# Patient Record
Sex: FEMALE | Race: WHITE | NOT HISPANIC OR LATINO | Employment: FULL TIME | URBAN - METROPOLITAN AREA
[De-identification: names, ages, dates, MRNs, and addresses within clinical notes are randomized per-mention and may not be internally consistent; named-entity substitution may affect disease eponyms.]

---

## 2017-01-14 DIAGNOSIS — G62.9 PERIPHERAL POLYNEUROPATHY: ICD-10-CM

## 2017-01-15 RX ORDER — FUROSEMIDE 20 MG/1
TABLET ORAL
Qty: 30 TABLET | Refills: 4 | Status: SHIPPED | OUTPATIENT
Start: 2017-01-15 | End: 2017-05-20 | Stop reason: SDUPTHER

## 2017-01-16 ENCOUNTER — TELEPHONE (OUTPATIENT)
Dept: INTERNAL MEDICINE | Facility: CLINIC | Age: 52
End: 2017-01-16

## 2017-01-16 ENCOUNTER — TELEPHONE (OUTPATIENT)
Dept: ENDOCRINOLOGY | Facility: CLINIC | Age: 52
End: 2017-01-16

## 2017-01-16 RX ORDER — GABAPENTIN 800 MG/1
TABLET ORAL
Qty: 120 TABLET | Refills: 3 | Status: SHIPPED | OUTPATIENT
Start: 2017-01-16 | End: 2017-03-02 | Stop reason: SDUPTHER

## 2017-01-16 NOTE — TELEPHONE ENCOUNTER
----- Message from Blu Mallory sent at 1/16/2017 12:46 PM CST -----  Contact: same  Patient called in and requested a refill on her Gabapentin 800 mgs. Patient is completed out.      [id number] - [place] - [address] [facility] [facility] [address] [facility] [address] [place]  [address] [facility] [address]  Phone: [phone] Fax: [phone]    Patient call back number is [phone]

## 2017-01-18 DIAGNOSIS — G62.9 PERIPHERAL POLYNEUROPATHY: ICD-10-CM

## 2017-01-18 RX ORDER — GABAPENTIN 800 MG/1
TABLET ORAL
Qty: 120 TABLET | Refills: 3 | OUTPATIENT
Start: 2017-01-18

## 2017-01-23 RX ORDER — FLUOXETINE HYDROCHLORIDE 40 MG/1
CAPSULE ORAL
Qty: 30 CAPSULE | Refills: 4 | Status: SHIPPED | OUTPATIENT
Start: 2017-01-23 | End: 2017-03-02 | Stop reason: SDUPTHER

## 2017-02-13 ENCOUNTER — PATIENT MESSAGE (OUTPATIENT)
Dept: ENDOCRINOLOGY | Facility: CLINIC | Age: 52
End: 2017-02-13

## 2017-02-13 DIAGNOSIS — E11.9 TYPE 2 DIABETES MELLITUS WITHOUT COMPLICATION: ICD-10-CM

## 2017-02-14 DIAGNOSIS — E11.9 TYPE 2 DIABETES MELLITUS WITHOUT COMPLICATION, UNSPECIFIED LONG TERM INSULIN USE STATUS: ICD-10-CM

## 2017-02-14 RX ORDER — LANCETS
1 EACH MISCELLANEOUS 3 TIMES DAILY
Qty: 100 EACH | Refills: 3 | OUTPATIENT
Start: 2017-02-14

## 2017-02-14 NOTE — TELEPHONE ENCOUNTER
----- Message from Michela Chin sent at 2/14/2017  1:09 PM CST -----  Contact: Patient  Mary Ann, patient [phone]. Patient is calling about a refill on Rx Bydurion. Patient requested to have a refill and was told it is denied. Please advise as to why and what she can do to correct. Patient is scheduled for 3/2/17 appointment. Thanks.

## 2017-02-17 RX ORDER — NAPROXEN SODIUM 550 MG/1
TABLET ORAL
Qty: 60 TABLET | Refills: 5 | Status: SHIPPED | OUTPATIENT
Start: 2017-02-17 | End: 2017-08-16 | Stop reason: SDUPTHER

## 2017-02-20 RX ORDER — ALPRAZOLAM 0.5 MG/1
TABLET ORAL
Qty: 30 TABLET | Refills: 1 | Status: SHIPPED | OUTPATIENT
Start: 2017-02-20 | End: 2017-04-24 | Stop reason: SDUPTHER

## 2017-02-21 ENCOUNTER — TELEPHONE (OUTPATIENT)
Dept: ENDOCRINOLOGY | Facility: CLINIC | Age: 52
End: 2017-02-21

## 2017-02-21 DIAGNOSIS — E11.8 TYPE 2 DIABETES MELLITUS WITH COMPLICATION, UNSPECIFIED LONG TERM INSULIN USE STATUS: Primary | ICD-10-CM

## 2017-02-21 NOTE — TELEPHONE ENCOUNTER
Called pt, informed her of fasting labs scheduled for Thursday as requested.  Pt verbalized understanding.

## 2017-03-02 ENCOUNTER — TELEPHONE (OUTPATIENT)
Dept: ENDOCRINOLOGY | Facility: CLINIC | Age: 52
End: 2017-03-02

## 2017-03-02 ENCOUNTER — OFFICE VISIT (OUTPATIENT)
Dept: ENDOCRINOLOGY | Facility: CLINIC | Age: 52
End: 2017-03-02
Payer: COMMERCIAL

## 2017-03-02 VITALS
HEIGHT: 64 IN | SYSTOLIC BLOOD PRESSURE: 118 MMHG | HEART RATE: 101 BPM | BODY MASS INDEX: 48.67 KG/M2 | DIASTOLIC BLOOD PRESSURE: 78 MMHG | WEIGHT: 285.06 LBS

## 2017-03-02 DIAGNOSIS — G62.9 PERIPHERAL POLYNEUROPATHY: ICD-10-CM

## 2017-03-02 DIAGNOSIS — E78.5 DYSLIPIDEMIA: ICD-10-CM

## 2017-03-02 DIAGNOSIS — E11.9 TYPE 2 DIABETES MELLITUS WITHOUT COMPLICATION, WITH LONG-TERM CURRENT USE OF INSULIN: ICD-10-CM

## 2017-03-02 DIAGNOSIS — E66.01 MORBID OBESITY, UNSPECIFIED OBESITY TYPE: ICD-10-CM

## 2017-03-02 DIAGNOSIS — Z79.4 TYPE 2 DIABETES MELLITUS WITHOUT COMPLICATION, WITH LONG-TERM CURRENT USE OF INSULIN: ICD-10-CM

## 2017-03-02 DIAGNOSIS — E11.9 TYPE 2 DIABETES MELLITUS WITHOUT COMPLICATION, WITHOUT LONG-TERM CURRENT USE OF INSULIN: ICD-10-CM

## 2017-03-02 DIAGNOSIS — R25.1 OCCASIONAL TREMORS: ICD-10-CM

## 2017-03-02 DIAGNOSIS — R25.1 TREMORS OF NERVOUS SYSTEM: ICD-10-CM

## 2017-03-02 DIAGNOSIS — I10 HTN (HYPERTENSION), BENIGN: ICD-10-CM

## 2017-03-02 PROCEDURE — 99214 OFFICE O/P EST MOD 30 MIN: CPT | Mod: S$GLB,,, | Performed by: NURSE PRACTITIONER

## 2017-03-02 PROCEDURE — 4010F ACE/ARB THERAPY RXD/TAKEN: CPT | Mod: S$GLB,,, | Performed by: NURSE PRACTITIONER

## 2017-03-02 PROCEDURE — 3046F HEMOGLOBIN A1C LEVEL >9.0%: CPT | Mod: S$GLB,,, | Performed by: NURSE PRACTITIONER

## 2017-03-02 PROCEDURE — 2022F DILAT RTA XM EVC RTNOPTHY: CPT | Mod: S$GLB,,, | Performed by: NURSE PRACTITIONER

## 2017-03-02 PROCEDURE — 99999 PR PBB SHADOW E&M-EST. PATIENT-LVL IV: CPT | Mod: PBBFAC,,, | Performed by: NURSE PRACTITIONER

## 2017-03-02 PROCEDURE — 3078F DIAST BP <80 MM HG: CPT | Mod: S$GLB,,, | Performed by: NURSE PRACTITIONER

## 2017-03-02 PROCEDURE — 1160F RVW MEDS BY RX/DR IN RCRD: CPT | Mod: S$GLB,,, | Performed by: NURSE PRACTITIONER

## 2017-03-02 PROCEDURE — 3074F SYST BP LT 130 MM HG: CPT | Mod: S$GLB,,, | Performed by: NURSE PRACTITIONER

## 2017-03-02 RX ORDER — DEXTROSE 4 G
1 TABLET,CHEWABLE ORAL 2 TIMES DAILY
Qty: 1 EACH | Refills: 1 | Status: CANCELLED | OUTPATIENT
Start: 2017-03-02 | End: 2017-05-31

## 2017-03-02 RX ORDER — DEXTROSE 4 G
TABLET,CHEWABLE ORAL
Qty: 50 EACH | Refills: 12 | Status: SHIPPED | OUTPATIENT
Start: 2017-03-02 | End: 2018-09-07

## 2017-03-02 RX ORDER — GABAPENTIN 800 MG/1
TABLET ORAL
Qty: 120 TABLET | Refills: 12 | Status: SHIPPED | OUTPATIENT
Start: 2017-03-02 | End: 2017-04-24 | Stop reason: SDUPTHER

## 2017-03-02 RX ORDER — METFORMIN HYDROCHLORIDE 1000 MG/1
1000 TABLET ORAL 2 TIMES DAILY WITH MEALS
Qty: 60 TABLET | Refills: 12 | Status: SHIPPED | OUTPATIENT
Start: 2017-03-02 | End: 2018-02-05 | Stop reason: SDUPTHER

## 2017-03-02 RX ORDER — GLIMEPIRIDE 4 MG/1
TABLET ORAL
Qty: 30 TABLET | Refills: 12 | Status: SHIPPED | OUTPATIENT
Start: 2017-03-02 | End: 2017-07-28 | Stop reason: SDUPTHER

## 2017-03-02 NOTE — PROGRESS NOTES
Subjective:      Patient ID: Mary Ann Vidales is a 52 y.o. female.    Chief Complaint:  Diabetes f/u      History of Present Illness  Pt is a 50 y/o female with TYPE 2 DM since approx 2000, and chronic conditions pending review including HTN, neuropathy. Dyslipidemia, morbid obesity.      Interim Events:  Pt not seen in > 1 year. Back for refills.  Only sporadically checking glucoses and reports 140's.  Had recent labs for life insurance in Jan 2017. A1C 7%, Chem WNL.  Trigs 250's.  Otherwise grossly abnormal. Has been out of Bydureon for last 2 weeks.     Diabetes Flow Sheet:  Diabetes Medications:  Metformin 1000 BID,  glimipiride 4 mg,  Bydureon 2 mg q weekly.    Diabetes Complications:peripheral neuropathy   Aspirin:   Statin: none   ACE/ARB:losartan hctz 100/25 qd.   Last Urine Microalbumin:   Last Eye exam: 10/16Last Diabetic Education:   Glucometer--Accucheck Verio     Review of Systems   Constitutional: Positive for fatigue. Negative for activity change.   HENT: Negative for hearing loss and trouble swallowing.    Eyes: Negative for visual disturbance.        Reports retinopathy--last exam Jan 2014   Respiratory: Negative for cough and shortness of breath.    Cardiovascular: Positive for leg swelling. Negative for chest pain.   Gastrointestinal: Negative for constipation and diarrhea.   Genitourinary: Negative for difficulty urinating and frequency.   Musculoskeletal: Positive for arthralgias. Negative for myalgias.        C/o heel pain,  > in morning   Skin: Negative for rash and wound.   Neurological: Positive for numbness (feet and hands, hands > becoming more troublesome,  hand tremors which are worsening ). Negative for weakness and light-headedness.   Hematological: Does not bruise/bleed easily.   Psychiatric/Behavioral: Positive for sleep disturbance. Negative for agitation (per partners report) and decreased concentration. The patient is not nervous/anxious.        Objective:   Physical Exam    Constitutional: She is oriented to person, place, and time. She appears well-developed and well-nourished.   HENT:   Head: Normocephalic and atraumatic.   Right Ear: External ear normal.   Left Ear: External ear normal.   Nose: Nose normal.   Eyes: Conjunctivae and EOM are normal. Pupils are equal, round, and reactive to light.   Neck: Normal range of motion. Neck supple. No tracheal deviation present. No thyromegaly present.   Cardiovascular: Normal rate, regular rhythm, normal heart sounds and intact distal pulses.    Pulmonary/Chest: Effort normal and breath sounds normal. No respiratory distress.   Musculoskeletal: Normal range of motion. She exhibits no edema.   Feet: no open wounds or calluses.    Pedal pulses + 1 bilaterally  VIbratory sensation to feet scantly decreased bilaterally    Neurological: She is alert and oriented to person, place, and time. She has normal reflexes.   Skin: Skin is warm and dry.   Psychiatric: She has a normal mood and affect. Her behavior is normal. Judgment and thought content normal.       Lab Review:   Hemoglobin A1C   Date Value Ref Range Status   10/29/2015 7.7 (H) 4.5 - 6.2 % Final   08/05/2014 6.8 (H) 4.5 - 6.2 % Final   01/03/2014 6.3 (H) 4.5 - 6.2 % Final     Lab Results   Component Value Date    LDLCALC 115.4 10/29/2015     Lab Results   Component Value Date    TSH 2.098 10/29/2015       Chemistry        Component Value Date/Time     10/29/2015 0810    K 4.6 10/29/2015 0810     10/29/2015 0810    CO2 31 (H) 10/29/2015 0810    BUN 17 10/29/2015 0810    CREATININE 1.0 10/29/2015 0810     (H) 10/29/2015 0810        Component Value Date/Time    CALCIUM 10.1 10/29/2015 0810    ALKPHOS 83 10/29/2015 0810    AST 40 10/29/2015 0810    ALT 48 (H) 10/29/2015 0810    BILITOT 0.6 10/29/2015 0810            Assessment:     1. Diabetes mellitus with neurological manifestations, uncontrolled  blood sugar diagnostic Strp    Lipid panel    Hemoglobin A1c     Comprehensive metabolic panel    Microalbumin/creatinine urine ratio     DIABETES FOOT EXAM  Chronic-stable-needs to increase SBGM    2. Type 2 diabetes mellitus without complication, with long-term current use of insulin  glimepiride (AMARYL) 4 MG tablet    metformin (GLUCOPHAGE) 1000 MG tablet   3. Peripheral polyneuropathy  gabapentin (NEURONTIN) 800 MG tablet   4. Tremors of nervous system  --chronic-worseni g-refer to neuro -   5. HTN (hypertension), benign  -chronic-stable-cont arb-though may be causing cough   6. Dyslipidemia  -chronic-reevaluate-has had financial issues with multi drugs    7. Morbid obesity, unspecified obesity type  -chronic-enc diet and exercise.    8. Type 2 diabetes mellitus without complication, without long-term current use of insulin  exenatide microspheres (BYDUREON) 2 mg SSRR   9. Occasional tremors  Ambulatory referral to Neurology           Plan:       ORDERS  3/2/17    Cons neurology-hand tremors --> 18 mo and worsening.   6 mo  With fasting cmp,  Lipids, a1c, urine m/c prior.

## 2017-03-02 NOTE — MR AVS SNAPSHOT
[facility]  [address] [facility] [address] [place] [address]  Phone: [phone]  Fax: [phone]                  Mary Ann VOGT Sobeida   3/2/2017 7:30 AM   Office Visit    Description:  Female : 1965   Provider:  NICOLE Bejarano,ANTONINAC   Department:  [facility] - Endocrinology           Diagnoses this Visit        Comments    Diabetes mellitus with neurological manifestations, uncontrolled    -  Primary     Peripheral polyneuropathy         HTN (hypertension), benign         Dyslipidemia         Morbid obesity, unspecified obesity type         Tremors of nervous system         Type 2 diabetes mellitus without complication, with long-term current use of insulin         Type 2 diabetes mellitus without complication, without long-term current use of insulin         Occasional tremors                To Do List           Future Appointments        Provider Department Dept Phone    2017 8:00 AM LAB, [place] [facility] [phone]    2017 8:15 AM URINE [facility] [phone]    2017 9:00 AM NICOLE Bejarano,ANP-C [place] [phone]      Goals (5 Years of Data)     None       These Medications        Disp Refills Start End    glimepiride (AMARYL) 4 MG tablet 30 tablet 12 3/2/2017     TAKE 1 TABLET (4 MG TOTAL) BY MOUTH BEFORE BREAKFAST.    Pharmacy: [id number] [address] AT [facility] Ph #: [phone]       exenatide microspheres (BYDUREON) 2 mg SSRR 4 each 12 3/2/2017     Inject 2 mg into the skin once a week. - Subcutaneous    Pharmacy: [facility][id number] [address] AT [facility] Ph #: [phone]       gabapentin (NEURONTIN) 800 MG tablet 120 tablet 12 3/2/2017     TAKE 2 TABLETS (1,600 MG TOTAL) BY MOUTH 2 (TWO) TIMES DAILY.    Pharmacy: [facility] #[id number] [facility] [address] AT [facility]  "[facility] Ph #: [phone]       metformin (GLUCOPHAGE) 1000 MG tablet 60 tablet 12 3/2/2017     Take 1 tablet (1,000 mg total) by mouth 2 (two) times daily with meals. - Oral    Pharmacy: [facility]/pharmacy #[id number] [address] AT [facility] Ph #: [phone]       blood sugar diagnostic Strp 50 strip 12 3/2/2017     One touch verio test strips and lancets. Pt to check glucose 1-2 x a day.    Pharmacy: [facility]/pharmacy #[id number] [address]. AT [facility] Ph #: [phone]         [facility]     [facility] On Call Nurse Care Line - 24/7 Assistance  Registered nurses in the [facility] On Call Center provide clinical advisement, health education, appointment booking, and other advisory services.  Call for this free service at [phone].             Medications           Message regarding Medications     Verify the changes and/or additions to your medication regime listed below are the same as discussed with your clinician today.  If any of these changes or additions are incorrect, please notify your healthcare provider.        START taking these NEW medications        Refills    blood sugar diagnostic Strp 12    Sig: One touch verio test strips and lancets. Pt to check glucose 1-2 x a day.    Class: Normal      CHANGE how you are taking these medications     Start Taking Instead of    exenatide microspheres (BYDUREON) 2 mg SSRR BYDUREON 2 mg SSRR    Dosage:  Inject 2 mg into the skin once a week. Dosage:  INJECT 2 MG INTO THE SKIN ONCE A WEEK.    Reason for Change:  Reorder       STOP taking these medications     insulin needles, disposable, (BD ULTRA-FINE DEJON PEN NEEDLES) 32 x 5/32 " Ndle To use as directed with Humalog pen TID           Verify that the below list of medications is an accurate representation of the medications you are currently taking.  If none reported, the list may be blank. If incorrect, please contact your " healthcare provider. Carry this list with you in case of emergency.           Current Medications     albuterol-ipratropium  mcg (COMBIVENT)  mcg/actuation inhaler Inhale 1 puff into the lungs every 4 (four) hours as needed for Wheezing. 1 Aerosol Inhalation Three times a day    alprazolam (XANAX) 0.5 MG tablet TAKE 1 TABLET EVERY DAY AS NEEDED    clotrimazole-betamethasone (LOTRISONE) cream Apply 1 applicator topically Once daily as needed.     exenatide microspheres (BYDUREON) 2 mg SSRR Inject 2 mg into the skin once a week.    fluoxetine (PROZAC) 40 MG capsule TAKE ONE CAPSULE BY MOUTH EVERY DAY    fluticasone (FLONASE) 50 mcg/actuation nasal spray 2 sprays per nostril daily    furosemide (LASIX) 20 MG tablet TAKE 1 TABLET EVERY DAY    gabapentin (NEURONTIN) 800 MG tablet TAKE 2 TABLETS (1,600 MG TOTAL) BY MOUTH 2 (TWO) TIMES DAILY.    glimepiride (AMARYL) 4 MG tablet TAKE 1 TABLET (4 MG TOTAL) BY MOUTH BEFORE BREAKFAST.    hydrocodone-acetaminophen 7.5-325mg (NORCO) 7.5-325 mg per tablet Take 1 tablet by mouth 3 (three) times daily as needed.    insulin lispro (HUMALOG KWIKPEN) 100 unit/mL InPn pen Inject 10 Units into the skin three times daily with food.    lancets Misc 1 lancet by Misc.(Non-Drug; Combo Route) route 3 (three) times daily. Use with contour next meter.    metformin (GLUCOPHAGE) 1000 MG tablet Take 1 tablet (1,000 mg total) by mouth 2 (two) times daily with meals.    naproxen sodium (ANAPROX) 550 MG tablet TAKE 1 TABLET (550 MG TOTAL) BY MOUTH 2 (TWO) TIMES DAILY AS NEEDED.    nystatin-triamcinolone (MYCOLOG II) cream Apply topically 2 (two) times daily.    omeprazole (PRILOSEC) 40 MG capsule TAKE ONE CAPSULE BY MOUTH EVERY DAY    blood sugar diagnostic Strp One touch verio test strips and lancets. Pt to check glucose 1-2 x a day.    losartan-hydrochlorothiazide 100-25 mg (HYZAAR) 100-25 mg per tablet TAKE 1 TABLET BY MOUTH ONCE DAILY.           Clinical Reference Information            Your Vitals Were     BP                   118/78 (BP Location: Right arm, Patient Position: Sitting, BP Method: Manual)           Blood Pressure          Most Recent Value    BP  118/78      Allergies as of 3/2/2017     Captopril    Lantus [Insulin Glargine]    Levemir [Insulin Detemir]    Lisinopril    Other    Antiseptic Swabs      Immunizations Administered on Date of Encounter - 3/2/2017     None      Orders Placed During Today's Visit      Normal Orders This Visit    Ambulatory referral to Neurology      DIABETES FOOT EXAM     Future Labs/Procedures Expected by Expires    Comprehensive metabolic panel  3/2/2017 3/2/2018    Hemoglobin A1c  3/2/2017 3/2/2018    Lipid panel  3/2/2017 3/2/2018    Microalbumin/creatinine urine ratio  7/3/2017 5/1/2018      Language Assistance Services     ATTENTION: Language assistance services are available, free of charge. Please call [phone].      ATENCIÓN: Si habperico estrella, tiene a bang disposición servicios gratuitos de asistencia lingüística. Llame al [phone].     CHÚ Ý: N?u b?n nói Ti?ng Vi?t, có các d?ch v? h? tr? ngôn ng? mi?n phí dành cho b?n. G?i s? [phone].         [facility] complies with applicable Federal civil rights laws and does not discriminate on the basis of race, color, national origin, age, disability, or sex.

## 2017-03-02 NOTE — TELEPHONE ENCOUNTER
One touch verio not approved by insurance plan. Contour meter and test strips were preferred by her insurance company, and a prior authorization was completed for her.

## 2017-03-02 NOTE — TELEPHONE ENCOUNTER
Please schedule pt to see neuro.  Dx hand tremors.  Referral is in the system but I was unable to schedule the appt.  Thank you.

## 2017-03-03 RX ORDER — LANCETS
1 EACH MISCELLANEOUS 3 TIMES DAILY
Qty: 125 EACH | Refills: 3 | Status: SHIPPED | OUTPATIENT
Start: 2017-03-03 | End: 2018-09-07 | Stop reason: ALTCHOICE

## 2017-03-16 RX ORDER — HYDROCODONE BITARTRATE AND ACETAMINOPHEN 7.5; 325 MG/1; MG/1
1 TABLET ORAL 3 TIMES DAILY PRN
Qty: 90 TABLET | Refills: 0 | Status: SHIPPED | OUTPATIENT
Start: 2017-03-16 | End: 2017-06-27 | Stop reason: SDUPTHER

## 2017-03-16 NOTE — TELEPHONE ENCOUNTER
Last refilled in December.  Last visit was April. i sent her message she needs to book her annual to continue refills.    Refill ok?  Thanks hamlet

## 2017-03-27 RX ORDER — OMEPRAZOLE 40 MG/1
CAPSULE, DELAYED RELEASE ORAL
Qty: 30 CAPSULE | Refills: 6 | Status: SHIPPED | OUTPATIENT
Start: 2017-03-27 | End: 2017-10-17 | Stop reason: SDUPTHER

## 2017-04-06 ENCOUNTER — TELEPHONE (OUTPATIENT)
Dept: NEUROLOGY | Facility: CLINIC | Age: 52
End: 2017-04-06

## 2017-04-24 DIAGNOSIS — G62.9 PERIPHERAL POLYNEUROPATHY: ICD-10-CM

## 2017-04-24 RX ORDER — GABAPENTIN 800 MG/1
TABLET ORAL
Qty: 120 TABLET | Refills: 2 | Status: SHIPPED | OUTPATIENT
Start: 2017-04-24 | End: 2018-04-30 | Stop reason: SDUPTHER

## 2017-04-25 RX ORDER — ALPRAZOLAM 0.5 MG/1
TABLET ORAL
Qty: 30 TABLET | Refills: 1 | Status: SHIPPED | OUTPATIENT
Start: 2017-04-25 | End: 2017-08-14 | Stop reason: SDUPTHER

## 2017-05-20 RX ORDER — FUROSEMIDE 20 MG/1
TABLET ORAL
Qty: 30 TABLET | Refills: 3 | Status: SHIPPED | OUTPATIENT
Start: 2017-05-20 | End: 2017-07-24 | Stop reason: SDUPTHER

## 2017-05-20 RX ORDER — LOSARTAN POTASSIUM AND HYDROCHLOROTHIAZIDE 25; 100 MG/1; MG/1
TABLET ORAL
Qty: 30 TABLET | Refills: 1 | Status: SHIPPED | OUTPATIENT
Start: 2017-05-20 | End: 2017-08-12 | Stop reason: SDUPTHER

## 2017-05-23 ENCOUNTER — PATIENT MESSAGE (OUTPATIENT)
Dept: INTERNAL MEDICINE | Facility: CLINIC | Age: 52
End: 2017-05-23

## 2017-06-13 RX ORDER — FLUOXETINE HYDROCHLORIDE 40 MG/1
CAPSULE ORAL
Qty: 30 CAPSULE | Refills: 3 | Status: SHIPPED | OUTPATIENT
Start: 2017-06-13 | End: 2017-10-12 | Stop reason: SDUPTHER

## 2017-06-27 RX ORDER — HYDROCODONE BITARTRATE AND ACETAMINOPHEN 7.5; 325 MG/1; MG/1
1 TABLET ORAL 3 TIMES DAILY PRN
Qty: 90 TABLET | Refills: 0 | Status: SHIPPED | OUTPATIENT
Start: 2017-06-27 | End: 2017-07-28 | Stop reason: SDUPTHER

## 2017-06-27 NOTE — TELEPHONE ENCOUNTER
Yearly visit  was due in April and she never booked yet.  I booked in July and sent her message that she needs to keep appt in July and you likely will give this refill,    Ok?  Thanks hamlet

## 2017-07-12 ENCOUNTER — TELEPHONE (OUTPATIENT)
Dept: INTERNAL MEDICINE | Facility: CLINIC | Age: 52
End: 2017-07-12

## 2017-07-12 DIAGNOSIS — Z00.00 ANNUAL PHYSICAL EXAM: Primary | ICD-10-CM

## 2017-07-17 ENCOUNTER — PATIENT MESSAGE (OUTPATIENT)
Dept: INTERNAL MEDICINE | Facility: CLINIC | Age: 52
End: 2017-07-17

## 2017-07-17 NOTE — TELEPHONE ENCOUNTER
She was just apologizing for having to reschedule physical again.    Wife leonie had to go to er over weekend and has been very sick, might need angiogram.  She will try to keep appt for 7/28.

## 2017-07-24 ENCOUNTER — LAB VISIT (OUTPATIENT)
Dept: LAB | Facility: HOSPITAL | Age: 52
End: 2017-07-24
Attending: INTERNAL MEDICINE
Payer: COMMERCIAL

## 2017-07-24 DIAGNOSIS — Z00.00 ANNUAL PHYSICAL EXAM: ICD-10-CM

## 2017-07-24 LAB
ALBUMIN SERPL BCP-MCNC: 3.6 G/DL
ALP SERPL-CCNC: 82 U/L
ALT SERPL W/O P-5'-P-CCNC: 23 U/L
ANION GAP SERPL CALC-SCNC: 12 MMOL/L
AST SERPL-CCNC: 18 U/L
BASOPHILS # BLD AUTO: 0.03 K/UL
BASOPHILS NFR BLD: 0.4 %
BILIRUB SERPL-MCNC: 0.5 MG/DL
BUN SERPL-MCNC: 25 MG/DL
CALCIUM SERPL-MCNC: 10 MG/DL
CHLORIDE SERPL-SCNC: 103 MMOL/L
CHOLEST/HDLC SERPL: 5.2 {RATIO}
CO2 SERPL-SCNC: 27 MMOL/L
CREAT SERPL-MCNC: 1.5 MG/DL
DIFFERENTIAL METHOD: ABNORMAL
EOSINOPHIL # BLD AUTO: 0.2 K/UL
EOSINOPHIL NFR BLD: 2 %
ERYTHROCYTE [DISTWIDTH] IN BLOOD BY AUTOMATED COUNT: 14 %
EST. GFR  (AFRICAN AMERICAN): 46.5 ML/MIN/1.73 M^2
EST. GFR  (NON AFRICAN AMERICAN): 40.3 ML/MIN/1.73 M^2
ESTIMATED AVG GLUCOSE: 134 MG/DL
GLUCOSE SERPL-MCNC: 208 MG/DL
HBA1C MFR BLD HPLC: 6.3 %
HCT VFR BLD AUTO: 33.7 %
HDL/CHOLESTEROL RATIO: 19.1 %
HDLC SERPL-MCNC: 183 MG/DL
HDLC SERPL-MCNC: 35 MG/DL
HGB BLD-MCNC: 11.1 G/DL
LDLC SERPL CALC-MCNC: 107.6 MG/DL
LYMPHOCYTES # BLD AUTO: 2.3 K/UL
LYMPHOCYTES NFR BLD: 28.3 %
MCH RBC QN AUTO: 30.4 PG
MCHC RBC AUTO-ENTMCNC: 32.9 G/DL
MCV RBC AUTO: 92 FL
MONOCYTES # BLD AUTO: 0.6 K/UL
MONOCYTES NFR BLD: 6.9 %
NEUTROPHILS # BLD AUTO: 5 K/UL
NEUTROPHILS NFR BLD: 62 %
NONHDLC SERPL-MCNC: 148 MG/DL
PLATELET # BLD AUTO: 174 K/UL
PMV BLD AUTO: 10.2 FL
POTASSIUM SERPL-SCNC: 4.2 MMOL/L
PROT SERPL-MCNC: 6.9 G/DL
RBC # BLD AUTO: 3.65 M/UL
SODIUM SERPL-SCNC: 142 MMOL/L
T4 FREE SERPL-MCNC: 1.13 NG/DL
TRIGL SERPL-MCNC: 202 MG/DL
TSH SERPL DL<=0.005 MIU/L-ACNC: 1.64 UIU/ML
WBC # BLD AUTO: 8.09 K/UL

## 2017-07-24 PROCEDURE — 36415 COLL VENOUS BLD VENIPUNCTURE: CPT | Mod: PO

## 2017-07-24 PROCEDURE — 80053 COMPREHEN METABOLIC PANEL: CPT

## 2017-07-24 PROCEDURE — 84443 ASSAY THYROID STIM HORMONE: CPT

## 2017-07-24 PROCEDURE — 85025 COMPLETE CBC W/AUTO DIFF WBC: CPT

## 2017-07-24 PROCEDURE — 83036 HEMOGLOBIN GLYCOSYLATED A1C: CPT

## 2017-07-24 PROCEDURE — 84439 ASSAY OF FREE THYROXINE: CPT

## 2017-07-24 PROCEDURE — 80061 LIPID PANEL: CPT

## 2017-07-25 RX ORDER — FUROSEMIDE 20 MG/1
TABLET ORAL
Qty: 30 TABLET | Refills: 3 | Status: SHIPPED | OUTPATIENT
Start: 2017-07-25 | End: 2017-10-17 | Stop reason: SDUPTHER

## 2017-07-28 ENCOUNTER — OFFICE VISIT (OUTPATIENT)
Dept: INTERNAL MEDICINE | Facility: CLINIC | Age: 52
End: 2017-07-28
Payer: COMMERCIAL

## 2017-07-28 ENCOUNTER — TELEPHONE (OUTPATIENT)
Dept: INTERNAL MEDICINE | Facility: CLINIC | Age: 52
End: 2017-07-28

## 2017-07-28 VITALS
HEIGHT: 64 IN | RESPIRATION RATE: 16 BRPM | SYSTOLIC BLOOD PRESSURE: 110 MMHG | BODY MASS INDEX: 47.88 KG/M2 | TEMPERATURE: 98 F | DIASTOLIC BLOOD PRESSURE: 75 MMHG | HEART RATE: 93 BPM | WEIGHT: 280.44 LBS

## 2017-07-28 DIAGNOSIS — E11.9 TYPE 2 DIABETES MELLITUS WITHOUT COMPLICATION, WITH LONG-TERM CURRENT USE OF INSULIN: ICD-10-CM

## 2017-07-28 DIAGNOSIS — Z79.4 TYPE 2 DIABETES MELLITUS WITHOUT COMPLICATION, WITH LONG-TERM CURRENT USE OF INSULIN: ICD-10-CM

## 2017-07-28 DIAGNOSIS — Z23 NEED FOR 23-POLYVALENT PNEUMOCOCCAL POLYSACCHARIDE VACCINE: ICD-10-CM

## 2017-07-28 DIAGNOSIS — I10 HTN (HYPERTENSION), BENIGN: ICD-10-CM

## 2017-07-28 DIAGNOSIS — E10.40 DIABETIC NEUROPATHY, TYPE I DIABETES MELLITUS: ICD-10-CM

## 2017-07-28 DIAGNOSIS — F41.9 ANXIETY: ICD-10-CM

## 2017-07-28 DIAGNOSIS — Z00.00 ANNUAL PHYSICAL EXAM: Primary | ICD-10-CM

## 2017-07-28 DIAGNOSIS — E66.01 MORBID OBESITY, UNSPECIFIED OBESITY TYPE: ICD-10-CM

## 2017-07-28 DIAGNOSIS — R53.82 CHRONIC FATIGUE: ICD-10-CM

## 2017-07-28 DIAGNOSIS — G47.33 OSA (OBSTRUCTIVE SLEEP APNEA): ICD-10-CM

## 2017-07-28 DIAGNOSIS — D53.9 ANEMIA ASSOCIATED WITH NUTRITIONAL DEFICIENCY: ICD-10-CM

## 2017-07-28 PROCEDURE — 90732 PPSV23 VACC 2 YRS+ SUBQ/IM: CPT | Mod: S$GLB,,, | Performed by: INTERNAL MEDICINE

## 2017-07-28 PROCEDURE — 99396 PREV VISIT EST AGE 40-64: CPT | Mod: 25,S$GLB,, | Performed by: INTERNAL MEDICINE

## 2017-07-28 PROCEDURE — 90471 IMMUNIZATION ADMIN: CPT | Mod: S$GLB,,, | Performed by: INTERNAL MEDICINE

## 2017-07-28 PROCEDURE — 99999 PR PBB SHADOW E&M-EST. PATIENT-LVL III: CPT | Mod: PBBFAC,,, | Performed by: INTERNAL MEDICINE

## 2017-07-28 RX ORDER — GLIMEPIRIDE 4 MG/1
TABLET ORAL
Qty: 30 TABLET | Refills: 12
Start: 2017-07-28 | End: 2018-05-30 | Stop reason: SDUPTHER

## 2017-07-28 RX ORDER — HYDROCODONE BITARTRATE AND ACETAMINOPHEN 7.5; 325 MG/1; MG/1
1 TABLET ORAL 3 TIMES DAILY PRN
Qty: 90 TABLET | Refills: 0 | Status: SHIPPED | OUTPATIENT
Start: 2017-07-28 | End: 2017-10-26 | Stop reason: SDUPTHER

## 2017-07-28 NOTE — PROGRESS NOTES
Subjective:       Patient ID: Mary Ann Vidales is a 52 y.o. female.    Chief Complaint: Annual Exam  Dictation #1  MRN:[id number]  CSN:[id number]  Dict [id number]  HPI  Review of Systems   Constitutional: Positive for fatigue.   HENT: Negative for congestion, hearing loss, sinus pressure, sneezing, sore throat and voice change.    Eyes: Negative for visual disturbance.   Respiratory: Negative for cough, chest tightness and shortness of breath.    Cardiovascular: Negative.  Negative for chest pain, palpitations and leg swelling.   Gastrointestinal: Negative.    Genitourinary: Negative for difficulty urinating, dysuria, flank pain, frequency, hematuria, menstrual problem, pelvic pain, urgency, vaginal bleeding and vaginal discharge.   Musculoskeletal: Negative.  Negative for neck pain and neck stiffness.   Skin: Negative.    Neurological: Positive for numbness. Negative for dizziness, seizures, light-headedness and headaches.   Psychiatric/Behavioral: Positive for sleep disturbance. Negative for agitation, behavioral problems and confusion. The patient is not nervous/anxious.        Objective:      Physical Exam   Constitutional: She is oriented to person, place, and time. She appears well-developed and well-nourished.   Eyes: EOM are normal. Pupils are equal, round, and reactive to light.   Neck: Normal range of motion. Neck supple. No JVD present. No thyromegaly present.   Cardiovascular: Normal rate, regular rhythm, normal heart sounds and intact distal pulses.  Exam reveals no gallop.    No murmur heard.  Pulmonary/Chest: Breath sounds normal. She has no wheezes. She has no rales.   Abdominal: Soft. Bowel sounds are normal. She exhibits no mass. There is no tenderness.   Musculoskeletal: Normal range of motion.   Lymphadenopathy:     She has no cervical adenopathy.   Neurological: She is alert and oriented to person, place, and time. She has normal reflexes. No cranial nerve deficit.   Skin: No rash noted. No erythema.    Psychiatric: She has a normal mood and affect. Judgment normal.       Assessment:       1. Annual physical exam    2. Type 2 diabetes mellitus without complication, with long-term current use of insulin    3. Morbid obesity, unspecified obesity type    4. Diabetic neuropathy, type I diabetes mellitus    5. HTN (hypertension), benign    6. Anxiety    7. Chronic fatigue    8. Diabetes mellitus with neurological manifestations, uncontrolled    9. TINA (obstructive sleep apnea)        Plan:

## 2017-07-28 NOTE — TELEPHONE ENCOUNTER
Her glucose and lipids are high and should work on lo car,lo lipid intake and exercise to lose wt. Recheck again next viist

## 2017-07-30 NOTE — PROGRESS NOTES
HISTORY OF PRESENT ILLNESS:  A 52-year-old white female patient with diabetes   who has a history of sleep apnea, on CPAP, is sleeping well, but awakens tired   and naps through the day.  She is followed by Endocrine, Dr. Nuñez, blood   sugar at home is in the 130 range.  She has seen her eye doctor and was told her   eyes are normal.  She has had a total hysterectomy, so I checked her breasts.    She has a history of diabetic neuropathy to just below her knees that has been   stable.  She had lab work done prior to this visit showing normal urinalysis,   hemoglobin A1c is 6.3, glucose, however, was 208, but she said she had some   sweets the night before.  Creatinine also was now 1.5, previously it had been   normal.  Triglyceride 202.  CBC shows mild anemia, which she has had in the past   and thyroid function is normal.    PHYSICAL EXAMINATION:  VITAL SIGNS:  Normal.  SKIN:  Fair and healthy.  HEENT:  Clear.  NECK:  Shows no adenopathy, thyroid enlargement or bruit.  CHEST:  Clear.  HEART:  There is no murmur or gallop.  ABDOMEN:  Obese, nontender, no organomegaly.  EXTREMITIES:  Show normal muscles, joints, pulses.  NEUROLOGIC:  She has decreased sensation to proximal leg bilaterally about the   same level, which is a few inches below her knee.  She has normal appearing   skin.  She has no wounds, on her skin.  She has good pulses, good capillary   filling.  Her nails look good.  She does have some sensation in her feet.    IMPRESSION:  1.  Diabetes with diabetic neuropathy.  2.  Renal impairment, which is new.  3.  Mild anemia, which I am going to redo her evaluation.  4.  Obesity.  She has been losing weight over the last couple of years.  5.  Hyperlipidemia.    I have given her what refill she needs.  I did check her breasts, she has no   mass upon palpation, I ordered mammogram.  She is due a pneumococcal vaccine,   which she was to get this year.  I have ordered the additional lab on her and we   will see  her again in six months or before depending on her renal function.      JORGE LUIS/JOSE  dd: 07/29/2017 17:20:26 (CDT)  td: 07/29/2017 20:03:13 (CDT)  Doc ID   #[id number]  Job ID #[id number]    CC:

## 2017-08-11 ENCOUNTER — LAB VISIT (OUTPATIENT)
Dept: LAB | Facility: HOSPITAL | Age: 52
End: 2017-08-11
Attending: NURSE PRACTITIONER
Payer: COMMERCIAL

## 2017-08-11 DIAGNOSIS — Z79.4 TYPE 2 DIABETES MELLITUS WITHOUT COMPLICATION, WITH LONG-TERM CURRENT USE OF INSULIN: ICD-10-CM

## 2017-08-11 DIAGNOSIS — R53.82 CHRONIC FATIGUE: ICD-10-CM

## 2017-08-11 DIAGNOSIS — E11.9 TYPE 2 DIABETES MELLITUS WITHOUT COMPLICATION, WITH LONG-TERM CURRENT USE OF INSULIN: ICD-10-CM

## 2017-08-11 LAB
ALBUMIN SERPL BCP-MCNC: 3.5 G/DL
ALP SERPL-CCNC: 82 U/L
ALT SERPL W/O P-5'-P-CCNC: 27 U/L
ANION GAP SERPL CALC-SCNC: 10 MMOL/L
ANION GAP SERPL CALC-SCNC: 10 MMOL/L
AST SERPL-CCNC: 23 U/L
BASOPHILS # BLD AUTO: 0.02 K/UL
BASOPHILS NFR BLD: 0.3 %
BILIRUB SERPL-MCNC: 0.6 MG/DL
BUN SERPL-MCNC: 27 MG/DL
BUN SERPL-MCNC: 27 MG/DL
CALCIUM SERPL-MCNC: 9.8 MG/DL
CALCIUM SERPL-MCNC: 9.8 MG/DL
CHLORIDE SERPL-SCNC: 100 MMOL/L
CHLORIDE SERPL-SCNC: 100 MMOL/L
CHOLEST/HDLC SERPL: 5.4 {RATIO}
CO2 SERPL-SCNC: 28 MMOL/L
CO2 SERPL-SCNC: 28 MMOL/L
CREAT SERPL-MCNC: 1.5 MG/DL
CREAT SERPL-MCNC: 1.5 MG/DL
DIFFERENTIAL METHOD: ABNORMAL
EOSINOPHIL # BLD AUTO: 0.2 K/UL
EOSINOPHIL NFR BLD: 2.4 %
ERYTHROCYTE [DISTWIDTH] IN BLOOD BY AUTOMATED COUNT: 13.7 %
ERYTHROCYTE [SEDIMENTATION RATE] IN BLOOD BY WESTERGREN METHOD: 55 MM/HR
EST. GFR  (AFRICAN AMERICAN): 46.5 ML/MIN/1.73 M^2
EST. GFR  (AFRICAN AMERICAN): 46.5 ML/MIN/1.73 M^2
EST. GFR  (NON AFRICAN AMERICAN): 40.3 ML/MIN/1.73 M^2
EST. GFR  (NON AFRICAN AMERICAN): 40.3 ML/MIN/1.73 M^2
ESTIMATED AVG GLUCOSE: 160 MG/DL
GLUCOSE SERPL-MCNC: 225 MG/DL
GLUCOSE SERPL-MCNC: 225 MG/DL
HBA1C MFR BLD HPLC: 7.2 %
HCT VFR BLD AUTO: 33.9 %
HDL/CHOLESTEROL RATIO: 18.7 %
HDLC SERPL-MCNC: 198 MG/DL
HDLC SERPL-MCNC: 37 MG/DL
HGB BLD-MCNC: 11.4 G/DL
IRON SERPL-MCNC: 62 UG/DL
LDLC SERPL CALC-MCNC: 117 MG/DL
LYMPHOCYTES # BLD AUTO: 2.1 K/UL
LYMPHOCYTES NFR BLD: 25.8 %
MCH RBC QN AUTO: 30.7 PG
MCHC RBC AUTO-ENTMCNC: 33.6 G/DL
MCV RBC AUTO: 91 FL
MONOCYTES # BLD AUTO: 0.7 K/UL
MONOCYTES NFR BLD: 8.2 %
NEUTROPHILS # BLD AUTO: 5 K/UL
NEUTROPHILS NFR BLD: 62.9 %
NONHDLC SERPL-MCNC: 161 MG/DL
PLATELET # BLD AUTO: 169 K/UL
PMV BLD AUTO: 9.8 FL
POTASSIUM SERPL-SCNC: 4.9 MMOL/L
POTASSIUM SERPL-SCNC: 4.9 MMOL/L
PROT SERPL-MCNC: 6.7 G/DL
RBC # BLD AUTO: 3.71 M/UL
RETICS/RBC NFR AUTO: 2.8 %
SATURATED IRON: 15 %
SODIUM SERPL-SCNC: 138 MMOL/L
SODIUM SERPL-SCNC: 138 MMOL/L
TOTAL IRON BINDING CAPACITY: 422 UG/DL
TRANSFERRIN SERPL-MCNC: 285 MG/DL
TRIGL SERPL-MCNC: 220 MG/DL
VIT B12 SERPL-MCNC: 1825 PG/ML
WBC # BLD AUTO: 7.96 K/UL

## 2017-08-11 PROCEDURE — 80053 COMPREHEN METABOLIC PANEL: CPT

## 2017-08-11 PROCEDURE — 83540 ASSAY OF IRON: CPT

## 2017-08-11 PROCEDURE — 85045 AUTOMATED RETICULOCYTE COUNT: CPT

## 2017-08-11 PROCEDURE — 83036 HEMOGLOBIN GLYCOSYLATED A1C: CPT

## 2017-08-11 PROCEDURE — 36415 COLL VENOUS BLD VENIPUNCTURE: CPT | Mod: PO

## 2017-08-11 PROCEDURE — 80061 LIPID PANEL: CPT

## 2017-08-11 PROCEDURE — 85651 RBC SED RATE NONAUTOMATED: CPT

## 2017-08-11 PROCEDURE — 82607 VITAMIN B-12: CPT

## 2017-08-11 PROCEDURE — 85025 COMPLETE CBC W/AUTO DIFF WBC: CPT

## 2017-08-12 RX ORDER — LOSARTAN POTASSIUM AND HYDROCHLOROTHIAZIDE 25; 100 MG/1; MG/1
TABLET ORAL
Qty: 30 TABLET | Refills: 6 | Status: SHIPPED | OUTPATIENT
Start: 2017-08-12 | End: 2018-02-07 | Stop reason: SDUPTHER

## 2017-08-15 RX ORDER — ALPRAZOLAM 0.5 MG/1
TABLET ORAL
Qty: 30 TABLET | Refills: 2 | Status: SHIPPED | OUTPATIENT
Start: 2017-08-15 | End: 2018-01-09 | Stop reason: SDUPTHER

## 2017-08-15 NOTE — TELEPHONE ENCOUNTER
You did her hydrocodone recently. This was last filled 2 times in April.  I called to pharmacy. Please sign to chart.    Thanks hamlet

## 2017-08-16 RX ORDER — NAPROXEN SODIUM 550 MG/1
TABLET ORAL
Qty: 60 TABLET | Refills: 5 | Status: SHIPPED | OUTPATIENT
Start: 2017-08-16 | End: 2018-09-10 | Stop reason: ALTCHOICE

## 2017-08-28 ENCOUNTER — TELEPHONE (OUTPATIENT)
Dept: INTERNAL MEDICINE | Facility: CLINIC | Age: 52
End: 2017-08-28

## 2017-08-28 ENCOUNTER — PATIENT MESSAGE (OUTPATIENT)
Dept: INTERNAL MEDICINE | Facility: CLINIC | Age: 52
End: 2017-08-28

## 2017-08-28 NOTE — TELEPHONE ENCOUNTER
----- Message from Vishnu Batres MD sent at 8/28/2017 10:53 AM CDT -----  Her lab is good except for high glucose and elevated creatinine, so needs to drink more fluid and work on wt reduction and lo CHO diet

## 2017-10-02 DIAGNOSIS — M25.562 ACUTE PAIN OF LEFT KNEE: Primary | ICD-10-CM

## 2017-10-05 ENCOUNTER — OFFICE VISIT (OUTPATIENT)
Dept: ORTHOPEDICS | Facility: CLINIC | Age: 52
End: 2017-10-05
Payer: COMMERCIAL

## 2017-10-05 ENCOUNTER — HOSPITAL ENCOUNTER (OUTPATIENT)
Dept: RADIOLOGY | Facility: HOSPITAL | Age: 52
Discharge: HOME | End: 2017-10-05
Attending: ORTHOPAEDIC SURGERY
Payer: COMMERCIAL

## 2017-10-05 VITALS — BODY MASS INDEX: 47.8 KG/M2 | WEIGHT: 280 LBS | HEIGHT: 64 IN

## 2017-10-05 DIAGNOSIS — Z79.4 TYPE 2 DIABETES MELLITUS WITHOUT COMPLICATION, WITH LONG-TERM CURRENT USE OF INSULIN: ICD-10-CM

## 2017-10-05 DIAGNOSIS — M25.561 ACUTE PAIN OF RIGHT KNEE: ICD-10-CM

## 2017-10-05 DIAGNOSIS — S82.141A CLOSED FRACTURE OF RIGHT TIBIAL PLATEAU, INITIAL ENCOUNTER: Primary | ICD-10-CM

## 2017-10-05 DIAGNOSIS — M79.642 LEFT HAND PAIN: ICD-10-CM

## 2017-10-05 DIAGNOSIS — S62.647A CLOSED NONDISPLACED FRACTURE OF PROXIMAL PHALANX OF LEFT LITTLE FINGER, INITIAL ENCOUNTER: ICD-10-CM

## 2017-10-05 DIAGNOSIS — E11.9 TYPE 2 DIABETES MELLITUS WITHOUT COMPLICATION, WITH LONG-TERM CURRENT USE OF INSULIN: ICD-10-CM

## 2017-10-05 DIAGNOSIS — M25.562 ACUTE PAIN OF LEFT KNEE: ICD-10-CM

## 2017-10-05 PROCEDURE — 73562 X-RAY EXAM OF KNEE 3: CPT | Mod: 26,RT,, | Performed by: RADIOLOGY

## 2017-10-05 PROCEDURE — 99214 OFFICE O/P EST MOD 30 MIN: CPT | Mod: 57,S$GLB,, | Performed by: ORTHOPAEDIC SURGERY

## 2017-10-05 PROCEDURE — 27530 TREAT KNEE FRACTURE: CPT | Mod: RT,S$GLB,, | Performed by: ORTHOPAEDIC SURGERY

## 2017-10-05 PROCEDURE — 99999 PR PBB SHADOW E&M-EST. PATIENT-LVL II: CPT | Mod: PBBFAC,,, | Performed by: ORTHOPAEDIC SURGERY

## 2017-10-05 PROCEDURE — 26720 TREAT FINGER FRACTURE EACH: CPT | Mod: 51,LT,S$GLB, | Performed by: ORTHOPAEDIC SURGERY

## 2017-10-05 PROCEDURE — 73560 X-RAY EXAM OF KNEE 1 OR 2: CPT | Mod: TC,PO,LT

## 2017-10-05 PROCEDURE — 73560 X-RAY EXAM OF KNEE 1 OR 2: CPT | Mod: 26,59,LT, | Performed by: RADIOLOGY

## 2017-10-05 RX ORDER — HYDROCODONE BITARTRATE AND ACETAMINOPHEN 5; 325 MG/1; MG/1
TABLET ORAL
COMMUNITY
Start: 2017-09-25 | End: 2017-10-05 | Stop reason: SDUPTHER
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
## 2017-10-12 RX ORDER — FLUOXETINE HYDROCHLORIDE 40 MG/1
40 CAPSULE ORAL DAILY
Qty: 30 CAPSULE | Refills: 5 | Status: SHIPPED | OUTPATIENT
Start: 2017-10-12 | End: 2018-04-02 | Stop reason: SDUPTHER

## 2017-10-22 RX ORDER — FUROSEMIDE 20 MG/1
20 TABLET ORAL DAILY
Qty: 90 TABLET | Refills: 3 | Status: SHIPPED | OUTPATIENT
Start: 2017-10-22 | End: 2018-10-26 | Stop reason: SDUPTHER

## 2017-10-22 RX ORDER — OMEPRAZOLE 40 MG/1
40 CAPSULE, DELAYED RELEASE ORAL DAILY
Qty: 90 CAPSULE | Refills: 3 | Status: SHIPPED | OUTPATIENT
Start: 2017-10-22 | End: 2018-10-31 | Stop reason: SDUPTHER

## 2017-10-26 NOTE — TELEPHONE ENCOUNTER
Last seen and refilled 7/28/17.    Please approve.  I told her you'd be back Monday to sign rx.    Thanks hamlet

## 2017-10-27 DIAGNOSIS — Z09 FRACTURE FOLLOW-UP: Primary | ICD-10-CM

## 2017-10-28 ENCOUNTER — HOSPITAL ENCOUNTER (OUTPATIENT)
Dept: RADIOLOGY | Facility: HOSPITAL | Age: 52
Discharge: HOME | End: 2017-10-28
Attending: ORTHOPAEDIC SURGERY
Payer: COMMERCIAL

## 2017-10-28 ENCOUNTER — OFFICE VISIT (OUTPATIENT)
Dept: ORTHOPEDICS | Facility: CLINIC | Age: 52
End: 2017-10-28
Payer: COMMERCIAL

## 2017-10-28 VITALS — BODY MASS INDEX: 47.8 KG/M2 | WEIGHT: 280 LBS | HEIGHT: 64 IN

## 2017-10-28 DIAGNOSIS — E11.9 TYPE 2 DIABETES MELLITUS WITHOUT COMPLICATION, WITH LONG-TERM CURRENT USE OF INSULIN: ICD-10-CM

## 2017-10-28 DIAGNOSIS — M17.11 PRIMARY OSTEOARTHRITIS OF RIGHT KNEE: Primary | ICD-10-CM

## 2017-10-28 DIAGNOSIS — Z79.4 TYPE 2 DIABETES MELLITUS WITHOUT COMPLICATION, WITH LONG-TERM CURRENT USE OF INSULIN: ICD-10-CM

## 2017-10-28 DIAGNOSIS — Z09 FRACTURE FOLLOW-UP: ICD-10-CM

## 2017-10-28 PROCEDURE — 99999 PR PBB SHADOW E&M-EST. PATIENT-LVL II: CPT | Mod: PBBFAC,,, | Performed by: ORTHOPAEDIC SURGERY

## 2017-10-28 PROCEDURE — 73562 X-RAY EXAM OF KNEE 3: CPT | Mod: 26,RT,, | Performed by: RADIOLOGY

## 2017-10-28 PROCEDURE — 20610 DRAIN/INJ JOINT/BURSA W/O US: CPT | Mod: 58,RT,S$GLB, | Performed by: ORTHOPAEDIC SURGERY

## 2017-10-28 PROCEDURE — 73560 X-RAY EXAM OF KNEE 1 OR 2: CPT | Mod: 26,LT,, | Performed by: RADIOLOGY

## 2017-10-28 PROCEDURE — 99214 OFFICE O/P EST MOD 30 MIN: CPT | Mod: 24,25,S$GLB, | Performed by: ORTHOPAEDIC SURGERY

## 2017-10-28 PROCEDURE — 73130 X-RAY EXAM OF HAND: CPT | Mod: 26,LT,, | Performed by: RADIOLOGY

## 2017-10-28 PROCEDURE — 73130 X-RAY EXAM OF HAND: CPT | Mod: TC,PO,LT

## 2017-10-28 PROCEDURE — 73560 X-RAY EXAM OF KNEE 1 OR 2: CPT | Mod: TC,PO,LT

## 2017-10-28 RX ORDER — TRIAMCINOLONE ACETONIDE 40 MG/ML
40 INJECTION, SUSPENSION INTRA-ARTICULAR; INTRAMUSCULAR
Status: DISCONTINUED | OUTPATIENT
Start: 2017-10-28 | End: 2017-10-28 | Stop reason: HOSPADM

## 2017-10-28 RX ADMIN — TRIAMCINOLONE ACETONIDE 40 MG: 40 INJECTION, SUSPENSION INTRA-ARTICULAR; INTRAMUSCULAR at 09:10

## 2017-10-28 NOTE — PROCEDURES
Large Joint Aspiration/Injection  Date/Time: 10/28/2017 9:03 AM  Performed by: GURVINDER SIMS  Authorized by: GURVINDER SIMS.     Consent Done?:  Yes (Verbal)  Indications:  Pain  Timeout: Prior to procedure the correct patient, procedure, and site was verified      Location:  Knee  Site:  R knee  Prep: Patient was prepped and draped in usual sterile fashion    Ultrasonic Guidance for needle placement: No  Needle size:  21 G  Approach:  Anterolateral  Medications:  40 mg triamcinolone acetonide 40 mg/mL  Patient tolerance:  Patient tolerated the procedure well with no immediate complications
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
## 2017-10-30 RX ORDER — HYDROCODONE BITARTRATE AND ACETAMINOPHEN 7.5; 325 MG/1; MG/1
1 TABLET ORAL 3 TIMES DAILY PRN
Qty: 90 TABLET | Refills: 0 | Status: SHIPPED | OUTPATIENT
Start: 2017-10-30 | End: 2017-11-22 | Stop reason: ALTCHOICE

## 2017-11-29 ENCOUNTER — OFFICE VISIT (OUTPATIENT)
Dept: FAMILY MEDICINE | Facility: CLINIC | Age: 52
End: 2017-11-29
Payer: COMMERCIAL

## 2017-11-29 VITALS
BODY MASS INDEX: 47.39 KG/M2 | HEART RATE: 98 BPM | SYSTOLIC BLOOD PRESSURE: 100 MMHG | DIASTOLIC BLOOD PRESSURE: 80 MMHG | WEIGHT: 277.56 LBS | HEIGHT: 64 IN | RESPIRATION RATE: 16 BRPM

## 2017-11-29 DIAGNOSIS — S01.01XA LACERATION OF SCALP, INITIAL ENCOUNTER: ICD-10-CM

## 2017-11-29 PROCEDURE — 90471 IMMUNIZATION ADMIN: CPT | Mod: S$GLB,,, | Performed by: FAMILY MEDICINE

## 2017-11-29 PROCEDURE — 99999 PR PBB SHADOW E&M-EST. PATIENT-LVL IV: CPT | Mod: PBBFAC,,, | Performed by: PHYSICIAN ASSISTANT

## 2017-11-29 PROCEDURE — 90715 TDAP VACCINE 7 YRS/> IM: CPT | Mod: S$GLB,,, | Performed by: FAMILY MEDICINE

## 2017-11-29 PROCEDURE — 99214 OFFICE O/P EST MOD 30 MIN: CPT | Mod: 25,S$GLB,, | Performed by: PHYSICIAN ASSISTANT

## 2017-11-29 RX ORDER — HYDROCODONE BITARTRATE AND ACETAMINOPHEN 7.5; 325 MG/15ML; MG/15ML
SOLUTION ORAL
Refills: 0 | COMMUNITY
Start: 2017-11-15 | End: 2018-03-07

## 2017-11-29 RX ORDER — BLOOD SUGAR DIAGNOSTIC
STRIP MISCELLANEOUS
COMMUNITY
Start: 2017-11-15 | End: 2018-09-07

## 2017-11-29 RX ORDER — HYDROCODONE BITARTRATE AND ACETAMINOPHEN 7.5; 325 MG/15ML; MG/15ML
SOLUTION ORAL
COMMUNITY
Start: 2017-11-15 | End: 2017-11-29 | Stop reason: SDUPTHER
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
## 2017-12-20 DIAGNOSIS — M25.561 CHRONIC PAIN OF RIGHT KNEE: Primary | ICD-10-CM

## 2017-12-20 DIAGNOSIS — G89.29 CHRONIC PAIN OF RIGHT KNEE: Primary | ICD-10-CM

## 2017-12-20 DIAGNOSIS — M79.645 PAIN IN FINGER OF LEFT HAND: ICD-10-CM

## 2017-12-20 DIAGNOSIS — M79.644 PAIN OF FINGER OF RIGHT HAND: ICD-10-CM

## 2018-01-09 NOTE — TELEPHONE ENCOUNTER
She is due for 6 mos ck feb.  Last refilled 3 times 8/15.  I called in 3 more refills to recorder.    Please sign to chart.    I sent her a msg to set up her 6 mos visit soon.    Thanks hamlet

## 2018-01-10 RX ORDER — ALPRAZOLAM 0.5 MG/1
0.5 TABLET ORAL DAILY PRN
Qty: 30 TABLET | Refills: 2 | Status: SHIPPED | OUTPATIENT
Start: 2018-01-10 | End: 2018-04-25 | Stop reason: SDUPTHER

## 2018-02-05 DIAGNOSIS — E11.9 TYPE 2 DIABETES MELLITUS WITHOUT COMPLICATION, WITH LONG-TERM CURRENT USE OF INSULIN: ICD-10-CM

## 2018-02-05 DIAGNOSIS — Z79.4 TYPE 2 DIABETES MELLITUS WITHOUT COMPLICATION, WITH LONG-TERM CURRENT USE OF INSULIN: ICD-10-CM

## 2018-02-06 RX ORDER — METFORMIN HYDROCHLORIDE 1000 MG/1
1000 TABLET ORAL 2 TIMES DAILY WITH MEALS
Qty: 60 TABLET | Refills: 3 | Status: SHIPPED | OUTPATIENT
Start: 2018-02-06 | End: 2018-07-31 | Stop reason: ALTCHOICE

## 2018-02-08 RX ORDER — LOSARTAN POTASSIUM AND HYDROCHLOROTHIAZIDE 25; 100 MG/1; MG/1
TABLET ORAL
Qty: 30 TABLET | Refills: 3 | Status: SHIPPED | OUTPATIENT
Start: 2018-02-08 | End: 2018-06-16 | Stop reason: SDUPTHER

## 2018-03-06 ENCOUNTER — PATIENT MESSAGE (OUTPATIENT)
Dept: INTERNAL MEDICINE | Facility: CLINIC | Age: 53
End: 2018-03-06

## 2018-03-08 ENCOUNTER — PATIENT MESSAGE (OUTPATIENT)
Dept: INTERNAL MEDICINE | Facility: CLINIC | Age: 53
End: 2018-03-08

## 2018-03-08 RX ORDER — HYDROCODONE BITARTRATE AND ACETAMINOPHEN 7.5; 325 MG/1; MG/1
1 TABLET ORAL EVERY 6 HOURS PRN
Qty: 90 TABLET | Refills: 0 | Status: SHIPPED | OUTPATIENT
Start: 2018-03-08 | End: 2018-09-10 | Stop reason: SDUPTHER

## 2018-03-24 DIAGNOSIS — G62.9 PERIPHERAL POLYNEUROPATHY: ICD-10-CM

## 2018-03-24 RX ORDER — GABAPENTIN 800 MG/1
TABLET ORAL
Qty: 120 TABLET | Refills: 8
Start: 2018-03-24

## 2018-04-05 RX ORDER — FLUOXETINE HYDROCHLORIDE 40 MG/1
40 CAPSULE ORAL DAILY
Qty: 30 CAPSULE | Refills: 5 | Status: SHIPPED | OUTPATIENT
Start: 2018-04-05 | End: 2018-09-28 | Stop reason: SDUPTHER

## 2018-04-16 DIAGNOSIS — G62.9 PERIPHERAL POLYNEUROPATHY: ICD-10-CM

## 2018-04-17 RX ORDER — GABAPENTIN 800 MG/1
TABLET ORAL
Qty: 120 TABLET | Refills: 8 | Status: SHIPPED | OUTPATIENT
Start: 2018-04-17 | End: 2019-02-16 | Stop reason: SDUPTHER

## 2018-04-26 RX ORDER — ALPRAZOLAM 0.5 MG/1
TABLET ORAL
Qty: 30 TABLET | Refills: 2 | Status: SHIPPED | OUTPATIENT
Start: 2018-04-26 | End: 2018-07-30 | Stop reason: SDUPTHER

## 2018-04-26 NOTE — TELEPHONE ENCOUNTER
Called in alprazolam 30 x 2 refills to recorder at   [id number] - [place] - [address] AT [facility]   [address] [place] [address]   Phone: [phone]      As dr read approved.

## 2018-04-30 ENCOUNTER — HOSPITAL ENCOUNTER (OUTPATIENT)
Dept: RADIOLOGY | Facility: HOSPITAL | Age: 53
Discharge: HOME | End: 2018-04-30
Attending: INTERNAL MEDICINE
Payer: COMMERCIAL

## 2018-04-30 ENCOUNTER — OFFICE VISIT (OUTPATIENT)
Dept: INTERNAL MEDICINE | Facility: CLINIC | Age: 53
End: 2018-04-30
Payer: COMMERCIAL

## 2018-04-30 VITALS
RESPIRATION RATE: 18 BRPM | DIASTOLIC BLOOD PRESSURE: 94 MMHG | WEIGHT: 289.44 LBS | SYSTOLIC BLOOD PRESSURE: 116 MMHG | HEIGHT: 65 IN | TEMPERATURE: 98 F | BODY MASS INDEX: 48.22 KG/M2 | HEART RATE: 80 BPM

## 2018-04-30 DIAGNOSIS — E11.9 TYPE 2 DIABETES MELLITUS WITHOUT COMPLICATION, WITH LONG-TERM CURRENT USE OF INSULIN: ICD-10-CM

## 2018-04-30 DIAGNOSIS — S99.929A INJURY OF TOE, UNSPECIFIED LATERALITY, INITIAL ENCOUNTER: ICD-10-CM

## 2018-04-30 DIAGNOSIS — F41.9 ANXIETY: ICD-10-CM

## 2018-04-30 DIAGNOSIS — Z00.00 ANNUAL PHYSICAL EXAM: ICD-10-CM

## 2018-04-30 DIAGNOSIS — E66.01 MORBID OBESITY, UNSPECIFIED OBESITY TYPE: ICD-10-CM

## 2018-04-30 DIAGNOSIS — Z79.4 TYPE 2 DIABETES MELLITUS WITHOUT COMPLICATION, WITH LONG-TERM CURRENT USE OF INSULIN: ICD-10-CM

## 2018-04-30 DIAGNOSIS — E10.40 DIABETIC NEUROPATHY, TYPE I DIABETES MELLITUS: ICD-10-CM

## 2018-04-30 DIAGNOSIS — Z12.39 BREAST CANCER SCREENING: ICD-10-CM

## 2018-04-30 DIAGNOSIS — I10 HTN (HYPERTENSION), BENIGN: ICD-10-CM

## 2018-04-30 DIAGNOSIS — G47.33 OSA (OBSTRUCTIVE SLEEP APNEA): Primary | ICD-10-CM

## 2018-04-30 DIAGNOSIS — S91.209A AVULSION OF TOENAIL, INITIAL ENCOUNTER: ICD-10-CM

## 2018-04-30 DIAGNOSIS — R53.82 CHRONIC FATIGUE: ICD-10-CM

## 2018-04-30 DIAGNOSIS — F32.A DEPRESSION, UNSPECIFIED DEPRESSION TYPE: ICD-10-CM

## 2018-04-30 PROCEDURE — 71046 X-RAY EXAM CHEST 2 VIEWS: CPT | Mod: TC,PO

## 2018-04-30 PROCEDURE — 93010 ELECTROCARDIOGRAM REPORT: CPT | Mod: S$GLB,,, | Performed by: INTERNAL MEDICINE

## 2018-04-30 PROCEDURE — 93005 ELECTROCARDIOGRAM TRACING: CPT | Mod: S$GLB,,, | Performed by: INTERNAL MEDICINE

## 2018-04-30 PROCEDURE — 99999 PR PBB SHADOW E&M-EST. PATIENT-LVL V: CPT | Mod: PBBFAC,,, | Performed by: INTERNAL MEDICINE

## 2018-04-30 PROCEDURE — 3074F SYST BP LT 130 MM HG: CPT | Mod: CPTII,S$GLB,, | Performed by: INTERNAL MEDICINE

## 2018-04-30 PROCEDURE — 3080F DIAST BP >= 90 MM HG: CPT | Mod: CPTII,S$GLB,, | Performed by: INTERNAL MEDICINE

## 2018-04-30 PROCEDURE — 3045F PR MOST RECENT HEMOGLOBIN A1C LEVEL 7.0-9.0%: CPT | Mod: CPTII,S$GLB,, | Performed by: INTERNAL MEDICINE

## 2018-04-30 PROCEDURE — 71046 X-RAY EXAM CHEST 2 VIEWS: CPT | Mod: 26,,, | Performed by: RADIOLOGY

## 2018-04-30 PROCEDURE — 99214 OFFICE O/P EST MOD 30 MIN: CPT | Mod: S$GLB,,, | Performed by: INTERNAL MEDICINE

## 2018-05-02 ENCOUNTER — TELEPHONE (OUTPATIENT)
Dept: INTERNAL MEDICINE | Facility: CLINIC | Age: 53
End: 2018-05-02

## 2018-05-02 DIAGNOSIS — I10 BENIGN HYPERTENSION: Primary | ICD-10-CM

## 2018-05-02 DIAGNOSIS — Z91.89 AT RISK FOR CORONARY ARTERY DISEASE: ICD-10-CM

## 2018-05-02 NOTE — TELEPHONE ENCOUNTER
She wants to go ahead and schedule the calcium scoring , she was concerned since ekg done.  She will also need kidney function rechecked for the ct.    I entered both orders and will have renae call her to schedule. (referral msg sent)    She wanted your comments on cxr results.    Please advise.    Thanks hamlet    (we can send Blood cell Storage msg on cxr results)

## 2018-05-02 NOTE — TELEPHONE ENCOUNTER
----- Message from Margo Mares sent at 5/2/2018  9:05 AM CDT -----  Contact: Pt [phone]  Pt would like a call back from the nurse regarding getting a calcium scoring lab test ordered.

## 2018-05-03 ENCOUNTER — PATIENT MESSAGE (OUTPATIENT)
Dept: INTERNAL MEDICINE | Facility: CLINIC | Age: 53
End: 2018-05-03

## 2018-05-03 ENCOUNTER — OFFICE VISIT (OUTPATIENT)
Dept: PODIATRY | Facility: CLINIC | Age: 53
End: 2018-05-03
Payer: COMMERCIAL

## 2018-05-03 VITALS — BODY MASS INDEX: 48.22 KG/M2 | WEIGHT: 289.44 LBS | HEIGHT: 65 IN

## 2018-05-03 DIAGNOSIS — R60.0 BILATERAL LOWER EXTREMITY EDEMA: ICD-10-CM

## 2018-05-03 DIAGNOSIS — M20.42 HAMMER TOES OF BOTH FEET: ICD-10-CM

## 2018-05-03 DIAGNOSIS — E11.51 TYPE II DIABETES MELLITUS WITH PERIPHERAL CIRCULATORY DISORDER: Primary | ICD-10-CM

## 2018-05-03 DIAGNOSIS — B35.1 ONYCHOMYCOSIS DUE TO DERMATOPHYTE: ICD-10-CM

## 2018-05-03 DIAGNOSIS — E11.49 TYPE II DIABETES MELLITUS WITH NEUROLOGICAL MANIFESTATIONS: ICD-10-CM

## 2018-05-03 DIAGNOSIS — M20.41 HAMMER TOES OF BOTH FEET: ICD-10-CM

## 2018-05-03 PROCEDURE — 99203 OFFICE O/P NEW LOW 30 MIN: CPT | Mod: S$GLB,,, | Performed by: PODIATRIST

## 2018-05-03 PROCEDURE — 3008F BODY MASS INDEX DOCD: CPT | Mod: CPTII,S$GLB,, | Performed by: PODIATRIST

## 2018-05-03 PROCEDURE — 3045F PR MOST RECENT HEMOGLOBIN A1C LEVEL 7.0-9.0%: CPT | Mod: CPTII,S$GLB,, | Performed by: PODIATRIST

## 2018-05-03 PROCEDURE — 99999 PR PBB SHADOW E&M-EST. PATIENT-LVL III: CPT | Mod: PBBFAC,,, | Performed by: PODIATRIST

## 2018-05-03 NOTE — LETTER
May 8, 2018      Vishnu Batres MD  [address] [facility] [address]  [place] [address]           [facility]  [address] [facility] [address] [place] [address]  Phone: [phone]          Patient: Mary Ann Viadles   MR Number: [id number]   YOB: 1965   Date of Visit: 5/3/2018       Dear Dr. Vishnu Batres:    Thank you for referring Mary Ann Vidales to me for evaluation. Attached you will find relevant portions of my assessment and plan of care.    If you have questions, please do not hesitate to call me. I look forward to following Mary Ann Vidales along with you.    Sincerely,        Enclosure  CC:  No Recipients    If you would like to receive this communication electronically, please contact [email or web address] or [phone] to request more information on [employer] access.    For providers and/or their staff who would like to refer a patient to [facility], please contact us through our one-stop-shop provider referral line, Sole Nixon, at [phone].    If you feel you have received this communication in error or would no longer like to receive these types of communications, please e-mail [email or web address]

## 2018-05-13 NOTE — PROGRESS NOTES
Subjective:      Patient ID: Mary Ann Vidales is a 53 y.o. female.    Chief Complaint: Foot Problem (Toes bruised and loosing toenails, PCP--4/30/18, A1c-7.2-8/11/17)    Mary Ann is a 53 y.o. female who presents to the clinic for evaluation and treatment of high risk feet. Mary Ann has a past medical history of Chronic asthma; Diabetes mellitus type II; Fibroids; Hypertension; Incontinence; Neuropathy in diabetes; Obesity; TINA (obstructive sleep apnea); and Panic attacks. The patient's chief complaint is long, thick toenails and noting that the hallux nails are coming off every so often with bruising to the toes, no known trauma but she does admit that they may be rubbing on the shoes when she walks. This patient has documented high risk feet requiring routine maintenance secondary to diabetes mellitis and those secondary complications of diabetes, as mentioned.    PCP: Vishnu Batres MD    Date Last Seen by PCP: 4/30/18    Current shoe gear:   Casual shoes    Hemoglobin A1C   Date Value Ref Range Status   08/11/2017 7.2 (H) 4.0 - 5.6 % Final     Comment:     According to ADA guidelines, hemoglobin A1c <7.0% represents  optimal control in non-pregnant diabetic patients. Different  metrics may apply to specific patient populations.   Standards of Medical Care in Diabetes-2016.  For the purpose of screening for the presence of diabetes:  <5.7%     Consistent with the absence of diabetes  5.7-6.4%  Consistent with increasing risk for diabetes   (prediabetes)  >or=6.5%  Consistent with diabetes  Currently, no consensus exists for use of hemoglobin A1c  for diagnosis of diabetes for children.  This Hemoglobin A1c assay has significant interference with fetal   hemoglobin   (HbF). The results are invalid for patients with abnormal amounts of   HbF,   including those with known Hereditary Persistence   of Fetal Hemoglobin. Heterozygous hemoglobin variants (HbAS, HbAC,   HbAD, HbAE, HbA2) do not significantly  interfere with this assay;   however, presence of multiple variants in a sample may impact the %   interference.     07/24/2017 6.3 (H) 4.0 - 5.6 % Final     Comment:     According to ADA guidelines, hemoglobin A1c <7.0% represents  optimal control in non-pregnant diabetic patients. Different  metrics may apply to specific patient populations.   Standards of Medical Care in Diabetes-2016.  For the purpose of screening for the presence of diabetes:  <5.7%     Consistent with the absence of diabetes  5.7-6.4%  Consistent with increasing risk for diabetes   (prediabetes)  >or=6.5%  Consistent with diabetes  Currently, no consensus exists for use of hemoglobin A1c  for diagnosis of diabetes for children.  This Hemoglobin A1c assay has significant interference with fetal   hemoglobin   (HbF). The results are invalid for patients with abnormal amounts of   HbF,   including those with known Hereditary Persistence   of Fetal Hemoglobin. Heterozygous hemoglobin variants (HbAS, HbAC,   HbAD, HbAE, HbA2) do not significantly interfere with this assay;   however, presence of multiple variants in a sample may impact the %   interference.     10/29/2015 7.7 (H) 4.5 - 6.2 % Final       Review of Systems   Constitution: Negative for chills and fever.   Cardiovascular: Positive for leg swelling. Negative for claudication.   Respiratory: Negative for shortness of breath.    Skin: Positive for nail changes. Negative for itching and rash.   Musculoskeletal: Positive for arthritis. Negative for muscle cramps, muscle weakness and myalgias.   Gastrointestinal: Negative for nausea and vomiting.   Neurological: Positive for paresthesias. Negative for focal weakness, loss of balance and numbness.           Objective:      Physical Exam   Constitutional: She is oriented to person, place, and time. She appears well-developed and well-nourished. No distress.   Cardiovascular:   Pulses:       Dorsalis pedis pulses are 2+ on the right side, and  2+ on the left side.        Posterior tibial pulses are 2+ on the right side, and 2+ on the left side.   < 3 sec capillary refill time to toes 1-5 bilateral. Feet are warm to touch proximally with distal cooling b/l. There is diminished hair growth on the feet and toes b/l. There is mild edema b/l. No spider veins or varicosities present b/l.      Musculoskeletal:   Equinus noted b/l ankles with < 10 deg DF noted. MMT 5/5 in DF/PF/Inv/Ev resistance with no reproduction of pain in any direction. Passive range of motion of ankle and pedal joints is painless b/l.    Semi reducible contractures to bilateral toes 2-5 at the MTPJ and PIPJ.     Neurological: She is alert and oriented to person, place, and time. She has normal strength. She displays no atrophy and no tremor. A sensory deficit is present. She exhibits normal muscle tone.   Negative tinel sign bilateral. Diminished vibratory sensation bilateral   Skin: Skin is warm, dry and intact. No abrasion, no bruising, no burn, no ecchymosis, no laceration, no lesion, no petechiae and no rash noted. She is not diaphoretic. No cyanosis or erythema. No pallor. Nails show no clubbing.   Skin is thin and atrophic bilateral    Nails 2-5 bilateral are thick 3-4 mm, long 3-6 mm, and discolored with subungual debris    Right hallux nail is gone with underlying nail bed intact slight bruising noted. Left hallux nail lytic with surrounding bruising noted. No erythema or drainage.     Psychiatric: She has a normal mood and affect. Her behavior is normal.             Assessment:       Encounter Diagnoses   Name Primary?    Type II diabetes mellitus with peripheral circulatory disorder Yes    Type II diabetes mellitus with neurological manifestations     Onychomycosis due to dermatophyte     Hammer toes of both feet     Bilateral lower extremity edema          Plan:       Mary Ann was seen today for foot problem.    Diagnoses and all orders for this visit:    Type II diabetes  mellitus with peripheral circulatory disorder  -     DIABETIC SHOES FOR HOME USE    Type II diabetes mellitus with neurological manifestations  -     DIABETIC SHOES FOR HOME USE    Onychomycosis due to dermatophyte  -     DIABETIC SHOES FOR HOME USE    Hammer toes of both feet  -     DIABETIC SHOES FOR HOME USE    Bilateral lower extremity edema  -     DIABETIC SHOES FOR HOME USE      I counseled the patient on her conditions, their implications and medical management.    Shoe inspection. Diabetic Foot Education. Patient reminded of the importance of good nutrition and blood sugar control to help prevent podiatric complications of diabetes. Patient instructed on proper foot hygeine. We discussed wearing proper shoe gear, daily foot inspections, never walking without protective shoe gear, never putting sharp instruments to feet    Discussed that losing the nails can be both from the onychomycosis as well as from wearing ill fitting shoes. Prescription dispensed for diabetic shoes with deeper wider toe box.    Pres[facility]    Return in 6 weeks to follow up after she gets and has been wearing the diabetic shoes.    Lorne Burgess DPM

## 2018-05-13 NOTE — PROGRESS NOTES
Subjective:       Patient ID: Mary Ann Vidales is a 53 y.o. female.    Chief Complaint: Annual Exam; toe nails (bruising on both feet); Fatigue; Shortness of Breath; Cough (constant clearing of throat); and Chest Pain  HISTORY OF PRESENT ILLNESS:  A 53-year-old white female well known to me, coming   in for an annual review and she has some problems.  She is not really keeping   her diet.  Her blood sugars have been over 500.  She has quit Bydureon because   of the expense and wanted to get back on that again at the time of this visit,   which was arranged.  The patient is gaining weight and is already markedly   overweight.  She has had some decline in her vision.  She has not seen eye.  She   tends to fall asleep easily.  She has had chest tightness recently with a cough   productive of clear sputum, but minimally.  She has noticed problems with her   nails getting some more hematomas and has end up losing her first nail on the   right foot from this.  Her toes are rolling down.  She has no pain with walking,   however.    PHYSICAL EXAMINATION:  VITAL SIGNS:  Weight is 131.  Vital signs normal.  SKIN:  Fair, healthy.  HEENT:  Clear.  NECK:  Shows no adenopathy, thyroid enlargement or bruit.  CHEST:  Clear.  HEART:  There is no murmur or gallop.  ABDOMEN:  Obese, nontender, no organomegaly.  EXTREMITIES:  Show no edema.  NEUROLOGIC:  Normal.  Feet were examined in detail.  She has loss of sensation   in the distal toes and a little bit in the forefoot.  She has lost her first   nail on the right foot, but it is starting to epithelialize.  She has early claw   feet.  There is no break in the skin.  She has good pulse.  Neurologic   otherwise normal.    IMPRESSION:  1.  Diabetes, poor control.  2.  Morbid obesity.  I have gotten her on Bariatric Medicine for that.  3.  Problems with her toes, appear to be from trauma to her toes and I am having   her see Podiatry for that.  4.  Chest tightness, probably a mixture  of anxiety and her weight.  She has poor   R-wave progression, but she is very big.  5.  Change in vision for which she is going to see Optometry.    PLAN:  I told her we should put off her lab until she has been on the Bydureon   for a while since her measurements are all going to be off as a result of that.    I will see her again in three months.      JDC/HN  dd: 05/12/2018 23:55:27 (CDT)  td: 05/13/2018 14:46:12 (CDT)  Doc ID   #[id number]  Job ID #[id number]    CC:     Dict [id number]  HPI  Review of Systems   Constitutional: Positive for fatigue.   HENT: Negative for congestion, hearing loss, sinus pressure, sneezing, sore throat and voice change.    Eyes: Positive for visual disturbance.   Respiratory: Positive for cough and chest tightness. Negative for shortness of breath.    Cardiovascular: Negative.  Negative for chest pain, palpitations and leg swelling.   Gastrointestinal: Negative.    Genitourinary: Negative for difficulty urinating, dysuria, flank pain, frequency, hematuria, menstrual problem, pelvic pain, urgency, vaginal bleeding and vaginal discharge.   Musculoskeletal: Negative.  Negative for neck pain and neck stiffness.   Skin: Positive for color change and wound.   Neurological: Negative.  Negative for dizziness, seizures, light-headedness, numbness and headaches.   Psychiatric/Behavioral: Positive for sleep disturbance. Negative for agitation, behavioral problems and confusion. The patient is not nervous/anxious.        Objective:      Physical Exam   Constitutional: She is oriented to person, place, and time. She appears well-developed and well-nourished.   Eyes: EOM are normal. Pupils are equal, round, and reactive to light.   Neck: Normal range of motion. Neck supple. No JVD present. No thyromegaly present.   Cardiovascular: Normal rate, regular rhythm, normal heart sounds and intact distal pulses.  Exam reveals no gallop.    No murmur heard.  Pulmonary/Chest: Breath sounds normal. She has no wheezes.  She has no rales.   Abdominal: Soft. Bowel sounds are normal. She exhibits no mass. There is no tenderness.   Musculoskeletal: Normal range of motion.   Lymphadenopathy:     She has no cervical adenopathy.   Neurological: She is alert and oriented to person, place, and time. She has normal reflexes. No cranial nerve deficit.   Skin: No rash noted. No erythema.   Psychiatric: She has a normal mood and affect. Judgment normal.       Assessment:       1. TINA (obstructive sleep apnea)    2. Annual physical exam    3. Type 2 diabetes mellitus without complication, with long-term current use of insulin    4. Morbid obesity, unspecified obesity type    5. Diabetic neuropathy, type I diabetes mellitus    6. HTN (hypertension), benign    7. Anxiety    8. Depression, unspecified depression type    9. Chronic fatigue    10. Avulsion of toenail, initial encounter    11. Injury of toe, unspecified laterality, initial encounter    12. Breast cancer screening        Plan:

## 2018-05-17 ENCOUNTER — HOSPITAL ENCOUNTER (OUTPATIENT)
Dept: RADIOLOGY | Facility: HOSPITAL | Age: 53
Discharge: HOME | End: 2018-05-17
Attending: INTERNAL MEDICINE

## 2018-05-17 DIAGNOSIS — Z91.89 AT RISK FOR CORONARY ARTERY DISEASE: ICD-10-CM

## 2018-05-17 PROCEDURE — 75571 CT HRT W/O DYE W/CA TEST: CPT | Mod: 26,,, | Performed by: RADIOLOGY

## 2018-05-17 PROCEDURE — 75571 CT HRT W/O DYE W/CA TEST: CPT | Mod: TC

## 2018-05-21 ENCOUNTER — PATIENT MESSAGE (OUTPATIENT)
Dept: INTERNAL MEDICINE | Facility: CLINIC | Age: 53
End: 2018-05-21

## 2018-05-21 ENCOUNTER — TELEPHONE (OUTPATIENT)
Dept: INTERNAL MEDICINE | Facility: CLINIC | Age: 53
End: 2018-05-21

## 2018-05-21 DIAGNOSIS — E66.01 MORBID OBESITY, UNSPECIFIED OBESITY TYPE: ICD-10-CM

## 2018-05-21 DIAGNOSIS — E78.5 DYSLIPIDEMIA: ICD-10-CM

## 2018-05-21 DIAGNOSIS — I10 BENIGN HYPERTENSION: ICD-10-CM

## 2018-05-21 DIAGNOSIS — E11.9 TYPE 2 DIABETES MELLITUS WITHOUT COMPLICATION, WITH LONG-TERM CURRENT USE OF INSULIN: ICD-10-CM

## 2018-05-21 DIAGNOSIS — R93.1 ELEVATED CORONARY ARTERY CALCIUM SCORE: Primary | ICD-10-CM

## 2018-05-21 DIAGNOSIS — Z79.4 TYPE 2 DIABETES MELLITUS WITHOUT COMPLICATION, WITH LONG-TERM CURRENT USE OF INSULIN: ICD-10-CM

## 2018-05-21 RX ORDER — ATORVASTATIN CALCIUM 10 MG/1
10 TABLET, FILM COATED ORAL DAILY
Qty: 30 TABLET | Refills: 11 | Status: SHIPPED | OUTPATIENT
Start: 2018-05-21 | End: 2019-04-26 | Stop reason: SDUPTHER

## 2018-05-21 NOTE — TELEPHONE ENCOUNTER
She does have abnormal calcium score and should see cardiology.  She should be on lipitor, which was written.

## 2018-05-21 NOTE — TELEPHONE ENCOUNTER
RESULTS:  Agatston score 112.  Standard interpretation for a calcium score of 112 is as follows: Moderate plaque burden.  There is a high CVD risk.  Moderate nonobstructive CAD is highly likely..    When calculated for age and gender, patient is in the 90-99th percentile for cardiovascular risk.  Recommended clinical action: [place] risk factor modification and clinical follow-up.   Ensure strict adherence to NCEP cholesterol lowering guidelines.  Recommended appropriate exercise program.    No obvious intrathoracic abnormality

## 2018-05-21 NOTE — TELEPHONE ENCOUNTER
I gave her dr comments and recommendations.  She is agreeable to start the lipitor rx sent to pharmacy.     She wants to see Dr John Francis  , freddy at [facility]   Phone     Fax .  I faxed office note, dylon scoring, lab and ekg to them.    I have reminder to see if labs done in 3 months after started lipitor.

## 2018-05-25 DIAGNOSIS — Z79.4 TYPE 2 DIABETES MELLITUS WITHOUT COMPLICATION, WITH LONG-TERM CURRENT USE OF INSULIN: ICD-10-CM

## 2018-05-25 DIAGNOSIS — E11.9 TYPE 2 DIABETES MELLITUS WITHOUT COMPLICATION, WITH LONG-TERM CURRENT USE OF INSULIN: ICD-10-CM

## 2018-05-25 RX ORDER — GLIMEPIRIDE 4 MG/1
TABLET ORAL
Qty: 30 TABLET | Refills: 10 | OUTPATIENT
Start: 2018-05-25

## 2018-05-30 DIAGNOSIS — Z79.4 TYPE 2 DIABETES MELLITUS WITHOUT COMPLICATION, WITH LONG-TERM CURRENT USE OF INSULIN: ICD-10-CM

## 2018-05-30 DIAGNOSIS — E11.9 TYPE 2 DIABETES MELLITUS WITHOUT COMPLICATION, WITH LONG-TERM CURRENT USE OF INSULIN: ICD-10-CM

## 2018-05-30 NOTE — TELEPHONE ENCOUNTER
"----- Message from Melida Ray sent at 5/30/2018  3:42 PM CDT -----  Contact: patient- [phone]  RX request - refill or new RX.  Is this a refill or new RX:  refill  RX name and strength: glimepiride (AMARYL) 4 MG tablet   Directions:   Is this a 30 day or 90 day RX:    Local pharmacy or mail order pharmacy:  local  Pharmacy name and phone # (DON'T enter "on file" or "in chart"): [facility] [phone] (Phone)  [phone] (Fax)  Comments:  Patient going into meeting can leave message on voicemail if no answer        "

## 2018-05-30 NOTE — TELEPHONE ENCOUNTER
Looks like endocrine denied refill because appt is way past due. last seen 3/2/2017.  I told patient to book an appt with them and we ok'd 6 refills till they can see her.  I called refills to pharmacy recorder.    Please sign to chart if this is ok.    Thanks hamlet

## 2018-05-31 RX ORDER — GLIMEPIRIDE 4 MG/1
TABLET ORAL
Qty: 15 TABLET | Refills: 5 | Status: SHIPPED | OUTPATIENT
Start: 2018-05-31 | End: 2018-09-07

## 2018-06-18 RX ORDER — LOSARTAN POTASSIUM AND HYDROCHLOROTHIAZIDE 25; 100 MG/1; MG/1
TABLET ORAL
Qty: 30 TABLET | Refills: 3 | Status: SHIPPED | OUTPATIENT
Start: 2018-06-18 | End: 2018-10-24 | Stop reason: SDUPTHER

## 2018-07-30 ENCOUNTER — LAB VISIT (OUTPATIENT)
Dept: LAB | Facility: HOSPITAL | Age: 53
End: 2018-07-30
Attending: INTERNAL MEDICINE
Payer: COMMERCIAL

## 2018-07-30 DIAGNOSIS — E10.40 DIABETIC NEUROPATHY, TYPE I DIABETES MELLITUS: ICD-10-CM

## 2018-07-30 DIAGNOSIS — Z79.4 TYPE 2 DIABETES MELLITUS WITHOUT COMPLICATION, WITH LONG-TERM CURRENT USE OF INSULIN: ICD-10-CM

## 2018-07-30 DIAGNOSIS — E11.9 TYPE 2 DIABETES MELLITUS WITHOUT COMPLICATION, WITH LONG-TERM CURRENT USE OF INSULIN: ICD-10-CM

## 2018-07-30 DIAGNOSIS — E66.01 MORBID OBESITY, UNSPECIFIED OBESITY TYPE: ICD-10-CM

## 2018-07-30 DIAGNOSIS — I10 HTN (HYPERTENSION), BENIGN: ICD-10-CM

## 2018-07-30 DIAGNOSIS — Z00.00 ANNUAL PHYSICAL EXAM: ICD-10-CM

## 2018-07-30 LAB
ALBUMIN SERPL BCP-MCNC: 3.7 G/DL
ALP SERPL-CCNC: 77 U/L
ALT SERPL W/O P-5'-P-CCNC: 25 U/L
ANION GAP SERPL CALC-SCNC: 13 MMOL/L
AST SERPL-CCNC: 20 U/L
BASOPHILS # BLD AUTO: 0.05 K/UL
BASOPHILS NFR BLD: 0.5 %
BILIRUB SERPL-MCNC: 0.6 MG/DL
BUN SERPL-MCNC: 31 MG/DL
CALCIUM SERPL-MCNC: 10.2 MG/DL
CHLORIDE SERPL-SCNC: 101 MMOL/L
CHOLEST SERPL-MCNC: 142 MG/DL
CHOLEST/HDLC SERPL: 4.4 {RATIO}
CO2 SERPL-SCNC: 26 MMOL/L
CREAT SERPL-MCNC: 1.8 MG/DL
D DIMER PPP IA.FEU-MCNC: 0.39 MG/L FEU
DIFFERENTIAL METHOD: ABNORMAL
EOSINOPHIL # BLD AUTO: 0.2 K/UL
EOSINOPHIL NFR BLD: 1.9 %
ERYTHROCYTE [DISTWIDTH] IN BLOOD BY AUTOMATED COUNT: 14.7 %
EST. GFR  (AFRICAN AMERICAN): 37 ML/MIN/1.73 M^2
EST. GFR  (NON AFRICAN AMERICAN): 32.1 ML/MIN/1.73 M^2
ESTIMATED AVG GLUCOSE: 171 MG/DL
GLUCOSE SERPL-MCNC: 165 MG/DL
HBA1C MFR BLD HPLC: 7.6 %
HCT VFR BLD AUTO: 31.8 %
HDLC SERPL-MCNC: 32 MG/DL
HDLC SERPL: 22.5 %
HGB BLD-MCNC: 10.3 G/DL
IMM GRANULOCYTES # BLD AUTO: 0.03 K/UL
IMM GRANULOCYTES NFR BLD AUTO: 0.3 %
LDLC SERPL CALC-MCNC: 75 MG/DL
LYMPHOCYTES # BLD AUTO: 2.3 K/UL
LYMPHOCYTES NFR BLD: 25.1 %
MCH RBC QN AUTO: 29.9 PG
MCHC RBC AUTO-ENTMCNC: 32.4 G/DL
MCV RBC AUTO: 92 FL
MONOCYTES # BLD AUTO: 0.7 K/UL
MONOCYTES NFR BLD: 7.3 %
NEUTROPHILS # BLD AUTO: 5.9 K/UL
NEUTROPHILS NFR BLD: 64.9 %
NONHDLC SERPL-MCNC: 110 MG/DL
NRBC BLD-RTO: 0 /100 WBC
PLATELET # BLD AUTO: 175 K/UL
PMV BLD AUTO: 10.3 FL
POTASSIUM SERPL-SCNC: 4.1 MMOL/L
PROT SERPL-MCNC: 7.1 G/DL
RBC # BLD AUTO: 3.45 M/UL
SODIUM SERPL-SCNC: 140 MMOL/L
T4 FREE SERPL-MCNC: 1.25 NG/DL
TRIGL SERPL-MCNC: 175 MG/DL
TSH SERPL DL<=0.005 MIU/L-ACNC: 1.96 UIU/ML
WBC # BLD AUTO: 9.12 K/UL

## 2018-07-30 PROCEDURE — 83036 HEMOGLOBIN GLYCOSYLATED A1C: CPT

## 2018-07-30 PROCEDURE — 80053 COMPREHEN METABOLIC PANEL: CPT

## 2018-07-30 PROCEDURE — 85025 COMPLETE CBC W/AUTO DIFF WBC: CPT

## 2018-07-30 PROCEDURE — 84439 ASSAY OF FREE THYROXINE: CPT

## 2018-07-30 PROCEDURE — 84443 ASSAY THYROID STIM HORMONE: CPT

## 2018-07-30 PROCEDURE — 36415 COLL VENOUS BLD VENIPUNCTURE: CPT | Mod: PO

## 2018-07-30 PROCEDURE — 80061 LIPID PANEL: CPT

## 2018-07-30 PROCEDURE — 85379 FIBRIN DEGRADATION QUANT: CPT

## 2018-07-30 RX ORDER — ALPRAZOLAM 0.5 MG/1
TABLET ORAL
Qty: 30 TABLET | Refills: 2 | Status: SHIPPED | OUTPATIENT
Start: 2018-07-30 | End: 2018-10-26 | Stop reason: SDUPTHER

## 2018-07-30 NOTE — TELEPHONE ENCOUNTER
Xanax #30 x 2 to recorder at   [facility]/pharmacy #[id number] - [place] - [address] AT [facility]   [address] [place] [address]   Phone: [phone]     As dr read approved.

## 2018-07-31 ENCOUNTER — TELEPHONE (OUTPATIENT)
Dept: INTERNAL MEDICINE | Facility: CLINIC | Age: 53
End: 2018-07-31

## 2018-07-31 DIAGNOSIS — E11.9 TYPE 2 DIABETES MELLITUS WITHOUT COMPLICATION, WITH LONG-TERM CURRENT USE OF INSULIN: ICD-10-CM

## 2018-07-31 DIAGNOSIS — I10 ESSENTIAL HYPERTENSION: Primary | ICD-10-CM

## 2018-07-31 DIAGNOSIS — E10.29 TYPE 1 DIABETES MELLITUS WITH OTHER KIDNEY COMPLICATION: ICD-10-CM

## 2018-07-31 DIAGNOSIS — Z79.4 TYPE 2 DIABETES MELLITUS WITHOUT COMPLICATION, WITH LONG-TERM CURRENT USE OF INSULIN: ICD-10-CM

## 2018-07-31 DIAGNOSIS — E66.01 MORBID OBESITY, UNSPECIFIED OBESITY TYPE: ICD-10-CM

## 2018-07-31 NOTE — TELEPHONE ENCOUNTER
Her renal function is worse and hga1c is slightly higher, I would like her to see renal and get off metformin since the renal function is now worse.  In meantime she should push fluids and she is going to need to increase insulin and she should have this overseen by endo.

## 2018-08-01 ENCOUNTER — PATIENT MESSAGE (OUTPATIENT)
Dept: INTERNAL MEDICINE | Facility: CLINIC | Age: 53
End: 2018-08-01

## 2018-08-01 ENCOUNTER — TELEPHONE (OUTPATIENT)
Dept: INTERNAL MEDICINE | Facility: CLINIC | Age: 53
End: 2018-08-01

## 2018-08-01 DIAGNOSIS — N18.30 CKD (CHRONIC KIDNEY DISEASE), STAGE III: Primary | ICD-10-CM

## 2018-08-01 DIAGNOSIS — E10.29 TYPE 1 DIABETES MELLITUS WITH OTHER KIDNEY COMPLICATION: ICD-10-CM

## 2018-08-01 DIAGNOSIS — I10 ESSENTIAL HYPERTENSION: ICD-10-CM

## 2018-08-01 NOTE — TELEPHONE ENCOUNTER
She said she will see  Her endocrine on [facility] and stop metformin.  Angela is working on scheduling her with nephrology.  Will probably be a month before they can see her.    Do you need labs repeated  To recheck kidney function in meantime?

## 2018-08-01 NOTE — TELEPHONE ENCOUNTER
Left msg and [facility].  Send dr read separate msg to see if he needs labs repeated.

## 2018-08-13 ENCOUNTER — OFFICE VISIT (OUTPATIENT)
Dept: NEPHROLOGY | Facility: CLINIC | Age: 53
End: 2018-08-13
Payer: COMMERCIAL

## 2018-08-13 VITALS
BODY MASS INDEX: 46.88 KG/M2 | OXYGEN SATURATION: 99 % | DIASTOLIC BLOOD PRESSURE: 80 MMHG | WEIGHT: 281.38 LBS | HEIGHT: 65 IN | SYSTOLIC BLOOD PRESSURE: 130 MMHG | HEART RATE: 96 BPM

## 2018-08-13 DIAGNOSIS — N17.9 ACUTE RENAL FAILURE, UNSPECIFIED ACUTE RENAL FAILURE TYPE: Primary | ICD-10-CM

## 2018-08-13 PROCEDURE — 99999 PR PBB SHADOW E&M-EST. PATIENT-LVL III: CPT | Mod: PBBFAC,,, | Performed by: INTERNAL MEDICINE

## 2018-08-13 PROCEDURE — 99244 OFF/OP CNSLTJ NEW/EST MOD 40: CPT | Mod: S$GLB,,, | Performed by: INTERNAL MEDICINE

## 2018-08-13 NOTE — LETTER
August 13, 2018      Vishnu Batres MD  [address]  [place] [address]           [facility] [address] [facility] [address] [place] [address]  Phone: [phone]          Patient: Mary Ann Vidales   MR Number: [id number]   YOB: 1965   Date of Visit: 8/13/2018       Dear Dr. Vishnu Batres:    Thank you for referring Mary Ann Vidales to me for evaluation. Attached you will find relevant portions of my assessment and plan of care.    If you have questions, please do not hesitate to call me. I look forward to following Mary Ann Vidales along with you.    Sincerely,    Rosalio Peralta MD    Enclosure  CC:  No Recipients    If you would like to receive this communication electronically, please contact [email or web address] or [phone] to request more information on [facility] [employer] access.    For providers and/or their staff who would like to refer a patient to [facility], please contact us through our one-stop-shop provider referral line, S[facility] Nixon, at [phone].    If you feel you have received this communication in error or would no longer like to receive these types of communications, please e-mail [email or web address]

## 2018-08-13 NOTE — PROGRESS NOTES
Subjective:       Patient ID: Mary Ann Vidales is a 53 y.o. White female who presents for new patient evaluation for chronic renal failure.    Mary Ann Vidales is referred by Vishnu Batres MD to be evaluated for chronic renal failure.  She was found to have a creatinine of 1.5 in 2017 and then had a 1.8 this year.  She has no uremic or urinary symptoms and is in her usual state of health.  There have been no recent illnesses, hospitalizations or procedures.  She has chronic pain in her knees and takes naprosyn regularly for this.        Review of Systems   Constitutional: Negative for appetite change, chills and fever.   Eyes: Negative for visual disturbance.   Respiratory: Positive for shortness of breath. Negative for cough.    Cardiovascular: Positive for leg swelling. Negative for chest pain.   Gastrointestinal: Negative for diarrhea, nausea and vomiting.   Genitourinary: Negative for difficulty urinating, dysuria and hematuria.   Musculoskeletal: Positive for arthralgias (knees) and back pain. Negative for myalgias.   Skin: Negative for rash.   Neurological: Negative for headaches.   Psychiatric/Behavioral: Negative for sleep disturbance.       The past medical, family and social histories were reviewed for this encounter.     Past Medical History:   Diagnosis Date    Chronic asthma     Diabetes mellitus type II     Fibroids     Hypertension     Incontinence     Neuropathy in diabetes     Obesity     TINA (obstructive sleep apnea)     uses CPAP    Panic attacks      Past Surgical History:   Procedure Laterality Date    ECTOPIC PREGNANCY SURGERY      HERNIA REPAIR      HYSTERECTOMY      OOPHORECTOMY      TONSILLECTOMY       Social History     Socioeconomic History    Marital status:      Spouse name: Not on file    Number of children: Not on file    Years of education: Not on file    Highest education level: Not on file   Social Needs    Financial resource strain: Not on file     Food insecurity - worry: Not on file    Food insecurity - inability: Not on file    Transportation needs - medical: Not on file    Transportation needs - non-medical: Not on file   Occupational History    Not on file   Tobacco Use    Smoking status: Never Smoker    Smokeless tobacco: Never Used   Substance and Sexual Activity    Alcohol use: No     Alcohol/week: 0.0 oz    Drug use: No    Sexual activity: Yes   Other Topics Concern    Not on file   Social History Narrative     at car dealership. Lives with her partner, who smokes.     Current Outpatient Medications   Medication Sig    albuterol-ipratropium  mcg (COMBIVENT)  mcg/actuation inhaler Inhale 1 puff into the lungs every 4 (four) hours as needed for Wheezing. 1 Aerosol Inhalation Three times a day    ALPRAZolam (XANAX) 0.5 MG tablet TAKE 1 TABLET BY MOUTH ONCE A DAY AS NEEDED    atorvastatin (LIPITOR) 10 MG tablet Take 1 tablet (10 mg total) by mouth once daily.    blood-glucose meter (CONTOUR NEXT METER) Misc Check glucose 1-2 x a day    clotrimazole-betamethasone (LOTRISONE) cream Apply 1 applicator topically Once daily as needed.     CONTOUR TEST STRIPS Strp     exenatide microspheres (BYDUREON) 2 mg SSRR Inject 2 mg into the skin once a week.    FLUoxetine (PROZAC) 40 MG capsule TAKE 1 CAPSULE (40 MG TOTAL) BY MOUTH ONCE DAILY.    fluticasone (FLONASE) 50 mcg/actuation nasal spray 2 sprays per nostril daily    furosemide (LASIX) 20 MG tablet Take 1 tablet (20 mg total) by mouth once daily.    gabapentin (NEURONTIN) 800 MG tablet TAKE 2 TABLETS (1,600 MG TOTAL) BY MOUTH 2 (TWO) TIMES DAILY.    glimepiride (AMARYL) 4 MG tablet TAKE 1/2 TABLET (4 MG TOTAL) BY MOUTH BEFORE BREAKFAST.    hydrocodone-acetaminophen 7.5-325mg (NORCO) 7.5-325 mg per tablet Take 1 tablet by mouth every 6 (six) hours as needed for Pain.    insulin lispro (HUMALOG KWIKPEN) 100 unit/mL InPn pen Inject 10 Units into the skin three  times daily with food. (Patient taking differently: Inject 10 Units into the skin three times daily with food. With Sliding Scale.    Patient to Use Only When blood sugar over 120.)    lancets Misc 1 lancet by Misc.(Non-Drug; Combo Route) route 3 (three) times daily. Use with contour next meter.    losartan-hydrochlorothiazide 100-25 mg (HYZAAR) 100-25 mg per tablet TAKE 1 TABLET BY MOUTH ONCE DAILY.    naproxen sodium (ANAPROX) 550 MG tablet TAKE 1 TABLET (550 MG TOTAL) BY MOUTH 2 (TWO) TIMES DAILY AS NEEDED.    nystatin-triamcinolone (MYCOLOG II) cream Apply topically 2 (two) times daily.    omeprazole (PRILOSEC) 40 MG capsule Take 1 capsule (40 mg total) by mouth once daily.     No current facility-administered medications for this visit.        [facility] 12/20/2013     Objective:      Physical Exam   Constitutional: She is oriented to person, place, and time. She appears well-developed and well-nourished. No distress.   HENT:   Head: Normocephalic and atraumatic.   Eyes: Conjunctivae are normal.   Neck: Neck supple. No JVD present.   Cardiovascular: Normal rate, regular rhythm and normal heart sounds. Exam reveals no gallop and no friction rub.   No murmur heard.  Pulmonary/Chest: Effort normal and breath sounds normal. No respiratory distress. She has no wheezes. She has no rales.   Abdominal: Soft. Bowel sounds are normal. She exhibits no distension. There is no tenderness.   Musculoskeletal: She exhibits no edema.   Neurological: She is alert and oriented to person, place, and time.   Skin: Skin is warm and dry. No rash noted.   Psychiatric: She has a normal mood and affect.   Vitals reviewed.      Assessment:       1. Acute renal failure, unspecified acute renal failure type        Plan:   Return to clinic in 1 month.  Labs for next visit include rp, ua, urine for eosinophils, renal US in 2 weeks.  Baseline creatinine is 1.0 since 2005.  Please avoid or minimize all NSAIDS (ibuprofen, motrin, aleve, indocin,  naprosyn) to minimize the risk to your kidneys.  Aspirin in a dose of 325 mg or less a day is likely the safest with regards to kidney function.  If you are able to take aspirin and do not have any bleeding problems or ulcers, this may be your best therapy.  Alternatively, acetaminophen (Tylenol) is the safer than NSAIDs with regards to kidney function.  I would ask you take this as directed on the bottle.  It is best to stay under 2 grams a day (4-500 mg tablets a day maximum).  I suspect that her injury is from NSAIDs.  There appears to be no other precipitants although AIN is also a possibility.  Her urine sediment appears bland thus I think it is less likely that she has an acute GN.

## 2018-08-13 NOTE — PATIENT INSTRUCTIONS
Please avoid or minimize all NSAIDS (ibuprofen, motrin, aleve, indocin, naprosyn) to minimize the risk to your kidneys.  Aspirin in a dose of 325 mg or less a day is likely the safest with regards to kidney function.  If you are able to take aspirin and do not have any bleeding problems or ulcers, this may be your best therapy.  Alternatively, acetaminophen (Tylenol) is the safer than NSAIDs with regards to kidney function.  I would ask you take this as directed on the bottle.  It is best to stay under 2 grams a day (4-500 mg tablets a day maximum).

## 2018-08-20 ENCOUNTER — HOSPITAL ENCOUNTER (OUTPATIENT)
Dept: RADIOLOGY | Facility: HOSPITAL | Age: 53
Discharge: HOME | End: 2018-08-20
Attending: INTERNAL MEDICINE
Payer: COMMERCIAL

## 2018-08-20 DIAGNOSIS — N17.9 ACUTE RENAL FAILURE, UNSPECIFIED ACUTE RENAL FAILURE TYPE: ICD-10-CM

## 2018-08-20 PROCEDURE — 76770 US EXAM ABDO BACK WALL COMP: CPT | Mod: 26,,, | Performed by: RADIOLOGY

## 2018-08-20 PROCEDURE — 76770 US EXAM ABDO BACK WALL COMP: CPT | Mod: TC,PO

## 2018-08-22 ENCOUNTER — PATIENT MESSAGE (OUTPATIENT)
Dept: NEPHROLOGY | Facility: CLINIC | Age: 53
End: 2018-08-22

## 2018-08-22 ENCOUNTER — PATIENT MESSAGE (OUTPATIENT)
Dept: INTERNAL MEDICINE | Facility: CLINIC | Age: 53
End: 2018-08-22

## 2018-08-22 ENCOUNTER — TELEPHONE (OUTPATIENT)
Dept: INTERNAL MEDICINE | Facility: CLINIC | Age: 53
End: 2018-08-22

## 2018-08-22 NOTE — TELEPHONE ENCOUNTER
"Patient email states " We have established that it could be the anaprox causing my kidney issue Dr Peralta took me off and now my knees are killing me.  Is there anything else that dr Batres could prescribe to help as Tylenol is not working ? "    Please advise what we can recommend. otc heat rub? Other?  "

## 2018-08-24 ENCOUNTER — PATIENT MESSAGE (OUTPATIENT)
Dept: INTERNAL MEDICINE | Facility: CLINIC | Age: 53
End: 2018-08-24

## 2018-08-25 DIAGNOSIS — E11.9 TYPE 2 DIABETES MELLITUS WITHOUT COMPLICATION, WITH LONG-TERM CURRENT USE OF INSULIN: ICD-10-CM

## 2018-08-25 DIAGNOSIS — Z79.4 TYPE 2 DIABETES MELLITUS WITHOUT COMPLICATION, WITH LONG-TERM CURRENT USE OF INSULIN: ICD-10-CM

## 2018-08-26 ENCOUNTER — TELEPHONE (OUTPATIENT)
Dept: INTERNAL MEDICINE | Facility: CLINIC | Age: 53
End: 2018-08-26

## 2018-08-26 RX ORDER — METFORMIN HYDROCHLORIDE 1000 MG/1
1000 TABLET ORAL 2 TIMES DAILY WITH MEALS
Qty: 60 TABLET | Refills: 3 | OUTPATIENT
Start: 2018-08-26

## 2018-08-26 NOTE — TELEPHONE ENCOUNTER
If she needs something for joint pain we are then left with tramadol as the only solution.  If she wants to use that I will write for it

## 2018-08-28 RX ORDER — TRAMADOL HYDROCHLORIDE 50 MG/1
50 TABLET ORAL EVERY 12 HOURS PRN
Qty: 60 TABLET | Refills: 0 | Status: SHIPPED | OUTPATIENT
Start: 2018-08-28 | End: 2018-09-07

## 2018-09-07 ENCOUNTER — OFFICE VISIT (OUTPATIENT)
Dept: ENDOCRINOLOGY | Facility: CLINIC | Age: 53
End: 2018-09-07
Payer: COMMERCIAL

## 2018-09-07 VITALS
WEIGHT: 280.63 LBS | BODY MASS INDEX: 46.75 KG/M2 | HEART RATE: 82 BPM | SYSTOLIC BLOOD PRESSURE: 132 MMHG | HEIGHT: 65 IN | DIASTOLIC BLOOD PRESSURE: 80 MMHG

## 2018-09-07 DIAGNOSIS — I10 HTN (HYPERTENSION), BENIGN: ICD-10-CM

## 2018-09-07 DIAGNOSIS — E78.5 DYSLIPIDEMIA: ICD-10-CM

## 2018-09-07 DIAGNOSIS — Z79.4 TYPE 2 DIABETES MELLITUS WITHOUT COMPLICATION, WITH LONG-TERM CURRENT USE OF INSULIN: Primary | ICD-10-CM

## 2018-09-07 DIAGNOSIS — E11.9 TYPE 2 DIABETES MELLITUS WITHOUT COMPLICATION, WITH LONG-TERM CURRENT USE OF INSULIN: Primary | ICD-10-CM

## 2018-09-07 DIAGNOSIS — E66.01 MORBID OBESITY: ICD-10-CM

## 2018-09-07 PROCEDURE — 99999 PR PBB SHADOW E&M-EST. PATIENT-LVL IV: CPT | Mod: PBBFAC,,, | Performed by: NURSE PRACTITIONER

## 2018-09-07 PROCEDURE — 99214 OFFICE O/P EST MOD 30 MIN: CPT | Mod: S$GLB,,, | Performed by: NURSE PRACTITIONER

## 2018-09-07 RX ORDER — INSULIN PUMP SYRINGE, 3 ML
EACH MISCELLANEOUS
Qty: 1 EACH | Status: SHIPPED | OUTPATIENT
Start: 2018-09-07 | End: 2023-10-09

## 2018-09-07 RX ORDER — LANCETS
EACH MISCELLANEOUS
Qty: 100 EACH | Refills: 0 | Status: SHIPPED | OUTPATIENT
Start: 2018-09-07

## 2018-09-07 RX ORDER — GLIMEPIRIDE 4 MG/1
4 TABLET ORAL
Qty: 30 TABLET | Refills: 6 | Status: SHIPPED | OUTPATIENT
Start: 2018-09-07 | End: 2018-09-10 | Stop reason: SDUPTHER

## 2018-09-07 NOTE — PROGRESS NOTES
Subjective:      Patient ID: Mary Ann Vidales is a 53 y.o. female.    Chief Complaint:  Diabetes f/u      History of Present Illness  Pt is a 52y/o female with TYPE 2 DM since approx 2000, and chronic conditions pending review including HTN, neuropathy. Dyslipidemia, morbid obesity.      Interim Events:  Pt not seen since 3/17. . Back for refills.  Currently with sudden ARF--suspected r/t NSAID use r/t knee pain.  Needs TKR. Procrastinating.  Now pending angio.  Was Off metformin. But restarted the metformin when she stopped the Bydrureon which was causing a dry hacking cough--on 2 separate occassions when resuming (the bydureon). Continues on 2 mg glimpiride.   No recent glucose checks r/t no strips r/t costs.      Diabetes Flow Sheet:  Diabetes Medications:  Metformin 1000 BID,  glimipiride 4 mg,  Bydureon 2 mg q weekly.    Diabetes Complications:peripheral neuropathy   Aspirin:   Statin: none   ACE/ARB:losartan hctz 100/25 qd.   Last Urine Microalbumin:   Last Eye exam: 10/16Last Diabetic Education:   Glucometer--Accucheck Verio     Review of Systems   Constitutional: Negative for activity change and fatigue.   HENT: Negative for hearing loss and trouble swallowing.    Eyes: Negative for visual disturbance.   Respiratory: Negative for cough and shortness of breath.    Cardiovascular: Negative for chest pain and palpitations.   Gastrointestinal: Negative for constipation and diarrhea.   Genitourinary: Negative for difficulty urinating and frequency.   Musculoskeletal: Positive for arthralgias. Negative for myalgias.        Knees   Skin: Negative for rash and wound.   Neurological: Negative for weakness and light-headedness.   Hematological: Does not bruise/bleed easily.   Psychiatric/Behavioral: Negative for agitation and decreased concentration. The patient is not nervous/anxious.        Objective:   Physical Exam   Constitutional: She is oriented to person, place, and time. She appears well-developed and  well-nourished.   HENT:   Head: Normocephalic and atraumatic.   Right Ear: External ear normal.   Left Ear: External ear normal.   Nose: Nose normal.   Eyes: Conjunctivae and EOM are normal. Pupils are equal, round, and reactive to light.   Neck: Normal range of motion. Neck supple. No tracheal deviation present. No thyromegaly present.   Cardiovascular: Normal rate, regular rhythm, normal heart sounds and intact distal pulses.   Pulmonary/Chest: Effort normal and breath sounds normal. No respiratory distress.   Musculoskeletal: Normal range of motion. She exhibits no edema.   Feet:   Neurological: She is alert and oriented to person, place, and time. She has normal reflexes.   Skin: Skin is warm and dry.   Psychiatric: She has a normal mood and affect. Her behavior is normal. Judgment and thought content normal.       Lab Review:   Hemoglobin A1C   Date Value Ref Range Status   08/20/2018 8.3 (H) 4.0 - 5.6 % Final     Comment:     ADA Screening Guidelines:  5.7-6.4%  Consistent with prediabetes  >or=6.5%  Consistent with diabetes  High levels of fetal hemoglobin interfere with the HbA1C  assay. Heterozygous hemoglobin variants (HbS, HgC, etc)do  not significantly interfere with this assay.   However, presence of multiple variants may affect accuracy.     07/30/2018 7.6 (H) 4.0 - 5.6 % Final     Comment:     ADA Screening Guidelines:  5.7-6.4%  Consistent with prediabetes  >or=6.5%  Consistent with diabetes  High levels of fetal hemoglobin interfere with the HbA1C  assay. Heterozygous hemoglobin variants (HbS, HgC, etc)do  not significantly interfere with this assay.   However, presence of multiple variants may affect accuracy.     08/11/2017 7.2 (H) 4.0 - 5.6 % Final     Comment:     According to ADA guidelines, hemoglobin A1c <7.0% represents  optimal control in non-pregnant diabetic patients. Different  metrics may apply to specific patient populations.   Standards of Medical Care in Diabetes-2016.  For the  purpose of screening for the presence of diabetes:  <5.7%     Consistent with the absence of diabetes  5.7-6.4%  Consistent with increasing risk for diabetes   (prediabetes)  >or=6.5%  Consistent with diabetes  Currently, no consensus exists for use of hemoglobin A1c  for diagnosis of diabetes for children.  This Hemoglobin A1c assay has significant interference with fetal   hemoglobin   (HbF). The results are invalid for patients with abnormal amounts of   HbF,   including those with known Hereditary Persistence   of Fetal Hemoglobin. Heterozygous hemoglobin variants (HbAS, HbAC,   HbAD, HbAE, HbA2) do not significantly interfere with this assay;   however, presence of multiple variants in a sample may impact the %   interference.       Lab Results   Component Value Date    LDLCALC 75.0 07/30/2018     Lab Results   Component Value Date    TSH 1.957 07/30/2018       Chemistry        Component Value Date/Time     08/20/2018 1604     08/20/2018 1604     08/20/2018 1604    K 4.0 08/20/2018 1604    K 4.0 08/20/2018 1604    K 4.0 08/20/2018 1604    CL 97 08/20/2018 1604    CL 97 08/20/2018 1604    CL 97 08/20/2018 1604    CO2 33 (H) 08/20/2018 1604    CO2 33 (H) 08/20/2018 1604    CO2 33 (H) 08/20/2018 1604    BUN 25 (H) 08/20/2018 1604    BUN 25 (H) 08/20/2018 1604    BUN 25 (H) 08/20/2018 1604    CREATININE 1.8 (H) 08/20/2018 1604    CREATININE 1.8 (H) 08/20/2018 1604    CREATININE 1.8 (H) 08/20/2018 1604     (H) 08/20/2018 1604     (H) 08/20/2018 1604     (H) 08/20/2018 1604        Component Value Date/Time    CALCIUM 10.5 08/20/2018 1604    CALCIUM 10.5 08/20/2018 1604    CALCIUM 10.5 08/20/2018 1604    ALKPHOS 90 08/20/2018 1604    AST 23 08/20/2018 1604    ALT 27 08/20/2018 1604    BILITOT 0.7 08/20/2018 1604            Assessment:       1. Type 2 diabetes mellitus without complication, with long-term current use of insulin  semaglutide (OZEMPIC) 1 mg/0.75 mL (2 mg/1.5 mL)  PnIj  Chronic-uncontrolled-see plan     glimepiride (AMARYL) 4 MG tablet    blood-glucose meter kit    blood sugar diagnostic Strp    lancets (ONETOUCH ULTRASOFT LANCETS) Misc    Hemoglobin A1c   2. Morbid obesity  -chronic-limited for exercise    3. Dyslipidemia  -chronic -seeing cards-cont atorvastatin 10mg   4. HTN (hypertension), benign  -chronic-stable-cont losartan    5.      CKD stage 3----acutely worsened--on ACE, avoid NSAIDS.         Plan:   Pt has been taking nsaids r/t knee pain though advised not too--I reinforced not taking or she would likley be looking at HD in next couple of years.   She is procrastinating getting TKR--advised she needs to do it once medically cleared because she can't risk worsened renal issues r/t NSAIDs.   -advised starting with 2 mg glimipiride--if after several days most readings are > 140, can increase to whole tablet--4 mg.     Stop metformin   Continue glimipiride  4mg qd.   Add ozempic---unalble to tolerate Bydureon--hacking cough.  \      ORDERS 09/07/2018  3 mo with a1c prior

## 2018-09-10 DIAGNOSIS — E11.9 TYPE 2 DIABETES MELLITUS WITHOUT COMPLICATION, WITH LONG-TERM CURRENT USE OF INSULIN: ICD-10-CM

## 2018-09-10 DIAGNOSIS — Z79.4 TYPE 2 DIABETES MELLITUS WITHOUT COMPLICATION, WITH LONG-TERM CURRENT USE OF INSULIN: ICD-10-CM

## 2018-09-10 RX ORDER — GLIMEPIRIDE 4 MG/1
4 TABLET ORAL
Qty: 30 TABLET | Refills: 6 | Status: SHIPPED | OUTPATIENT
Start: 2018-09-10 | End: 2018-12-03 | Stop reason: SDUPTHER

## 2018-09-10 RX ORDER — HYDROCODONE BITARTRATE AND ACETAMINOPHEN 7.5; 325 MG/1; MG/1
1 TABLET ORAL EVERY 6 HOURS PRN
Qty: 90 TABLET | Refills: 0 | Status: SHIPPED | OUTPATIENT
Start: 2018-09-10 | End: 2018-12-12 | Stop reason: SDUPTHER

## 2018-09-10 NOTE — TELEPHONE ENCOUNTER
Last filled 3/8/18.  Says tramadol not working and dr galeas/kidney  ok  If she has to use this.    rx ok?    Thanks hamlet

## 2018-09-13 ENCOUNTER — OFFICE VISIT (OUTPATIENT)
Dept: NEPHROLOGY | Facility: CLINIC | Age: 53
End: 2018-09-13
Payer: COMMERCIAL

## 2018-09-13 ENCOUNTER — PATIENT MESSAGE (OUTPATIENT)
Dept: INTERNAL MEDICINE | Facility: CLINIC | Age: 53
End: 2018-09-13

## 2018-09-13 VITALS
SYSTOLIC BLOOD PRESSURE: 112 MMHG | WEIGHT: 275.38 LBS | DIASTOLIC BLOOD PRESSURE: 72 MMHG | HEART RATE: 80 BPM | HEIGHT: 65 IN | OXYGEN SATURATION: 98 % | BODY MASS INDEX: 45.88 KG/M2

## 2018-09-13 DIAGNOSIS — N17.9 ACUTE RENAL FAILURE, UNSPECIFIED ACUTE RENAL FAILURE TYPE: Primary | ICD-10-CM

## 2018-09-13 PROCEDURE — 3074F SYST BP LT 130 MM HG: CPT | Mod: CPTII,S$GLB,, | Performed by: INTERNAL MEDICINE

## 2018-09-13 PROCEDURE — 99999 PR PBB SHADOW E&M-EST. PATIENT-LVL III: CPT | Mod: PBBFAC,,, | Performed by: INTERNAL MEDICINE

## 2018-09-13 PROCEDURE — 3008F BODY MASS INDEX DOCD: CPT | Mod: CPTII,S$GLB,, | Performed by: INTERNAL MEDICINE

## 2018-09-13 PROCEDURE — 99214 OFFICE O/P EST MOD 30 MIN: CPT | Mod: S$GLB,,, | Performed by: INTERNAL MEDICINE

## 2018-09-13 PROCEDURE — 3078F DIAST BP <80 MM HG: CPT | Mod: CPTII,S$GLB,, | Performed by: INTERNAL MEDICINE

## 2018-09-13 NOTE — PROGRESS NOTES
"Subjective:       Patient ID: Mary Ann Vidales is a 53 y.o. White female who presents for return patient evaluation for chronic renal failure.    She has no uremic or urinary symptoms and is in her usual state of health.  There have been no recent illnesses, hospitalizations or procedures.  She has a cath planned for November.  She has been off of NSAIDs for three weeks.       Review of Systems   Constitutional: Negative for appetite change, chills and fever.   Eyes: Negative for visual disturbance.   Respiratory: Negative for cough and shortness of breath.    Cardiovascular: Negative for chest pain and leg swelling.   Gastrointestinal: Negative for diarrhea, nausea and vomiting.   Genitourinary: Negative for difficulty urinating, dysuria and hematuria.   Musculoskeletal: Positive for arthralgias (knees) and back pain. Negative for myalgias.   Skin: Negative for rash.   Neurological: Negative for headaches.   Psychiatric/Behavioral: Negative for sleep disturbance.       The past medical, family and social histories were reviewed for this encounter.     /72   Pulse 80   Ht 5' 4.5" (1.638 m)   Wt 124.9 kg (275 lb 5.7 oz)   LMP 12/20/2013   SpO2 98%   BMI 46.53 kg/m²     Objective:      Physical Exam   Constitutional: She is oriented to person, place, and time. She appears well-developed and well-nourished. No distress.   HENT:   Head: Normocephalic and atraumatic.   Eyes: Conjunctivae are normal.   Neck: Neck supple. No JVD present.   Cardiovascular: Normal rate, regular rhythm and normal heart sounds. Exam reveals no gallop and no friction rub.   No murmur heard.  Pulmonary/Chest: Effort normal and breath sounds normal. No respiratory distress. She has no wheezes. She has no rales.   Abdominal: Soft. Bowel sounds are normal. She exhibits no distension. There is no tenderness.   Musculoskeletal: She exhibits no edema.   Neurological: She is alert and oriented to person, place, and time.   Skin: Skin is " warm and dry. No rash noted.   Psychiatric: She has a normal mood and affect.   Vitals reviewed.      Assessment:       1. Acute renal failure, unspecified acute renal failure type        Plan:   Return to clinic in 1 month.  Labs for next visit include rp, ua in 3 weeks.  Baseline creatinine is 1.0 since 2005.  I suspect that her injury is from NSAIDs.  There appears to be no other precipitants although AIN is also a possibility.  Her urine sediment appears bland thus I think it is less likely that she has an acute GN.  I will give ner more time to settle down before I biopsy her per her wish.  She has a gap from 10/15-7/17 where she seemed to bump her baseline.  Renal US showed R 11.1 cm, L 10.6 cm.

## 2018-09-24 ENCOUNTER — PATIENT MESSAGE (OUTPATIENT)
Dept: INTERNAL MEDICINE | Facility: CLINIC | Age: 53
End: 2018-09-24

## 2018-09-24 ENCOUNTER — TELEPHONE (OUTPATIENT)
Dept: INTERNAL MEDICINE | Facility: CLINIC | Age: 53
End: 2018-09-24

## 2018-09-24 RX ORDER — AZITHROMYCIN 250 MG/1
TABLET, FILM COATED ORAL
Qty: 6 TABLET | Refills: 0 | Status: SHIPPED | OUTPATIENT
Start: 2018-09-24 | End: 2019-01-10 | Stop reason: ALTCHOICE

## 2018-09-24 NOTE — TELEPHONE ENCOUNTER
She had I think cardiac cath with [facility] and if so should see her soon to check her other illnesses out

## 2018-09-24 NOTE — TELEPHONE ENCOUNTER
Patient sent email requesting zpac.  I asked for symptoms and see below.    Chest congestion coughing up yuckies coughing stuffy I have feeling sick about a week now and getting progressively to this point         Last seen in April.    zpac ok with mucinex/saline spray?

## 2018-09-25 ENCOUNTER — PATIENT MESSAGE (OUTPATIENT)
Dept: INTERNAL MEDICINE | Facility: CLINIC | Age: 53
End: 2018-09-25

## 2018-09-26 ENCOUNTER — TELEPHONE (OUTPATIENT)
Dept: INTERNAL MEDICINE | Facility: CLINIC | Age: 53
End: 2018-09-26

## 2018-09-26 NOTE — TELEPHONE ENCOUNTER
Fax from [facility] at dr eder houser( [facility]) phone   Fax .    Stated patient was scheduled for coronary angiogram 8/17/18 but patient postponed to 11/9/18.    Dr read saw the fax and said keep info on her record.

## 2018-09-29 RX ORDER — FLUOXETINE HYDROCHLORIDE 40 MG/1
40 CAPSULE ORAL DAILY
Qty: 30 CAPSULE | Refills: 5 | Status: SHIPPED | OUTPATIENT
Start: 2018-09-29 | End: 2019-03-25 | Stop reason: SDUPTHER

## 2018-10-05 ENCOUNTER — LAB VISIT (OUTPATIENT)
Dept: LAB | Facility: HOSPITAL | Age: 53
End: 2018-10-05
Attending: INTERNAL MEDICINE
Payer: COMMERCIAL

## 2018-10-05 DIAGNOSIS — N17.9 ACUTE RENAL FAILURE, UNSPECIFIED ACUTE RENAL FAILURE TYPE: ICD-10-CM

## 2018-10-05 LAB
ALBUMIN SERPL BCP-MCNC: 3.5 G/DL
ANION GAP SERPL CALC-SCNC: 10 MMOL/L
BUN SERPL-MCNC: 26 MG/DL
CALCIUM SERPL-MCNC: 10 MG/DL
CHLORIDE SERPL-SCNC: 101 MMOL/L
CO2 SERPL-SCNC: 30 MMOL/L
CREAT SERPL-MCNC: 1.8 MG/DL
EST. GFR  (AFRICAN AMERICAN): 37 ML/MIN/1.73 M^2
EST. GFR  (NON AFRICAN AMERICAN): 32.1 ML/MIN/1.73 M^2
GLUCOSE SERPL-MCNC: 294 MG/DL
PHOSPHATE SERPL-MCNC: 2.8 MG/DL
POTASSIUM SERPL-SCNC: 4.3 MMOL/L
SODIUM SERPL-SCNC: 141 MMOL/L

## 2018-10-05 PROCEDURE — 36415 COLL VENOUS BLD VENIPUNCTURE: CPT | Mod: PO

## 2018-10-05 PROCEDURE — 80069 RENAL FUNCTION PANEL: CPT

## 2018-10-11 ENCOUNTER — OFFICE VISIT (OUTPATIENT)
Dept: NEPHROLOGY | Facility: CLINIC | Age: 53
End: 2018-10-11
Payer: COMMERCIAL

## 2018-10-11 VITALS
SYSTOLIC BLOOD PRESSURE: 118 MMHG | DIASTOLIC BLOOD PRESSURE: 78 MMHG | BODY MASS INDEX: 47.24 KG/M2 | HEIGHT: 64 IN | WEIGHT: 276.69 LBS | HEART RATE: 88 BPM | OXYGEN SATURATION: 98 %

## 2018-10-11 DIAGNOSIS — N17.9 ACUTE RENAL FAILURE, UNSPECIFIED ACUTE RENAL FAILURE TYPE: Primary | ICD-10-CM

## 2018-10-11 PROCEDURE — 3008F BODY MASS INDEX DOCD: CPT | Mod: CPTII,S$GLB,, | Performed by: INTERNAL MEDICINE

## 2018-10-11 PROCEDURE — 99214 OFFICE O/P EST MOD 30 MIN: CPT | Mod: S$GLB,,, | Performed by: INTERNAL MEDICINE

## 2018-10-11 PROCEDURE — 3074F SYST BP LT 130 MM HG: CPT | Mod: CPTII,S$GLB,, | Performed by: INTERNAL MEDICINE

## 2018-10-11 PROCEDURE — 3078F DIAST BP <80 MM HG: CPT | Mod: CPTII,S$GLB,, | Performed by: INTERNAL MEDICINE

## 2018-10-11 PROCEDURE — 99999 PR PBB SHADOW E&M-EST. PATIENT-LVL III: CPT | Mod: PBBFAC,,, | Performed by: INTERNAL MEDICINE

## 2018-10-11 RX ORDER — CYANOCOBALAMIN (VITAMIN B-12) 250 MCG
250 TABLET ORAL DAILY
COMMUNITY
End: 2020-05-21

## 2018-10-11 RX ORDER — ASCORBIC ACID 250 MG
250 TABLET ORAL DAILY
COMMUNITY
End: 2021-10-04

## 2018-10-11 NOTE — PROGRESS NOTES
"Subjective:       Patient ID: Mary Ann Vidales is a 53 y.o. White female who presents for return patient evaluation for chronic renal failure.    She has no uremic or urinary symptoms and is in her usual state of health.  There have been no recent illnesses, hospitalizations or procedures.  She has a cath planned for November.  She has been off of NSAIDs for three weeks.       Review of Systems   Constitutional: Negative for appetite change, chills and fever.   Eyes: Negative for visual disturbance.   Respiratory: Negative for cough and shortness of breath.    Cardiovascular: Negative for chest pain and leg swelling.   Gastrointestinal: Negative for diarrhea, nausea and vomiting.   Genitourinary: Negative for difficulty urinating, dysuria and hematuria.   Musculoskeletal: Positive for arthralgias (knees) and back pain. Negative for myalgias.   Skin: Negative for rash.   Neurological: Negative for headaches.   Psychiatric/Behavioral: Negative for sleep disturbance.       The past medical, family and social histories were reviewed for this encounter.     /78 (BP Location: Left arm, Patient Position: Sitting)   Pulse 88   Ht 5' 4" (1.626 m)   Wt 125.5 kg (276 lb 10.8 oz)   LMP 12/20/2013   SpO2 98%   BMI 47.49 kg/m²     Objective:      Physical Exam   Constitutional: She is oriented to person, place, and time. She appears well-developed and well-nourished. No distress.   HENT:   Head: Normocephalic and atraumatic.   Eyes: Conjunctivae are normal.   Neck: Neck supple. No JVD present.   Cardiovascular: Normal rate, regular rhythm and normal heart sounds. Exam reveals no gallop and no friction rub.   No murmur heard.  Pulmonary/Chest: Effort normal and breath sounds normal. No respiratory distress. She has no wheezes. She has no rales.   Abdominal: Soft. Bowel sounds are normal. She exhibits no distension. There is no tenderness.   Musculoskeletal: She exhibits no edema.   Neurological: She is alert and " oriented to person, place, and time.   Skin: Skin is warm and dry. No rash noted.   Psychiatric: She has a normal mood and affect.   Vitals reviewed.      Assessment:       1. Acute renal failure, unspecified acute renal failure type        Plan:   Return to clinic in 2 months.  Labs for next visit include rp, ua.  Baseline creatinine is 1.0 since 2005.  I do not know why she went from a creatinine of 1.0 in 2015 to 1.5 in 2017 and 1.8 in 2018 with bland urine sediment and a negative renal US.  There appears to be no other precipitants although AIN is also a possibility.  Her urine sediment appears bland thus I think it is less likely that she has an acute GN.  She does not wish to have a biopsy at this time and I am not puushing for one.  She has a gap from 10/15-7/17 where she seemed to bump her baseline.  Renal US showed R 11.1 cm, L 10.6 cm.

## 2018-10-12 ENCOUNTER — PATIENT MESSAGE (OUTPATIENT)
Dept: NEPHROLOGY | Facility: CLINIC | Age: 53
End: 2018-10-12

## 2018-10-16 ENCOUNTER — PATIENT MESSAGE (OUTPATIENT)
Dept: INTERNAL MEDICINE | Facility: CLINIC | Age: 53
End: 2018-10-16

## 2018-10-22 DIAGNOSIS — M25.569 KNEE PAIN, UNSPECIFIED CHRONICITY, UNSPECIFIED LATERALITY: Primary | ICD-10-CM

## 2018-10-25 ENCOUNTER — HOSPITAL ENCOUNTER (OUTPATIENT)
Dept: RADIOLOGY | Facility: HOSPITAL | Age: 53
Discharge: HOME | End: 2018-10-25
Attending: ORTHOPAEDIC SURGERY
Payer: COMMERCIAL

## 2018-10-25 ENCOUNTER — OFFICE VISIT (OUTPATIENT)
Dept: ORTHOPEDICS | Facility: CLINIC | Age: 53
End: 2018-10-25
Payer: COMMERCIAL

## 2018-10-25 VITALS — HEIGHT: 64 IN | WEIGHT: 276 LBS | BODY MASS INDEX: 47.12 KG/M2

## 2018-10-25 DIAGNOSIS — G89.29 CHRONIC PAIN OF RIGHT KNEE: Primary | ICD-10-CM

## 2018-10-25 DIAGNOSIS — M17.11 PRIMARY OSTEOARTHRITIS OF RIGHT KNEE: ICD-10-CM

## 2018-10-25 DIAGNOSIS — G89.29 CHRONIC PAIN OF LEFT KNEE: ICD-10-CM

## 2018-10-25 DIAGNOSIS — M25.561 CHRONIC PAIN OF RIGHT KNEE: Primary | ICD-10-CM

## 2018-10-25 DIAGNOSIS — M17.12 PRIMARY OSTEOARTHRITIS OF LEFT KNEE: ICD-10-CM

## 2018-10-25 DIAGNOSIS — M25.569 KNEE PAIN, UNSPECIFIED CHRONICITY, UNSPECIFIED LATERALITY: ICD-10-CM

## 2018-10-25 DIAGNOSIS — M25.562 CHRONIC PAIN OF LEFT KNEE: ICD-10-CM

## 2018-10-25 PROCEDURE — 20610 DRAIN/INJ JOINT/BURSA W/O US: CPT | Mod: 50,S$GLB,, | Performed by: ORTHOPAEDIC SURGERY

## 2018-10-25 PROCEDURE — 3008F BODY MASS INDEX DOCD: CPT | Mod: CPTII,S$GLB,, | Performed by: ORTHOPAEDIC SURGERY

## 2018-10-25 PROCEDURE — 99999 PR PBB SHADOW E&M-EST. PATIENT-LVL III: CPT | Mod: PBBFAC,,, | Performed by: ORTHOPAEDIC SURGERY

## 2018-10-25 PROCEDURE — 73562 X-RAY EXAM OF KNEE 3: CPT | Mod: TC,50,PO

## 2018-10-25 PROCEDURE — 99214 OFFICE O/P EST MOD 30 MIN: CPT | Mod: 25,S$GLB,, | Performed by: ORTHOPAEDIC SURGERY

## 2018-10-25 PROCEDURE — 73562 X-RAY EXAM OF KNEE 3: CPT | Mod: 26,59,LT, | Performed by: RADIOLOGY

## 2018-10-25 PROCEDURE — 73562 X-RAY EXAM OF KNEE 3: CPT | Mod: 26,RT,, | Performed by: RADIOLOGY

## 2018-10-25 RX ORDER — LOSARTAN POTASSIUM AND HYDROCHLOROTHIAZIDE 25; 100 MG/1; MG/1
TABLET ORAL
Qty: 30 TABLET | Refills: 3 | Status: SHIPPED | OUTPATIENT
Start: 2018-10-25 | End: 2018-11-20 | Stop reason: SDUPTHER

## 2018-10-25 RX ORDER — TRIAMCINOLONE ACETONIDE 40 MG/ML
80 INJECTION, SUSPENSION INTRA-ARTICULAR; INTRAMUSCULAR
Status: DISCONTINUED | OUTPATIENT
Start: 2018-10-25 | End: 2018-10-25 | Stop reason: HOSPADM

## 2018-10-25 RX ADMIN — TRIAMCINOLONE ACETONIDE 80 MG: 40 INJECTION, SUSPENSION INTRA-ARTICULAR; INTRAMUSCULAR at 03:10

## 2018-10-25 NOTE — PROCEDURES
Large Joint Aspiration/Injection: R knee, L knee  Date/Time: 10/25/2018 3:59 PM  Performed by: Caleb Felton MD  Authorized by: Caleb Felton MD     Consent Done?:  Yes (Verbal)  Indications:  Pain  Procedure site marked: Yes      Location:  Knee  Site:  R knee and L knee  Ultrasonic Guidance for needle placement: No  Needle size:  22 G  Approach:  Anterolateral  Medications:  80 mg triamcinolone acetonide 40 mg/mL  Patient tolerance:  Patient tolerated the procedure well with no immediate complications

## 2018-10-25 NOTE — PROGRESS NOTES
HISTORY OF PRESENT ILLNESS:  53 years old, complaining of bilateral knee pain.    We have given her injection into the right knee about a year ago, responded   well, requesting an injection today.  Pain is 10/10 on the pain scale, aching   pain.    Exam today shows tenderness at the joint line.  No signs of infection.  Skin is   intact.  Compartments are soft.    X-rays show arthritic changes with lateral plateau fracture old on the right   knee.    ASSESSMENT:  Bilateral knee arthrosis.    PLAN:  Bilateral Kenalog injection, strengthening over time.  Follow up as   needed.      PBB/HN  dd: 10/25/2018 16:32:11 (CDT)  td: 10/26/2018 11:09:18 (CDT)  Doc ID   #[id number]  Job ID #[id number]    CC:     Further History  Aching pain  Worse with activity  Relieved with rest  No other associated symptoms  No other radiation    Further Exam  Alert and oriented  Pleasant  Contralateral limb has appropriate range of motion for age and condition  Contralateral limb has appropriate strength for age and condition  Contralateral limb has appropriate stability  for age and condition  No adenopathy  Pulses are appropriate for current condition  Skin is intact        Chief Complaint    Chief Complaint   Patient presents with    Right Knee - Pain       HPI  Mary Ann Vidales is a 53 y.o.  female who presents with       Past Medical History  Past Medical History:   Diagnosis Date    Chronic asthma     Diabetes mellitus type II     Fibroids     Hypertension     Incontinence     Morbid obesity 11/3/2015    Neuropathy in diabetes     Obesity     TINA (obstructive sleep apnea)     uses CPAP    Panic attacks        Past Surgical History  Past Surgical History:   Procedure Laterality Date    ECTOPIC PREGNANCY SURGERY      HERNIA REPAIR      HYSTERECTOMY      OOPHORECTOMY      TONSILLECTOMY         Medications  Current Outpatient Medications   Medication Sig    albuterol-ipratropium  mcg (COMBIVENT)  mcg/actuation  inhaler Inhale 1 puff into the lungs every 4 (four) hours as needed for Wheezing. 1 Aerosol Inhalation Three times a day    ALPRAZolam (XANAX) 0.5 MG tablet TAKE 1 TABLET BY MOUTH ONCE A DAY AS NEEDED    ascorbic acid, vitamin C, (VITAMIN C) 250 MG tablet Take 250 mg by mouth once daily.    atorvastatin (LIPITOR) 10 MG tablet Take 1 tablet (10 mg total) by mouth once daily.    azithromycin (Z-CHRISTIAN) 250 MG tablet Take 2 tablets by mouth on day 1; Take 1 tablet by mouth on days 2-5    black cohosh 40 mg Tab Take by mouth.    blood sugar diagnostic Strp Pt to check glucoses TID    blood-glucose meter kit Pt to check glucoses Tid    clotrimazole-betamethasone (LOTRISONE) cream Apply 1 applicator topically Once daily as needed.     cyanocobalamin (VITAMIN B-12) 250 MCG tablet Take 250 mcg by mouth once daily.    FLUoxetine (PROZAC) 40 MG capsule TAKE 1 CAPSULE (40 MG TOTAL) BY MOUTH ONCE DAILY.    fluticasone (FLONASE) 50 mcg/actuation nasal spray 2 sprays per nostril daily    furosemide (LASIX) 20 MG tablet Take 1 tablet (20 mg total) by mouth once daily.    gabapentin (NEURONTIN) 800 MG tablet TAKE 2 TABLETS (1,600 MG TOTAL) BY MOUTH 2 (TWO) TIMES DAILY.    glimepiride (AMARYL) 4 MG tablet Take 1 tablet (4 mg total) by mouth daily with breakfast.    HYDROcodone-acetaminophen (NORCO) 7.5-325 mg per tablet Take 1 tablet by mouth every 6 (six) hours as needed for Pain.    lancets (ONETOUCH ULTRASOFT LANCETS) Misc Use to check glucoses up to TID.    losartan-hydrochlorothiazide 100-25 mg (HYZAAR) 100-25 mg per tablet TAKE 1 TABLET BY MOUTH ONCE DAILY.    multivit with calcium,iron,min (MULTIPLE VITAMIN, WOMENS ORAL) Take by mouth.    nystatin-triamcinolone (MYCOLOG II) cream Apply topically 2 (two) times daily.    omeprazole (PRILOSEC) 40 MG capsule Take 1 capsule (40 mg total) by mouth once daily.    semaglutide (OZEMPIC) 1 mg/0.75 mL (2 mg/1.5 mL) PnIj Inject 1 mg into the skin every 7 days.     No  current facility-administered medications for this visit.        Allergies  Review of patient's allergies indicates:   Allergen Reactions    Captopril      Other reaction(s): cough    Lantus [insulin glargine] Swelling and Other (See Comments)     Burning and redness in the legs    Levemir [insulin detemir] Swelling and Other (See Comments)     Burning and redness of lower extremities    Lisinopril Other (See Comments)     Other reaction(s): cough    Other      Tape causes welps & hives    Antiseptic swabs Rash     Antiseptic wipes given prior to surgery caused severe rash.        Family History  Family History   Problem Relation Age of Onset    Diabetes Mother     Heart disease Mother     COPD Mother     Heart failure Father     Breast cancer Neg Hx     Ovarian cancer Neg Hx        Social History  Social History     Socioeconomic History    Marital status:      Spouse name: Not on file    Number of children: Not on file    Years of education: Not on file    Highest education level: Not on file   Social Needs    Financial resource strain: Not on file    Food insecurity - worry: Not on file    Food insecurity - inability: Not on file    Transportation needs - medical: Not on file    Transportation needs - non-medical: Not on file   Occupational History    Not on file   Tobacco Use    Smoking status: Never Smoker    Smokeless tobacco: Never Used   Substance and Sexual Activity    Alcohol use: No     Alcohol/week: 0.0 oz    Drug use: No    Sexual activity: Yes   Other Topics Concern    Not on file   Social History Narrative     at car dealership. Lives with her partner, who smokes.               Review of Systems     Constitutional: Negative    HENT: Negative  Eyes: Negative  Respiratory: Negative  Cardiovascular: Negative  Musculoskeletal: HPI  Skin: Negative  Neurological: Negative  Hematological: Negative  Endocrine: Negative                 Physical Exam    There were  no vitals filed for this visit.  Body mass index is 47.38 kg/m².  Physical Examination:     General appearance -  well appearing, and in no distress  Mental status - awake  Neck - supple  Chest -  symmetric air entry  Heart - normal rate   Abdomen - soft      Assessment     1. Chronic pain of right knee    2. Primary osteoarthritis of right knee    3. Chronic pain of left knee    4. Primary osteoarthritis of left knee          Plan

## 2018-10-29 RX ORDER — ALPRAZOLAM 0.5 MG/1
TABLET ORAL
Qty: 30 TABLET | Refills: 2 | Status: SHIPPED | OUTPATIENT
Start: 2018-10-29 | End: 2019-01-21 | Stop reason: SDUPTHER

## 2018-10-29 RX ORDER — NAPROXEN SODIUM 550 MG/1
TABLET ORAL
Qty: 60 TABLET | Refills: 0 | Status: SHIPPED | OUTPATIENT
Start: 2018-10-29 | End: 2018-12-04 | Stop reason: SDUPTHER

## 2018-10-29 RX ORDER — FUROSEMIDE 20 MG/1
20 TABLET ORAL DAILY
Qty: 90 TABLET | Refills: 3 | Status: SHIPPED | OUTPATIENT
Start: 2018-10-29 | End: 2019-10-03 | Stop reason: SDUPTHER

## 2018-10-29 NOTE — TELEPHONE ENCOUNTER
Called in xanax #[phone] x 2 refills to recorder at     [facility]/pharmacy #[id number] - [place] - [address] AT [facility]   [address] [place] [id number]   Phone: [phone]      As dr read approved.

## 2018-11-01 RX ORDER — OMEPRAZOLE 40 MG/1
40 CAPSULE, DELAYED RELEASE ORAL DAILY
Qty: 90 CAPSULE | Refills: 3 | Status: SHIPPED | OUTPATIENT
Start: 2018-11-01 | End: 2019-09-12 | Stop reason: ALTCHOICE

## 2018-11-05 ENCOUNTER — PATIENT MESSAGE (OUTPATIENT)
Dept: ENDOCRINOLOGY | Facility: CLINIC | Age: 53
End: 2018-11-05

## 2018-11-05 DIAGNOSIS — Z79.4 TYPE 2 DIABETES MELLITUS WITHOUT COMPLICATION, WITH LONG-TERM CURRENT USE OF INSULIN: ICD-10-CM

## 2018-11-05 DIAGNOSIS — E11.9 TYPE 2 DIABETES MELLITUS WITHOUT COMPLICATION, WITH LONG-TERM CURRENT USE OF INSULIN: ICD-10-CM

## 2018-11-06 RX ORDER — METFORMIN HYDROCHLORIDE 1000 MG/1
1000 TABLET ORAL 2 TIMES DAILY WITH MEALS
Qty: 180 TABLET | Refills: 3 | Status: SHIPPED | OUTPATIENT
Start: 2018-11-06 | End: 2019-01-10 | Stop reason: SDUPTHER

## 2018-11-06 RX ORDER — METFORMIN HYDROCHLORIDE 1000 MG/1
1000 TABLET ORAL 2 TIMES DAILY WITH MEALS
Qty: 60 TABLET | Refills: 3 | Status: SHIPPED | OUTPATIENT
Start: 2018-11-06 | End: 2019-10-02 | Stop reason: SDUPTHER

## 2018-11-20 ENCOUNTER — TELEPHONE (OUTPATIENT)
Dept: INTERNAL MEDICINE | Facility: CLINIC | Age: 53
End: 2018-11-20

## 2018-11-20 RX ORDER — LOSARTAN POTASSIUM AND HYDROCHLOROTHIAZIDE 25; 100 MG/1; MG/1
1 TABLET ORAL DAILY
Qty: 30 TABLET | Refills: 5 | Status: SHIPPED | OUTPATIENT
Start: 2018-11-20 | End: 2019-05-09 | Stop reason: SDUPTHER

## 2018-11-20 RX ORDER — AMOXICILLIN AND CLAVULANATE POTASSIUM 875; 125 MG/1; MG/1
1 TABLET, FILM COATED ORAL 2 TIMES DAILY
Qty: 20 TABLET | Refills: 0 | Status: SHIPPED | OUTPATIENT
Start: 2018-11-20 | End: 2018-11-30

## 2018-11-20 NOTE — TELEPHONE ENCOUNTER
Last seen in April and past due returning.  I reminded her to book appt before end of the year (you wanted to see her by July)

## 2018-11-20 NOTE — TELEPHONE ENCOUNTER
She thinks she has an infected tooth and is hoping you can call in rx for her  Till she can get in with dentist.    Pharmacy verified.    Thanks hamlet

## 2018-11-26 ENCOUNTER — OFFICE VISIT (OUTPATIENT)
Dept: CARDIOLOGY | Facility: CLINIC | Age: 53
End: 2018-11-26
Payer: COMMERCIAL

## 2018-11-26 VITALS
WEIGHT: 271.63 LBS | BODY MASS INDEX: 46.37 KG/M2 | HEIGHT: 64 IN | SYSTOLIC BLOOD PRESSURE: 137 MMHG | HEART RATE: 74 BPM | DIASTOLIC BLOOD PRESSURE: 87 MMHG

## 2018-11-26 DIAGNOSIS — E78.5 DYSLIPIDEMIA: Primary | ICD-10-CM

## 2018-11-26 DIAGNOSIS — R07.9 CHEST PAIN, UNSPECIFIED TYPE: Primary | ICD-10-CM

## 2018-11-26 DIAGNOSIS — I10 HTN (HYPERTENSION), BENIGN: ICD-10-CM

## 2018-11-26 DIAGNOSIS — N18.30 CKD (CHRONIC KIDNEY DISEASE) STAGE 3, GFR 30-59 ML/MIN: ICD-10-CM

## 2018-11-26 DIAGNOSIS — R07.9 CHEST PAIN, UNSPECIFIED TYPE: ICD-10-CM

## 2018-11-26 DIAGNOSIS — R07.89 OTHER CHEST PAIN: ICD-10-CM

## 2018-11-26 PROCEDURE — 93000 ELECTROCARDIOGRAM COMPLETE: CPT | Mod: S$GLB,,, | Performed by: INTERNAL MEDICINE

## 2018-11-26 PROCEDURE — 3079F DIAST BP 80-89 MM HG: CPT | Mod: CPTII,S$GLB,, | Performed by: INTERNAL MEDICINE

## 2018-11-26 PROCEDURE — 3008F BODY MASS INDEX DOCD: CPT | Mod: CPTII,S$GLB,, | Performed by: INTERNAL MEDICINE

## 2018-11-26 PROCEDURE — 99204 OFFICE O/P NEW MOD 45 MIN: CPT | Mod: S$GLB,,, | Performed by: INTERNAL MEDICINE

## 2018-11-26 PROCEDURE — 3075F SYST BP GE 130 - 139MM HG: CPT | Mod: CPTII,S$GLB,, | Performed by: INTERNAL MEDICINE

## 2018-11-26 PROCEDURE — 99999 PR PBB SHADOW E&M-EST. PATIENT-LVL III: CPT | Mod: PBBFAC,,, | Performed by: INTERNAL MEDICINE

## 2018-11-26 NOTE — PROGRESS NOTES
Subjective:    Patient ID:  Mary Ann Vidales is a 53 y.o. female who presents for evaluation of Chest Pain (second opinion- angio vs stress test); Shortness of Breath; Nausea; Hypertension; and Hyperlipidemia      HPI53 yo WF with obesity, dyslipidemia, HTN, type II DM and family hx of CAD has been having SOB and chest tightness with over exertion for six months. Also having nausea, vomiting and HA's in the morning.Denies palpitations, weak spells, and syncope. Resting EKG shows LAD and NSSTT changes.    Review of Systems   Constitution: Negative for decreased appetite, fever, weakness, malaise/fatigue, weight gain and weight loss.   HENT: Negative for hearing loss and nosebleeds.    Eyes: Negative for visual disturbance.   Cardiovascular: Positive for chest pain and dyspnea on exertion. Negative for claudication, cyanosis, irregular heartbeat, leg swelling, near-syncope, orthopnea, palpitations, paroxysmal nocturnal dyspnea and syncope.   Respiratory: Positive for shortness of breath. Negative for cough, hemoptysis, sleep disturbances due to breathing, snoring and wheezing.    Endocrine: Negative for cold intolerance, heat intolerance, polydipsia and polyuria.   Hematologic/Lymphatic: Negative for adenopathy and bleeding problem. Does not bruise/bleed easily.   Skin: Negative for color change, itching, poor wound healing, rash and suspicious lesions.   Musculoskeletal: Negative for arthritis, back pain, falls, joint pain, joint swelling, muscle cramps, muscle weakness and myalgias.   Gastrointestinal: Positive for nausea and vomiting. Negative for bloating, abdominal pain, change in bowel habit, constipation, flatus, heartburn, hematemesis, hematochezia, hemorrhoids, jaundice and melena.   Genitourinary: Negative for bladder incontinence, decreased libido, frequency, hematuria, hesitancy and urgency.   Neurological: Negative for brief paralysis, difficulty with concentration, excessive daytime sleepiness,  "dizziness, focal weakness, headaches, light-headedness, loss of balance, numbness and vertigo.   Psychiatric/Behavioral: Negative for altered mental status, depression and memory loss. The patient does not have insomnia and is not nervous/anxious.    Allergic/Immunologic: Negative for environmental allergies, hives and persistent infections.        Objective:    Physical Exam   Constitutional: She is oriented to person, place, and time. She appears well-developed and well-nourished.   /87   Pulse 74   Ht 5' 4" (1.626 m)   Wt 123.2 kg (271 lb 9.7 oz)   LMP 12/20/2013   BMI 46.62 kg/m²      HENT:   Head: Normocephalic and atraumatic.   Right Ear: External ear normal.   Left Ear: External ear normal.   Nose: Nose normal.   Mouth/Throat: Oropharynx is clear and moist.   Eyes: Conjunctivae, EOM and lids are normal. Pupils are equal, round, and reactive to light. Right eye exhibits no discharge. Left eye exhibits no discharge. Right conjunctiva has no hemorrhage. No scleral icterus.   Neck: Normal range of motion. Neck supple. No JVD present. No tracheal deviation present. No thyromegaly present.   Cardiovascular: Normal rate, regular rhythm, normal heart sounds and intact distal pulses. Exam reveals no gallop and no friction rub.   No murmur heard.  Pulmonary/Chest: Effort normal and breath sounds normal. No respiratory distress. She has no wheezes. She has no rales. She exhibits no tenderness. Breasts are symmetrical.   Abdominal: Soft. Bowel sounds are normal. She exhibits no distension and no mass. There is no hepatosplenomegaly or hepatomegaly. There is no tenderness. There is no rebound and no guarding.   Musculoskeletal: Normal range of motion. She exhibits no edema or tenderness.   Lymphadenopathy:     She has no cervical adenopathy.   Neurological: She is alert and oriented to person, place, and time. She displays normal reflexes. No cranial nerve deficit. Coordination normal.   Skin: Skin is warm and " dry. No rash noted. No erythema. No pallor.   Psychiatric: She has a normal mood and affect. Her behavior is normal. Judgment and thought content normal.   Nursing note and vitals reviewed.        Assessment:       1. Dyslipidemia    2. HTN (hypertension), benign    3. CKD (chronic kidney disease) stage 3, GFR 30-59 ml/min    4. Other chest pain    5. Chest pain, unspecified type         Plan:     Discussed options and have elected to do PET stress. Cr 1.8 and would like to avoid iodine dye     Further recommendations after PET result  Orders Placed This Encounter   Procedures    PET Stress Test (Cupid Only)     Follow-up in about 3 months (around 2/26/2019).

## 2018-12-03 DIAGNOSIS — Z79.4 TYPE 2 DIABETES MELLITUS WITHOUT COMPLICATION, WITH LONG-TERM CURRENT USE OF INSULIN: ICD-10-CM

## 2018-12-03 DIAGNOSIS — E11.9 TYPE 2 DIABETES MELLITUS WITHOUT COMPLICATION, WITH LONG-TERM CURRENT USE OF INSULIN: ICD-10-CM

## 2018-12-03 RX ORDER — GLIMEPIRIDE 4 MG/1
TABLET ORAL
Qty: 15 TABLET | Refills: 5 | Status: SHIPPED | OUTPATIENT
Start: 2018-12-03 | End: 2019-05-21 | Stop reason: SDUPTHER

## 2018-12-05 RX ORDER — NAPROXEN SODIUM 550 MG/1
TABLET ORAL
Qty: 60 TABLET | Refills: 0 | Status: SHIPPED | OUTPATIENT
Start: 2018-12-05 | End: 2019-01-05 | Stop reason: SDUPTHER

## 2018-12-06 ENCOUNTER — CLINICAL SUPPORT (OUTPATIENT)
Dept: CARDIOLOGY | Facility: CLINIC | Age: 53
End: 2018-12-06
Attending: INTERNAL MEDICINE
Payer: COMMERCIAL

## 2018-12-06 DIAGNOSIS — N18.30 CKD (CHRONIC KIDNEY DISEASE) STAGE 3, GFR 30-59 ML/MIN: ICD-10-CM

## 2018-12-06 DIAGNOSIS — I10 HTN (HYPERTENSION), BENIGN: ICD-10-CM

## 2018-12-06 DIAGNOSIS — R07.89 OTHER CHEST PAIN: ICD-10-CM

## 2018-12-06 DIAGNOSIS — E78.5 DYSLIPIDEMIA: ICD-10-CM

## 2018-12-06 LAB
CV STRESS BASE HR: 101
DIASTOLIC BLOOD PRESSURE: 61
NUC REST DIASTOLIC VOLUME INDEX: 95
NUC REST EJECTION FRACTION: 65
NUC REST SYSTOLIC VOLUME INDEX: 34
NUC STRESS DIASTOLIC VOLUME INDEX: 93
NUC STRESS EJECTION FRACTION: 60 %
NUC STRESS SYSTOLIC VOLUME INDEX: 37
OHS CV CPX 85 PERCENT MAX PREDICTED HEART RATE MALE: 137
OHS CV CPX MAX PREDICTED HEART RATE: 161
OHS CV CPX PATIENT IS FEMALE: 1
OHS CV CPX PATIENT IS MALE: 0
OHS CV CPX PEAK DIASTOLIC BLOOD PRESSURE: 55 MMHG
OHS CV CPX PEAK HEAR RATE: 96
OHS CV CPX PEAK RATE PRESSURE PRODUCT: 8448
OHS CV CPX PEAK SYSTOLIC BLOOD PRESSURE: 88
OHS CV CPX PERCENT MAX PREDICTED HEART RATE ACHIEVED: 60
OHS CV CPX RATE PRESSURE PRODUCT PRESENTING: 9797
SYSTOLIC BLOOD PRESSURE: 97

## 2018-12-06 PROCEDURE — 78492 MYOCRD IMG PET MLT RST&STRS: CPT | Mod: S$GLB,,, | Performed by: INTERNAL MEDICINE

## 2018-12-06 PROCEDURE — A9555 RB82 RUBIDIUM: HCPCS | Mod: S$GLB,,, | Performed by: INTERNAL MEDICINE

## 2018-12-06 RX ORDER — DIPYRIDAMOLE 5 MG/ML
60 INJECTION INTRAVENOUS
Status: COMPLETED | OUTPATIENT
Start: 2018-12-06 | End: 2018-12-06

## 2018-12-06 RX ADMIN — DIPYRIDAMOLE 60 MG: 5 INJECTION INTRAVENOUS at 08:12

## 2018-12-07 ENCOUNTER — TELEPHONE (OUTPATIENT)
Dept: CARDIOLOGY | Facility: CLINIC | Age: 53
End: 2018-12-07

## 2018-12-07 DIAGNOSIS — R94.39 ABNORMAL STRESS TEST: Primary | ICD-10-CM

## 2018-12-07 DIAGNOSIS — R07.9 CHEST PAIN, UNSPECIFIED TYPE: ICD-10-CM

## 2018-12-07 NOTE — TELEPHONE ENCOUNTER
Discussed results of PET stress. Feel there is enough abnormalities to consider LHC. Discussed with patient and she is agreeable.

## 2018-12-07 NOTE — TELEPHONE ENCOUNTER
Pt has been given instruction and verbalized understanding and appointment has been moved to 1/11/18

## 2018-12-07 NOTE — TELEPHONE ENCOUNTER
----- Message from Michelle Reyna sent at 12/7/2018  3:23 PM CST -----  Contact: Patient  Type:  Patient Returning Call    Who Called:  Patient  Who Left Message for Patient:  Birgit   Does the patient know what this is regarding?: Angiogram  Best Call Back Number:  [phone] (home)   Additional Information:  Patient will need to reschedule from 12/19

## 2018-12-10 ENCOUNTER — PATIENT MESSAGE (OUTPATIENT)
Dept: CARDIOLOGY | Facility: CLINIC | Age: 53
End: 2018-12-10

## 2018-12-10 ENCOUNTER — TELEPHONE (OUTPATIENT)
Dept: INTERNAL MEDICINE | Facility: CLINIC | Age: 53
End: 2018-12-10

## 2018-12-10 DIAGNOSIS — E11.9 DIABETES MELLITUS WITHOUT COMPLICATION: Primary | ICD-10-CM

## 2018-12-10 NOTE — TELEPHONE ENCOUNTER
----- Message from Beatriz Gonzales sent at 12/10/2018  3:00 PM CST -----  Contact: Pt Mobile/Home [phone]   Doctor appointment and lab have been scheduled.  Please link lab orders to the lab appointment.  Date of doctor appointment:  12/18/2018  Physical or EP:  Physical   Date of lab appointment:  12/14/2018

## 2018-12-12 RX ORDER — HYDROCODONE BITARTRATE AND ACETAMINOPHEN 7.5; 325 MG/1; MG/1
1 TABLET ORAL EVERY 6 HOURS PRN
Qty: 90 TABLET | Refills: 0 | Status: SHIPPED | OUTPATIENT
Start: 2018-12-12 | End: 2019-03-14 | Stop reason: SDUPTHER

## 2018-12-14 ENCOUNTER — LAB VISIT (OUTPATIENT)
Dept: LAB | Facility: HOSPITAL | Age: 53
End: 2018-12-14
Attending: INTERNAL MEDICINE
Payer: COMMERCIAL

## 2018-12-14 DIAGNOSIS — E11.9 DIABETES MELLITUS WITHOUT COMPLICATION: ICD-10-CM

## 2018-12-14 DIAGNOSIS — N17.9 ACUTE RENAL FAILURE, UNSPECIFIED ACUTE RENAL FAILURE TYPE: ICD-10-CM

## 2018-12-14 LAB
ALBUMIN SERPL BCP-MCNC: 3.4 G/DL
ALBUMIN SERPL BCP-MCNC: 3.4 G/DL
ALP SERPL-CCNC: 78 U/L
ALT SERPL W/O P-5'-P-CCNC: 23 U/L
ANION GAP SERPL CALC-SCNC: 13 MMOL/L
ANION GAP SERPL CALC-SCNC: 13 MMOL/L
AST SERPL-CCNC: 18 U/L
BASOPHILS # BLD AUTO: 0.04 K/UL
BASOPHILS NFR BLD: 0.4 %
BILIRUB SERPL-MCNC: 0.5 MG/DL
BUN SERPL-MCNC: 30 MG/DL
BUN SERPL-MCNC: 30 MG/DL
CALCIUM SERPL-MCNC: 9.3 MG/DL
CALCIUM SERPL-MCNC: 9.3 MG/DL
CHLORIDE SERPL-SCNC: 102 MMOL/L
CHLORIDE SERPL-SCNC: 102 MMOL/L
CHOLEST SERPL-MCNC: 136 MG/DL
CHOLEST/HDLC SERPL: 3.3 {RATIO}
CO2 SERPL-SCNC: 28 MMOL/L
CO2 SERPL-SCNC: 28 MMOL/L
CREAT SERPL-MCNC: 1.8 MG/DL
CREAT SERPL-MCNC: 1.8 MG/DL
DIFFERENTIAL METHOD: ABNORMAL
EOSINOPHIL # BLD AUTO: 0.1 K/UL
EOSINOPHIL NFR BLD: 1.2 %
ERYTHROCYTE [DISTWIDTH] IN BLOOD BY AUTOMATED COUNT: 13.9 %
EST. GFR  (AFRICAN AMERICAN): 37 ML/MIN/1.73 M^2
EST. GFR  (AFRICAN AMERICAN): 37 ML/MIN/1.73 M^2
EST. GFR  (NON AFRICAN AMERICAN): 32.1 ML/MIN/1.73 M^2
EST. GFR  (NON AFRICAN AMERICAN): 32.1 ML/MIN/1.73 M^2
ESTIMATED AVG GLUCOSE: 146 MG/DL
GLUCOSE SERPL-MCNC: 159 MG/DL
GLUCOSE SERPL-MCNC: 159 MG/DL
HBA1C MFR BLD HPLC: 6.7 %
HCT VFR BLD AUTO: 32.3 %
HDLC SERPL-MCNC: 41 MG/DL
HDLC SERPL: 30.1 %
HGB BLD-MCNC: 10.4 G/DL
IMM GRANULOCYTES # BLD AUTO: 0.05 K/UL
IMM GRANULOCYTES NFR BLD AUTO: 0.5 %
LDLC SERPL CALC-MCNC: 70 MG/DL
LYMPHOCYTES # BLD AUTO: 2.2 K/UL
LYMPHOCYTES NFR BLD: 22.6 %
MCH RBC QN AUTO: 30.3 PG
MCHC RBC AUTO-ENTMCNC: 32.2 G/DL
MCV RBC AUTO: 94 FL
MONOCYTES # BLD AUTO: 0.6 K/UL
MONOCYTES NFR BLD: 6 %
NEUTROPHILS # BLD AUTO: 6.7 K/UL
NEUTROPHILS NFR BLD: 69.3 %
NONHDLC SERPL-MCNC: 95 MG/DL
NRBC BLD-RTO: 0 /100 WBC
PHOSPHATE SERPL-MCNC: 3.4 MG/DL
PLATELET # BLD AUTO: 173 K/UL
PMV BLD AUTO: 10 FL
POTASSIUM SERPL-SCNC: 4.2 MMOL/L
POTASSIUM SERPL-SCNC: 4.2 MMOL/L
PROT SERPL-MCNC: 6.8 G/DL
RBC # BLD AUTO: 3.43 M/UL
SODIUM SERPL-SCNC: 143 MMOL/L
SODIUM SERPL-SCNC: 143 MMOL/L
TRIGL SERPL-MCNC: 125 MG/DL
WBC # BLD AUTO: 9.69 K/UL

## 2018-12-14 PROCEDURE — 36415 COLL VENOUS BLD VENIPUNCTURE: CPT | Mod: PO

## 2018-12-14 PROCEDURE — 80053 COMPREHEN METABOLIC PANEL: CPT

## 2018-12-14 PROCEDURE — 83036 HEMOGLOBIN GLYCOSYLATED A1C: CPT

## 2018-12-14 PROCEDURE — 85025 COMPLETE CBC W/AUTO DIFF WBC: CPT

## 2018-12-14 PROCEDURE — 80069 RENAL FUNCTION PANEL: CPT

## 2018-12-14 PROCEDURE — 80061 LIPID PANEL: CPT

## 2019-01-07 RX ORDER — NAPROXEN SODIUM 550 MG/1
TABLET ORAL
Qty: 60 TABLET | Refills: 0 | Status: SHIPPED | OUTPATIENT
Start: 2019-01-07 | End: 2019-02-05 | Stop reason: SDUPTHER

## 2019-01-08 ENCOUNTER — PATIENT MESSAGE (OUTPATIENT)
Dept: ADMINISTRATIVE | Facility: OTHER | Age: 54
End: 2019-01-08

## 2019-01-08 ENCOUNTER — TELEPHONE (OUTPATIENT)
Dept: CARDIOLOGY | Facility: CLINIC | Age: 54
End: 2019-01-08

## 2019-01-08 ENCOUNTER — PATIENT MESSAGE (OUTPATIENT)
Dept: INTERNAL MEDICINE | Facility: CLINIC | Age: 54
End: 2019-01-08

## 2019-01-08 NOTE — TELEPHONE ENCOUNTER
----- Message from Brandi Denise sent at 1/8/2019  8:06 AM CST -----  Type: Needs Medical Advice    Who Called:  Patient   Symptoms (please be specific):  Coughing, congestion, sinus issues, throat sore  How long has patient had these symptoms: 1 day  Best Call Back Number: [phone]  Additional Information: patient is concerned about having her angiogram on Friday with these symptoms, please contact to advise.     Thank you

## 2019-01-09 ENCOUNTER — TELEPHONE (OUTPATIENT)
Dept: CARDIOLOGY | Facility: CLINIC | Age: 54
End: 2019-01-09

## 2019-01-09 NOTE — TELEPHONE ENCOUNTER
----- Message from Mariana Damian sent at 1/9/2019  2:25 PM CST -----  Contact: Mary Ann  Type: Needs Medical Advice    Who Called:  patient  Best Call Back Number: [phone]  Additional Information: patient has gotten sick & she needs to r/s the appt on 1/11--please call to r/s--thank you

## 2019-01-09 NOTE — TELEPHONE ENCOUNTER
Please advise pt. Wants to reschedule procedure due to being sick and wants to reschedule procedure to February 22nd 2019.  Is this ok

## 2019-01-10 ENCOUNTER — OFFICE VISIT (OUTPATIENT)
Dept: INTERNAL MEDICINE | Facility: CLINIC | Age: 54
End: 2019-01-10
Payer: COMMERCIAL

## 2019-01-10 VITALS
BODY MASS INDEX: 46.6 KG/M2 | HEART RATE: 90 BPM | SYSTOLIC BLOOD PRESSURE: 100 MMHG | TEMPERATURE: 98 F | HEIGHT: 64 IN | DIASTOLIC BLOOD PRESSURE: 60 MMHG | OXYGEN SATURATION: 96 % | RESPIRATION RATE: 18 BRPM | WEIGHT: 272.94 LBS

## 2019-01-10 DIAGNOSIS — J40 BRONCHITIS: ICD-10-CM

## 2019-01-10 DIAGNOSIS — B96.89 ACUTE BACTERIAL SINUSITIS: Primary | ICD-10-CM

## 2019-01-10 DIAGNOSIS — Z91.89 AT RISK FOR CORONARY ARTERY DISEASE: ICD-10-CM

## 2019-01-10 DIAGNOSIS — I10 ESSENTIAL HYPERTENSION: ICD-10-CM

## 2019-01-10 DIAGNOSIS — E66.01 MORBID OBESITY, UNSPECIFIED OBESITY TYPE: ICD-10-CM

## 2019-01-10 DIAGNOSIS — E11.9 DIABETES MELLITUS WITHOUT COMPLICATION: ICD-10-CM

## 2019-01-10 DIAGNOSIS — N18.30 CKD (CHRONIC KIDNEY DISEASE), STAGE III: ICD-10-CM

## 2019-01-10 DIAGNOSIS — J01.90 ACUTE BACTERIAL SINUSITIS: Primary | ICD-10-CM

## 2019-01-10 PROCEDURE — 3044F PR MOST RECENT HEMOGLOBIN A1C LEVEL <7.0%: ICD-10-PCS | Mod: CPTII,S$GLB,, | Performed by: INTERNAL MEDICINE

## 2019-01-10 PROCEDURE — 3044F HG A1C LEVEL LT 7.0%: CPT | Mod: CPTII,S$GLB,, | Performed by: INTERNAL MEDICINE

## 2019-01-10 PROCEDURE — 96372 PR INJECTION,THERAP/PROPH/DIAG2ST, IM OR SUBCUT: ICD-10-PCS | Mod: S$GLB,,, | Performed by: INTERNAL MEDICINE

## 2019-01-10 PROCEDURE — 99999 PR PBB SHADOW E&M-EST. PATIENT-LVL IV: ICD-10-PCS | Mod: PBBFAC,,, | Performed by: INTERNAL MEDICINE

## 2019-01-10 PROCEDURE — 96372 THER/PROPH/DIAG INJ SC/IM: CPT | Mod: S$GLB,,, | Performed by: INTERNAL MEDICINE

## 2019-01-10 PROCEDURE — 3008F BODY MASS INDEX DOCD: CPT | Mod: CPTII,S$GLB,, | Performed by: INTERNAL MEDICINE

## 2019-01-10 PROCEDURE — 3074F PR MOST RECENT SYSTOLIC BLOOD PRESSURE < 130 MM HG: ICD-10-PCS | Mod: CPTII,S$GLB,, | Performed by: INTERNAL MEDICINE

## 2019-01-10 PROCEDURE — 99214 PR OFFICE/OUTPT VISIT, EST, LEVL IV, 30-39 MIN: ICD-10-PCS | Mod: 25,S$GLB,, | Performed by: INTERNAL MEDICINE

## 2019-01-10 PROCEDURE — 3008F PR BODY MASS INDEX (BMI) DOCUMENTED: ICD-10-PCS | Mod: CPTII,S$GLB,, | Performed by: INTERNAL MEDICINE

## 2019-01-10 PROCEDURE — 99214 OFFICE O/P EST MOD 30 MIN: CPT | Mod: 25,S$GLB,, | Performed by: INTERNAL MEDICINE

## 2019-01-10 PROCEDURE — 3078F PR MOST RECENT DIASTOLIC BLOOD PRESSURE < 80 MM HG: ICD-10-PCS | Mod: CPTII,S$GLB,, | Performed by: INTERNAL MEDICINE

## 2019-01-10 PROCEDURE — 99999 PR PBB SHADOW E&M-EST. PATIENT-LVL IV: CPT | Mod: PBBFAC,,, | Performed by: INTERNAL MEDICINE

## 2019-01-10 PROCEDURE — 3074F SYST BP LT 130 MM HG: CPT | Mod: CPTII,S$GLB,, | Performed by: INTERNAL MEDICINE

## 2019-01-10 PROCEDURE — 3078F DIAST BP <80 MM HG: CPT | Mod: CPTII,S$GLB,, | Performed by: INTERNAL MEDICINE

## 2019-01-10 RX ORDER — TRIAMCINOLONE ACETONIDE 40 MG/ML
40 INJECTION, SUSPENSION INTRA-ARTICULAR; INTRAMUSCULAR ONCE
Status: COMPLETED | OUTPATIENT
Start: 2019-01-10 | End: 2019-01-10

## 2019-01-10 RX ORDER — AZITHROMYCIN 250 MG/1
TABLET, FILM COATED ORAL
Qty: 6 TABLET | Refills: 0 | Status: SHIPPED | OUTPATIENT
Start: 2019-01-10 | End: 2019-01-22 | Stop reason: ALTCHOICE

## 2019-01-10 RX ADMIN — TRIAMCINOLONE ACETONIDE 40 MG: 40 INJECTION, SUSPENSION INTRA-ARTICULAR; INTRAMUSCULAR at 03:01

## 2019-01-10 NOTE — TELEPHONE ENCOUNTER
Procedure moved to 02- as per pt. Advise pt. If she's having chest pains between then to go to the ER. PT. verbalized understanding.

## 2019-01-10 NOTE — PROGRESS NOTES
Subjective:       Patient ID: Mary Ann Vidales is a 53 y.o. female.    Chief Complaint: Nasal Congestion; Chest Congestion; Sore Throat; Cough; and Sinus Problem  HISTORY OF PRESENT ILLNESS:  A 53-year-old patient well known to me with a   three-day history of sinus congestion, drainage, cough, severe nasal blockage   and her sputum is slightly grayish.  The patient has had no fever, chills, and   she is getting ready to have an angiogram done.    PHYSICAL EXAMINATION:  VITAL SIGNS:  Normal.  Temperature normal.  O2 sat 96 on room air.  SKIN:  Fair and healthy.  HEENT:  Clear.  NECK:  Shows no stiffness or adenopathy.  CHEST:  Clear.    IMPRESSION:  Bacterial sinusitis.  The patient was treated with Z-CHRISTIAN, Kenalog   40 mg IM, told to flush her nose with saline, get Mucinex.  I will see her again   at regularly scheduled time.      JROXY/HN  dd: 01/31/2019 21:54:36 (CST)  td: 02/01/2019 19:36:31 (CST)  Doc ID   #[id number]  Job ID #[id number]    CC:     Dict [id number]  HPI  Review of Systems   Constitutional: Negative.    HENT: Positive for congestion, postnasal drip, sinus pressure, sinus pain and sneezing. Negative for hearing loss, sore throat and voice change.    Eyes: Negative for visual disturbance.   Respiratory: Positive for cough. Negative for chest tightness and shortness of breath.    Cardiovascular: Negative.  Negative for chest pain, palpitations and leg swelling.   Gastrointestinal: Negative.    Genitourinary: Negative for difficulty urinating, dysuria, flank pain, frequency, hematuria, menstrual problem, pelvic pain, urgency, vaginal bleeding and vaginal discharge.   Musculoskeletal: Negative.  Negative for neck pain and neck stiffness.   Skin: Negative.    Neurological: Negative.  Negative for dizziness, seizures, light-headedness, numbness and headaches.   Psychiatric/Behavioral: Negative for agitation, behavioral problems, confusion and sleep disturbance. The patient is not nervous/anxious.         Objective:      Physical Exam   Constitutional: She is oriented to person, place, and time. She appears well-developed and well-nourished.   Eyes: EOM are normal. Pupils are equal, round, and reactive to light.   Neck: Normal range of motion. Neck supple. No JVD present. No thyromegaly present.   Cardiovascular: Normal rate, regular rhythm, normal heart sounds and intact distal pulses. Exam reveals no gallop.   No murmur heard.  Pulmonary/Chest: Breath sounds normal. She has no wheezes. She has no rales.   Abdominal: Soft. Bowel sounds are normal. She exhibits no mass. There is no tenderness.   Musculoskeletal: Normal range of motion.   Lymphadenopathy:     She has no cervical adenopathy.   Neurological: She is alert and oriented to person, place, and time. She has normal reflexes. No cranial nerve deficit.   Skin: No rash noted. No erythema.   Psychiatric: She has a normal mood and affect. Judgment normal.       Assessment:       1. Acute bacterial sinusitis    2. Bronchitis    3. Diabetes mellitus without complication    4. CKD (chronic kidney disease), stage III    5. Essential hypertension    6. At risk for coronary artery disease    7. Morbid obesity, unspecified obesity type        Plan:

## 2019-01-22 ENCOUNTER — OFFICE VISIT (OUTPATIENT)
Dept: NEPHROLOGY | Facility: CLINIC | Age: 54
End: 2019-01-22
Payer: COMMERCIAL

## 2019-01-22 VITALS
HEIGHT: 64 IN | BODY MASS INDEX: 45.62 KG/M2 | SYSTOLIC BLOOD PRESSURE: 110 MMHG | DIASTOLIC BLOOD PRESSURE: 70 MMHG | OXYGEN SATURATION: 96 % | WEIGHT: 267.19 LBS | HEART RATE: 81 BPM

## 2019-01-22 DIAGNOSIS — N39.3 STRESS INCONTINENCE: ICD-10-CM

## 2019-01-22 DIAGNOSIS — R32 URINARY INCONTINENCE, UNSPECIFIED TYPE: ICD-10-CM

## 2019-01-22 DIAGNOSIS — N18.30 CHRONIC KIDNEY DISEASE, STAGE III (MODERATE): ICD-10-CM

## 2019-01-22 DIAGNOSIS — N17.9 ACUTE RENAL FAILURE, UNSPECIFIED ACUTE RENAL FAILURE TYPE: Primary | ICD-10-CM

## 2019-01-22 DIAGNOSIS — N28.1 ACQUIRED CYST OF KIDNEY: ICD-10-CM

## 2019-01-22 PROCEDURE — 99999 PR PBB SHADOW E&M-EST. PATIENT-LVL III: ICD-10-PCS | Mod: PBBFAC,,, | Performed by: INTERNAL MEDICINE

## 2019-01-22 PROCEDURE — 3008F BODY MASS INDEX DOCD: CPT | Mod: CPTII,S$GLB,, | Performed by: INTERNAL MEDICINE

## 2019-01-22 PROCEDURE — 3074F SYST BP LT 130 MM HG: CPT | Mod: CPTII,S$GLB,, | Performed by: INTERNAL MEDICINE

## 2019-01-22 PROCEDURE — 99999 PR PBB SHADOW E&M-EST. PATIENT-LVL III: CPT | Mod: PBBFAC,,, | Performed by: INTERNAL MEDICINE

## 2019-01-22 PROCEDURE — 99214 OFFICE O/P EST MOD 30 MIN: CPT | Mod: S$GLB,,, | Performed by: INTERNAL MEDICINE

## 2019-01-22 PROCEDURE — 3078F PR MOST RECENT DIASTOLIC BLOOD PRESSURE < 80 MM HG: ICD-10-PCS | Mod: CPTII,S$GLB,, | Performed by: INTERNAL MEDICINE

## 2019-01-22 PROCEDURE — 99214 PR OFFICE/OUTPT VISIT, EST, LEVL IV, 30-39 MIN: ICD-10-PCS | Mod: S$GLB,,, | Performed by: INTERNAL MEDICINE

## 2019-01-22 PROCEDURE — 3074F PR MOST RECENT SYSTOLIC BLOOD PRESSURE < 130 MM HG: ICD-10-PCS | Mod: CPTII,S$GLB,, | Performed by: INTERNAL MEDICINE

## 2019-01-22 PROCEDURE — 3008F PR BODY MASS INDEX (BMI) DOCUMENTED: ICD-10-PCS | Mod: CPTII,S$GLB,, | Performed by: INTERNAL MEDICINE

## 2019-01-22 PROCEDURE — 3078F DIAST BP <80 MM HG: CPT | Mod: CPTII,S$GLB,, | Performed by: INTERNAL MEDICINE

## 2019-01-22 RX ORDER — DICLOFENAC SODIUM 10 MG/G
2 GEL TOPICAL 2 TIMES DAILY PRN
Qty: 100 G | Refills: 3 | Status: SHIPPED | OUTPATIENT
Start: 2019-01-22 | End: 2020-11-05 | Stop reason: CLARIF

## 2019-01-22 NOTE — PROGRESS NOTES
"Subjective:       Patient ID: Mary Ann Vidales is a 53 y.o. White female who presents for return patient evaluation for chronic renal failure.    She has no uremic or urinary symptoms and is in her usual state of health.  There have been no recent illnesses, hospitalizations or procedures.  She has a cath planned for Jan 22nd.  She has chronic pain in her knees and does take naproxyn.  She also has chronic weak urinary stream with stress incontinence.      Review of Systems   Constitutional: Negative for appetite change, chills and fever.   Eyes: Negative for visual disturbance.   Respiratory: Negative for cough and shortness of breath.    Cardiovascular: Negative for chest pain and leg swelling.   Gastrointestinal: Negative for diarrhea, nausea and vomiting.   Genitourinary: Positive for decreased urine volume and difficulty urinating. Negative for dysuria and hematuria.   Musculoskeletal: Positive for arthralgias (knees) and back pain. Negative for myalgias.   Skin: Negative for rash.   Neurological: Negative for headaches.   Psychiatric/Behavioral: Negative for sleep disturbance.       The past medical, family and social histories were reviewed for this encounter.     /70   Pulse 81   Ht 5' 4" (1.626 m)   Wt 121.2 kg (267 lb 3.2 oz)   LMP 12/20/2013   SpO2 96%   BMI 45.86 kg/m²     Objective:      Physical Exam   Constitutional: She is oriented to person, place, and time. She appears well-developed and well-nourished. No distress.   HENT:   Head: Normocephalic and atraumatic.   Eyes: Conjunctivae are normal.   Neck: Neck supple. No JVD present.   Cardiovascular: Normal rate, regular rhythm and normal heart sounds. Exam reveals no gallop and no friction rub.   No murmur heard.  Pulmonary/Chest: Effort normal and breath sounds normal. No respiratory distress. She has no wheezes. She has no rales.   Abdominal: Soft. Bowel sounds are normal. She exhibits no distension. There is no tenderness. "   Musculoskeletal: She exhibits no edema.   Neurological: She is alert and oriented to person, place, and time.   Skin: Skin is warm and dry. No rash noted.   Psychiatric: She has a normal mood and affect.   Vitals reviewed.      Assessment:       1. Acute renal failure, unspecified acute renal failure type    2. Acquired cyst of kidney    3. Chronic kidney disease, stage III (moderate)        Plan:   Return to clinic in 5 months.  Labs for next visit include rp, ua, pth.  Baseline creatinine is 1.0 since 2005.  I do not know why she went from a creatinine of 1.0 in 2015 to 1.5 in 2017 and 1.8 in 2018 with bland urine sediment and a negative renal US.  There appears to be no other precipitants although AIN is also a possibility.  Her urine sediment appears bland thus I think it is less likely that she has an acute GN.  She does not wish to have a biopsy at this time and I am not puushing for one.  She has a gap from 10/15-7/17 where she seemed to bump her baseline.  Renal US showed R 11.1 cm, L 10.6 cm.  Refer to Lelia Sahni PT for incontinence evaluation.  Try voltaren gel to see if we can get her on less NSAIDs po.

## 2019-01-24 RX ORDER — ALPRAZOLAM 0.5 MG/1
TABLET ORAL
Qty: 30 TABLET | Refills: 2 | Status: SHIPPED | OUTPATIENT
Start: 2019-01-24 | End: 2019-06-28 | Stop reason: SDUPTHER

## 2019-01-24 NOTE — TELEPHONE ENCOUNTER
Called in xanax #30 x 2 refills to recorder at    [facility]/pharmacy #[id number] - [place] - [address] AT [facility]   [address] [place] [id number]   Phone: [phone]      As dr read approved.

## 2019-02-05 RX ORDER — NAPROXEN SODIUM 550 MG/1
TABLET ORAL
Qty: 60 TABLET | Refills: 0 | Status: SHIPPED | OUTPATIENT
Start: 2019-02-05 | End: 2019-03-06 | Stop reason: SDUPTHER

## 2019-02-08 ENCOUNTER — OFFICE VISIT (OUTPATIENT)
Dept: ENDOCRINOLOGY | Facility: CLINIC | Age: 54
End: 2019-02-08
Payer: COMMERCIAL

## 2019-02-08 VITALS
DIASTOLIC BLOOD PRESSURE: 70 MMHG | HEIGHT: 65 IN | BODY MASS INDEX: 44.22 KG/M2 | WEIGHT: 265.44 LBS | SYSTOLIC BLOOD PRESSURE: 118 MMHG | HEART RATE: 93 BPM

## 2019-02-08 DIAGNOSIS — N18.30 CKD (CHRONIC KIDNEY DISEASE) STAGE 3, GFR 30-59 ML/MIN: ICD-10-CM

## 2019-02-08 DIAGNOSIS — E78.5 DYSLIPIDEMIA: ICD-10-CM

## 2019-02-08 DIAGNOSIS — G62.9 PERIPHERAL POLYNEUROPATHY: ICD-10-CM

## 2019-02-08 DIAGNOSIS — Z79.4 TYPE 2 DIABETES MELLITUS WITHOUT COMPLICATION, WITH LONG-TERM CURRENT USE OF INSULIN: Primary | ICD-10-CM

## 2019-02-08 DIAGNOSIS — I10 HTN (HYPERTENSION), BENIGN: ICD-10-CM

## 2019-02-08 DIAGNOSIS — E11.9 TYPE 2 DIABETES MELLITUS WITHOUT COMPLICATION, WITH LONG-TERM CURRENT USE OF INSULIN: Primary | ICD-10-CM

## 2019-02-08 PROCEDURE — 99214 PR OFFICE/OUTPT VISIT, EST, LEVL IV, 30-39 MIN: ICD-10-PCS | Mod: S$GLB,,, | Performed by: NURSE PRACTITIONER

## 2019-02-08 PROCEDURE — 99214 OFFICE O/P EST MOD 30 MIN: CPT | Mod: S$GLB,,, | Performed by: NURSE PRACTITIONER

## 2019-02-08 PROCEDURE — 99999 PR PBB SHADOW E&M-EST. PATIENT-LVL IV: ICD-10-PCS | Mod: PBBFAC,,, | Performed by: NURSE PRACTITIONER

## 2019-02-08 PROCEDURE — 99999 PR PBB SHADOW E&M-EST. PATIENT-LVL IV: CPT | Mod: PBBFAC,,, | Performed by: NURSE PRACTITIONER

## 2019-02-16 DIAGNOSIS — G62.9 PERIPHERAL POLYNEUROPATHY: ICD-10-CM

## 2019-02-19 ENCOUNTER — TELEPHONE (OUTPATIENT)
Dept: CARDIOLOGY | Facility: CLINIC | Age: 54
End: 2019-02-19

## 2019-02-19 RX ORDER — GABAPENTIN 800 MG/1
TABLET ORAL
Qty: 120 TABLET | Refills: 8 | Status: SHIPPED | OUTPATIENT
Start: 2019-02-19 | End: 2019-10-02 | Stop reason: SDUPTHER

## 2019-02-19 NOTE — TELEPHONE ENCOUNTER
----- Message from Blu Stantoneunice sent at 2/19/2019  3:11 PM CST -----  Contact: same  IM sent also. I have Mary Ann Sobeida, MRN [id number] she wants to know if her procedure at [facility] on Friday 2/22/19 can be moved to 8am.  It is now scheduled at 2pm and patient stated she is a diabetic and cannot go that long with food and/or medication.      Patient call back number is [phone]

## 2019-02-20 ENCOUNTER — TELEPHONE (OUTPATIENT)
Dept: CARDIOLOGY | Facility: CLINIC | Age: 54
End: 2019-02-20

## 2019-02-20 NOTE — TELEPHONE ENCOUNTER
Spoke with the patient she stated she is going to cancel her appointment with Dr Beckman on 2/22. She is going to have the procedure with Dr Francis's office on 4/12. She called the cath lab personally. She spoke with Mala as well as myself

## 2019-02-20 NOTE — TELEPHONE ENCOUNTER
----- Message from Sharron Viramontes sent at 2/19/2019  4:39 PM CST -----  Contact: patient  Type: Needs Medical Advice    Who Called:  patient  Best Call Back Number: [phone]  Additional Information: calling to cancel procedure scheduled on 02/22/2019. Please advise

## 2019-02-28 ENCOUNTER — PATIENT MESSAGE (OUTPATIENT)
Dept: INTERNAL MEDICINE | Facility: CLINIC | Age: 54
End: 2019-02-28

## 2019-03-07 RX ORDER — NAPROXEN SODIUM 550 MG/1
TABLET ORAL
Qty: 60 TABLET | Refills: 0 | Status: SHIPPED | OUTPATIENT
Start: 2019-03-07 | End: 2019-04-15 | Stop reason: SDUPTHER

## 2019-03-18 RX ORDER — HYDROCODONE BITARTRATE AND ACETAMINOPHEN 7.5; 325 MG/1; MG/1
1 TABLET ORAL EVERY 6 HOURS PRN
Qty: 90 TABLET | Refills: 0 | Status: SHIPPED | OUTPATIENT
Start: 2019-03-18 | End: 2019-07-09 | Stop reason: SDUPTHER

## 2019-03-25 RX ORDER — FLUOXETINE HYDROCHLORIDE 40 MG/1
40 CAPSULE ORAL DAILY
Qty: 30 CAPSULE | Refills: 5 | Status: SHIPPED | OUTPATIENT
Start: 2019-03-25 | End: 2019-09-30 | Stop reason: SDUPTHER

## 2019-04-02 ENCOUNTER — PATIENT MESSAGE (OUTPATIENT)
Dept: INTERNAL MEDICINE | Facility: CLINIC | Age: 54
End: 2019-04-02

## 2019-04-02 RX ORDER — AMOXICILLIN AND CLAVULANATE POTASSIUM 875; 125 MG/1; MG/1
1 TABLET, FILM COATED ORAL 2 TIMES DAILY
Qty: 20 TABLET | Refills: 0 | OUTPATIENT
Start: 2019-04-02 | End: 2019-04-12

## 2019-04-02 RX ORDER — AMOXICILLIN AND CLAVULANATE POTASSIUM 875; 125 MG/1; MG/1
1 TABLET, FILM COATED ORAL 2 TIMES DAILY
Qty: 20 TABLET | Refills: 0 | Status: SHIPPED | OUTPATIENT
Start: 2019-04-02 | End: 2019-04-12

## 2019-04-02 NOTE — TELEPHONE ENCOUNTER
"So this is why she asked for amoxil.  You gave her in November and she was to make a dentist appt.  She is having tooth problem again and wants a refill.  Don't know if she ever went to dentist.  Says she " can't go to dentist right now, "  $ ?    Ok to refill or not?    Thanks hamlet"

## 2019-04-05 ENCOUNTER — TELEPHONE (OUTPATIENT)
Dept: CARDIOLOGY | Facility: CLINIC | Age: 54
End: 2019-04-05

## 2019-04-05 NOTE — TELEPHONE ENCOUNTER
Spoke to pt. Stated she was originally scheduled with Dr. Raymundo for a [facility] procedure and is not scheduled with another provider. she asking how can she get the prior authorization from Dr. Raymundo procedure sent to the provider who is not doing her procedure. Sent a message for advise to pre-service, pt. Responded back with insurance removed previous procedure request.  No further action needed.

## 2019-04-05 NOTE — TELEPHONE ENCOUNTER
----- Message from French Isaac sent at 4/5/2019 11:17 AM CDT -----  Contact: pt  Type: Needs Medical Advice    Who Called:  pt    Best Call Back Number: [phone]  Additional Information: pt called stating that she needs a release of authorization sent to her provider. Pt is having angiogram done on 4/12/19 and needs the release since she will not be doing this procedure with Dr. Raymundo.  Please call to advise.

## 2019-04-16 ENCOUNTER — OFFICE VISIT (OUTPATIENT)
Dept: INTERNAL MEDICINE | Facility: CLINIC | Age: 54
End: 2019-04-16
Payer: COMMERCIAL

## 2019-04-16 VITALS
DIASTOLIC BLOOD PRESSURE: 70 MMHG | RESPIRATION RATE: 16 BRPM | WEIGHT: 264 LBS | HEIGHT: 64 IN | SYSTOLIC BLOOD PRESSURE: 104 MMHG | BODY MASS INDEX: 45.07 KG/M2 | TEMPERATURE: 98 F | HEART RATE: 72 BPM

## 2019-04-16 DIAGNOSIS — M54.32 SCIATICA OF LEFT SIDE: Primary | ICD-10-CM

## 2019-04-16 DIAGNOSIS — J06.9 VIRAL UPPER RESPIRATORY TRACT INFECTION: ICD-10-CM

## 2019-04-16 PROCEDURE — 99999 PR PBB SHADOW E&M-EST. PATIENT-LVL III: ICD-10-PCS | Mod: PBBFAC,,, | Performed by: INTERNAL MEDICINE

## 2019-04-16 PROCEDURE — 99214 OFFICE O/P EST MOD 30 MIN: CPT | Mod: S$GLB,,, | Performed by: INTERNAL MEDICINE

## 2019-04-16 PROCEDURE — 3074F SYST BP LT 130 MM HG: CPT | Mod: CPTII,S$GLB,, | Performed by: INTERNAL MEDICINE

## 2019-04-16 PROCEDURE — 99214 PR OFFICE/OUTPT VISIT, EST, LEVL IV, 30-39 MIN: ICD-10-PCS | Mod: S$GLB,,, | Performed by: INTERNAL MEDICINE

## 2019-04-16 PROCEDURE — 3074F PR MOST RECENT SYSTOLIC BLOOD PRESSURE < 130 MM HG: ICD-10-PCS | Mod: CPTII,S$GLB,, | Performed by: INTERNAL MEDICINE

## 2019-04-16 PROCEDURE — 3008F BODY MASS INDEX DOCD: CPT | Mod: CPTII,S$GLB,, | Performed by: INTERNAL MEDICINE

## 2019-04-16 PROCEDURE — 3078F PR MOST RECENT DIASTOLIC BLOOD PRESSURE < 80 MM HG: ICD-10-PCS | Mod: CPTII,S$GLB,, | Performed by: INTERNAL MEDICINE

## 2019-04-16 PROCEDURE — 3008F PR BODY MASS INDEX (BMI) DOCUMENTED: ICD-10-PCS | Mod: CPTII,S$GLB,, | Performed by: INTERNAL MEDICINE

## 2019-04-16 PROCEDURE — 99999 PR PBB SHADOW E&M-EST. PATIENT-LVL III: CPT | Mod: PBBFAC,,, | Performed by: INTERNAL MEDICINE

## 2019-04-16 PROCEDURE — 3078F DIAST BP <80 MM HG: CPT | Mod: CPTII,S$GLB,, | Performed by: INTERNAL MEDICINE

## 2019-04-16 RX ORDER — NAPROXEN SODIUM 550 MG/1
550 TABLET ORAL 2 TIMES DAILY WITH MEALS
Qty: 60 TABLET | Refills: 3 | Status: SHIPPED | OUTPATIENT
Start: 2019-04-16 | End: 2019-07-15 | Stop reason: SDUPTHER

## 2019-04-16 RX ORDER — METHYLPREDNISOLONE 4 MG/1
TABLET ORAL
Qty: 1 PACKAGE | Refills: 0 | Status: SHIPPED | OUTPATIENT
Start: 2019-04-16 | End: 2019-05-07

## 2019-04-16 RX ORDER — NAPROXEN SODIUM 550 MG/1
TABLET ORAL
Qty: 60 TABLET | Refills: 0 | OUTPATIENT
Start: 2019-04-16

## 2019-04-17 NOTE — PROGRESS NOTES
Subjective:       Patient ID: Mary Ann Vidales is a 54 y.o. female.    Chief Complaint: Hip Pain (left side- pain at 2 ,  walking up to 10.  has to stop and sit. cries in sleep.) and Cough  HISTORY OF PRESENT ILLNESS:  A 54-year-old overweight white female comes in with   a three-week history of pain in her left lateral lower extremity from her hip   down to her foot with walking.  If she stops walking or rests for a minute, the   pain goes away or gets much better.  However, it occurred the last two nights   while she was sleeping when she was either lying on her left hip or on her back.    She had to get up and sit for it to go away.  Last three days, she has had a   severe cough productive of thick colored sputum as well.  The problem with her   pain occurred prior to her developing the respiratory problems.  The patient has   an angiogram scheduled for coronary arteries in early May.    PHYSICAL EXAMINATION:  VITAL SIGNS:  Normal including temperature.  SKIN:  Fair, healthy.  She complains of her face getting hot with this and that   occurred even prior to her respiratory infection that is episodic.  It does not   seem to, really with the pain itself.  The patient has no pain on neck flexion.  HEENT:  Clear.  NECK:  Shows no stiffness or adenopathy.  CHEST:  Clear.  MUSCULOSKELETAL:  The patient has no back pain, although it was difficult to   evaluate because of her size.  She has negative on straight leg raise.  She is   tender, however, in her sciatic nerve area and lateral thigh proximally.    IMPRESSION:  1.  Probable sciatica on the left.  2.  Probable viral respiratory infection.  She was given a Medrol Dosepak, which   I told her would probably help with both things.  She also was scheduled for   physical therapy.  She is a diabetic, so I told her glucoses might rise during   this, to flush her nose with saline, Mucinex if needed, drink plenty of fluids.    She was told to take naproxen if she was  not taking the steroids, but to stay   off of that while she was on the steroid.      JDC/HN  dd: 04/21/2019 19:39:36 (CDT)  td: 04/22/2019 00:41:28 (CDT)  Doc ID   #[id number]  Job ID #[id number]    CC:     [facility] [id number]  [facility]  Review of Systems   Constitutional: Negative.    HENT: Negative for congestion, hearing loss, sinus pressure, sneezing, sore throat and voice change.    Eyes: Negative for visual disturbance.   Respiratory: Positive for cough. Negative for chest tightness and shortness of breath.    Cardiovascular: Negative.  Negative for chest pain, palpitations and leg swelling.   Gastrointestinal: Negative.    Genitourinary: Negative for difficulty urinating, dysuria, flank pain, frequency, hematuria, menstrual problem, pelvic pain, urgency, vaginal bleeding and vaginal discharge.   Musculoskeletal: Positive for arthralgias and myalgias. Negative for neck pain and neck stiffness.   Skin: Negative.    Neurological: Negative.  Negative for dizziness, seizures, light-headedness, numbness and headaches.   Psychiatric/Behavioral: Negative for agitation, behavioral problems, confusion and sleep disturbance. The patient is not nervous/anxious.        Objective:      Physical Exam   Constitutional: She is oriented to person, place, and time. She appears well-developed and well-nourished.   Eyes: Pupils are equal, round, and reactive to light. EOM are normal.   Neck: Normal range of motion. Neck supple. No JVD present. No thyromegaly present.   Cardiovascular: Normal rate, regular rhythm, normal heart sounds and intact distal pulses. Exam reveals no gallop.   No murmur heard.  Pulmonary/Chest: Breath sounds normal. She has no wheezes. She has no rales.   Abdominal: Soft. Bowel sounds are normal. She exhibits no mass. There is no tenderness.   Musculoskeletal: Normal range of motion. She exhibits tenderness.   Lymphadenopathy:     She has no cervical adenopathy.   Neurological: She is alert and oriented to person, place, and  time. She has normal reflexes. No cranial nerve deficit.   Skin: No rash noted. No erythema.   Psychiatric: She has a normal mood and affect. Judgment normal.       Assessment:       1. Sciatica of left side    2. Viral upper respiratory tract infection        Plan:

## 2019-04-19 DIAGNOSIS — E11.9 TYPE 2 DIABETES MELLITUS WITHOUT COMPLICATION, WITH LONG-TERM CURRENT USE OF INSULIN: ICD-10-CM

## 2019-04-19 DIAGNOSIS — Z79.4 TYPE 2 DIABETES MELLITUS WITHOUT COMPLICATION, WITH LONG-TERM CURRENT USE OF INSULIN: ICD-10-CM

## 2019-04-22 DIAGNOSIS — Z79.4 TYPE 2 DIABETES MELLITUS WITHOUT COMPLICATION, WITH LONG-TERM CURRENT USE OF INSULIN: ICD-10-CM

## 2019-04-22 DIAGNOSIS — E11.9 TYPE 2 DIABETES MELLITUS WITHOUT COMPLICATION, WITH LONG-TERM CURRENT USE OF INSULIN: ICD-10-CM

## 2019-04-23 RX ORDER — SEMAGLUTIDE 1.34 MG/ML
INJECTION, SOLUTION SUBCUTANEOUS
Qty: 3 SYRINGE | Refills: 6 | Status: SHIPPED | OUTPATIENT
Start: 2019-04-23 | End: 2019-10-02 | Stop reason: SDUPTHER

## 2019-04-25 DIAGNOSIS — Z12.11 COLON CANCER SCREENING: ICD-10-CM

## 2019-04-26 DIAGNOSIS — E11.9 TYPE 2 DIABETES MELLITUS WITHOUT COMPLICATION, WITH LONG-TERM CURRENT USE OF INSULIN: ICD-10-CM

## 2019-04-26 DIAGNOSIS — E78.5 DYSLIPIDEMIA: ICD-10-CM

## 2019-04-26 DIAGNOSIS — E66.01 MORBID OBESITY, UNSPECIFIED OBESITY TYPE: ICD-10-CM

## 2019-04-26 DIAGNOSIS — I10 BENIGN HYPERTENSION: ICD-10-CM

## 2019-04-26 DIAGNOSIS — Z79.4 TYPE 2 DIABETES MELLITUS WITHOUT COMPLICATION, WITH LONG-TERM CURRENT USE OF INSULIN: ICD-10-CM

## 2019-04-26 DIAGNOSIS — R93.1 ELEVATED CORONARY ARTERY CALCIUM SCORE: ICD-10-CM

## 2019-04-27 RX ORDER — ATORVASTATIN CALCIUM 10 MG/1
TABLET, FILM COATED ORAL
Qty: 30 TABLET | Refills: 11 | Status: SHIPPED | OUTPATIENT
Start: 2019-04-27 | End: 2019-10-03 | Stop reason: SDUPTHER

## 2019-05-11 RX ORDER — LOSARTAN POTASSIUM AND HYDROCHLOROTHIAZIDE 25; 100 MG/1; MG/1
TABLET ORAL
Qty: 30 TABLET | Refills: 5 | Status: SHIPPED | OUTPATIENT
Start: 2019-05-11 | End: 2019-10-03 | Stop reason: SDUPTHER

## 2019-05-11 RX ORDER — LOSARTAN POTASSIUM AND HYDROCHLOROTHIAZIDE 25; 100 MG/1; MG/1
1 TABLET ORAL DAILY
Qty: 30 TABLET | Refills: 5 | Status: SHIPPED | OUTPATIENT
Start: 2019-05-11 | End: 2019-05-28 | Stop reason: SDUPTHER

## 2019-05-21 DIAGNOSIS — Z79.4 TYPE 2 DIABETES MELLITUS WITHOUT COMPLICATION, WITH LONG-TERM CURRENT USE OF INSULIN: ICD-10-CM

## 2019-05-21 DIAGNOSIS — E11.9 TYPE 2 DIABETES MELLITUS WITHOUT COMPLICATION, WITH LONG-TERM CURRENT USE OF INSULIN: ICD-10-CM

## 2019-05-22 ENCOUNTER — LAB VISIT (OUTPATIENT)
Dept: LAB | Facility: HOSPITAL | Age: 54
End: 2019-05-22
Attending: INTERNAL MEDICINE
Payer: COMMERCIAL

## 2019-05-22 DIAGNOSIS — N18.30 CHRONIC KIDNEY DISEASE, STAGE III (MODERATE): ICD-10-CM

## 2019-05-22 LAB
ALBUMIN SERPL BCP-MCNC: 3.6 G/DL (ref 3.5–5.2)
ANION GAP SERPL CALC-SCNC: 8 MMOL/L (ref 8–16)
BUN SERPL-MCNC: 31 MG/DL (ref 6–20)
CALCIUM SERPL-MCNC: 10.3 MG/DL (ref 8.7–10.5)
CHLORIDE SERPL-SCNC: 100 MMOL/L (ref 95–110)
CO2 SERPL-SCNC: 32 MMOL/L (ref 23–29)
CREAT SERPL-MCNC: 1.8 MG/DL (ref 0.5–1.4)
EST. GFR  (AFRICAN AMERICAN): 36.8 ML/MIN/1.73 M^2
EST. GFR  (NON AFRICAN AMERICAN): 31.9 ML/MIN/1.73 M^2
GLUCOSE SERPL-MCNC: 88 MG/DL (ref 70–110)
PHOSPHATE SERPL-MCNC: 2.6 MG/DL (ref 2.7–4.5)
POTASSIUM SERPL-SCNC: 4.3 MMOL/L (ref 3.5–5.1)
SODIUM SERPL-SCNC: 140 MMOL/L (ref 136–145)

## 2019-05-22 PROCEDURE — 80069 RENAL FUNCTION PANEL: CPT

## 2019-05-22 PROCEDURE — 36415 COLL VENOUS BLD VENIPUNCTURE: CPT | Mod: PO

## 2019-05-22 PROCEDURE — 83970 ASSAY OF PARATHORMONE: CPT

## 2019-05-22 RX ORDER — GLIMEPIRIDE 4 MG/1
TABLET ORAL
Qty: 15 TABLET | Refills: 5 | Status: SHIPPED | OUTPATIENT
Start: 2019-05-22 | End: 2019-06-07 | Stop reason: ALTCHOICE

## 2019-05-23 ENCOUNTER — PATIENT MESSAGE (OUTPATIENT)
Dept: NEPHROLOGY | Facility: CLINIC | Age: 54
End: 2019-05-23

## 2019-05-23 DIAGNOSIS — R82.90 ABNORMAL URINE FINDINGS: Primary | ICD-10-CM

## 2019-05-23 LAB — PTH-INTACT SERPL-MCNC: 76 PG/ML (ref 9–77)

## 2019-05-28 ENCOUNTER — PATIENT MESSAGE (OUTPATIENT)
Dept: NEPHROLOGY | Facility: CLINIC | Age: 54
End: 2019-05-28

## 2019-05-28 ENCOUNTER — OFFICE VISIT (OUTPATIENT)
Dept: NEPHROLOGY | Facility: CLINIC | Age: 54
End: 2019-05-28
Payer: COMMERCIAL

## 2019-05-28 VITALS
DIASTOLIC BLOOD PRESSURE: 50 MMHG | OXYGEN SATURATION: 98 % | SYSTOLIC BLOOD PRESSURE: 86 MMHG | HEART RATE: 110 BPM | WEIGHT: 264.56 LBS | BODY MASS INDEX: 45.17 KG/M2 | HEIGHT: 64 IN

## 2019-05-28 DIAGNOSIS — N18.30 CHRONIC KIDNEY DISEASE, STAGE III (MODERATE): Primary | ICD-10-CM

## 2019-05-28 DIAGNOSIS — N28.1 ACQUIRED CYST OF KIDNEY: ICD-10-CM

## 2019-05-28 PROCEDURE — 3078F PR MOST RECENT DIASTOLIC BLOOD PRESSURE < 80 MM HG: ICD-10-PCS | Mod: CPTII,S$GLB,, | Performed by: INTERNAL MEDICINE

## 2019-05-28 PROCEDURE — 3074F PR MOST RECENT SYSTOLIC BLOOD PRESSURE < 130 MM HG: ICD-10-PCS | Mod: CPTII,S$GLB,, | Performed by: INTERNAL MEDICINE

## 2019-05-28 PROCEDURE — 99214 PR OFFICE/OUTPT VISIT, EST, LEVL IV, 30-39 MIN: ICD-10-PCS | Mod: S$GLB,,, | Performed by: INTERNAL MEDICINE

## 2019-05-28 PROCEDURE — 99214 OFFICE O/P EST MOD 30 MIN: CPT | Mod: S$GLB,,, | Performed by: INTERNAL MEDICINE

## 2019-05-28 PROCEDURE — 3078F DIAST BP <80 MM HG: CPT | Mod: CPTII,S$GLB,, | Performed by: INTERNAL MEDICINE

## 2019-05-28 PROCEDURE — 99999 PR PBB SHADOW E&M-EST. PATIENT-LVL III: ICD-10-PCS | Mod: PBBFAC,,, | Performed by: INTERNAL MEDICINE

## 2019-05-28 PROCEDURE — 3008F BODY MASS INDEX DOCD: CPT | Mod: CPTII,S$GLB,, | Performed by: INTERNAL MEDICINE

## 2019-05-28 PROCEDURE — 3074F SYST BP LT 130 MM HG: CPT | Mod: CPTII,S$GLB,, | Performed by: INTERNAL MEDICINE

## 2019-05-28 PROCEDURE — 3008F PR BODY MASS INDEX (BMI) DOCUMENTED: ICD-10-PCS | Mod: CPTII,S$GLB,, | Performed by: INTERNAL MEDICINE

## 2019-05-28 PROCEDURE — 99999 PR PBB SHADOW E&M-EST. PATIENT-LVL III: CPT | Mod: PBBFAC,,, | Performed by: INTERNAL MEDICINE

## 2019-05-28 RX ORDER — CEPHALEXIN 250 MG/1
250 CAPSULE ORAL EVERY 8 HOURS
Qty: 21 CAPSULE | Refills: 0 | Status: SHIPPED | OUTPATIENT
Start: 2019-05-28 | End: 2020-05-21

## 2019-05-28 NOTE — PROGRESS NOTES
"Subjective:       Patient ID: Mary Ann Vidales is a 54 y.o. White female who presents for return patient evaluation for chronic renal failure.    She has no uremic symptoms and is in her usual state of health.  There have been no recent illnesses, hospitalizations or procedures.  She did have some dysuria lately.  She has an angiogram planned for July 5th.      Review of Systems   Constitutional: Negative for appetite change, chills and fever.   Eyes: Negative for visual disturbance.   Respiratory: Negative for cough and shortness of breath.    Cardiovascular: Negative for chest pain and leg swelling.   Gastrointestinal: Negative for diarrhea, nausea and vomiting.   Genitourinary: Positive for decreased urine volume and difficulty urinating. Negative for dysuria and hematuria.   Musculoskeletal: Positive for arthralgias (knees) and back pain. Negative for myalgias.   Skin: Negative for rash.   Neurological: Negative for headaches.   Psychiatric/Behavioral: Negative for sleep disturbance.       The past medical, family and social histories were reviewed for this encounter.     BP (!) 86/50 (BP Location: Right arm, Patient Position: Sitting)   Pulse 110   Ht 5' 4" (1.626 m)   Wt 120 kg (264 lb 8.8 oz)   LMP 12/20/2013   SpO2 98%   BMI 45.41 kg/m²     Objective:      Physical Exam   Constitutional: She is oriented to person, place, and time. She appears well-developed and well-nourished. No distress.   HENT:   Head: Normocephalic and atraumatic.   Eyes: Conjunctivae are normal.   Neck: Neck supple. No JVD present.   Cardiovascular: Normal rate, regular rhythm and normal heart sounds. Exam reveals no gallop and no friction rub.   No murmur heard.  Pulmonary/Chest: Effort normal and breath sounds normal. No respiratory distress. She has no wheezes. She has no rales.   Abdominal: Soft. Bowel sounds are normal. She exhibits no distension. There is no tenderness.   Musculoskeletal: She exhibits no edema. "   Neurological: She is alert and oriented to person, place, and time.   Skin: Skin is warm and dry. No rash noted.   Psychiatric: She has a normal mood and affect.   Vitals reviewed.      Assessment:       1. Chronic kidney disease, stage III (moderate)    2. Acquired cyst of kidney        Plan:   Return to clinic in 3 months.  Labs for next visit include rp, ua, pth.  Baseline creatinine is 1.0 since 2005.  She has then been 1.6-1.8 since 2015.  PTH is 76 with a calcium of 10.3.  I do not know why she went from a creatinine of 1.0 in 2015 to 1.5 in 2017 and 1.8 in 2018 with bland urine sediment and a negative renal US.  There appears to be no other precipitants although AIN is also a possibility.  Her urine sediment appears bland thus I think it is less likely that she has an acute GN.  She does not wish to have a biopsy at this time and I am not pushing for one.  She has a gap from 10/15-7/17 where she seemed to bump her baseline.  Renal US showed R 11.1 cm, L 10.6 cm.  Drop losartan to 1/2 pill a day.  I encouraged her to start a plant-based whole food diet.

## 2019-06-06 ENCOUNTER — TELEPHONE (OUTPATIENT)
Dept: ENDOCRINOLOGY | Facility: CLINIC | Age: 54
End: 2019-06-06

## 2019-06-06 NOTE — TELEPHONE ENCOUNTER
Pt asking pharmacy to fill glimepiride 2 mg bid  Cannot tell if that is what you want per chart notes    Let me know and I can send you a refill

## 2019-06-07 RX ORDER — GLIMEPIRIDE 2 MG/1
2 TABLET ORAL
Qty: 90 TABLET | Refills: 3 | Status: SHIPPED | OUTPATIENT
Start: 2019-06-07 | End: 2019-06-10 | Stop reason: SDUPTHER

## 2019-06-10 RX ORDER — GLIMEPIRIDE 2 MG/1
4 TABLET ORAL
Qty: 180 TABLET | Refills: 3 | Status: SHIPPED | OUTPATIENT
Start: 2019-06-10 | End: 2019-10-02 | Stop reason: SDUPTHER

## 2019-06-21 ENCOUNTER — PATIENT OUTREACH (OUTPATIENT)
Dept: ADMINISTRATIVE | Facility: HOSPITAL | Age: 54
End: 2019-06-21

## 2019-06-30 ENCOUNTER — LAB VISIT (OUTPATIENT)
Dept: LAB | Facility: HOSPITAL | Age: 54
End: 2019-06-30
Attending: INTERNAL MEDICINE
Payer: COMMERCIAL

## 2019-06-30 DIAGNOSIS — Z12.11 COLON CANCER SCREENING: ICD-10-CM

## 2019-06-30 PROCEDURE — 82274 ASSAY TEST FOR BLOOD FECAL: CPT

## 2019-06-30 RX ORDER — ALPRAZOLAM 0.5 MG/1
TABLET ORAL
Qty: 30 TABLET | Refills: 3 | Status: SHIPPED | OUTPATIENT
Start: 2019-06-30 | End: 2019-11-12 | Stop reason: SDUPTHER

## 2019-07-01 NOTE — TELEPHONE ENCOUNTER
Alprazolam 30 x 3 called to recorder as dr read approved.    [facility]/pharmacy #[id number] - [place] - [address] AT [place] [id number] [place] [address]   Phone: [phone]

## 2019-07-03 LAB — HEMOCCULT STL QL IA: NEGATIVE

## 2019-07-05 ENCOUNTER — TELEPHONE (OUTPATIENT)
Dept: INTERNAL MEDICINE | Facility: CLINIC | Age: 54
End: 2019-07-05

## 2019-07-09 NOTE — TELEPHONE ENCOUNTER
Last saw dr read 4/16/19.  Last refilled 3/18/19.  She gets this occasionally.    Please approve since dr read is out till 7/15/19.    Thanks hamlet

## 2019-07-11 RX ORDER — HYDROCODONE BITARTRATE AND ACETAMINOPHEN 7.5; 325 MG/1; MG/1
1 TABLET ORAL EVERY 6 HOURS PRN
Qty: 90 TABLET | Refills: 0 | Status: SHIPPED | OUTPATIENT
Start: 2019-07-11 | End: 2019-10-07 | Stop reason: SDUPTHER

## 2019-07-11 RX ORDER — HYDROCODONE BITARTRATE AND ACETAMINOPHEN 7.5; 325 MG/1; MG/1
1 TABLET ORAL EVERY 6 HOURS PRN
Qty: 90 TABLET | Refills: 0 | Status: SHIPPED | OUTPATIENT
Start: 2019-07-11 | End: 2019-07-11 | Stop reason: SDUPTHER

## 2019-07-16 RX ORDER — NAPROXEN SODIUM 550 MG/1
550 TABLET ORAL 2 TIMES DAILY WITH MEALS
Qty: 60 TABLET | Refills: 3 | Status: SHIPPED | OUTPATIENT
Start: 2019-07-16 | End: 2019-10-03 | Stop reason: SDUPTHER

## 2019-08-09 ENCOUNTER — LAB VISIT (OUTPATIENT)
Dept: LAB | Facility: HOSPITAL | Age: 54
End: 2019-08-09
Attending: NURSE PRACTITIONER
Payer: COMMERCIAL

## 2019-08-09 DIAGNOSIS — Z79.4 TYPE 2 DIABETES MELLITUS WITHOUT COMPLICATION, WITH LONG-TERM CURRENT USE OF INSULIN: ICD-10-CM

## 2019-08-09 DIAGNOSIS — E11.9 TYPE 2 DIABETES MELLITUS WITHOUT COMPLICATION, WITH LONG-TERM CURRENT USE OF INSULIN: ICD-10-CM

## 2019-08-09 LAB
ESTIMATED AVG GLUCOSE: 131 MG/DL (ref 68–131)
HBA1C MFR BLD HPLC: 6.2 % (ref 4–5.6)

## 2019-08-09 PROCEDURE — 36415 COLL VENOUS BLD VENIPUNCTURE: CPT | Mod: PO

## 2019-08-09 PROCEDURE — 83036 HEMOGLOBIN GLYCOSYLATED A1C: CPT

## 2019-09-11 ENCOUNTER — TELEPHONE (OUTPATIENT)
Dept: INTERNAL MEDICINE | Facility: CLINIC | Age: 54
End: 2019-09-11

## 2019-09-11 NOTE — TELEPHONE ENCOUNTER
[facility] faxed requesting change from omprazole 40mg to pantoprazole 40mg so no interaction with plavix .    Ok to change? If so will need 90 day     Thanks hamlet

## 2019-09-12 RX ORDER — PANTOPRAZOLE SODIUM 40 MG/1
40 TABLET, DELAYED RELEASE ORAL DAILY
Qty: 90 TABLET | Refills: 3 | Status: SHIPPED | OUTPATIENT
Start: 2019-09-12 | End: 2019-10-03 | Stop reason: SDUPTHER

## 2019-09-30 ENCOUNTER — PATIENT MESSAGE (OUTPATIENT)
Dept: INTERNAL MEDICINE | Facility: CLINIC | Age: 54
End: 2019-09-30

## 2019-09-30 DIAGNOSIS — A49.8 CLOSTRIDIUM DIFFICILE INFECTION: Primary | ICD-10-CM

## 2019-09-30 RX ORDER — FLUOXETINE HYDROCHLORIDE 40 MG/1
40 CAPSULE ORAL DAILY
Qty: 30 CAPSULE | Refills: 5 | Status: SHIPPED | OUTPATIENT
Start: 2019-09-30 | End: 2019-10-03 | Stop reason: SDUPTHER

## 2019-09-30 NOTE — TELEPHONE ENCOUNTER
Her partner is on vancomycin for c diff.    They have been using different bathrooms but now [place]rington is gassy, fatigue, and stools are soft pellet size .  She is worried she has c diff.  She can't get off work for at least 2 days  To come in.     Can you order c diff test ?    Please advise.  Thanks hamlet       Ps:  She did finally get her angiogram done and a stint put in by dr houser and on blood thinner

## 2019-10-01 RX ORDER — METRONIDAZOLE 500 MG/1
500 TABLET ORAL EVERY 8 HOURS
Qty: 30 TABLET | Refills: 0 | Status: SHIPPED | OUTPATIENT
Start: 2019-10-01 | End: 2020-05-21

## 2019-10-01 NOTE — TELEPHONE ENCOUNTER
I have sent in flagyl for the infection since she likely has it with situation.  The stool specimen should be done before she starts the med

## 2019-10-02 DIAGNOSIS — E11.9 TYPE 2 DIABETES MELLITUS WITHOUT COMPLICATION, WITH LONG-TERM CURRENT USE OF INSULIN: ICD-10-CM

## 2019-10-02 DIAGNOSIS — G62.9 PERIPHERAL POLYNEUROPATHY: ICD-10-CM

## 2019-10-02 DIAGNOSIS — Z79.4 TYPE 2 DIABETES MELLITUS WITHOUT COMPLICATION, WITH LONG-TERM CURRENT USE OF INSULIN: ICD-10-CM

## 2019-10-02 RX ORDER — GABAPENTIN 800 MG/1
TABLET ORAL
Qty: 360 TABLET | Refills: 3 | Status: SHIPPED | OUTPATIENT
Start: 2019-10-02 | End: 2019-10-22 | Stop reason: SDUPTHER

## 2019-10-02 RX ORDER — METFORMIN HYDROCHLORIDE 1000 MG/1
1000 TABLET ORAL 2 TIMES DAILY WITH MEALS
Qty: 180 TABLET | Refills: 3 | Status: SHIPPED | OUTPATIENT
Start: 2019-10-02 | End: 2019-10-03 | Stop reason: SDUPTHER

## 2019-10-02 RX ORDER — GLIMEPIRIDE 2 MG/1
4 TABLET ORAL
Qty: 180 TABLET | Refills: 3 | Status: SHIPPED | OUTPATIENT
Start: 2019-10-02 | End: 2019-10-03 | Stop reason: SDUPTHER

## 2019-10-03 DIAGNOSIS — E78.5 DYSLIPIDEMIA: ICD-10-CM

## 2019-10-03 DIAGNOSIS — I10 BENIGN HYPERTENSION: ICD-10-CM

## 2019-10-03 DIAGNOSIS — E66.01 MORBID OBESITY, UNSPECIFIED OBESITY TYPE: ICD-10-CM

## 2019-10-03 DIAGNOSIS — R93.1 ELEVATED CORONARY ARTERY CALCIUM SCORE: ICD-10-CM

## 2019-10-03 DIAGNOSIS — Z79.4 TYPE 2 DIABETES MELLITUS WITHOUT COMPLICATION, WITH LONG-TERM CURRENT USE OF INSULIN: ICD-10-CM

## 2019-10-03 DIAGNOSIS — E11.9 TYPE 2 DIABETES MELLITUS WITHOUT COMPLICATION, WITH LONG-TERM CURRENT USE OF INSULIN: ICD-10-CM

## 2019-10-03 RX ORDER — LOSARTAN POTASSIUM AND HYDROCHLOROTHIAZIDE 25; 100 MG/1; MG/1
1 TABLET ORAL DAILY
Qty: 90 TABLET | Refills: 1 | Status: SHIPPED | OUTPATIENT
Start: 2019-10-03 | End: 2020-07-14

## 2019-10-03 RX ORDER — FLUOXETINE HYDROCHLORIDE 40 MG/1
40 CAPSULE ORAL DAILY
Qty: 90 CAPSULE | Refills: 1 | Status: SHIPPED | OUTPATIENT
Start: 2019-10-03 | End: 2020-03-22

## 2019-10-03 RX ORDER — METFORMIN HYDROCHLORIDE 1000 MG/1
1000 TABLET ORAL 2 TIMES DAILY WITH MEALS
Qty: 180 TABLET | Refills: 1 | Status: SHIPPED | OUTPATIENT
Start: 2019-10-03 | End: 2020-03-05 | Stop reason: SDUPTHER

## 2019-10-03 RX ORDER — FUROSEMIDE 20 MG/1
20 TABLET ORAL DAILY
Qty: 90 TABLET | Refills: 1 | Status: SHIPPED | OUTPATIENT
Start: 2019-10-03 | End: 2020-03-22

## 2019-10-03 RX ORDER — ATORVASTATIN CALCIUM 10 MG/1
10 TABLET, FILM COATED ORAL DAILY
Qty: 90 TABLET | Refills: 1 | Status: SHIPPED | OUTPATIENT
Start: 2019-10-03 | End: 2020-03-22

## 2019-10-03 RX ORDER — PANTOPRAZOLE SODIUM 40 MG/1
40 TABLET, DELAYED RELEASE ORAL DAILY
Qty: 90 TABLET | Refills: 1 | Status: SHIPPED | OUTPATIENT
Start: 2019-10-03 | End: 2020-05-19

## 2019-10-03 RX ORDER — NAPROXEN SODIUM 550 MG/1
550 TABLET ORAL 2 TIMES DAILY WITH MEALS
Qty: 180 TABLET | Refills: 1 | Status: SHIPPED | OUTPATIENT
Start: 2019-10-03 | End: 2020-03-22

## 2019-10-03 RX ORDER — GLIMEPIRIDE 2 MG/1
4 TABLET ORAL
Qty: 180 TABLET | Refills: 1 | Status: SHIPPED | OUTPATIENT
Start: 2019-10-03 | End: 2020-05-04 | Stop reason: SDUPTHER

## 2019-10-03 NOTE — TELEPHONE ENCOUNTER
Last seen 4/16/19, annual due may 2020.    Please approve refills to new pharmacy, pill pack.    Thanks hamlet

## 2019-10-03 NOTE — TELEPHONE ENCOUNTER
----- Message from Marysol Hall sent at 10/3/2019  9:17 AM CDT -----  Contact: [facility]/Michael [phone] Opt [facility]  Prescription Request:     Name of medication:   atorvastatin (LIPITOR) 10 MG tablet   FLUoxetine 40 MG capsule   furosemide (LASIX) 20 MG tablet   losartan-hydrochlorothiazide 100-25 mg (HYZAAR) 100-25 mg per tablet   metroNIDAZOLE (FLAGYL) 500 MG tablet   naproxen sodium (ANAPROX) 550 MG tablet   omeprazole (PRILOSEC) 40 MG capsule   pantoprazole (PROTONIX) 40 MG tablet     Reason for request: Refill    Pharmacy: [facility], [address]    Requesting 90 day supply.    Thank You

## 2019-10-03 NOTE — TELEPHONE ENCOUNTER
----- Message from Marysol Hall sent at 10/3/2019  9:21 AM CDT -----  Contact: Luigi Henriquez/Michael [phone] [facility]  Prescription Request:     Name of medication: Prasugrel 10 mg     Reason for request: New    Pharmacy: [address]    Requesting 90 day supply.    Thank You

## 2019-10-03 NOTE — TELEPHONE ENCOUNTER
----- Message from Marysol Hall sent at 10/3/2019  9:17 AM CDT -----  Contact: [facility]Michael [phone] [facility]  Prescription Request:     Name of medication:   atorvastatin (LIPITOR) 10 MG tablet   FLUoxetine 40 MG capsule   furosemide (LASIX) 20 MG tablet   losartan-hydrochlorothiazide 100-25 mg (HYZAAR) 100-25 mg per tablet   metroNIDAZOLE (FLAGYL) 500 MG tablet   naproxen sodium (ANAPROX) 550 MG tablet   omeprazole (PRILOSEC) 40 MG capsule   pantoprazole (PROTONIX) 40 MG tablet     Reason for request: Refill    Pharmacy: [facility], [address]    Requesting 90 day supply.    Thank You

## 2019-10-04 ENCOUNTER — TELEPHONE (OUTPATIENT)
Dept: UROLOGY | Facility: CLINIC | Age: 54
End: 2019-10-04

## 2019-10-04 ENCOUNTER — LAB VISIT (OUTPATIENT)
Dept: LAB | Facility: HOSPITAL | Age: 54
End: 2019-10-04
Attending: INTERNAL MEDICINE
Payer: COMMERCIAL

## 2019-10-04 DIAGNOSIS — A49.8 CLOSTRIDIUM DIFFICILE INFECTION: ICD-10-CM

## 2019-10-04 DIAGNOSIS — N18.30 CKD (CHRONIC KIDNEY DISEASE) STAGE 3, GFR 30-59 ML/MIN: ICD-10-CM

## 2019-10-04 DIAGNOSIS — N18.30 CKD (CHRONIC KIDNEY DISEASE) STAGE 3, GFR 30-59 ML/MIN: Primary | ICD-10-CM

## 2019-10-04 PROCEDURE — 80069 RENAL FUNCTION PANEL: CPT

## 2019-10-04 PROCEDURE — 87449 NOS EACH ORGANISM AG IA: CPT

## 2019-10-04 PROCEDURE — 83970 ASSAY OF PARATHORMONE: CPT

## 2019-10-04 PROCEDURE — 36415 COLL VENOUS BLD VENIPUNCTURE: CPT | Mod: PO

## 2019-10-04 NOTE — TELEPHONE ENCOUNTER
----- Message from Beatriz Gonzales sent at 10/4/2019  4:55 PM CDT -----  Contact: Pt self Mobile/Home [phone]   Patient would like a call back in regards to her wanting to know why her script for Losartan canceled please?

## 2019-10-05 LAB
ALBUMIN SERPL BCP-MCNC: 3.6 G/DL (ref 3.5–5.2)
ANION GAP SERPL CALC-SCNC: 12 MMOL/L (ref 8–16)
BUN SERPL-MCNC: 41 MG/DL (ref 6–20)
CALCIUM SERPL-MCNC: 10 MG/DL (ref 8.7–10.5)
CHLORIDE SERPL-SCNC: 102 MMOL/L (ref 95–110)
CO2 SERPL-SCNC: 28 MMOL/L (ref 23–29)
CREAT SERPL-MCNC: 2 MG/DL (ref 0.5–1.4)
EST. GFR  (AFRICAN AMERICAN): 32.4 ML/MIN/1.73 M^2
EST. GFR  (NON AFRICAN AMERICAN): 28.1 ML/MIN/1.73 M^2
GLUCOSE SERPL-MCNC: 170 MG/DL (ref 70–110)
PHOSPHATE SERPL-MCNC: 2.9 MG/DL (ref 2.7–4.5)
POTASSIUM SERPL-SCNC: 4.3 MMOL/L (ref 3.5–5.1)
PTH-INTACT SERPL-MCNC: 38 PG/ML (ref 9–77)
SODIUM SERPL-SCNC: 142 MMOL/L (ref 136–145)

## 2019-10-07 ENCOUNTER — PATIENT MESSAGE (OUTPATIENT)
Dept: NEPHROLOGY | Facility: CLINIC | Age: 54
End: 2019-10-07

## 2019-10-07 LAB
C DIFF GDH STL QL: NEGATIVE
C DIFF TOX A+B STL QL IA: NEGATIVE

## 2019-10-08 ENCOUNTER — PATIENT MESSAGE (OUTPATIENT)
Dept: INTERNAL MEDICINE | Facility: CLINIC | Age: 54
End: 2019-10-08

## 2019-10-08 RX ORDER — HYDROCODONE BITARTRATE AND ACETAMINOPHEN 7.5; 325 MG/1; MG/1
1 TABLET ORAL EVERY 6 HOURS PRN
Qty: 90 TABLET | Refills: 0 | Status: SHIPPED | OUTPATIENT
Start: 2019-10-08 | End: 2020-01-09 | Stop reason: SDUPTHER

## 2019-10-11 ENCOUNTER — PATIENT MESSAGE (OUTPATIENT)
Dept: NEPHROLOGY | Facility: CLINIC | Age: 54
End: 2019-10-11

## 2019-10-11 ENCOUNTER — OFFICE VISIT (OUTPATIENT)
Dept: NEPHROLOGY | Facility: CLINIC | Age: 54
End: 2019-10-11
Payer: COMMERCIAL

## 2019-10-11 VITALS
BODY MASS INDEX: 44.79 KG/M2 | HEART RATE: 98 BPM | HEIGHT: 64 IN | DIASTOLIC BLOOD PRESSURE: 60 MMHG | WEIGHT: 262.38 LBS | OXYGEN SATURATION: 98 % | SYSTOLIC BLOOD PRESSURE: 100 MMHG

## 2019-10-11 DIAGNOSIS — N28.1 ACQUIRED CYST OF KIDNEY: ICD-10-CM

## 2019-10-11 DIAGNOSIS — N18.30 CHRONIC KIDNEY DISEASE, STAGE III (MODERATE): Primary | ICD-10-CM

## 2019-10-11 PROCEDURE — 3078F DIAST BP <80 MM HG: CPT | Mod: CPTII,S$GLB,, | Performed by: INTERNAL MEDICINE

## 2019-10-11 PROCEDURE — 3044F HG A1C LEVEL LT 7.0%: CPT | Mod: CPTII,S$GLB,, | Performed by: INTERNAL MEDICINE

## 2019-10-11 PROCEDURE — 3074F SYST BP LT 130 MM HG: CPT | Mod: CPTII,S$GLB,, | Performed by: INTERNAL MEDICINE

## 2019-10-11 PROCEDURE — 3078F PR MOST RECENT DIASTOLIC BLOOD PRESSURE < 80 MM HG: ICD-10-PCS | Mod: CPTII,S$GLB,, | Performed by: INTERNAL MEDICINE

## 2019-10-11 PROCEDURE — 99999 PR PBB SHADOW E&M-EST. PATIENT-LVL III: ICD-10-PCS | Mod: PBBFAC,,, | Performed by: INTERNAL MEDICINE

## 2019-10-11 PROCEDURE — 3074F PR MOST RECENT SYSTOLIC BLOOD PRESSURE < 130 MM HG: ICD-10-PCS | Mod: CPTII,S$GLB,, | Performed by: INTERNAL MEDICINE

## 2019-10-11 PROCEDURE — 3008F BODY MASS INDEX DOCD: CPT | Mod: CPTII,S$GLB,, | Performed by: INTERNAL MEDICINE

## 2019-10-11 PROCEDURE — 99214 PR OFFICE/OUTPT VISIT, EST, LEVL IV, 30-39 MIN: ICD-10-PCS | Mod: S$GLB,,, | Performed by: INTERNAL MEDICINE

## 2019-10-11 PROCEDURE — 3044F PR MOST RECENT HEMOGLOBIN A1C LEVEL <7.0%: ICD-10-PCS | Mod: CPTII,S$GLB,, | Performed by: INTERNAL MEDICINE

## 2019-10-11 PROCEDURE — 3008F PR BODY MASS INDEX (BMI) DOCUMENTED: ICD-10-PCS | Mod: CPTII,S$GLB,, | Performed by: INTERNAL MEDICINE

## 2019-10-11 PROCEDURE — 99999 PR PBB SHADOW E&M-EST. PATIENT-LVL III: CPT | Mod: PBBFAC,,, | Performed by: INTERNAL MEDICINE

## 2019-10-11 PROCEDURE — 99214 OFFICE O/P EST MOD 30 MIN: CPT | Mod: S$GLB,,, | Performed by: INTERNAL MEDICINE

## 2019-10-11 RX ORDER — CLOPIDOGREL BISULFATE 75 MG/1
75 TABLET ORAL DAILY
Refills: 11 | COMMUNITY
Start: 2019-10-01

## 2019-10-11 RX ORDER — NITROGLYCERIN 0.4 MG/1
TABLET SUBLINGUAL
Refills: 11 | COMMUNITY
Start: 2019-08-20

## 2019-10-11 NOTE — PROGRESS NOTES
"Subjective:       Patient ID: Mary Ann Vidales is a 54 y.o. White female who presents for return patient evaluation for chronic renal failure.    She has no uremic symptoms and is in her usual state of health.  There have been no recent illnesses, hospitalizations or procedures.  She did have some dysuria lately.  She had an angiogram on July 5th.      Review of Systems   Constitutional: Negative for appetite change, chills and fever.   Eyes: Negative for visual disturbance.   Respiratory: Negative for cough and shortness of breath.    Cardiovascular: Negative for chest pain and leg swelling.   Gastrointestinal: Negative for diarrhea, nausea and vomiting.   Genitourinary: Positive for decreased urine volume and difficulty urinating. Negative for dysuria and hematuria.   Musculoskeletal: Positive for arthralgias (knees) and back pain. Negative for myalgias.   Skin: Negative for rash.   Neurological: Negative for headaches.   Hematological: Bruises/bleeds easily.   Psychiatric/Behavioral: Negative for sleep disturbance.       The past medical, family and social histories were reviewed for this encounter.     /60   Pulse 98   Ht 5' 4" (1.626 m)   Wt 119 kg (262 lb 5.6 oz)   LMP 12/20/2013   SpO2 98%   BMI 45.03 kg/m²     Objective:      Physical Exam   Constitutional: She is oriented to person, place, and time. She appears well-developed and well-nourished. No distress.   HENT:   Head: Normocephalic and atraumatic.   Eyes: Conjunctivae are normal.   Neck: Neck supple. No JVD present.   Cardiovascular: Normal rate, regular rhythm and normal heart sounds. Exam reveals no gallop and no friction rub.   No murmur heard.  Pulmonary/Chest: Effort normal and breath sounds normal. No respiratory distress. She has no wheezes. She has no rales.   Abdominal: Soft. Bowel sounds are normal. She exhibits no distension. There is no tenderness.   Musculoskeletal: She exhibits no edema.   Neurological: She is alert and " oriented to person, place, and time.   Skin: Skin is warm and dry. No rash noted.   Psychiatric: She has a normal mood and affect.   Vitals reviewed.      Assessment:       1. Chronic kidney disease, stage III (moderate)    2. Acquired cyst of kidney    3. Diabetes mellitus with neurological manifestations, uncontrolled        Plan:   Return to clinic in 3 months.  Labs for next visit include rp, upc, pth.  RP in 1 month.  Baseline creatinine is 1.0 since 2005.  She has then been 1.6-1.8 since 2015.  PTH is 76 with a calcium of 10.3.  I do not know why she went from a creatinine of 1.0 in 2015 to 1.5 in 2017 and 1.8 in 2018 with bland urine sediment and a negative renal US.  There appears to be no other precipitants although AIN is also a possibility.  Her urine sediment appears bland thus I think it is less likely that she has an acute GN.  She does not wish to have a biopsy at this time and I am not pushing for one.  She has a gap from 10/15-7/17 where she seemed to bump her baseline.  Renal US showed R 11.1 cm, L 10.6 cm.  Stop losartan.  I encouraged her to start a plant-based whole food diet.

## 2019-10-15 RX ORDER — METFORMIN HYDROCHLORIDE 1000 MG/1
TABLET ORAL
Qty: 180 TABLET | Refills: 3 | Status: SHIPPED | OUTPATIENT
Start: 2019-10-15 | End: 2020-03-05 | Stop reason: SDUPTHER

## 2019-10-22 ENCOUNTER — PATIENT MESSAGE (OUTPATIENT)
Dept: NEPHROLOGY | Facility: CLINIC | Age: 54
End: 2019-10-22

## 2019-10-22 ENCOUNTER — PATIENT OUTREACH (OUTPATIENT)
Dept: ADMINISTRATIVE | Facility: HOSPITAL | Age: 54
End: 2019-10-22

## 2019-10-22 DIAGNOSIS — G62.9 PERIPHERAL POLYNEUROPATHY: ICD-10-CM

## 2019-10-22 RX ORDER — GABAPENTIN 800 MG/1
TABLET ORAL
Qty: 360 TABLET | Refills: 3 | Status: SHIPPED | OUTPATIENT
Start: 2019-10-22 | End: 2020-11-02

## 2019-10-27 DIAGNOSIS — Z79.4 TYPE 2 DIABETES MELLITUS WITHOUT COMPLICATION, WITH LONG-TERM CURRENT USE OF INSULIN: ICD-10-CM

## 2019-10-27 DIAGNOSIS — E11.9 TYPE 2 DIABETES MELLITUS WITHOUT COMPLICATION, WITH LONG-TERM CURRENT USE OF INSULIN: ICD-10-CM

## 2019-10-28 RX ORDER — GLIMEPIRIDE 4 MG/1
TABLET ORAL
Qty: 15 TABLET | Refills: 5 | Status: SHIPPED | OUTPATIENT
Start: 2019-10-28 | End: 2020-05-21

## 2019-11-01 DIAGNOSIS — Z12.39 BREAST CANCER SCREENING: ICD-10-CM

## 2019-11-07 ENCOUNTER — OFFICE VISIT (OUTPATIENT)
Dept: ENDOCRINOLOGY | Facility: CLINIC | Age: 54
End: 2019-11-07
Payer: COMMERCIAL

## 2019-11-07 ENCOUNTER — LAB VISIT (OUTPATIENT)
Dept: LAB | Facility: HOSPITAL | Age: 54
End: 2019-11-07
Attending: INTERNAL MEDICINE
Payer: COMMERCIAL

## 2019-11-07 VITALS
BODY MASS INDEX: 43.94 KG/M2 | DIASTOLIC BLOOD PRESSURE: 64 MMHG | WEIGHT: 263.75 LBS | RESPIRATION RATE: 18 BRPM | HEIGHT: 65 IN | SYSTOLIC BLOOD PRESSURE: 108 MMHG | HEART RATE: 92 BPM

## 2019-11-07 DIAGNOSIS — E11.9 TYPE 2 DIABETES MELLITUS WITHOUT COMPLICATION, WITH LONG-TERM CURRENT USE OF INSULIN: ICD-10-CM

## 2019-11-07 DIAGNOSIS — N18.30 CHRONIC KIDNEY DISEASE, STAGE III (MODERATE): ICD-10-CM

## 2019-11-07 DIAGNOSIS — E11.9 TYPE 2 DIABETES MELLITUS WITHOUT COMPLICATION, WITH LONG-TERM CURRENT USE OF INSULIN: Primary | ICD-10-CM

## 2019-11-07 DIAGNOSIS — N18.30 CKD (CHRONIC KIDNEY DISEASE) STAGE 3, GFR 30-59 ML/MIN: ICD-10-CM

## 2019-11-07 DIAGNOSIS — Z79.4 TYPE 2 DIABETES MELLITUS WITHOUT COMPLICATION, WITH LONG-TERM CURRENT USE OF INSULIN: ICD-10-CM

## 2019-11-07 DIAGNOSIS — Z79.4 TYPE 2 DIABETES MELLITUS WITHOUT COMPLICATION, WITH LONG-TERM CURRENT USE OF INSULIN: Primary | ICD-10-CM

## 2019-11-07 DIAGNOSIS — E78.5 DYSLIPIDEMIA: ICD-10-CM

## 2019-11-07 DIAGNOSIS — S91.109A OPEN TOE WOUND, INITIAL ENCOUNTER: ICD-10-CM

## 2019-11-07 DIAGNOSIS — I10 HTN (HYPERTENSION), BENIGN: ICD-10-CM

## 2019-11-07 LAB
ALBUMIN SERPL BCP-MCNC: 3.9 G/DL (ref 3.5–5.2)
ANION GAP SERPL CALC-SCNC: 14 MMOL/L (ref 8–16)
BUN SERPL-MCNC: 26 MG/DL (ref 6–20)
CALCIUM SERPL-MCNC: 10 MG/DL (ref 8.7–10.5)
CHLORIDE SERPL-SCNC: 102 MMOL/L (ref 95–110)
CO2 SERPL-SCNC: 26 MMOL/L (ref 23–29)
CREAT SERPL-MCNC: 1.8 MG/DL (ref 0.5–1.4)
EST. GFR  (AFRICAN AMERICAN): 36.8 ML/MIN/1.73 M^2
EST. GFR  (NON AFRICAN AMERICAN): 31.9 ML/MIN/1.73 M^2
GLUCOSE SERPL-MCNC: 62 MG/DL (ref 70–110)
PHOSPHATE SERPL-MCNC: 3.2 MG/DL (ref 2.7–4.5)
POTASSIUM SERPL-SCNC: 3.9 MMOL/L (ref 3.5–5.1)
PTH-INTACT SERPL-MCNC: 72 PG/ML (ref 9–77)
SODIUM SERPL-SCNC: 142 MMOL/L (ref 136–145)

## 2019-11-07 PROCEDURE — 99215 PR OFFICE/OUTPT VISIT, EST, LEVL V, 40-54 MIN: ICD-10-PCS | Mod: S$GLB,,, | Performed by: NURSE PRACTITIONER

## 2019-11-07 PROCEDURE — 83036 HEMOGLOBIN GLYCOSYLATED A1C: CPT

## 2019-11-07 PROCEDURE — 99215 OFFICE O/P EST HI 40 MIN: CPT | Mod: S$GLB,,, | Performed by: NURSE PRACTITIONER

## 2019-11-07 PROCEDURE — 80069 RENAL FUNCTION PANEL: CPT

## 2019-11-07 PROCEDURE — 99999 PR PBB SHADOW E&M-EST. PATIENT-LVL III: ICD-10-PCS | Mod: PBBFAC,,, | Performed by: NURSE PRACTITIONER

## 2019-11-07 PROCEDURE — 99999 PR PBB SHADOW E&M-EST. PATIENT-LVL III: CPT | Mod: PBBFAC,,, | Performed by: NURSE PRACTITIONER

## 2019-11-07 PROCEDURE — 83970 ASSAY OF PARATHORMONE: CPT

## 2019-11-07 PROCEDURE — 36415 COLL VENOUS BLD VENIPUNCTURE: CPT | Mod: PO

## 2019-11-07 NOTE — PROGRESS NOTES
Subjective:      Patient ID: Mary Ann Vidales is a 54 y.o. female.    Chief Complaint:  Diabetes f/u      History of Present Illness  Pt is a 54y/o female with TYPE 2 DM since approx 2000, and chronic conditions pending review including HTN, neuropathy. Dyslipidemia, morbid obesity.      Interim Events:  No acute events.   Creatine creeping. Had previously Restarted metformin (creatinine 1.8). Continues on 2 mg glimpiride. Bydureon substituted for ozempic.  Pt tolerating well. Glucoses much improved.  C/o hypoglycemic episode while driving. She does not check glucoses.       Diabetes Flow Sheet:  Diabetes Medications:  Metformin 1000 BID,  glimipiride 2 mg, ozempci 1 mg   Bydureon d/c r/t hacking cough   Diabetes Complications:peripheral neuropathy   Aspirin:   Statin: none   ACE/ARB:losartan hctz 100/25 qd.   Last Urine Microalbumin:   Last Eye exam: 10/16Last Diabetic Education:   Glucometer--Accucheck Verio     Review of Systems   Constitutional: Negative for activity change and fatigue.   HENT: Negative for hearing loss and trouble swallowing.    Eyes: Negative for visual disturbance.   Respiratory: Negative for cough and shortness of breath.    Cardiovascular: Negative for chest pain and palpitations.   Gastrointestinal: Negative for constipation and diarrhea.   Genitourinary: Negative for difficulty urinating and frequency.   Musculoskeletal: Positive for arthralgias. Negative for myalgias.        Knees   Skin: Positive for wound (feet). Negative for rash.   Neurological: Positive for numbness (feet worsened). Negative for weakness and light-headedness.   Hematological: Does not bruise/bleed easily.   Psychiatric/Behavioral: Negative for agitation and decreased concentration. The patient is not nervous/anxious.        Objective:   Physical Exam   Constitutional: She is oriented to person, place, and time. She appears well-developed and well-nourished.   HENT:   Head: Normocephalic and atraumatic.   Right  Ear: External ear normal.   Left Ear: External ear normal.   Nose: Nose normal.   Mouth/Throat: Oropharynx is clear and moist.   Eyes: Pupils are equal, round, and reactive to light. Conjunctivae and EOM are normal.   Neck: Normal range of motion. Neck supple. No tracheal deviation present. No thyromegaly present.   Cardiovascular: Normal rate, regular rhythm, normal heart sounds and intact distal pulses.   Pulmonary/Chest: Effort normal and breath sounds normal. No respiratory distress.   Musculoskeletal: Normal range of motion. She exhibits no edema.   Feet: open wounds near great nail, fissures, onychomycosis.   Decreased vibratory sensation bilaterally    Neurological: She is alert and oriented to person, place, and time. She has normal reflexes.   Skin: Skin is warm and dry.   Psychiatric: She has a normal mood and affect. Her behavior is normal. Judgment and thought content normal.           Lab Review:   Hemoglobin A1C   Date Value Ref Range Status   08/09/2019 6.2 (H) 4.0 - 5.6 % Final     Comment:     ADA Screening Guidelines:  5.7-6.4%  Consistent with prediabetes  >or=6.5%  Consistent with diabetes  High levels of fetal hemoglobin interfere with the HbA1C  assay. Heterozygous hemoglobin variants (HbS, HgC, etc)do  not significantly interfere with this assay.   However, presence of multiple variants may affect accuracy.     12/14/2018 6.7 (H) 4.0 - 5.6 % Final     Comment:     ADA Screening Guidelines:  5.7-6.4%  Consistent with prediabetes  >or=6.5%  Consistent with diabetes  High levels of fetal hemoglobin interfere with the HbA1C  assay. Heterozygous hemoglobin variants (HbS, HgC, etc)do  not significantly interfere with this assay.   However, presence of multiple variants may affect accuracy.     08/20/2018 8.3 (H) 4.0 - 5.6 % Final     Comment:     ADA Screening Guidelines:  5.7-6.4%  Consistent with prediabetes  >or=6.5%  Consistent with diabetes  High levels of fetal hemoglobin interfere with the  HbA1C  assay. Heterozygous hemoglobin variants (HbS, HgC, etc)do  not significantly interfere with this assay.   However, presence of multiple variants may affect accuracy.       Lab Results   Component Value Date    LDLCALC 70.0 12/14/2018     Lab Results   Component Value Date    TSH 1.957 07/30/2018       Chemistry        Component Value Date/Time     10/04/2019 1608    K 4.3 10/04/2019 1608     10/04/2019 1608    CO2 28 10/04/2019 1608    BUN 41 (H) 10/04/2019 1608    CREATININE 2.0 (H) 10/04/2019 1608     (H) 10/04/2019 1608        Component Value Date/Time    CALCIUM 10.0 10/04/2019 1608    ALKPHOS 78 12/14/2018 0731    AST 18 12/14/2018 0731    ALT 23 12/14/2018 0731    BILITOT 0.5 12/14/2018 0731            Assessment:         1. Type 2 diabetes mellitus without complication, with long-term current use of insulin    Chronic-uncontrolled-see plan      DIABETES FOOT EXAM    Ambulatory referral to Podiatry    Hemoglobin A1c   2. CKD (chronic kidney disease) stage 3, GFR 30-59 ml/min  Chronic-worsened-see plan    3. Dyslipidemia  Lipid panel  Chronic-stable-cont atorvastatin    4. HTN (hypertension), benign  Resolved--off arb per renal    5. Open toe wound, initial encounter  New dx--high risk feet-cons pod          Plan:   Continue glimipiride  4mg qd. ----  Add ozempic---unalble to tolerate Bydureon--hacking cough.    Continue metformin 1000 bid  Advised needs to increase SBGM for safey with tight a1c,    decrease glimipiride to 2mg     Can send log via portal in couple of weeks.     PN may be coming more from back than DM.  Counseled on use of capsacin cream.  May do better with Lyrica, but currently on max dose of gabapentin and would  Prefer neuro to consider switch over since a wean may be needed.   Can also consider high dose Vit B supplements.     ORDERS 11/07/2019   A1c today--add to [facility]---    3 mo with  Fasting lipids,  a1c prior

## 2019-11-08 ENCOUNTER — IMMUNIZATION (OUTPATIENT)
Dept: PHARMACY | Facility: CLINIC | Age: 54
End: 2019-11-08
Payer: COMMERCIAL

## 2019-11-08 LAB
ESTIMATED AVG GLUCOSE: 126 MG/DL (ref 68–131)
HBA1C MFR BLD HPLC: 6 % (ref 4–5.6)

## 2019-11-12 DIAGNOSIS — Z00.00 ANNUAL PHYSICAL EXAM: ICD-10-CM

## 2019-11-12 DIAGNOSIS — E11.9 DIABETES MELLITUS WITHOUT COMPLICATION: Primary | ICD-10-CM

## 2019-11-12 RX ORDER — ALPRAZOLAM 0.5 MG/1
TABLET ORAL
Qty: 30 TABLET | Refills: 3 | OUTPATIENT
Start: 2019-11-12

## 2019-11-12 RX ORDER — ALPRAZOLAM 0.5 MG/1
0.5 TABLET ORAL DAILY
Qty: 30 TABLET | Refills: 3 | Status: SHIPPED | OUTPATIENT
Start: 2019-11-12 | End: 2020-04-26

## 2020-01-09 NOTE — TELEPHONE ENCOUNTER
Last seen in April.  She cancelled her annual in December and I asked her to resched soon.    Ok to refill?  Last filled 10/8/2019

## 2020-01-11 RX ORDER — HYDROCODONE BITARTRATE AND ACETAMINOPHEN 7.5; 325 MG/1; MG/1
1 TABLET ORAL EVERY 6 HOURS PRN
Qty: 90 TABLET | Refills: 0 | Status: SHIPPED | OUTPATIENT
Start: 2020-01-11 | End: 2020-03-14 | Stop reason: SDUPTHER

## 2020-02-11 ENCOUNTER — PATIENT OUTREACH (OUTPATIENT)
Dept: ADMINISTRATIVE | Facility: OTHER | Age: 55
End: 2020-02-11

## 2020-02-11 DIAGNOSIS — Z13.5 ENCOUNTER FOR SCREENING FOR DIABETIC RETINOPATHY: Primary | ICD-10-CM

## 2020-02-11 NOTE — PROGRESS NOTES
Chart reviewed.   Requested updates from Care Everywhere.  Immunizations reconciled.   HM updated.  Order for diabetic eye screening photo placed.

## 2020-03-04 LAB
LEFT EYE DM RETINOPATHY: NORMAL
RIGHT EYE DM RETINOPATHY: NORMAL

## 2020-03-05 ENCOUNTER — PATIENT MESSAGE (OUTPATIENT)
Dept: ADMINISTRATIVE | Facility: HOSPITAL | Age: 55
End: 2020-03-05

## 2020-03-05 ENCOUNTER — TELEPHONE (OUTPATIENT)
Dept: PULMONOLOGY | Facility: CLINIC | Age: 55
End: 2020-03-05

## 2020-03-05 ENCOUNTER — PATIENT OUTREACH (OUTPATIENT)
Dept: ADMINISTRATIVE | Facility: HOSPITAL | Age: 55
End: 2020-03-05

## 2020-03-05 ENCOUNTER — PATIENT MESSAGE (OUTPATIENT)
Dept: ENDOCRINOLOGY | Facility: CLINIC | Age: 55
End: 2020-03-05

## 2020-03-05 ENCOUNTER — OFFICE VISIT (OUTPATIENT)
Dept: ENDOCRINOLOGY | Facility: CLINIC | Age: 55
End: 2020-03-05
Payer: MEDICAID

## 2020-03-05 ENCOUNTER — PATIENT MESSAGE (OUTPATIENT)
Dept: INTERNAL MEDICINE | Facility: CLINIC | Age: 55
End: 2020-03-05

## 2020-03-05 ENCOUNTER — PATIENT OUTREACH (OUTPATIENT)
Dept: ADMINISTRATIVE | Facility: OTHER | Age: 55
End: 2020-03-05

## 2020-03-05 VITALS
BODY MASS INDEX: 43.49 KG/M2 | SYSTOLIC BLOOD PRESSURE: 120 MMHG | HEART RATE: 73 BPM | HEIGHT: 65 IN | DIASTOLIC BLOOD PRESSURE: 70 MMHG | OXYGEN SATURATION: 98 % | WEIGHT: 261 LBS

## 2020-03-05 DIAGNOSIS — G47.33 OSA (OBSTRUCTIVE SLEEP APNEA): Primary | ICD-10-CM

## 2020-03-05 DIAGNOSIS — E78.5 DYSLIPIDEMIA: ICD-10-CM

## 2020-03-05 DIAGNOSIS — N18.30 CKD (CHRONIC KIDNEY DISEASE) STAGE 3, GFR 30-59 ML/MIN: ICD-10-CM

## 2020-03-05 DIAGNOSIS — E11.9 TYPE 2 DIABETES MELLITUS WITHOUT COMPLICATION, WITH LONG-TERM CURRENT USE OF INSULIN: Primary | ICD-10-CM

## 2020-03-05 DIAGNOSIS — I10 HTN (HYPERTENSION), BENIGN: ICD-10-CM

## 2020-03-05 DIAGNOSIS — G25.0 ESSENTIAL TREMOR: ICD-10-CM

## 2020-03-05 DIAGNOSIS — E11.9 DIABETIC EYE EXAM: Primary | ICD-10-CM

## 2020-03-05 DIAGNOSIS — Z79.4 TYPE 2 DIABETES MELLITUS WITHOUT COMPLICATION, WITH LONG-TERM CURRENT USE OF INSULIN: Primary | ICD-10-CM

## 2020-03-05 DIAGNOSIS — G62.9 PERIPHERAL POLYNEUROPATHY: ICD-10-CM

## 2020-03-05 DIAGNOSIS — Z01.00 DIABETIC EYE EXAM: Primary | ICD-10-CM

## 2020-03-05 PROCEDURE — 99214 OFFICE O/P EST MOD 30 MIN: CPT | Mod: S$PBB,,, | Performed by: NURSE PRACTITIONER

## 2020-03-05 PROCEDURE — 99214 PR OFFICE/OUTPT VISIT, EST, LEVL IV, 30-39 MIN: ICD-10-PCS | Mod: S$PBB,,, | Performed by: NURSE PRACTITIONER

## 2020-03-05 PROCEDURE — 99999 PR PBB SHADOW E&M-EST. PATIENT-LVL V: CPT | Mod: PBBFAC,,, | Performed by: NURSE PRACTITIONER

## 2020-03-05 PROCEDURE — 99215 OFFICE O/P EST HI 40 MIN: CPT | Mod: PBBFAC,PO | Performed by: NURSE PRACTITIONER

## 2020-03-05 PROCEDURE — 99999 PR PBB SHADOW E&M-EST. PATIENT-LVL V: ICD-10-PCS | Mod: PBBFAC,,, | Performed by: NURSE PRACTITIONER

## 2020-03-05 RX ORDER — PROPRANOLOL HYDROCHLORIDE 10 MG/1
10 TABLET ORAL 2 TIMES DAILY
Qty: 60 TABLET | Refills: 3 | Status: SHIPPED | OUTPATIENT
Start: 2020-03-05 | End: 2020-05-05 | Stop reason: SDUPTHER

## 2020-03-05 RX ORDER — METFORMIN HYDROCHLORIDE 1000 MG/1
1000 TABLET ORAL 2 TIMES DAILY WITH MEALS
Qty: 180 TABLET | Refills: 3 | Status: SHIPPED | OUTPATIENT
Start: 2020-03-05 | End: 2021-05-20

## 2020-03-05 NOTE — TELEPHONE ENCOUNTER
Spoke with patient. Full name and  verified. Patient stated that she had her last appointment for sleep apnea in . Her CPAP machine recently broke and she needs an appointment as soon as possible to discuss with provider. Advised patient to contact [facility], to discuss if they are able to troubleshoot her machine while waiting for her appointment. Patient confirmed appointment with LINA Lozano on  at 0900.

## 2020-03-05 NOTE — TELEPHONE ENCOUNTER
----- Message from Leena Valencia MA sent at 3/5/2020 10:27 AM CST -----  Contact: self/[phone] /[phone]  PT states she has not been seen in awhile but is requesting to speak with the nurse to see if she can schedule an appt. Pt will be a new Patient and does not remember the Doctor she use to see at [facility]. Pt states that her CPAP has been broken for a week now and thinks she may need another one.  Pt is requesting an appt for Today or Tomorrow.

## 2020-03-05 NOTE — PROGRESS NOTES
Subjective:      Patient ID: Mary Ann Vidales is a 55 y.o. female.    Chief Complaint:  Diabetes f/u      History of Present Illness  Pt is a 52y/o female with TYPE 2 DM since approx 2000, and chronic conditions pending review including HTN, neuropathy. Dyslipidemia, morbid obesity.      Interim Events: Had cardiac stent per Dr. Francis [place].  I had stopped metformin previously r/t creatine of 1.8, it is unchanged, but A1C is up significantly and pt requesting to add back.  Current literature suggestive it is OK to use at higher than 1.5 creatine levels. Continues to take 4 mg glimipiride and Ozempic. 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     .     Only checks glucoses sporadically.      Diabetes Flow Sheet:  Diabetes Medications:  Metformin 1000 BID,  glimipiride 2 mg, ozempci 1 mg   Bydureon d/c r/t hacking cough   Diabetes Complications:peripheral neuropathy   Aspirin:   Statin: none   ACE/ARB:losartan hctz 100/25 qd.   Last Urine Microalbumin:   Last Eye exam: 10/16Last Diabetic Education:   Glucometer--Accucheck Verio     Review of Systems   Constitutional: Negative for activity change and fatigue.   HENT: Negative for hearing loss and trouble swallowing.    Eyes: Negative for visual disturbance.   Respiratory: Negative for cough and shortness of breath.    Cardiovascular: Negative for chest pain and palpitations.   Gastrointestinal: Negative for constipation and diarrhea.   Genitourinary: Negative for difficulty urinating and frequency.   Musculoskeletal: Positive for arthralgias. Negative for myalgias.        Knees   Skin: Positive for wound (feet). Negative for rash.   Neurological: Positive for tremors and numbness (feet worsened, hands). Negative for weakness and light-headedness.   Hematological: Does not bruise/bleed easily.   Psychiatric/Behavioral: Negative for agitation and decreased concentration. The patient is  not nervous/anxious.        Objective:   Physical Exam   Constitutional: She is oriented to person, place, and time. She appears well-developed and well-nourished.   HENT:   Head: Normocephalic and atraumatic.   Right Ear: External ear normal.   Left Ear: External ear normal.   Nose: Nose normal.   Mouth/Throat: Oropharynx is clear and moist.   Eyes: Pupils are equal, round, and reactive to light. Conjunctivae and EOM are normal.   Neck: Normal range of motion. Neck supple. No tracheal deviation present. No thyromegaly present.   Cardiovascular: Normal rate, regular rhythm, normal heart sounds and intact distal pulses.   Pulmonary/Chest: Effort normal and breath sounds normal. No respiratory distress.   Musculoskeletal: Normal range of motion. She exhibits no edema.   Feet: open wounds near great nail, fissures, onychomycosis.   Decreased vibratory sensation bilaterally    Neurological: She is alert and oriented to person, place, and time. She has normal reflexes.   Some fine tremors noted.   Questionable + tinel L hand   Positive Phalen bilaterally    Skin: Skin is warm and dry. She is not diaphoretic.   Psychiatric: She has a normal mood and affect. Her behavior is normal. Judgment and thought content normal.           Lab Review:   Hemoglobin A1C   Date Value Ref Range Status   03/04/2020 7.3 (H) 0.0 - 5.6 % Final     Comment:     Reference Interval:  5.0 - 5.6 Normal   5.7 - 6.4 High Risk   > 6.5 Diabetic    Hgb A1c results are standardized based on the (NGSP) National   Glycohemoglobin Standardization Program.    Hemoglobin A1C levels are related to mean serum/plasma glucose   during the preceding 2-3 months.        11/07/2019 6.0 (H) 4.0 - 5.6 % Final     Comment:     ADA Screening Guidelines:  5.7-6.4%  Consistent with prediabetes  >or=6.5%  Consistent with diabetes  High levels of fetal hemoglobin interfere with the HbA1C  assay. Heterozygous hemoglobin variants (HbS, HgC, etc)do  not significantly interfere  with this assay.   However, presence of multiple variants may affect accuracy.     08/09/2019 6.2 (H) 4.0 - 5.6 % Final     Comment:     ADA Screening Guidelines:  5.7-6.4%  Consistent with prediabetes  >or=6.5%  Consistent with diabetes  High levels of fetal hemoglobin interfere with the HbA1C  assay. Heterozygous hemoglobin variants (HbS, HgC, etc)do  not significantly interfere with this assay.   However, presence of multiple variants may affect accuracy.       Lab Results   Component Value Date    LDLCALC 66.2 03/04/2020     Lab Results   Component Value Date    TSH 2.030 03/04/2020       Chemistry        Component Value Date/Time     03/04/2020 0851    K 3.7 03/04/2020 0851     03/04/2020 0851    CO2 28 03/04/2020 0851    BUN 42 (H) 03/04/2020 0851    CREATININE 1.87 (H) 03/04/2020 0851     (H) 03/04/2020 0851        Component Value Date/Time    CALCIUM 9.0 03/04/2020 0851    ALKPHOS 95 03/04/2020 0851    AST 20 03/04/2020 0851    ALT 15 03/04/2020 0851    BILITOT 0.8 03/04/2020 0851            Assessment:       1. Type 2 diabetes mellitus without complication, with long-term current use of insulin  metFORMIN (GLUCOPHAGE) 1000 MG tablet  Chronic-uncontrolled-see plan       Basic metabolic panel    Hemoglobin A1c   2. CKD (chronic kidney disease) stage 3, GFR 30-59 ml/min  Chronic-stable-monitor   3. HTN (hypertension), benign  Chronic-stable-cont losartan    4. Dyslipidemia  crhonic-stable- no change   5. Peripheral polyneuropathy     6. Essential tremor  propranolol (INDERAL) 10 MG tablet    Ambulatory referral/consult to Neurology  New dx-low risk          Plan:   Decrease  glimipiride  4mg to to 2 mg qd. ----  Continue ozempic---  Continue metformin 1000 bid  Add propanolol 10 mg qd--can titrate to bid after several days.     ORDERS 03/05/2020     3 mo with  A1c, bmp  prior   Cons neuro--tremors.

## 2020-03-05 NOTE — LETTER
AUTHORIZATION FOR RELEASE OF   CONFIDENTIAL INFORMATION    Dear [facility],    We are seeing Mary Ann Vidales, date of birth 1965, in the clinic at [facility]. Vishnu Batres MD is the patient's PCP. Mary Ann Vidales has an outstanding lab/procedure at the time we reviewed her chart. In order to help keep her health information updated, she has authorized us to request the following medical record(s):        (  )  MAMMOGRAM                                      (  )  COLONOSCOPY      (  )  PAP SMEAR                                          (  )  OUTSIDE LAB RESULTS     (  )  DEXA SCAN                                          ( x )  DIABETIC EYE EXAM            (  )  FOOT EXAM                                          (  )  ENTIRE RECORD     (  )  OUTSIDE IMMUNIZATIONS                 (  )  _______________         Please fax records to [facility], James D Conway, MD, [phone]     If you have any questions, please contact TAMMY Cruz at [phone]          Patient Name: Mary Ann Vidales  : 1965  Patient Phone #: [phone]

## 2020-03-06 ENCOUNTER — PATIENT OUTREACH (OUTPATIENT)
Dept: ADMINISTRATIVE | Facility: HOSPITAL | Age: 55
End: 2020-03-06

## 2020-03-06 NOTE — TELEPHONE ENCOUNTER
She needs new machine.  Order pended for your signature. I am not allowed to sign.    Please sign order asap.  Her machine is broke.    Thanks hamlet 3/6 Friday.

## 2020-03-11 ENCOUNTER — TELEPHONE (OUTPATIENT)
Dept: ENDOCRINOLOGY | Facility: CLINIC | Age: 55
End: 2020-03-11

## 2020-03-11 ENCOUNTER — PATIENT OUTREACH (OUTPATIENT)
Dept: ADMINISTRATIVE | Facility: OTHER | Age: 55
End: 2020-03-11

## 2020-03-11 NOTE — TELEPHONE ENCOUNTER
Attempting to call pt but no answer, busy signal and not able to leave voicemail  NICOLE Velazquez wanting to inform pt that she is not able to sign paperwork for CPAP

## 2020-03-11 NOTE — TELEPHONE ENCOUNTER
----- Message from Cira Hare sent at 3/11/2020  8:38 AM CDT -----  Contact: [facility]  Type: Needs Medical Advice    Who Called: Birgit Chung Call Back Number: [phone]  Additional Information: Caller is requesting to have the Rx that she faxed over signed for the Cpap and she needs the clinical notes please respond today the fax is [phone]. She cant read the name on the Cpap orders she thinks is Katherin Nuñez but is not sure please call to advise.

## 2020-03-11 NOTE — TELEPHONE ENCOUNTER
Spoke with Birgit, informed her that we have not received any faxes  Fax number correct, will fax to us again for completion

## 2020-03-15 RX ORDER — HYDROCODONE BITARTRATE AND ACETAMINOPHEN 7.5; 325 MG/1; MG/1
1 TABLET ORAL EVERY 6 HOURS PRN
Qty: 90 TABLET | Refills: 0 | Status: SHIPPED | OUTPATIENT
Start: 2020-03-15 | End: 2020-04-02 | Stop reason: SDUPTHER

## 2020-03-20 ENCOUNTER — PATIENT MESSAGE (OUTPATIENT)
Dept: ADMINISTRATIVE | Facility: OTHER | Age: 55
End: 2020-03-20

## 2020-03-20 DIAGNOSIS — I10 BENIGN HYPERTENSION: ICD-10-CM

## 2020-03-20 DIAGNOSIS — E66.01 MORBID OBESITY, UNSPECIFIED OBESITY TYPE: ICD-10-CM

## 2020-03-20 DIAGNOSIS — E78.5 DYSLIPIDEMIA: ICD-10-CM

## 2020-03-20 DIAGNOSIS — R93.1 ELEVATED CORONARY ARTERY CALCIUM SCORE: ICD-10-CM

## 2020-03-20 DIAGNOSIS — E11.9 TYPE 2 DIABETES MELLITUS WITHOUT COMPLICATION, WITH LONG-TERM CURRENT USE OF INSULIN: ICD-10-CM

## 2020-03-20 DIAGNOSIS — Z79.4 TYPE 2 DIABETES MELLITUS WITHOUT COMPLICATION, WITH LONG-TERM CURRENT USE OF INSULIN: ICD-10-CM

## 2020-03-22 RX ORDER — FUROSEMIDE 20 MG/1
TABLET ORAL
Qty: 90 TABLET | Refills: 1 | Status: SHIPPED | OUTPATIENT
Start: 2020-03-22 | End: 2020-11-02

## 2020-03-22 RX ORDER — NAPROXEN SODIUM 550 MG/1
TABLET ORAL
Qty: 180 TABLET | Refills: 1 | Status: SHIPPED | OUTPATIENT
Start: 2020-03-22 | End: 2020-11-02

## 2020-03-22 RX ORDER — FLUOXETINE HYDROCHLORIDE 40 MG/1
CAPSULE ORAL
Qty: 90 CAPSULE | Refills: 1 | Status: SHIPPED | OUTPATIENT
Start: 2020-03-22 | End: 2020-11-02

## 2020-03-22 RX ORDER — ATORVASTATIN CALCIUM 10 MG/1
TABLET, FILM COATED ORAL
Qty: 90 TABLET | Refills: 1 | Status: SHIPPED | OUTPATIENT
Start: 2020-03-22 | End: 2020-11-02

## 2020-03-23 DIAGNOSIS — E11.9 TYPE 2 DIABETES MELLITUS: ICD-10-CM

## 2020-03-31 ENCOUNTER — TELEPHONE (OUTPATIENT)
Dept: INTERNAL MEDICINE | Facility: CLINIC | Age: 55
End: 2020-03-31

## 2020-04-01 NOTE — TELEPHONE ENCOUNTER
Prior auth thru cover my meds and form faxed off to [facility] stating dx. Chronic right and lefg knee pain and osteoarthritis.    M25.561 and m25.562 and osteoarthritis m17.11  Since 2009.    Prior auth only approved thru 7/29/20 and will have to repeat process    Sent patient email and left msg at pharmacy pa approved so can fill hydrocodone..

## 2020-04-02 ENCOUNTER — TELEPHONE (OUTPATIENT)
Dept: INTERNAL MEDICINE | Facility: CLINIC | Age: 55
End: 2020-04-02

## 2020-04-02 RX ORDER — HYDROCODONE BITARTRATE AND ACETAMINOPHEN 7.5; 325 MG/1; MG/1
1 TABLET ORAL EVERY 6 HOURS PRN
Qty: 90 TABLET | Refills: 0 | Status: SHIPPED | OUTPATIENT
Start: 2020-04-02 | End: 2020-07-07 | Stop reason: SDUPTHER

## 2020-04-02 NOTE — TELEPHONE ENCOUNTER
----- Message from Amanda Marrufo sent at 4/2/2020 11:27 AM CDT -----  Contact: [phone]/ [facility]  [facility] called and stated Cover [employer] is having Internet problem and they did not received anything from the office for the patient.    Please advise, thank you.

## 2020-04-02 NOTE — TELEPHONE ENCOUNTER
----- Message from Yajaira Early sent at 4/2/2020 11:22 AM CDT -----  Pharmacy Calling    Reason for call: PA not approved     Pharmacy Name: [facility]/pharmacy #[id number]  [address] [facility] [phone] (Phone)  [phone] (Fax)      Prescription Name: HYDROcodone-acetaminophen (NORCO) 7.5-325 mg per tablet        Additional Information:  Margo states that the PA is not approved and that you may have to call and do it over the phone.

## 2020-04-26 RX ORDER — ALPRAZOLAM 0.5 MG/1
0.5 TABLET ORAL DAILY
Qty: 30 TABLET | Refills: 3 | Status: SHIPPED | OUTPATIENT
Start: 2020-04-26 | End: 2020-09-22 | Stop reason: SDUPTHER

## 2020-04-30 ENCOUNTER — PATIENT OUTREACH (OUTPATIENT)
Dept: OTHER | Facility: OTHER | Age: 55
End: 2020-04-30

## 2020-04-30 NOTE — LETTER
May 14, 2020     Mary Ann Vidales  [address] [place] [address]       Dear Mary Ann,    Welcome to [facility]! Our goal is to make care effective, proactive and convenient by using data you send us from home to better treat your chronic conditions.              My name is Nighat Singh, and I am your dedicated Digital Medicine clinician. As an expert in medication management, I will help ensure that the medications you are taking continue to provide the intended benefits and help you reach your goals. You can reach me directly at [phone] or by sending me a message directly through your [facility] account.      I am Susan Mcguire and I will be your health . My job is to help you identify lifestyle changes to improve your disease control. We will talk about nutrition, exercise, and other ways you may be able to adjust your current habits to better your health. Additionally, we will help ensure you are completing the tests and screenings that are necessary to help manage your conditions. You can reach me directly at [phone] or by sending me a message directly through your [facility] account.    Most importantly, YOU are at the center of this team. Together, we will work to improve your overall health and encourage you to meet your goals for a healthier lifestyle.     What we expect from YOU:  · Please take frequent home blood sugar measurements according to the frequency your physician and Digital Medicine care team specify. It is important that your team see both fasting and after meal readings.      Be available to receive phone calls or [employer] messages, when appropriate, from your care team. Please let us know if there are any specific days or times that work best for us to reach you via phone.     Complete routine tests and screenings. Dont worry, we will help keep you on track!           What you should expect from your Digital Medicine Care  Team:   We will work with you to create a personalized plan of care and provide you with encouragement and education, including regarding lifestyle changes, that could help you manage your disease states.     We will adjust your current medications, if needed, and continue to monitor your long-term progress.     We will provide you and your physician with monthly progress reports after you have been in the program for more than 30 days.     We will send you reminders through [employer] and text messages to help ensure you do not miss any testing deadlines to help manage your disease states.    You will be able to reach us by phone or through your [facility] account by clicking our names under Care Team on the right side of the home screen.    I look forward to working with you to achieve your blood pressure goals!    We look forward to working with you to help manage your health,    Sincerely,    Your Digital Medicine Team    Please visit our websites to learn more:   · Diabetes: [email or web address]      Remember, we are not available for emergencies. If you have an emergency, please contact your doctors office directly or call [facility] on-call ([phone] or [phone]) or 911.    Diabetes: We want help you get important tests and screenings done regularly to assure that your health needs are met. We have put a new system in place, called CareTouch that will help us improve how we monitor and reach out to you about the following lab tests that you will need to help manage your diabetes.  · Hemoglobin A1c testing (Frequency: Every 3 to 6 months, dependent on A1c goal)  · Nephropathy Assessment, generally urine micro albumin testing (Frequency: Yearly)  · Eye exam through a quick 30-minute Eye Photo Exam (Frequency: 1-2 Years, depending on result)    When necessary you can come in to one of the labs on the attached page any weekday between 10:30 am and 4:00 pm to have your tests done prior  to their due date. Tell the  you received a CareTouch letter, or just look for the CareTouch sign.

## 2020-05-04 ENCOUNTER — PATIENT MESSAGE (OUTPATIENT)
Dept: ENDOCRINOLOGY | Facility: CLINIC | Age: 55
End: 2020-05-04

## 2020-05-04 DIAGNOSIS — G25.0 ESSENTIAL TREMOR: ICD-10-CM

## 2020-05-04 RX ORDER — GLIMEPIRIDE 2 MG/1
4 TABLET ORAL
Qty: 180 TABLET | Refills: 1 | Status: SHIPPED | OUTPATIENT
Start: 2020-05-04 | End: 2020-11-19

## 2020-05-05 DIAGNOSIS — G25.0 ESSENTIAL TREMOR: ICD-10-CM

## 2020-05-05 RX ORDER — PROPRANOLOL HYDROCHLORIDE 10 MG/1
10 TABLET ORAL 2 TIMES DAILY
Qty: 60 TABLET | Refills: 3 | Status: SHIPPED | OUTPATIENT
Start: 2020-05-05 | End: 2020-06-24 | Stop reason: ALTCHOICE

## 2020-05-05 RX ORDER — PROPRANOLOL HYDROCHLORIDE 10 MG/1
10 TABLET ORAL 2 TIMES DAILY
Qty: 60 TABLET | Refills: 3 | Status: SHIPPED | OUTPATIENT
Start: 2020-05-05 | End: 2020-05-05 | Stop reason: SDUPTHER

## 2020-05-05 NOTE — TELEPHONE ENCOUNTER
----- Message from Wil Swanson sent at 5/5/2020  1:24 PM CDT -----  Contact: Rubio terrell/[employer]   Pt is requesting a refill on her propranoloL (INDERAL) 10 MG tablet  Call Back#[phone]  Thanks      [facility]  [facility] [address]  Phone: [phone] Fax: [phone]

## 2020-05-07 ENCOUNTER — TELEPHONE (OUTPATIENT)
Dept: ENDOCRINOLOGY | Facility: CLINIC | Age: 55
End: 2020-05-07

## 2020-05-07 NOTE — TELEPHONE ENCOUNTER
Spoke to pharm and she stated it says drug not on formulary per pt's insurance, not PA required. She stated to disregard the message and fax and that she would adv pt.

## 2020-05-07 NOTE — TELEPHONE ENCOUNTER
----- Message from Luis Conway sent at 5/7/2020  9:27 AM CDT -----  Contact: [facility], [place]  Patient need anna authorization on (OZEMPIC) please call back at [facility], any questions please call back at [phone] (home)     Case number [id number]  [facility] #[id number] - [place] - [address] AT [place] [address]  [place] [address]  Phone: [phone] Fax: [phone]

## 2020-05-14 NOTE — PROGRESS NOTES
Digital Medicine: Health  Introduction    Introduced Mary Ann Vidales to Digital Medicine. Discussed health  role and recommended lifestyle modifications.    The history is provided by the patient.     DIABETES    Our goal is to decrease A1c within patient-specific target levels and make the process convenient so patient can avoid extra trips to the office. Reducing A1C by merely 1% results in a decreased risk of complications of at least 10%. For example, an A1C reduced from 8.5% to 7.5% results in almost 40% lower risk of kidney, eye, and nerve disease      Reviewed that the Digital Medicine care team - consisting of a clinician and a health  - will follow the most current evidence-based national guidelines for treating your condition.  The health  will focus on lifestyle modifications and motivation while the clinician will focus on medication therapy.  The care team will review all data on a regular basis and reach out as needed.      Explained that one of the key parts of the program is communication with the care team.  Asked patient to respond to outreach attempts and complete questionnaires.  Stressed importance of medication adherence.      Instructed patient not to allow anyone else to use phone and monitoring device.  Explained that we expect patient to test their blood sugar as prescribed by their physician or Digital Medicine Clinician.      Reviewed general Self-Monitoring of Blood Glucose (SMBG) goals:  · FPG: <  mg/dL  · 1h PPG: < 180 mg/dL  · 2h PPG: < 160 mg/dL  · Bedtime: < 150 mg/dL    Explained to patient that the digital medicine team is not available for emergencies.  Patient will call [facility] on-call ([phone] or [phone]) or [phone] if needed.      Reviewed signs and symptoms of hypoglycemia (weakness, dizziness, hunger, shakiness, nausea, headache, heart palpitations, sweating, fatigue, anxiety, etc.).  Reviewed treatment of hypoglycemia (15/15 rule).       Patient's A1C goal is less than or equal to 7.  Patient's most recent A1C result is above goal.  Lab Results     Component                Value               Date                     HGBA1C                   7.3 (H)             03/04/2020              Patient does experience hypoglycemia from time to time and will have shakiness.   She states she tends to get low blood sugar while at work and would like to know if she can be prescribed a glucagon shot so she can resolve her low blood sugar quickly at work.   I reviewed w/ patient the 15x15 rule.     Patient notes that her a1c 6 months ago was at 6%. Her doctor took her off of metformin because she had kidney issues and wanted to see if the metformin was contributing to her kidney issues. It was concluded that it was not. Her a1c is now 7.3 and she is back on metformin.                 Last 6 Patient Entered Readings                                          Most Recent A1c:      Recent Readings 5/13/2020 5/10/2020 5/6/2020 5/6/2020 5/5/2020    Blood Glucose (mg/dL) 145 254 139 87 145          INTERVENTION(S)  reviewed monitoring technique and encouragement/support    PLAN  patient verbalizes understanding, patient amenable to changes, Clinician follow-up and continue monitoring    Patient is pleasant to speak to and excited to be in the program.     Per her request, will follow up in 2 weeks to conduct dietary and physical activity background information.           There are no preventive care reminders to display for this patient.    Reviewed the importance of self-monitoring, medication adherence, and that the health  can be used as a resource for lifestyle modifications to help reduce or maintain a healthy lifestyle.    Sent link to [facility] webpages and my contact information via [employer] for future questions. Follow up scheduled.         Screenings    SDOH

## 2020-05-19 RX ORDER — PANTOPRAZOLE SODIUM 40 MG/1
TABLET, DELAYED RELEASE ORAL
Qty: 90 TABLET | Refills: 3 | Status: SHIPPED | OUTPATIENT
Start: 2020-05-19 | End: 2020-11-09

## 2020-05-21 ENCOUNTER — PATIENT OUTREACH (OUTPATIENT)
Dept: OTHER | Facility: OTHER | Age: 55
End: 2020-05-21

## 2020-05-21 ENCOUNTER — PATIENT MESSAGE (OUTPATIENT)
Dept: ENDOCRINOLOGY | Facility: CLINIC | Age: 55
End: 2020-05-21

## 2020-05-21 DIAGNOSIS — Z79.4 TYPE 2 DIABETES MELLITUS WITHOUT COMPLICATION, WITH LONG-TERM CURRENT USE OF INSULIN: Primary | ICD-10-CM

## 2020-05-21 DIAGNOSIS — E11.9 TYPE 2 DIABETES MELLITUS WITHOUT COMPLICATION, WITH LONG-TERM CURRENT USE OF INSULIN: Primary | ICD-10-CM

## 2020-05-21 NOTE — PROGRESS NOTES
Wet you did Digital Medicine: Clinician Introduction    Mary Ann Vidales is a 55 y.o. female who is newly enrolled in the Digital Medicine Clinic.    The following information was reviewed and updated:  Preferred pharmacy   [id number] - [place] - [address] AT [facility]  [address]  [place] [address]  Phone: [phone] Fax: [phone]    [place], [place] - [address]  [facility] [address]  Phone: [phone] Fax: [phone]      Patient prefers a 90 days supply.     Review of patient's allergies indicates:   Allergen Reactions    Captopril      Other reaction(s): cough    Lantus [insulin glargine] Swelling and Other (See Comments)     Burning and redness in the legs    Levemir [insulin detemir] Swelling and Other (See Comments)     Burning and redness of lower extremities    Lisinopril Other (See Comments)     Other reaction(s): cough    Other      Tape causes welps & hives    Antiseptic swabs Rash     Antiseptic wipes given prior to surgery caused severe rash.        I spoke with the patient today for her initial enrollment call.  She admits to history of hypoglycemia and says that she feels shaky when her blood sugars get to 80.  She has a history of CKD with her most recent eGFR 30.  Her endocrinologist has approved that she continue metformin.  She says that her last A1c is reflective of her being off metformin for a few months, but now she is back on it.    The history is provided by the patient. No  was used.     DIABETES  Instructed patient not to allow anyone else to use phone and monitoring device.  Explained that we expect patient to test their blood sugar as prescribed by their physician or Digital Medicine Clinician.      Reviewed general Self-Monitoring of Blood Glucose (SMBG) goals:  · FPG: <  mg/dL  · 1h PPG: < 180 mg/dL  · 2h PPG: < 160 mg/dL  · Bedtime: < 150  mg/dL    Patient has history of hypoglycemia.    Reviewed signs and symptoms of hypoglycemia (weakness, dizziness, hunger, shakiness, nausea, headache, heart palpitations, sweating, fatigue, anxiety, etc.).  Reviewed treatment of hypoglycemia (15/15 rule).      Reviewed signs or symptoms of hyperglycemia (headache, increased thirst, increased urination, fatigue, blurred vision, etc.).      Patient is on ace inhibitor or angiotensin II receptor blocker.   Patient is on statin.     Patient's A1C goals is less than or equal to 8. Patient's most recent A1C result is at or below goal. Lab Results     Component                Value               Date                     HGBA1C                   7.3 (H)             03/04/2020             Med Review complete.    Allergies reviewed.              INTERVENTION(S)  reviewed appropriate dose schedule, recommended diet modifications, recommended physical activity and encouragement/support    PLAN  patient verbalizes understanding, patient amenable to changes and additional monitoring needed    Last A1C 7.3%    No med changes today. F/u 4 week after her endocrine appt.      There are no preventive care reminders to display for this patient.    Current Medication Regimen:    Diabetes Medications             glimepiride (AMARYL) 2 MG tablet Take 2 tablets (4 mg total) by mouth before breakfast.    glimepiride (AMARYL) 4 MG tablet TAKE 1/2 TABLET BY MOUTH BEFORE BREAKFAST    metFORMIN (GLUCOPHAGE) 1000 MG tablet Take 1 tablet (1,000 mg total) by mouth 2 (two) times daily with meals.    semaglutide (OZEMPIC) 1 mg/dose (2 mg/1.5 mL) PnIj INJECT 1 MG INTO THE SKIN EVERY 7 DAYS.          Reviewed the importance of self-monitoring, medication adherence, and that the health  can be used as a resource for lifestyle modifications to help reduce or maintain a healthy lifestyle.    Sent link to [facility] webpages and my contact information via [employer] for future  questions. Follow up scheduled.         Screenings

## 2020-06-15 ENCOUNTER — PATIENT OUTREACH (OUTPATIENT)
Dept: OTHER | Facility: OTHER | Age: 55
End: 2020-06-15

## 2020-06-20 ENCOUNTER — PATIENT OUTREACH (OUTPATIENT)
Dept: ADMINISTRATIVE | Facility: OTHER | Age: 55
End: 2020-06-20

## 2020-06-24 ENCOUNTER — OFFICE VISIT (OUTPATIENT)
Dept: ENDOCRINOLOGY | Facility: CLINIC | Age: 55
End: 2020-06-24
Payer: MEDICAID

## 2020-06-24 DIAGNOSIS — E78.5 DYSLIPIDEMIA: ICD-10-CM

## 2020-06-24 DIAGNOSIS — N18.30 CKD (CHRONIC KIDNEY DISEASE) STAGE 3, GFR 30-59 ML/MIN: ICD-10-CM

## 2020-06-24 DIAGNOSIS — R25.1 TREMORS OF NERVOUS SYSTEM: ICD-10-CM

## 2020-06-24 DIAGNOSIS — I10 HTN (HYPERTENSION), BENIGN: ICD-10-CM

## 2020-06-24 PROCEDURE — 99214 PR OFFICE/OUTPT VISIT, EST, LEVL IV, 30-39 MIN: ICD-10-PCS | Mod: 95,,, | Performed by: NURSE PRACTITIONER

## 2020-06-24 PROCEDURE — 99214 OFFICE O/P EST MOD 30 MIN: CPT | Mod: 95,,, | Performed by: NURSE PRACTITIONER

## 2020-06-24 RX ORDER — HYDROCHLOROTHIAZIDE 25 MG/1
TABLET ORAL
Qty: 30 TABLET | Refills: 3 | Status: SHIPPED | OUTPATIENT
Start: 2020-06-24 | End: 2020-09-17

## 2020-06-24 RX ORDER — PROPRANOLOL HYDROCHLORIDE 20 MG/1
20 TABLET ORAL 2 TIMES DAILY
Qty: 180 TABLET | Refills: 3 | Status: SHIPPED | OUTPATIENT
Start: 2020-06-24 | End: 2020-11-03 | Stop reason: SDUPTHER

## 2020-06-24 NOTE — PROGRESS NOTES
Subjective:      Patient ID: Mary Ann Vidales is a 55 y.o. female.    Chief Complaint:  Diabetes f/u    The patient location is: home   The chief complaint leading to consultation is: routine f/u Type 1 DM     Visit type: audiovisual    Face to Face time with patient: 1:55 to 2:22   27  minutes of total time spent on the encounter, which includes face to face time and non-face to face time preparing to see the patient (eg, review of tests), Obtaining and/or reviewing separately obtained history, Documenting clinical information in the electronic or other health record, Independently interpreting results (not separately reported) and communicating results to the patient/family/caregiver, or Care coordination (not separately reported).         Each patient to whom he or she provides medical services by telemedicine is:  (1) informed of the relationship between the physician and patient and the respective role of any other health care provider with respect to management of the patient; and (2) notified that he or she may decline to receive medical services by telemedicine and may withdraw from such care at any time.    Notes:   History of Present Illness  Pt is a 54y/o female with TYPE 2 DM since approx 2000, and chronic conditions pending review including HTN, neuropathy. Dyslipidemia, morbid obesity.      Interim Events: Had cardiac stent per Dr. Francis In July 2019 at [facility].  I had previously stopped metformin previously r/t creatine of 1.8, it is unchanged, but A1C was up significantly and pt requestedto add back.  Current literature suggestive it is OK to use at higher than 1.5 creatine levels. Last visit we decreased glimipiride to 2mg--but glucoses elevated so pt increased back to 4 mg.   Continues to take 4 mg glimipiride and Ozempic. 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     Only checking glucoses several times a week, so no patterns to  identify. But states usually in 200  range.   Did not see neuro as I advised- states propanolol for hand tremors very beneficial, wants to increase dose.     .       Only checks glucoses sporadically.      Diabetes Flow Sheet:  Diabetes Medications:  Metformin 1000 BID,  glimipiride 4 mg, ozempci 1 mg   Bydureon d/c r/t hacking cough   Diabetes Complications:peripheral neuropathy   Aspirin:   Statin: none   ACE/ARB:losartan hctz 100/25 qd.   Last Urine Microalbumin:   Last Eye exam: 10/16Last Diabetic Education:   Glucometer--Accucheck Verio     Review of Systems   Constitutional: Negative for activity change and fatigue.   HENT: Negative for hearing loss and trouble swallowing.    Eyes: Negative for visual disturbance.   Respiratory: Negative for cough and shortness of breath.    Cardiovascular: Positive for leg swelling. Negative for chest pain and palpitations.   Gastrointestinal: Negative for constipation and diarrhea.   Genitourinary: Negative for difficulty urinating and frequency.   Musculoskeletal: Positive for arthralgias. Negative for myalgias.        Knees   Skin: Negative for rash and wound.   Neurological: Positive for tremors and numbness (feet worsened, hands). Negative for weakness and light-headedness.   Hematological: Does not bruise/bleed easily.   Psychiatric/Behavioral: Negative for agitation and decreased concentration. The patient is not nervous/anxious.        Objective:   Physical Exam   Constitutional: She appears well-developed and well-nourished.   Psychiatric: She has a normal mood and affect. Her behavior is normal. Judgment and thought content normal.           Lab Review:   Hemoglobin A1C   Date Value Ref Range Status   03/04/2020 7.3 (H) 0.0 - 5.6 % Final     Comment:     Reference Interval:  5.0 - 5.6 Normal   5.7 - 6.4 High Risk   > 6.5 Diabetic    Hgb A1c results are standardized based on the (NGSP) National   Glycohemoglobin Standardization Program.    Hemoglobin A1C levels are  related to mean serum/plasma glucose   during the preceding 2-3 months.        11/07/2019 6.0 (H) 4.0 - 5.6 % Final     Comment:     ADA Screening Guidelines:  5.7-6.4%  Consistent with prediabetes  >or=6.5%  Consistent with diabetes  High levels of fetal hemoglobin interfere with the HbA1C  assay. Heterozygous hemoglobin variants (HbS, HgC, etc)do  not significantly interfere with this assay.   However, presence of multiple variants may affect accuracy.     08/09/2019 6.2 (H) 4.0 - 5.6 % Final     Comment:     ADA Screening Guidelines:  5.7-6.4%  Consistent with prediabetes  >or=6.5%  Consistent with diabetes  High levels of fetal hemoglobin interfere with the HbA1C  assay. Heterozygous hemoglobin variants (HbS, HgC, etc)do  not significantly interfere with this assay.   However, presence of multiple variants may affect accuracy.       Lab Results   Component Value Date    LDLCALC 66.2 03/04/2020     Lab Results   Component Value Date    TSH 2.030 03/04/2020       Chemistry        Component Value Date/Time     03/04/2020 0851    K 3.7 03/04/2020 0851     03/04/2020 0851    CO2 28 03/04/2020 0851    BUN 42 (H) 03/04/2020 0851    CREATININE 1.87 (H) 03/04/2020 0851     (H) 03/04/2020 0851        Component Value Date/Time    CALCIUM 9.0 03/04/2020 0851    ALKPHOS 95 03/04/2020 0851    AST 20 03/04/2020 0851    ALT 15 03/04/2020 0851    BILITOT 0.8 03/04/2020 0851            Assessment:       1. Diabetes mellitus with neurological manifestations, uncontrolled  hydroCHLOROthiazide (HYDRODIURIL) 25 MG tablet  Chronic-uncontrolled-see plan     Hemoglobin A1C   2. CKD (chronic kidney disease) stage 3, GFR 30-59 ml/min  Chronic-stable-optimize bp, dm    3. HTN (hypertension), benign  crhronic-controlled-monitro   4. Dyslipidemia  Chronic-stable-no change    5. Tremors of nervous system  propranoloL (INDERAL) 20 MG tablet  Chronic-improved, f/u neuro          Plan:   Continue   glimipiride  4mg  mg qd.  ----  Continue ozempic---  Continue metformin 1000 bid  Increaes  propanolol 10 mg  BID. To 20 bid  Stop losartan with increased propanolol until BP is checked.   Must increase SBGM minimum bid and alternated--can send via portal.   Pt counseled only thing left to add is a basal insulin     Pt must appt with neurology     ORDERS 06/24/2020   [facility]--nonft  cmp, a1c.  B12      3 mo with  A1c,

## 2020-06-29 ENCOUNTER — PATIENT MESSAGE (OUTPATIENT)
Dept: ENDOCRINOLOGY | Facility: CLINIC | Age: 55
End: 2020-06-29

## 2020-07-02 ENCOUNTER — PATIENT OUTREACH (OUTPATIENT)
Dept: ADMINISTRATIVE | Facility: HOSPITAL | Age: 55
End: 2020-07-02

## 2020-07-02 DIAGNOSIS — Z12.11 COLON CANCER SCREENING: Primary | ICD-10-CM

## 2020-07-08 RX ORDER — HYDROCODONE BITARTRATE AND ACETAMINOPHEN 7.5; 325 MG/1; MG/1
1 TABLET ORAL EVERY 6 HOURS PRN
Qty: 90 TABLET | Refills: 0 | Status: SHIPPED | OUTPATIENT
Start: 2020-07-08 | End: 2020-09-22 | Stop reason: SDUPTHER

## 2020-07-08 NOTE — TELEPHONE ENCOUNTER
She hasn't seen you since April 2019.  I asked her to book a virtual appt soon.  Can you give her this last refill till then?    Thanks hamlet

## 2020-07-10 ENCOUNTER — PATIENT OUTREACH (OUTPATIENT)
Dept: OTHER | Facility: OTHER | Age: 55
End: 2020-07-10

## 2020-07-10 DIAGNOSIS — Z79.4 TYPE 2 DIABETES MELLITUS WITHOUT COMPLICATION, WITH LONG-TERM CURRENT USE OF INSULIN: Primary | ICD-10-CM

## 2020-07-10 DIAGNOSIS — E11.9 TYPE 2 DIABETES MELLITUS WITHOUT COMPLICATION, WITH LONG-TERM CURRENT USE OF INSULIN: Primary | ICD-10-CM

## 2020-07-14 ENCOUNTER — OFFICE VISIT (OUTPATIENT)
Dept: INTERNAL MEDICINE | Facility: CLINIC | Age: 55
End: 2020-07-14
Payer: MEDICAID

## 2020-07-14 DIAGNOSIS — Z00.00 ANNUAL VISIT FOR GENERAL ADULT MEDICAL EXAMINATION WITHOUT ABNORMAL FINDINGS: Primary | ICD-10-CM

## 2020-07-14 DIAGNOSIS — I10 ESSENTIAL HYPERTENSION: ICD-10-CM

## 2020-07-14 DIAGNOSIS — E66.01 MORBID OBESITY, UNSPECIFIED OBESITY TYPE: ICD-10-CM

## 2020-07-14 DIAGNOSIS — N18.30 CKD (CHRONIC KIDNEY DISEASE), STAGE III: ICD-10-CM

## 2020-07-14 DIAGNOSIS — G47.33 OSA (OBSTRUCTIVE SLEEP APNEA): ICD-10-CM

## 2020-07-14 DIAGNOSIS — E78.5 DYSLIPIDEMIA: ICD-10-CM

## 2020-07-14 DIAGNOSIS — E11.9 DIABETES MELLITUS WITHOUT COMPLICATION: ICD-10-CM

## 2020-07-14 DIAGNOSIS — R25.1 TREMORS OF NERVOUS SYSTEM: ICD-10-CM

## 2020-07-14 PROCEDURE — 99214 PR OFFICE/OUTPT VISIT, EST, LEVL IV, 30-39 MIN: ICD-10-PCS | Mod: 95,,, | Performed by: INTERNAL MEDICINE

## 2020-07-14 PROCEDURE — 99214 OFFICE O/P EST MOD 30 MIN: CPT | Mod: 95,,, | Performed by: INTERNAL MEDICINE

## 2020-07-14 NOTE — PROGRESS NOTES
Subjective:       Patient ID: Mary Ann Vidales is a 55 y.o. female.    Chief Complaint: No chief complaint on file.  Dictation #1  MRN:[id number]  CSN:[id number]  Dict   HPI  Review of Systems   Constitutional: Positive for unexpected weight change.   HENT: Negative for nasal congestion, hearing loss, sinus pressure/congestion, sneezing, sore throat and voice change.    Eyes: Negative for visual disturbance.   Respiratory: Negative for cough, chest tightness and shortness of breath.    Cardiovascular: Negative.  Negative for chest pain, palpitations and leg swelling.   Gastrointestinal: Negative.    Genitourinary: Negative for difficulty urinating, dysuria, flank pain, frequency, hematuria, menstrual problem, pelvic pain, urgency, vaginal bleeding and vaginal discharge.   Musculoskeletal: Negative.  Negative for neck pain and neck stiffness.   Integumentary:  Negative.   Neurological: Negative.  Negative for dizziness, seizures, light-headedness, numbness and headaches.   Psychiatric/Behavioral: Negative for agitation, behavioral problems, confusion and sleep disturbance. The patient is not nervous/anxious.        The patient location is: work  The chief complaint leading to consultation is: annual review    Visit type: audiovisual    Face to Face time with patient: 25 min  30 minutes of total time spent on the encounter, which includes face to face time and non-face to face time preparing to see the patient (eg, review of tests), Obtaining and/or reviewing separately obtained history, Documenting clinical information in the electronic or other health record, Independently interpreting results (not separately reported) and communicating results to the patient/family/caregiver, or Care coordination (not separately reported).         Each patient to whom he or she provides medical services by telemedicine is:  (1) informed of the relationship between the physician and patient and the respective role of any other  health care provider with respect to management of the patient; and (2) notified that he or she may decline to receive medical services by telemedicine and may withdraw from such care at any time.    Notes:  55-year-old white female patient mine calling for when in essence is a annual review.  Patient has multiple medical problems and wanted to discuss some of these.  Patient is a diabetic her blood sugars are running between 175 and 225.  Patient has seen in the past endocrine for her diabetes.  A recent visit her endocrine doctor increased her Inderal because of increasing problems with her tremor.  In addition to she was taken off of losartan and left on the hydrochlorothiazide.  That dose was unchanged.  Patient has not been checking her blood pressure but says she is going to do that and keep a record.    Patient has no real problem she is complaining of.  She has had weight loss which is gone from her peak of 311 down to 261 most recently.  Patient is working at [employer] and wears a mask at work.  She lives with her partner was not working.  Her both practicing this COVID behavior.  She has no COVID related symptoms.  Patient has had blood work done but last time was March and was some primarily for following her diabetes.    Physical exam:  Not done since this is a virtual visit    Impression:  1.  Diabetes-not well controlled and on leaving this to she in her endocrine doctor and encouraged her to contact Endocrine to work on getting this better controlled  2.  Hypertension-on a reduced dose of the medication for this and needs to check her pressure and let me know what is  3.  Obstructive sleep apnea on CPAP with good control  4.  Mild renal impairment associated with her diabetes  5.  Obesity-much improved with the loss of 50 lb  6.  Tremor better controlled with propranolol and improved control with this recent increase in dose    Plan:  1.  I have order annual labs though be done of 2-3 months  from now followed by another visit from me 2.  No additional workup was ordered and she no refills of medication.  Objective:        Assessment:       1. Annual visit for general adult medical examination without abnormal findings    2. TINA (obstructive sleep apnea)    3. Diabetes mellitus without complication    4. CKD (chronic kidney disease), stage III    5. Essential hypertension    6. Morbid obesity, unspecified obesity type    7. Tremors of nervous system    8. Dyslipidemia        Plan:

## 2020-08-10 ENCOUNTER — TELEPHONE (OUTPATIENT)
Dept: NEPHROLOGY | Facility: CLINIC | Age: 55
End: 2020-08-10

## 2020-08-10 NOTE — TELEPHONE ENCOUNTER
----- Message from Bobby Fisher sent at 8/10/2020  9:40 AM CDT -----  Type: Needs Medical Advice    Who Called:  Patient  Best Call Back Number: [phone]  Additional Information: Patient would like to schedule an ER follow up. Both epic and [facility] will not pull any availability. Patient would prefer virtual. Please call to advise. Thanks!

## 2020-08-10 NOTE — TELEPHONE ENCOUNTER
Patient was in ER for Chest pain was advised to see Peralta for f/u.      Please review labs and advise when she will need to be seen.  She is having labs again tomorrow that include:    T4, free [XDS435]  CBC auto differential [CHY9198]  Lipid Panel [LAB18]  Comprehensive metabolic panel [LAB17]  TSH [GOI857]  Hemoglobin A1C [LAB90]

## 2020-08-11 ENCOUNTER — LAB VISIT (OUTPATIENT)
Dept: LAB | Facility: HOSPITAL | Age: 55
End: 2020-08-11
Attending: INTERNAL MEDICINE
Payer: MEDICAID

## 2020-08-11 DIAGNOSIS — N18.30 CKD (CHRONIC KIDNEY DISEASE), STAGE III: ICD-10-CM

## 2020-08-11 DIAGNOSIS — E11.9 DIABETES MELLITUS WITHOUT COMPLICATION: ICD-10-CM

## 2020-08-11 DIAGNOSIS — E78.5 DYSLIPIDEMIA: ICD-10-CM

## 2020-08-11 DIAGNOSIS — Z00.00 ANNUAL VISIT FOR GENERAL ADULT MEDICAL EXAMINATION WITHOUT ABNORMAL FINDINGS: ICD-10-CM

## 2020-08-11 DIAGNOSIS — I10 ESSENTIAL HYPERTENSION: ICD-10-CM

## 2020-08-11 LAB
ALBUMIN/CREAT UR: 5.4 UG/MG (ref 0–30)
BILIRUB UR QL STRIP: NEGATIVE
CLARITY UR REFRACT.AUTO: ABNORMAL
COLOR UR AUTO: YELLOW
CREAT UR-MCNC: 92 MG/DL (ref 15–325)
GLUCOSE UR QL STRIP: NEGATIVE
HGB UR QL STRIP: NEGATIVE
KETONES UR QL STRIP: NEGATIVE
LEUKOCYTE ESTERASE UR QL STRIP: NEGATIVE
MICROALBUMIN UR DL<=1MG/L-MCNC: 5 UG/ML
NITRITE UR QL STRIP: NEGATIVE
PH UR STRIP: 7 [PH] (ref 5–8)
PROT UR QL STRIP: NEGATIVE
SP GR UR STRIP: 1.01 (ref 1–1.03)
URN SPEC COLLECT METH UR: ABNORMAL

## 2020-08-11 PROCEDURE — 82043 UR ALBUMIN QUANTITATIVE: CPT

## 2020-08-11 PROCEDURE — 81003 URINALYSIS AUTO W/O SCOPE: CPT

## 2020-08-17 ENCOUNTER — PATIENT OUTREACH (OUTPATIENT)
Dept: ADMINISTRATIVE | Facility: OTHER | Age: 55
End: 2020-08-17

## 2020-08-17 NOTE — PROGRESS NOTES
Health Maintenance Due   Topic Date Due    Mammogram  12/10/2015    Shingles Vaccine (1 of 2) 02/21/2017    Colorectal Cancer Screening  06/30/2020     Updates were requested from care everywhere.  Chart was reviewed for overdue Proactive [facility] Encounters (PURA) topics (CRS, Breast Cancer Screening, Eye exam)  Health Maintenance has been updated.  LINKS immunization registry triggered.  Immunizations were reconciled.  Mammogram previously tasked to patient.

## 2020-08-18 ENCOUNTER — OFFICE VISIT (OUTPATIENT)
Dept: NEPHROLOGY | Facility: CLINIC | Age: 55
End: 2020-08-18
Payer: MEDICAID

## 2020-08-18 DIAGNOSIS — N28.1 ACQUIRED CYST OF KIDNEY: ICD-10-CM

## 2020-08-18 DIAGNOSIS — N39.3 STRESS INCONTINENCE: ICD-10-CM

## 2020-08-18 DIAGNOSIS — N18.30 CHRONIC KIDNEY DISEASE, STAGE III (MODERATE): Primary | ICD-10-CM

## 2020-08-18 PROCEDURE — 99214 OFFICE O/P EST MOD 30 MIN: CPT | Mod: 95,,, | Performed by: INTERNAL MEDICINE

## 2020-08-18 PROCEDURE — 99214 PR OFFICE/OUTPT VISIT, EST, LEVL IV, 30-39 MIN: ICD-10-PCS | Mod: 95,,, | Performed by: INTERNAL MEDICINE

## 2020-08-18 NOTE — PROGRESS NOTES
Subjective:       Patient ID: Mary Ann Vidales is a 55 y.o. White female who presents for return patient evaluation for chronic renal failure.    The patient location is:  Patient Home   The chief complaint leading to consultation is: ckd  Visit type: Virtual visit with synchronous audio and video  Total time spent with patient: 10 minutes  Each patient to whom he or she provides medical services by telemedicine is:  (1) informed of the relationship between the physician and patient and the respective role of any other health care provider with respect to management of the patient; and (2) notified that he or she may decline to receive medical services by telemedicine and may withdraw from such care at any time.     She has no uremic symptoms and is in her usual state of health.  There have been no recent illnesses, hospitalizations or procedures.  She was temporarily taken off of her blood pressure medication and placed back on it.  She is still taking NSAIDs for her arthritis.      Review of Systems   Constitutional: Negative for appetite change, chills and fever.   Eyes: Negative for visual disturbance.   Respiratory: Negative for cough and shortness of breath.    Cardiovascular: Negative for chest pain and leg swelling.   Gastrointestinal: Negative for diarrhea, nausea and vomiting.   Genitourinary: Positive for decreased urine volume and difficulty urinating. Negative for dysuria and hematuria.   Musculoskeletal: Positive for arthralgias (knees) and back pain. Negative for myalgias.   Skin: Negative for rash.   Neurological: Negative for headaches.   Hematological: Bruises/bleeds easily.   Psychiatric/Behavioral: Negative for sleep disturbance.       The past medical, family and social histories were reviewed for this encounter.     [facility] 12/20/2013     Objective:      Physical Exam  Vitals signs reviewed.   Constitutional:       General: She is not in acute distress.     Appearance: She is well-developed.    HENT:      Head: Normocephalic and atraumatic.   Eyes:      Conjunctiva/sclera: Conjunctivae normal.   Neck:      Musculoskeletal: Neck supple.      Vascular: No JVD.   Cardiovascular:      Rate and Rhythm: Normal rate and regular rhythm.      Heart sounds: Normal heart sounds. No murmur. No friction rub. No gallop.    Pulmonary:      Effort: Pulmonary effort is normal. No respiratory distress.      Breath sounds: Normal breath sounds. No wheezing or rales.   Abdominal:      General: Bowel sounds are normal. There is no distension.      Palpations: Abdomen is soft.      Tenderness: There is no abdominal tenderness.   Skin:     General: Skin is warm and dry.      Findings: No rash.   Neurological:      Mental Status: She is alert and oriented to person, place, and time.         Assessment:       1. Chronic kidney disease, stage III (moderate)    2. Acquired cyst of kidney    3. Diabetes mellitus with neurological manifestations, uncontrolled    4. Stress incontinence        Plan:   Return to clinic in 3 months.  Labs for next visit include rp, upc, pth, renal US.  RP in 1 month.  Baseline creatinine is 1.0 since 2005.  She has then been 1.6-1.8 since 2015.  PTH is 76 with a calcium of 10.3.  I do not know why she went from a creatinine of 1.0 in 2015 to 1.5 in 2017 and 1.8 in 2018 with bland urine sediment and a negative renal US.  There appears to be no other precipitants although AIN is also a possibility.  Her urine sediment appears bland thus I think it is less likely that she has an acute GN.  She does not wish to have a biopsy at this time and I am not pushing for one.  She has a gap from 10/15-7/17 where she seemed to bump her baseline.  Renal US showed R 11.1 cm, L 10.6 cm.  Please avoid or minimize all NSAIDS (ibuprofen, motrin, aleve, indocin, naprosyn) to minimize the risk to your kidneys.  Aspirin in a dose of 325 mg or less a day is likely the safest with regards to kidney function.  If you are able to  take aspirin and do not have any bleeding problems or ulcers, this may be your best therapy.  Alternatively, acetaminophen (Tylenol) is the safer than NSAIDs with regards to kidney function.  I would ask you take this as directed on the bottle.  It is best to stay under 2 grams a day (4-500 mg tablets a day maximum).  She likely needs to come off of the metformin.

## 2020-08-20 ENCOUNTER — TELEPHONE (OUTPATIENT)
Dept: INTERNAL MEDICINE | Facility: CLINIC | Age: 55
End: 2020-08-20

## 2020-08-20 DIAGNOSIS — N18.30 CKD (CHRONIC KIDNEY DISEASE), STAGE III: ICD-10-CM

## 2020-08-20 DIAGNOSIS — I10 ESSENTIAL HYPERTENSION: ICD-10-CM

## 2020-08-20 DIAGNOSIS — E11.9 DIABETES MELLITUS WITHOUT COMPLICATION: Primary | ICD-10-CM

## 2020-08-20 DIAGNOSIS — E11.9 TYPE 2 DIABETES MELLITUS WITHOUT COMPLICATION, WITH LONG-TERM CURRENT USE OF INSULIN: ICD-10-CM

## 2020-08-20 DIAGNOSIS — Z79.4 TYPE 2 DIABETES MELLITUS WITHOUT COMPLICATION, WITH LONG-TERM CURRENT USE OF INSULIN: ICD-10-CM

## 2020-08-20 NOTE — TELEPHONE ENCOUNTER
She has creatinine that is high and not coming down, so push fluids and will repeat in 3 mo as ordered

## 2020-08-21 RX ORDER — SEMAGLUTIDE 1.34 MG/ML
INJECTION, SOLUTION SUBCUTANEOUS
Qty: 6 SYRINGE | Refills: 3 | Status: SHIPPED | OUTPATIENT
Start: 2020-08-21 | End: 2021-09-02

## 2020-08-27 NOTE — PROGRESS NOTES
Digital Medicine: Health  Follow-Up    The history is provided by the patient.                   Additional Follow-up details: Patient states she has not been taking readings because she was told that she could not participate in the program if she see's an endocrinologist. Informed patient that she can still be in the program.    Patient states she's pleased with her BG readings, but when I asked her if she has been monitoring with another glucometer she said she wasn't.     Patient's a1c has gone down to 6.6 from 7.3, patient was very pleased with this reduction.     Patient states she will resume taking readings.         Lifestyle    Additional monitoring needed.  Provided patient education.       Patient verbalizes understanding.      Explained the importance of self-monitoring and medication adherence. Encouraged the patient to communicate with their health  for lifestyle modifications to help improve or maintain a healthy lifestyle.            There are no preventive care reminders to display for this patient.      Last 6 Patient Entered Readings                                          Most Recent A1c: 6.6% on 8/11/2020  (Goal: 7%)     Recent Readings 7/25/2020 7/13/2020 6/29/2020 6/27/2020 6/26/2020    Blood Glucose (mg/dL) 156 240 104 162 299

## 2020-09-11 ENCOUNTER — TELEPHONE (OUTPATIENT)
Dept: INTERNAL MEDICINE | Facility: CLINIC | Age: 55
End: 2020-09-11

## 2020-09-11 ENCOUNTER — OFFICE VISIT (OUTPATIENT)
Dept: INTERNAL MEDICINE | Facility: CLINIC | Age: 55
End: 2020-09-11
Payer: MEDICAID

## 2020-09-11 ENCOUNTER — CLINICAL SUPPORT (OUTPATIENT)
Dept: CARDIOLOGY | Facility: CLINIC | Age: 55
End: 2020-09-11
Attending: INTERNAL MEDICINE
Payer: MEDICAID

## 2020-09-11 VITALS
SYSTOLIC BLOOD PRESSURE: 92 MMHG | BODY MASS INDEX: 44.59 KG/M2 | DIASTOLIC BLOOD PRESSURE: 72 MMHG | OXYGEN SATURATION: 98 % | WEIGHT: 267.63 LBS | RESPIRATION RATE: 18 BRPM | HEART RATE: 86 BPM | HEIGHT: 65 IN | TEMPERATURE: 97 F

## 2020-09-11 DIAGNOSIS — R60.0 LEG EDEMA, LEFT: ICD-10-CM

## 2020-09-11 DIAGNOSIS — L03.115 CELLULITIS OF RIGHT LOWER EXTREMITY: Primary | ICD-10-CM

## 2020-09-11 PROCEDURE — 99999 PR PBB SHADOW E&M-EST. PATIENT-LVL V: ICD-10-PCS | Mod: PBBFAC,,, | Performed by: INTERNAL MEDICINE

## 2020-09-11 PROCEDURE — 93971 CV US DOPPLER VENOUS LEG LEFT (CUPID ONLY): ICD-10-PCS | Mod: 26,S$PBB,LT, | Performed by: INTERNAL MEDICINE

## 2020-09-11 PROCEDURE — 99214 PR OFFICE/OUTPT VISIT, EST, LEVL IV, 30-39 MIN: ICD-10-PCS | Mod: S$PBB,,, | Performed by: INTERNAL MEDICINE

## 2020-09-11 PROCEDURE — 99215 OFFICE O/P EST HI 40 MIN: CPT | Mod: PBBFAC,PO | Performed by: INTERNAL MEDICINE

## 2020-09-11 PROCEDURE — 93971 EXTREMITY STUDY: CPT | Mod: PBBFAC,PO,LT | Performed by: INTERNAL MEDICINE

## 2020-09-11 PROCEDURE — 99999 PR PBB SHADOW E&M-EST. PATIENT-LVL V: CPT | Mod: PBBFAC,,, | Performed by: INTERNAL MEDICINE

## 2020-09-11 PROCEDURE — 99214 OFFICE O/P EST MOD 30 MIN: CPT | Mod: S$PBB,,, | Performed by: INTERNAL MEDICINE

## 2020-09-11 RX ORDER — LOSARTAN POTASSIUM 50 MG/1
50 TABLET ORAL DAILY
COMMUNITY
Start: 2020-08-26

## 2020-09-11 RX ORDER — CLINDAMYCIN HYDROCHLORIDE 300 MG/1
300 CAPSULE ORAL 3 TIMES DAILY
Qty: 30 CAPSULE | Refills: 0 | Status: SHIPPED | OUTPATIENT
Start: 2020-09-11 | End: 2020-09-21

## 2020-09-11 NOTE — TELEPHONE ENCOUNTER
----- Message from Dina Aldana sent at 9/11/2020  9:01 AM CDT -----  Regarding: cellulitis  Contact: self [phone]  Pt would like to talk to hamlet in ref to setting up a appt or sending in a medication for the redness on her leg(cellulitis). Please call and advise. Thank you

## 2020-09-11 NOTE — PROGRESS NOTES
Subjective:       Patient ID: Mary Ann Vidales is a 55 y.o. female.    Chief Complaint: Recurrent Skin Infections (Lower left Leg; pt state burning and painful)    HPI   Pt here for evaluation of 1 day of worsening pain with swelling/erythema around her left lower leg ankle. No open wounds. No fevers/chills.   Review of Systems   Constitutional: Negative for activity change, appetite change, chills, diaphoresis, fatigue, fever and unexpected weight change.   HENT: Negative for postnasal drip, rhinorrhea, sinus pressure/congestion, sneezing, sore throat, trouble swallowing and voice change.    Respiratory: Negative for cough, shortness of breath and wheezing.    Cardiovascular: Positive for leg swelling. Negative for chest pain and palpitations.   Gastrointestinal: Negative for abdominal pain, blood in stool, constipation, diarrhea, nausea and vomiting.   Genitourinary: Negative for dysuria.   Musculoskeletal: Negative for arthralgias and myalgias.   Integumentary:  Positive for rash. Negative for wound.   Allergic/Immunologic: Negative for environmental allergies and food allergies.   Hematological: Negative for adenopathy. Does not bruise/bleed easily.         Objective:      Physical Exam  Constitutional:       General: She is not in acute distress.     Appearance: She is well-developed. She is not diaphoretic.   HENT:      Head: Normocephalic and atraumatic.      Right Ear: External ear normal.      Left Ear: External ear normal.      Nose: Nose normal.      Mouth/Throat:      Pharynx: No oropharyngeal exudate.   Eyes:      General: No scleral icterus.        Right eye: No discharge.         Left eye: No discharge.      Conjunctiva/sclera: Conjunctivae normal.      Pupils: Pupils are equal, round, and reactive to light.   Neck:      Musculoskeletal: Neck supple.      Vascular: No JVD.   Cardiovascular:      Rate and Rhythm: Normal rate and regular rhythm.      Heart sounds: Normal heart sounds.   Pulmonary:       Effort: Pulmonary effort is normal. No respiratory distress.      Breath sounds: Normal breath sounds. No wheezing or rales.   Musculoskeletal:      Left lower leg: Edema (1+) present.   Lymphadenopathy:      Cervical: No cervical adenopathy.   Skin:     General: Skin is warm and dry.      Coloration: Skin is not pale.      Findings: Erythema (increased warmth) and rash present.          Neurological:      Mental Status: She is alert and oriented to person, place, and time.         Assessment:       1. Cellulitis of right lower extremity    2. Leg edema, left        Plan:    1. Rx Clindamycin 300 mg TID x 10 days   2. Check US to r/o DVT    ED precautions discussed with pt

## 2020-09-11 NOTE — TELEPHONE ENCOUNTER
Annual and labs set up for January.  Sent remind me msg to enter orders later.    She had cellultiis in leg before and wanted rx.  I booked her to see katie for today.

## 2020-09-11 NOTE — PROGRESS NOTES
Left lower venous u/s complete. Paperwork,images,cupid,text and email sent to reading physician.     
Gets together: None     Attends Evangelical service: None     Active member of club or organization: None     Attends meetings of clubs or organizations: None     Relationship status: None    Intimate partner violence:     Fear of current or ex partner: None     Emotionally abused: None     Physically abused: None     Forced sexual activity: None   Other Topics Concern    None   Social History Narrative    None       Family History   Problem Relation Age of Onset    Cancer Father 46        presented with bowel obstruction          Review of Systems   Constitutional: Negative for activity change, appetite change, fatigue and fever. HENT: Negative for congestion, ear pain, hearing loss, nosebleeds, rhinorrhea, sinus pressure and sore throat. Eyes: Negative for pain, redness, itching and visual disturbance. Respiratory: Negative for apnea, cough, chest tightness, shortness of breath and wheezing. Cardiovascular: Negative for chest pain, palpitations and leg swelling. Gastrointestinal: Negative for abdominal pain, blood in stool, constipation, diarrhea, nausea and vomiting. Endocrine: Negative. Genitourinary: Negative for decreased urine volume, difficulty urinating, dysuria, frequency, hematuria and urgency. Musculoskeletal: Negative for arthralgias, back pain, gait problem, myalgias and neck pain. Skin: Negative for color change and rash. Allergic/Immunologic: Negative for environmental allergies and food allergies. Neurological: Negative for dizziness, weakness, light-headedness, numbness and headaches. Hematological: Negative for adenopathy. Does not bruise/bleed easily. Psychiatric/Behavioral: Negative for behavioral problems, dysphoric mood and sleep disturbance. The patient is not nervous/anxious and is not hyperactive. All other systems reviewed and are negative.         /82   Pulse 60   Temp 98.2 °F (36.8 °C)   Resp 18   Ht 5' 6\" (1.676 m)   Wt 156 lb (70.8 kg)

## 2020-09-11 NOTE — TELEPHONE ENCOUNTER
----- Message from Kj Clark DO sent at 9/11/2020  4:43 PM CDT -----  No blood clots seen in the leg

## 2020-09-17 ENCOUNTER — LAB VISIT (OUTPATIENT)
Dept: LAB | Facility: HOSPITAL | Age: 55
End: 2020-09-17
Attending: INTERNAL MEDICINE
Payer: MEDICAID

## 2020-09-17 ENCOUNTER — OFFICE VISIT (OUTPATIENT)
Dept: ORTHOPEDICS | Facility: CLINIC | Age: 55
End: 2020-09-17
Payer: MEDICAID

## 2020-09-17 ENCOUNTER — PATIENT OUTREACH (OUTPATIENT)
Dept: ADMINISTRATIVE | Facility: OTHER | Age: 55
End: 2020-09-17

## 2020-09-17 VITALS — HEIGHT: 65 IN | BODY MASS INDEX: 44.59 KG/M2 | WEIGHT: 267.63 LBS

## 2020-09-17 DIAGNOSIS — F32.A DEPRESSION, UNSPECIFIED DEPRESSION TYPE: ICD-10-CM

## 2020-09-17 DIAGNOSIS — N18.30 CHRONIC KIDNEY DISEASE, STAGE III (MODERATE): ICD-10-CM

## 2020-09-17 DIAGNOSIS — N18.30 CKD (CHRONIC KIDNEY DISEASE) STAGE 3, GFR 30-59 ML/MIN: ICD-10-CM

## 2020-09-17 DIAGNOSIS — F41.9 ANXIETY: ICD-10-CM

## 2020-09-17 DIAGNOSIS — M17.12 PRIMARY OSTEOARTHRITIS OF LEFT KNEE: ICD-10-CM

## 2020-09-17 DIAGNOSIS — M17.11 PRIMARY OSTEOARTHRITIS OF RIGHT KNEE: Primary | ICD-10-CM

## 2020-09-17 DIAGNOSIS — E11.9 TYPE 2 DIABETES MELLITUS WITHOUT COMPLICATION, WITH LONG-TERM CURRENT USE OF INSULIN: ICD-10-CM

## 2020-09-17 DIAGNOSIS — Z79.4 TYPE 2 DIABETES MELLITUS WITHOUT COMPLICATION, WITH LONG-TERM CURRENT USE OF INSULIN: ICD-10-CM

## 2020-09-17 LAB
ALBUMIN SERPL BCP-MCNC: 3.5 G/DL (ref 3.5–5.2)
ANION GAP SERPL CALC-SCNC: 10 MMOL/L (ref 8–16)
BUN SERPL-MCNC: 47 MG/DL (ref 6–20)
CALCIUM SERPL-MCNC: 9.2 MG/DL (ref 8.7–10.5)
CHLORIDE SERPL-SCNC: 105 MMOL/L (ref 95–110)
CO2 SERPL-SCNC: 28 MMOL/L (ref 23–29)
CREAT SERPL-MCNC: 2.2 MG/DL (ref 0.5–1.4)
EST. GFR  (AFRICAN AMERICAN): 28.7 ML/MIN/1.73 M^2
EST. GFR  (NON AFRICAN AMERICAN): 24.9 ML/MIN/1.73 M^2
GLUCOSE SERPL-MCNC: 79 MG/DL (ref 70–110)
PHOSPHATE SERPL-MCNC: 3.8 MG/DL (ref 2.7–4.5)
POTASSIUM SERPL-SCNC: 4.6 MMOL/L (ref 3.5–5.1)
SODIUM SERPL-SCNC: 143 MMOL/L (ref 136–145)

## 2020-09-17 PROCEDURE — 36415 COLL VENOUS BLD VENIPUNCTURE: CPT | Mod: PO

## 2020-09-17 PROCEDURE — 20610 DRAIN/INJ JOINT/BURSA W/O US: CPT | Mod: 50,PBBFAC,PN | Performed by: ORTHOPAEDIC SURGERY

## 2020-09-17 PROCEDURE — 80069 RENAL FUNCTION PANEL: CPT

## 2020-09-17 PROCEDURE — 99214 OFFICE O/P EST MOD 30 MIN: CPT | Mod: PBBFAC,PN,25 | Performed by: ORTHOPAEDIC SURGERY

## 2020-09-17 PROCEDURE — 99999 PR PBB SHADOW E&M-EST. PATIENT-LVL IV: ICD-10-PCS | Mod: PBBFAC,,, | Performed by: ORTHOPAEDIC SURGERY

## 2020-09-17 PROCEDURE — 99214 OFFICE O/P EST MOD 30 MIN: CPT | Mod: 25,S$PBB,, | Performed by: ORTHOPAEDIC SURGERY

## 2020-09-17 PROCEDURE — 20610 LARGE JOINT ASPIRATION/INJECTION: BILATERAL KNEE: ICD-10-PCS | Mod: 50,S$PBB,, | Performed by: ORTHOPAEDIC SURGERY

## 2020-09-17 PROCEDURE — 99999 PR PBB SHADOW E&M-EST. PATIENT-LVL IV: CPT | Mod: PBBFAC,,, | Performed by: ORTHOPAEDIC SURGERY

## 2020-09-17 PROCEDURE — 99214 PR OFFICE/OUTPT VISIT, EST, LEVL IV, 30-39 MIN: ICD-10-PCS | Mod: 25,S$PBB,, | Performed by: ORTHOPAEDIC SURGERY

## 2020-09-17 RX ORDER — TRIAMCINOLONE ACETONIDE 40 MG/ML
40 INJECTION, SUSPENSION INTRA-ARTICULAR; INTRAMUSCULAR
Status: DISCONTINUED | OUTPATIENT
Start: 2020-09-17 | End: 2020-09-17 | Stop reason: HOSPADM

## 2020-09-17 RX ADMIN — TRIAMCINOLONE ACETONIDE 40 MG: 40 INJECTION, SUSPENSION INTRA-ARTICULAR; INTRAMUSCULAR at 01:09

## 2020-09-17 NOTE — PROCEDURES
Large Joint Aspiration/Injection: bilateral knee    Date/Time: 9/17/2020 1:15 PM  Performed by: Caleb Felton MD  Authorized by: Caleb Felton MD     Consent Done?:  Yes (Verbal)  Timeout: prior to procedure the correct patient, procedure, and site was verified    Prep: patient was prepped and draped in usual sterile fashion      Details:  Needle Size:  21 G  Approach:  Anterolateral  Location:  Knee  Laterality:  Bilateral  Site:  Bilateral knee  Medications (Right):  40 mg triamcinolone acetonide 40 mg/mL  Medications (Left):  40 mg triamcinolone acetonide 40 mg/mL  Patient tolerance:  Patient tolerated the procedure well with no immediate complications

## 2020-09-17 NOTE — PROGRESS NOTES
55 y.o. year old presents to the clinic today with recurring pain in the bilateral knee.  Patient has had Kenlaog injections in the past and responded well.  Last injection was 24 months ago. Patient is requesting injection today into bilateral knee    Exam shows tenderness at the joint line without signs of infection or instability    X-rays show arthritic changes    Assessment:  bilateral knee arthrosis    Plan:  Kenlaog into the bilateral knee.  Encourage strengthening over time.  Followup as needed.    Further History  Aching pain  Worse with activity  Relieved with rest  No other associated symptoms  No other radiation    Further Exam  Alert and oriented  Pleasant  Contralateral limb has appropriate range of motion for age and condition  Contralateral limb has appropriate strength for age and condition  Contralateral limb has appropriate stability  for age and condition  No adenopathy  Pulses are appropriate for current condition  Skin is intact        Chief Complaint    Chief Complaint   Patient presents with    Left Knee - Pain    Right Knee - Pain       HPI  Mary Ann Vidales is a 55 y.o.  female who presents with       Past Medical History  Past Medical History:   Diagnosis Date    Chronic asthma     Diabetes mellitus type II     Fibroids     Hypertension     Incontinence     Morbid obesity 11/3/2015    Neuropathy in diabetes     Obesity     TINA (obstructive sleep apnea)     uses CPAP    Panic attacks        Past Surgical History  Past Surgical History:   Procedure Laterality Date    CARDIAC CATHETERIZATION  07/05/2019    ECTOPIC PREGNANCY SURGERY      HERNIA REPAIR      HYSTERECTOMY      OOPHORECTOMY      TONSILLECTOMY         Medications  Current Outpatient Medications   Medication Sig    AFLURIA QD 2019-20,3YR UP,,PF, 60 mcg (15 mcg x 4)/0.5 mL Syrg TO BE ADMINISTERED BY PHARMACIST FOR IMMUNIZATION    ALPRAZolam (XANAX) 0.5 MG tablet TAKE 1 TABLET (0.5 MG TOTAL) BY MOUTH ONCE  DAILY.    ascorbic acid, vitamin C, (VITAMIN C) 250 MG tablet Take 250 mg by mouth once daily.    atorvastatin (LIPITOR) 10 MG tablet Take 1 tablet by mouth daily.    black cohosh 40 mg Tab Take by mouth.    blood sugar diagnostic Strp Pt to check glucoses TID    blood-glucose meter kit Pt to check glucoses Tid    clindamycin (CLEOCIN) 300 MG capsule Take 1 capsule (300 mg total) by mouth 3 (three) times daily. for 10 days    clopidogrel (PLAVIX) 75 mg tablet Take 75 mg by mouth once daily.    diclofenac sodium (VOLTAREN) 1 % Gel Apply 2 g topically 2 (two) times daily as needed.    FLUoxetine 40 MG capsule Take 1 capsule by mouth daily.    furosemide (LASIX) 20 MG tablet Take 1 tablet by mouth daily.    gabapentin (NEURONTIN) 800 MG tablet TAKE 2 TABLETS (1,600 MG TOTAL) BY MOUTH 2 (TWO) TIMES DAILY.    glimepiride (AMARYL) 2 MG tablet Take 2 tablets (4 mg total) by mouth before breakfast.    hydroCHLOROthiazide (HYDRODIURIL) 25 MG tablet 1 tablet a day, while NOT taking losartan hctz    HYDROcodone-acetaminophen (NORCO) 7.5-325 mg per tablet Take 1 tablet by mouth every 6 (six) hours as needed for Pain.    lancets (ONETOUCH ULTRASOFT LANCETS) Misc Use to check glucoses up to TID.    losartan (COZAAR) 50 MG tablet Take 50 mg by mouth once daily.    metFORMIN (GLUCOPHAGE) 1000 MG tablet Take 1 tablet (1,000 mg total) by mouth 2 (two) times daily with meals.    multivit with calcium,iron,min (MULTIPLE VITAMIN, WOMENS ORAL) Take by mouth once daily.     naproxen sodium (ANAPROX) 550 MG tablet Take 1 tablet by mouth twice daily with meals.    nitroGLYCERIN (NITROSTAT) 0.4 MG SL tablet PLACE 1 TABLET UNDER TONGUE EVERY 5 MINS, UP TO 3 DOSES AS NEEDED FOR CHEST PAIN    pantoprazole (PROTONIX) 40 MG tablet Take 1 tablet by mouth daily.    propranoloL (INDERAL) 20 MG tablet Take 1 tablet (20 mg total) by mouth 2 (two) times daily.    semaglutide (OZEMPIC) 1 mg/dose (2 mg/1.5 mL) PnIj INJECT 1 MG INTO  THE SKIN EVERY 7 DAYS. ICD-10-CM: E11.49     No current facility-administered medications for this visit.        Allergies  Review of patient's allergies indicates:   Allergen Reactions    Captopril      Other reaction(s): cough    Lantus [insulin glargine] Swelling and Other (See Comments)     Burning and redness in the legs    Levemir [insulin detemir] Swelling and Other (See Comments)     Burning and redness of lower extremities    Lisinopril Other (See Comments)     Other reaction(s): cough    Other      Tape causes welps & hives    Antiseptic swabs Rash     Antiseptic wipes given prior to surgery caused severe rash.        Family History  Family History   Problem Relation Age of Onset    Diabetes Mother     Heart disease Mother     COPD Mother     Heart failure Father     Breast cancer Neg Hx     Ovarian cancer Neg Hx        Social History  Social History     Socioeconomic History    Marital status:      Spouse name: Not on file    Number of children: Not on file    Years of education: Not on file    Highest education level: Not on file   Occupational History    Not on file   Social Needs    Financial resource strain: Not very hard    Food insecurity     Worry: Sometimes true     Inability: Sometimes true    Transportation needs     Medical: No     Non-medical: No   Tobacco Use    Smoking status: Never Smoker    Smokeless tobacco: Never Used   Substance and Sexual Activity    Alcohol use: No     Alcohol/week: 0.0 standard drinks     Frequency: Never     Drinks per session: Patient refused     Binge frequency: Never    Drug use: No    Sexual activity: Yes   Lifestyle    Physical activity     Days per week: 0 days     Minutes per session: 0 min    Stress: To some extent   Relationships    Social connections     Talks on phone: Twice a week     Gets together: Never     Attends Hindu service: Not on file     Active member of club or organization: No     Attends meetings of  clubs or organizations: Never     Relationship status: Living with partner   Other Topics Concern    Not on file   Social History Narrative     at car dealership. Lives with her partner, who smokes.               Review of Systems     Constitutional: Negative    HENT: Negative  Eyes: Negative  Respiratory: Negative  Cardiovascular: Negative  Musculoskeletal: HPI  Skin: Negative  Neurological: Negative  Hematological: Negative  Endocrine: Negative                 Physical Exam    There were no vitals filed for this visit.  Body mass index is 45.23 kg/m².  Physical Examination:     General appearance -  well appearing, and in no distress  Mental status - awake  Neck - supple  Chest -  symmetric air entry  Heart - normal rate   Abdomen - soft      Assessment     1. Primary osteoarthritis of right knee    2. Primary osteoarthritis of left knee    3. CKD (chronic kidney disease) stage 3, GFR 30-59 ml/min    4. Anxiety    5. Depression, unspecified depression type    6. Type 2 diabetes mellitus without complication, with long-term current use of insulin          Plan

## 2020-09-17 NOTE — PROGRESS NOTES
LINKS immunization registry updated  Care Everywhere updated  Health Maintenance updated  DIS/Chart reviewed for overdue Proactive [facility] Encounters (PURA) health maintenance testing (CRS, Breast Ca, Diabetic Eye Exam)   Orders entered:N/A

## 2020-09-18 ENCOUNTER — PATIENT MESSAGE (OUTPATIENT)
Dept: NEPHROLOGY | Facility: CLINIC | Age: 55
End: 2020-09-18

## 2020-09-21 ENCOUNTER — DOCUMENTATION ONLY (OUTPATIENT)
Dept: NEPHROLOGY | Facility: CLINIC | Age: 55
End: 2020-09-21

## 2020-09-21 DIAGNOSIS — N17.9 ACUTE RENAL FAILURE, UNSPECIFIED ACUTE RENAL FAILURE TYPE: ICD-10-CM

## 2020-09-21 DIAGNOSIS — N18.30 CHRONIC KIDNEY DISEASE, STAGE III (MODERATE): Primary | ICD-10-CM

## 2020-09-21 NOTE — PROGRESS NOTES
I spoke to her by phone.    She has progressive CKD with DM/HTN.    She will be at risk of CHEYANNE.    Please avoid or minimize all NSAIDS (ibuprofen, motrin, aleve, indocin, naprosyn) to minimize the risk to your kidneys.  Aspirin in a dose of 325 mg or less a day is likely the safest with regards to kidney function.  If you are able to take aspirin and do not have any bleeding problems or ulcers, this may be your best therapy.  Alternatively, acetaminophen (Tylenol) is the safer than NSAIDs with regards to kidney function.  I would ask you take this as directed on the bottle.  It is best to stay under 2 grams a day (4-500 mg tablets a day maximum).    She also will need to come off of the metformin.    We will need to coordinate with Dr. Francis with the timing of her C.

## 2020-09-23 RX ORDER — ALPRAZOLAM 0.5 MG/1
0.5 TABLET ORAL DAILY
Qty: 30 TABLET | Refills: 3 | Status: SHIPPED | OUTPATIENT
Start: 2020-09-23 | End: 2021-03-01 | Stop reason: SDUPTHER

## 2020-09-23 RX ORDER — HYDROCODONE BITARTRATE AND ACETAMINOPHEN 7.5; 325 MG/1; MG/1
1 TABLET ORAL EVERY 6 HOURS PRN
Qty: 90 TABLET | Refills: 0 | Status: SHIPPED | OUTPATIENT
Start: 2020-09-23 | End: 2020-12-05 | Stop reason: SDUPTHER

## 2020-09-23 RX ORDER — ALPRAZOLAM 0.5 MG/1
0.5 TABLET ORAL DAILY
Qty: 30 TABLET | Refills: 3 | Status: SHIPPED | OUTPATIENT
Start: 2020-09-23 | End: 2021-10-04 | Stop reason: SDUPTHER

## 2020-09-28 ENCOUNTER — LAB VISIT (OUTPATIENT)
Dept: LAB | Facility: HOSPITAL | Age: 55
End: 2020-09-28
Attending: INTERNAL MEDICINE
Payer: MEDICAID

## 2020-09-28 DIAGNOSIS — N18.30 CHRONIC KIDNEY DISEASE, STAGE III (MODERATE): ICD-10-CM

## 2020-09-28 DIAGNOSIS — N17.9 ACUTE RENAL FAILURE, UNSPECIFIED ACUTE RENAL FAILURE TYPE: ICD-10-CM

## 2020-09-28 LAB
ALBUMIN SERPL BCP-MCNC: 3.7 G/DL (ref 3.5–5.2)
ANION GAP SERPL CALC-SCNC: 11 MMOL/L (ref 8–16)
BUN SERPL-MCNC: 43 MG/DL (ref 6–20)
CALCIUM SERPL-MCNC: 9.5 MG/DL (ref 8.7–10.5)
CHLORIDE SERPL-SCNC: 103 MMOL/L (ref 95–110)
CO2 SERPL-SCNC: 27 MMOL/L (ref 23–29)
CREAT SERPL-MCNC: 2 MG/DL (ref 0.5–1.4)
EST. GFR  (AFRICAN AMERICAN): 32.2 ML/MIN/1.73 M^2
EST. GFR  (NON AFRICAN AMERICAN): 27.9 ML/MIN/1.73 M^2
GLUCOSE SERPL-MCNC: 162 MG/DL (ref 70–110)
PHOSPHATE SERPL-MCNC: 3.8 MG/DL (ref 2.7–4.5)
POTASSIUM SERPL-SCNC: 3.9 MMOL/L (ref 3.5–5.1)
SODIUM SERPL-SCNC: 141 MMOL/L (ref 136–145)

## 2020-09-28 PROCEDURE — 80069 RENAL FUNCTION PANEL: CPT

## 2020-09-28 PROCEDURE — 36415 COLL VENOUS BLD VENIPUNCTURE: CPT | Mod: PO

## 2020-09-29 ENCOUNTER — PATIENT MESSAGE (OUTPATIENT)
Dept: NEPHROLOGY | Facility: CLINIC | Age: 55
End: 2020-09-29

## 2020-09-30 ENCOUNTER — TELEPHONE (OUTPATIENT)
Dept: ENDOCRINOLOGY | Facility: CLINIC | Age: 55
End: 2020-09-30

## 2020-09-30 NOTE — TELEPHONE ENCOUNTER
----- Message from Marlene Piña DNP, NP sent at 9/22/2020  7:48 AM CDT -----  Stop the metformin  Check bg at least twice a day and send logs in 1 week for review or sooner if her numbers shoot up  ----- Message -----  From: Pooja Flores LPN  Sent: 9/22/2020   7:41 AM CDT  To: Marlene Piña DNP, NP    Please advise since Katherin is out.   ----- Message -----  From: Rosalio Peralta MD  Sent: 9/21/2020   5:23 PM CDT  To: [facility] Staff, #    Please contact her about what she needs to do with stopping the metformin.  Thanks!

## 2020-10-05 ENCOUNTER — PATIENT OUTREACH (OUTPATIENT)
Dept: OTHER | Facility: OTHER | Age: 55
End: 2020-10-05

## 2020-10-30 ENCOUNTER — PATIENT MESSAGE (OUTPATIENT)
Dept: ADMINISTRATIVE | Facility: HOSPITAL | Age: 55
End: 2020-10-30

## 2020-10-30 DIAGNOSIS — R93.1 ELEVATED CORONARY ARTERY CALCIUM SCORE: ICD-10-CM

## 2020-10-30 DIAGNOSIS — Z79.4 TYPE 2 DIABETES MELLITUS WITHOUT COMPLICATION, WITH LONG-TERM CURRENT USE OF INSULIN: ICD-10-CM

## 2020-10-30 DIAGNOSIS — I10 BENIGN HYPERTENSION: ICD-10-CM

## 2020-10-30 DIAGNOSIS — E66.01 MORBID OBESITY, UNSPECIFIED OBESITY TYPE: ICD-10-CM

## 2020-10-30 DIAGNOSIS — E11.9 TYPE 2 DIABETES MELLITUS WITHOUT COMPLICATION, WITH LONG-TERM CURRENT USE OF INSULIN: ICD-10-CM

## 2020-10-30 DIAGNOSIS — E78.5 DYSLIPIDEMIA: ICD-10-CM

## 2020-11-02 RX ORDER — FUROSEMIDE 20 MG/1
TABLET ORAL
Qty: 90 TABLET | Refills: 3 | Status: SHIPPED | OUTPATIENT
Start: 2020-11-02 | End: 2021-11-15

## 2020-11-02 RX ORDER — ATORVASTATIN CALCIUM 10 MG/1
TABLET, FILM COATED ORAL
Qty: 90 TABLET | Refills: 3 | Status: SHIPPED | OUTPATIENT
Start: 2020-11-02 | End: 2021-11-10

## 2020-11-02 RX ORDER — FLUOXETINE HYDROCHLORIDE 40 MG/1
CAPSULE ORAL
Qty: 90 CAPSULE | Refills: 3 | Status: SHIPPED | OUTPATIENT
Start: 2020-11-02 | End: 2021-11-15

## 2020-11-02 RX ORDER — NAPROXEN SODIUM 550 MG/1
TABLET ORAL
Qty: 180 TABLET | Refills: 3 | Status: SHIPPED | OUTPATIENT
Start: 2020-11-02 | End: 2020-11-05 | Stop reason: CLARIF

## 2020-11-03 DIAGNOSIS — R25.1 TREMORS OF NERVOUS SYSTEM: ICD-10-CM

## 2020-11-03 RX ORDER — PROPRANOLOL HYDROCHLORIDE 20 MG/1
20 TABLET ORAL 2 TIMES DAILY
Qty: 180 TABLET | Refills: 3 | Status: SHIPPED | OUTPATIENT
Start: 2020-11-03 | End: 2021-12-29

## 2020-11-06 PROBLEM — I25.10 CORONARY ARTERY DISEASE: Status: ACTIVE | Noted: 2020-11-06

## 2020-11-18 ENCOUNTER — LAB VISIT (OUTPATIENT)
Dept: LAB | Facility: HOSPITAL | Age: 55
End: 2020-11-18
Attending: INTERNAL MEDICINE
Payer: MEDICAID

## 2020-11-18 DIAGNOSIS — N18.30 CHRONIC KIDNEY DISEASE, STAGE III (MODERATE): ICD-10-CM

## 2020-11-18 LAB
ALBUMIN SERPL BCP-MCNC: 3.5 G/DL (ref 3.5–5.2)
ANION GAP SERPL CALC-SCNC: 9 MMOL/L (ref 8–16)
BUN SERPL-MCNC: 28 MG/DL (ref 6–20)
CALCIUM SERPL-MCNC: 9.3 MG/DL (ref 8.7–10.5)
CHLORIDE SERPL-SCNC: 102 MMOL/L (ref 95–110)
CO2 SERPL-SCNC: 30 MMOL/L (ref 23–29)
CREAT SERPL-MCNC: 1.8 MG/DL (ref 0.5–1.4)
EST. GFR  (AFRICAN AMERICAN): 36.5 ML/MIN/1.73 M^2
EST. GFR  (NON AFRICAN AMERICAN): 31.7 ML/MIN/1.73 M^2
GLUCOSE SERPL-MCNC: 162 MG/DL (ref 70–110)
PHOSPHATE SERPL-MCNC: 3.4 MG/DL (ref 2.7–4.5)
POTASSIUM SERPL-SCNC: 4.6 MMOL/L (ref 3.5–5.1)
PTH-INTACT SERPL-MCNC: 63 PG/ML (ref 9–77)
SODIUM SERPL-SCNC: 141 MMOL/L (ref 136–145)

## 2020-11-18 PROCEDURE — 36415 COLL VENOUS BLD VENIPUNCTURE: CPT | Mod: PO

## 2020-11-18 PROCEDURE — 80069 RENAL FUNCTION PANEL: CPT

## 2020-11-18 PROCEDURE — 83970 ASSAY OF PARATHORMONE: CPT

## 2020-12-07 RX ORDER — HYDROCODONE BITARTRATE AND ACETAMINOPHEN 7.5; 325 MG/1; MG/1
1 TABLET ORAL EVERY 6 HOURS PRN
Qty: 90 TABLET | Refills: 0 | Status: SHIPPED | OUTPATIENT
Start: 2020-12-07 | End: 2021-03-02 | Stop reason: SDUPTHER

## 2020-12-11 ENCOUNTER — PATIENT MESSAGE (OUTPATIENT)
Dept: OTHER | Facility: OTHER | Age: 55
End: 2020-12-11

## 2020-12-23 ENCOUNTER — PATIENT MESSAGE (OUTPATIENT)
Dept: INTERNAL MEDICINE | Facility: CLINIC | Age: 55
End: 2020-12-23

## 2020-12-24 ENCOUNTER — TELEPHONE (OUTPATIENT)
Dept: INTERNAL MEDICINE | Facility: CLINIC | Age: 55
End: 2020-12-24

## 2020-12-24 DIAGNOSIS — J01.40 ACUTE NON-RECURRENT PANSINUSITIS: Primary | ICD-10-CM

## 2020-12-24 RX ORDER — AZITHROMYCIN 250 MG/1
TABLET, FILM COATED ORAL
Qty: 6 TABLET | Refills: 0 | Status: SHIPPED | OUTPATIENT
Start: 2020-12-24 | End: 2021-10-04 | Stop reason: ALTCHOICE

## 2020-12-24 NOTE — TELEPHONE ENCOUNTER
I called her after she sent email for me to call her. .    covid test 11/6 was negative before another heart stint put in.  Started a few days ago with Stuffy sinus and hoarse and neck glands sore. Sneezing.  No colored phlegm. Fever 99.0 not higher. Denies wheez. Slight cough.   Started amoxil that had at  Home.   Not on any sinus meds except dayquil.     [facility]  Ok?     Please advise.  Thanks hamlet

## 2021-01-04 ENCOUNTER — PATIENT MESSAGE (OUTPATIENT)
Dept: ADMINISTRATIVE | Facility: HOSPITAL | Age: 56
End: 2021-01-04

## 2021-01-06 ENCOUNTER — TELEPHONE (OUTPATIENT)
Dept: INTERNAL MEDICINE | Facility: CLINIC | Age: 56
End: 2021-01-06

## 2021-01-06 DIAGNOSIS — N18.32 STAGE 3B CHRONIC KIDNEY DISEASE: ICD-10-CM

## 2021-01-06 DIAGNOSIS — I10 ESSENTIAL HYPERTENSION: Primary | ICD-10-CM

## 2021-01-06 DIAGNOSIS — E11.9 TYPE 2 DIABETES MELLITUS WITHOUT COMPLICATION, WITH LONG-TERM CURRENT USE OF INSULIN: ICD-10-CM

## 2021-01-06 DIAGNOSIS — E55.9 VITAMIN D DEFICIENCY DISEASE: ICD-10-CM

## 2021-01-06 DIAGNOSIS — Z91.89 AT RISK FOR CORONARY ARTERY DISEASE: ICD-10-CM

## 2021-01-06 DIAGNOSIS — Z79.4 TYPE 2 DIABETES MELLITUS WITHOUT COMPLICATION, WITH LONG-TERM CURRENT USE OF INSULIN: ICD-10-CM

## 2021-01-07 ENCOUNTER — LAB VISIT (OUTPATIENT)
Dept: LAB | Facility: HOSPITAL | Age: 56
End: 2021-01-07
Attending: INTERNAL MEDICINE
Payer: MEDICAID

## 2021-01-07 DIAGNOSIS — E11.9 TYPE 2 DIABETES MELLITUS WITHOUT COMPLICATION, WITH LONG-TERM CURRENT USE OF INSULIN: ICD-10-CM

## 2021-01-07 DIAGNOSIS — N18.32 STAGE 3B CHRONIC KIDNEY DISEASE: ICD-10-CM

## 2021-01-07 DIAGNOSIS — E55.9 VITAMIN D DEFICIENCY DISEASE: ICD-10-CM

## 2021-01-07 DIAGNOSIS — I10 ESSENTIAL HYPERTENSION: ICD-10-CM

## 2021-01-07 DIAGNOSIS — Z91.89 AT RISK FOR CORONARY ARTERY DISEASE: ICD-10-CM

## 2021-01-07 DIAGNOSIS — Z79.4 TYPE 2 DIABETES MELLITUS WITHOUT COMPLICATION, WITH LONG-TERM CURRENT USE OF INSULIN: ICD-10-CM

## 2021-01-07 LAB
25(OH)D3+25(OH)D2 SERPL-MCNC: 72 NG/ML (ref 30–96)
ALBUMIN SERPL BCP-MCNC: 3.5 G/DL (ref 3.5–5.2)
ALP SERPL-CCNC: 89 U/L (ref 55–135)
ALT SERPL W/O P-5'-P-CCNC: 14 U/L (ref 10–44)
ANION GAP SERPL CALC-SCNC: 12 MMOL/L (ref 8–16)
AST SERPL-CCNC: 15 U/L (ref 10–40)
BASOPHILS # BLD AUTO: 0.06 K/UL (ref 0–0.2)
BASOPHILS NFR BLD: 0.7 % (ref 0–1.9)
BILIRUB SERPL-MCNC: 0.4 MG/DL (ref 0.1–1)
BUN SERPL-MCNC: 33 MG/DL (ref 6–20)
CALCIUM SERPL-MCNC: 9.3 MG/DL (ref 8.7–10.5)
CHLORIDE SERPL-SCNC: 105 MMOL/L (ref 95–110)
CHOLEST SERPL-MCNC: 135 MG/DL (ref 120–199)
CHOLEST/HDLC SERPL: 4.4 {RATIO} (ref 2–5)
CO2 SERPL-SCNC: 25 MMOL/L (ref 23–29)
CREAT SERPL-MCNC: 1.7 MG/DL (ref 0.5–1.4)
DIFFERENTIAL METHOD: ABNORMAL
EOSINOPHIL # BLD AUTO: 0.2 K/UL (ref 0–0.5)
EOSINOPHIL NFR BLD: 2.6 % (ref 0–8)
ERYTHROCYTE [DISTWIDTH] IN BLOOD BY AUTOMATED COUNT: 14.3 % (ref 11.5–14.5)
EST. GFR  (AFRICAN AMERICAN): 39.1 ML/MIN/1.73 M^2
EST. GFR  (NON AFRICAN AMERICAN): 33.9 ML/MIN/1.73 M^2
ESTIMATED AVG GLUCOSE: 140 MG/DL (ref 68–131)
GLUCOSE SERPL-MCNC: 139 MG/DL (ref 70–110)
HBA1C MFR BLD HPLC: 6.5 % (ref 4–5.6)
HCT VFR BLD AUTO: 32.6 % (ref 37–48.5)
HDLC SERPL-MCNC: 31 MG/DL (ref 40–75)
HDLC SERPL: 23 % (ref 20–50)
HGB BLD-MCNC: 10.2 G/DL (ref 12–16)
IMM GRANULOCYTES # BLD AUTO: 0.05 K/UL (ref 0–0.04)
IMM GRANULOCYTES NFR BLD AUTO: 0.6 % (ref 0–0.5)
LDLC SERPL CALC-MCNC: 61.8 MG/DL (ref 63–159)
LYMPHOCYTES # BLD AUTO: 2.1 K/UL (ref 1–4.8)
LYMPHOCYTES NFR BLD: 25.7 % (ref 18–48)
MCH RBC QN AUTO: 29.9 PG (ref 27–31)
MCHC RBC AUTO-ENTMCNC: 31.3 G/DL (ref 32–36)
MCV RBC AUTO: 96 FL (ref 82–98)
MONOCYTES # BLD AUTO: 0.5 K/UL (ref 0.3–1)
MONOCYTES NFR BLD: 6.6 % (ref 4–15)
NEUTROPHILS # BLD AUTO: 5.1 K/UL (ref 1.8–7.7)
NEUTROPHILS NFR BLD: 63.8 % (ref 38–73)
NONHDLC SERPL-MCNC: 104 MG/DL
NRBC BLD-RTO: 0 /100 WBC
PLATELET # BLD AUTO: 176 K/UL (ref 150–350)
PMV BLD AUTO: 10.4 FL (ref 9.2–12.9)
POTASSIUM SERPL-SCNC: 4.5 MMOL/L (ref 3.5–5.1)
PROT SERPL-MCNC: 6.8 G/DL (ref 6–8.4)
RBC # BLD AUTO: 3.41 M/UL (ref 4–5.4)
SODIUM SERPL-SCNC: 142 MMOL/L (ref 136–145)
T4 FREE SERPL-MCNC: 1.3 NG/DL (ref 0.71–1.51)
TRIGL SERPL-MCNC: 211 MG/DL (ref 30–150)
TSH SERPL DL<=0.005 MIU/L-ACNC: 1.64 UIU/ML (ref 0.4–4)
WBC # BLD AUTO: 8.02 K/UL (ref 3.9–12.7)

## 2021-01-07 PROCEDURE — 80053 COMPREHEN METABOLIC PANEL: CPT

## 2021-01-07 PROCEDURE — 80061 LIPID PANEL: CPT

## 2021-01-07 PROCEDURE — 36415 COLL VENOUS BLD VENIPUNCTURE: CPT | Mod: PO

## 2021-01-07 PROCEDURE — 85025 COMPLETE CBC W/AUTO DIFF WBC: CPT

## 2021-01-07 PROCEDURE — 83036 HEMOGLOBIN GLYCOSYLATED A1C: CPT

## 2021-01-07 PROCEDURE — 84439 ASSAY OF FREE THYROXINE: CPT

## 2021-01-07 PROCEDURE — 84443 ASSAY THYROID STIM HORMONE: CPT

## 2021-01-07 PROCEDURE — 82306 VITAMIN D 25 HYDROXY: CPT

## 2021-01-21 ENCOUNTER — CLINICAL SUPPORT (OUTPATIENT)
Dept: URGENT CARE | Facility: CLINIC | Age: 56
End: 2021-01-21
Payer: COMMERCIAL

## 2021-01-21 VITALS
WEIGHT: 245 LBS | RESPIRATION RATE: 16 BRPM | DIASTOLIC BLOOD PRESSURE: 80 MMHG | BODY MASS INDEX: 41.83 KG/M2 | HEART RATE: 89 BPM | HEIGHT: 64 IN | SYSTOLIC BLOOD PRESSURE: 127 MMHG | TEMPERATURE: 98 F | OXYGEN SATURATION: 97 %

## 2021-01-21 DIAGNOSIS — M25.572 LEFT ANKLE PAIN, UNSPECIFIED CHRONICITY: Primary | ICD-10-CM

## 2021-01-21 DIAGNOSIS — M79.672 LEFT FOOT PAIN: ICD-10-CM

## 2021-01-21 PROCEDURE — 73630 XR FOOT COMPLETE 3 VIEW LEFT: ICD-10-PCS | Mod: LT,S$GLB,, | Performed by: RADIOLOGY

## 2021-01-21 PROCEDURE — 73610 XR ANKLE COMPLETE 3 VIEW LEFT: ICD-10-PCS | Mod: LT,S$GLB,, | Performed by: RADIOLOGY

## 2021-01-21 PROCEDURE — 99214 PR OFFICE/OUTPT VISIT, EST, LEVL IV, 30-39 MIN: ICD-10-PCS | Mod: S$GLB,,, | Performed by: PHYSICIAN ASSISTANT

## 2021-01-21 PROCEDURE — 73630 X-RAY EXAM OF FOOT: CPT | Mod: LT,S$GLB,, | Performed by: RADIOLOGY

## 2021-01-21 PROCEDURE — 73610 X-RAY EXAM OF ANKLE: CPT | Mod: LT,S$GLB,, | Performed by: RADIOLOGY

## 2021-01-21 PROCEDURE — 99214 OFFICE O/P EST MOD 30 MIN: CPT | Mod: S$GLB,,, | Performed by: PHYSICIAN ASSISTANT

## 2021-01-22 ENCOUNTER — TELEPHONE (OUTPATIENT)
Dept: ORTHOPEDICS | Facility: CLINIC | Age: 56
End: 2021-01-22

## 2021-01-28 ENCOUNTER — PATIENT OUTREACH (OUTPATIENT)
Dept: ADMINISTRATIVE | Facility: OTHER | Age: 56
End: 2021-01-28

## 2021-01-28 DIAGNOSIS — Z12.31 BREAST CANCER SCREENING BY MAMMOGRAM: Primary | ICD-10-CM

## 2021-01-29 DIAGNOSIS — M79.672 LEFT FOOT PAIN: Primary | ICD-10-CM

## 2021-01-29 DIAGNOSIS — M25.572 LEFT ANKLE PAIN, UNSPECIFIED CHRONICITY: ICD-10-CM

## 2021-02-01 ENCOUNTER — TELEPHONE (OUTPATIENT)
Dept: ORTHOPEDICS | Facility: CLINIC | Age: 56
End: 2021-02-01

## 2021-02-26 ENCOUNTER — TELEPHONE (OUTPATIENT)
Dept: INTERNAL MEDICINE | Facility: CLINIC | Age: 56
End: 2021-02-26

## 2021-03-02 RX ORDER — ALPRAZOLAM 0.5 MG/1
0.5 TABLET ORAL DAILY
Qty: 30 TABLET | Refills: 3 | Status: SHIPPED | OUTPATIENT
Start: 2021-03-02 | End: 2021-08-24

## 2021-03-22 ENCOUNTER — OFFICE VISIT (OUTPATIENT)
Dept: INTERNAL MEDICINE | Facility: CLINIC | Age: 56
End: 2021-03-22
Payer: COMMERCIAL

## 2021-03-22 ENCOUNTER — TELEPHONE (OUTPATIENT)
Dept: INTERNAL MEDICINE | Facility: CLINIC | Age: 56
End: 2021-03-22

## 2021-03-22 DIAGNOSIS — K52.9 GE (GASTROENTERITIS): Primary | ICD-10-CM

## 2021-03-22 DIAGNOSIS — E10.40 DIABETIC NEUROPATHY, TYPE I DIABETES MELLITUS: ICD-10-CM

## 2021-03-22 DIAGNOSIS — E11.9 TYPE 2 DIABETES MELLITUS WITHOUT COMPLICATION, WITH LONG-TERM CURRENT USE OF INSULIN: ICD-10-CM

## 2021-03-22 DIAGNOSIS — N18.32 STAGE 3B CHRONIC KIDNEY DISEASE: ICD-10-CM

## 2021-03-22 DIAGNOSIS — F41.9 ANXIETY: ICD-10-CM

## 2021-03-22 DIAGNOSIS — I10 ESSENTIAL HYPERTENSION: ICD-10-CM

## 2021-03-22 DIAGNOSIS — Z79.4 TYPE 2 DIABETES MELLITUS WITHOUT COMPLICATION, WITH LONG-TERM CURRENT USE OF INSULIN: ICD-10-CM

## 2021-03-22 PROCEDURE — 99213 PR OFFICE/OUTPT VISIT, EST, LEVL III, 20-29 MIN: ICD-10-PCS | Mod: 95,,, | Performed by: INTERNAL MEDICINE

## 2021-03-22 PROCEDURE — 99213 OFFICE O/P EST LOW 20 MIN: CPT | Mod: 95,,, | Performed by: INTERNAL MEDICINE

## 2021-03-22 RX ORDER — ONDANSETRON 4 MG/1
4 TABLET, FILM COATED ORAL EVERY 8 HOURS PRN
Qty: 30 TABLET | Refills: 0 | Status: SHIPPED | OUTPATIENT
Start: 2021-03-22 | End: 2022-01-05

## 2021-03-23 ENCOUNTER — LAB VISIT (OUTPATIENT)
Dept: INTERNAL MEDICINE | Facility: CLINIC | Age: 56
End: 2021-03-23
Payer: COMMERCIAL

## 2021-03-23 DIAGNOSIS — K52.9 GE (GASTROENTERITIS): ICD-10-CM

## 2021-03-23 PROCEDURE — U0005 INFEC AGEN DETEC AMPLI PROBE: HCPCS | Performed by: INTERNAL MEDICINE

## 2021-03-23 PROCEDURE — U0003 INFECTIOUS AGENT DETECTION BY NUCLEIC ACID (DNA OR RNA); SEVERE ACUTE RESPIRATORY SYNDROME CORONAVIRUS 2 (SARS-COV-2) (CORONAVIRUS DISEASE [COVID-19]), AMPLIFIED PROBE TECHNIQUE, MAKING USE OF HIGH THROUGHPUT TECHNOLOGIES AS DESCRIBED BY CMS-2020-01-R: HCPCS | Performed by: INTERNAL MEDICINE

## 2021-03-24 LAB — SARS-COV-2 RNA RESP QL NAA+PROBE: NOT DETECTED

## 2021-03-25 ENCOUNTER — TELEPHONE (OUTPATIENT)
Dept: INTERNAL MEDICINE | Facility: CLINIC | Age: 56
End: 2021-03-25

## 2021-03-29 ENCOUNTER — PATIENT MESSAGE (OUTPATIENT)
Dept: INTERNAL MEDICINE | Facility: CLINIC | Age: 56
End: 2021-03-29

## 2021-03-30 ENCOUNTER — OFFICE VISIT (OUTPATIENT)
Dept: INTERNAL MEDICINE | Facility: CLINIC | Age: 56
End: 2021-03-30
Payer: COMMERCIAL

## 2021-03-30 VITALS
OXYGEN SATURATION: 97 % | WEIGHT: 259.25 LBS | DIASTOLIC BLOOD PRESSURE: 72 MMHG | HEIGHT: 65 IN | TEMPERATURE: 97 F | BODY MASS INDEX: 43.19 KG/M2 | HEART RATE: 78 BPM | SYSTOLIC BLOOD PRESSURE: 114 MMHG | RESPIRATION RATE: 18 BRPM

## 2021-03-30 DIAGNOSIS — R07.81 RIB PAIN: ICD-10-CM

## 2021-03-30 DIAGNOSIS — M79.672 PAIN OF LEFT HEEL: ICD-10-CM
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
## 2021-03-30 DIAGNOSIS — N64.4 BREAST PAIN: ICD-10-CM

## 2021-03-30 PROCEDURE — 99999 PR PBB SHADOW E&M-EST. PATIENT-LVL V: CPT | Mod: PBBFAC,,, | Performed by: HOSPITALIST

## 2021-03-30 PROCEDURE — 99214 OFFICE O/P EST MOD 30 MIN: CPT | Mod: S$GLB,,, | Performed by: HOSPITALIST

## 2021-03-30 PROCEDURE — 99214 PR OFFICE/OUTPT VISIT, EST, LEVL IV, 30-39 MIN: ICD-10-PCS | Mod: S$GLB,,, | Performed by: HOSPITALIST

## 2021-03-30 PROCEDURE — 99999 PR PBB SHADOW E&M-EST. PATIENT-LVL V: ICD-10-PCS | Mod: PBBFAC,,, | Performed by: HOSPITALIST

## 2021-04-01 ENCOUNTER — HOSPITAL ENCOUNTER (OUTPATIENT)
Dept: RADIOLOGY | Facility: HOSPITAL | Age: 56
Discharge: HOME | End: 2021-04-01
Attending: HOSPITALIST
Payer: COMMERCIAL

## 2021-04-01 DIAGNOSIS — R07.81 RIB PAIN: ICD-10-CM
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
## 2021-04-01 PROCEDURE — 71110 X-RAY EXAM RIBS BIL 3 VIEWS: CPT | Mod: TC,PO

## 2021-04-01 PROCEDURE — 71110 XR RIBS 3 VIEWS BILATERAL: ICD-10-PCS | Mod: 26,,, | Performed by: RADIOLOGY

## 2021-04-01 PROCEDURE — 71110 X-RAY EXAM RIBS BIL 3 VIEWS: CPT | Mod: 26,,, | Performed by: RADIOLOGY

## 2021-04-05 ENCOUNTER — PATIENT MESSAGE (OUTPATIENT)
Dept: ADMINISTRATIVE | Facility: HOSPITAL | Age: 56
End: 2021-04-05

## 2021-05-11 ENCOUNTER — TELEPHONE (OUTPATIENT)
Dept: INTERNAL MEDICINE | Facility: CLINIC | Age: 56
End: 2021-05-11

## 2021-05-11 DIAGNOSIS — G47.33 OSA (OBSTRUCTIVE SLEEP APNEA): Primary | ICD-10-CM

## 2021-05-12 ENCOUNTER — PATIENT OUTREACH (OUTPATIENT)
Dept: ADMINISTRATIVE | Facility: HOSPITAL | Age: 56
End: 2021-05-12

## 2021-05-12 ENCOUNTER — PATIENT MESSAGE (OUTPATIENT)
Dept: ADMINISTRATIVE | Facility: HOSPITAL | Age: 56
End: 2021-05-12

## 2021-05-19 RX ORDER — GLIMEPIRIDE 2 MG/1
TABLET ORAL
Qty: 180 TABLET | Refills: 3 | Status: SHIPPED | OUTPATIENT
Start: 2021-05-19 | End: 2022-02-24

## 2021-06-02 ENCOUNTER — PATIENT OUTREACH (OUTPATIENT)
Dept: ADMINISTRATIVE | Facility: OTHER | Age: 56
End: 2021-06-02

## 2021-06-09 RX ORDER — HYDROCODONE BITARTRATE AND ACETAMINOPHEN 7.5; 325 MG/1; MG/1
1 TABLET ORAL EVERY 6 HOURS PRN
Qty: 90 TABLET | Refills: 0 | Status: SHIPPED | OUTPATIENT
Start: 2021-06-09 | End: 2021-08-23 | Stop reason: SDUPTHER

## 2021-06-16 ENCOUNTER — PATIENT MESSAGE (OUTPATIENT)
Dept: INTERNAL MEDICINE | Facility: CLINIC | Age: 56
End: 2021-06-16

## 2021-06-25 ENCOUNTER — OFFICE VISIT (OUTPATIENT)
Dept: INTERNAL MEDICINE | Facility: CLINIC | Age: 56
End: 2021-06-25
Payer: COMMERCIAL

## 2021-06-25 DIAGNOSIS — J20.9 ACUTE BRONCHITIS, UNSPECIFIED ORGANISM: Primary | ICD-10-CM

## 2021-06-25 PROCEDURE — 99213 PR OFFICE/OUTPT VISIT, EST, LEVL III, 20-29 MIN: ICD-10-PCS | Mod: 95,,, | Performed by: INTERNAL MEDICINE

## 2021-06-25 PROCEDURE — 99213 OFFICE O/P EST LOW 20 MIN: CPT | Mod: 95,,, | Performed by: INTERNAL MEDICINE

## 2021-06-25 RX ORDER — CODEINE PHOSPHATE AND GUAIFENESIN 10; 100 MG/5ML; MG/5ML
5 SOLUTION ORAL EVERY 6 HOURS PRN
Qty: 236 ML | Refills: 0 | Status: SHIPPED | OUTPATIENT
Start: 2021-06-25 | End: 2021-07-05

## 2021-06-25 RX ORDER — AZITHROMYCIN 250 MG/1
TABLET, FILM COATED ORAL
Qty: 6 TABLET | Refills: 0 | Status: SHIPPED | OUTPATIENT
Start: 2021-06-25 | End: 2021-06-30

## 2021-07-06 ENCOUNTER — PATIENT MESSAGE (OUTPATIENT)
Dept: ADMINISTRATIVE | Facility: HOSPITAL | Age: 56
End: 2021-07-06

## 2021-07-08 ENCOUNTER — PATIENT OUTREACH (OUTPATIENT)
Dept: ADMINISTRATIVE | Facility: OTHER | Age: 56
End: 2021-07-08

## 2021-08-03 ENCOUNTER — OFFICE VISIT (OUTPATIENT)
Dept: ENDOCRINOLOGY | Facility: CLINIC | Age: 56
End: 2021-08-03
Payer: COMMERCIAL

## 2021-08-03 ENCOUNTER — PATIENT MESSAGE (OUTPATIENT)
Dept: ADMINISTRATIVE | Facility: HOSPITAL | Age: 56
End: 2021-08-03

## 2021-08-03 DIAGNOSIS — E78.5 DYSLIPIDEMIA: ICD-10-CM

## 2021-08-03 DIAGNOSIS — G25.0 ESSENTIAL TREMOR: ICD-10-CM

## 2021-08-03 DIAGNOSIS — N18.30 STAGE 3 CHRONIC KIDNEY DISEASE, UNSPECIFIED WHETHER STAGE 3A OR 3B CKD: ICD-10-CM

## 2021-08-03 DIAGNOSIS — Z79.4 TYPE 2 DIABETES MELLITUS WITHOUT COMPLICATION, WITH LONG-TERM CURRENT USE OF INSULIN: Primary | ICD-10-CM

## 2021-08-03 DIAGNOSIS — E11.9 TYPE 2 DIABETES MELLITUS WITHOUT COMPLICATION, WITH LONG-TERM CURRENT USE OF INSULIN: Primary | ICD-10-CM

## 2021-08-03 DIAGNOSIS — I10 HTN (HYPERTENSION), BENIGN: ICD-10-CM

## 2021-08-03 DIAGNOSIS — G63 POLYNEUROPATHY ASSOCIATED WITH UNDERLYING DISEASE: ICD-10-CM

## 2021-08-03 PROCEDURE — 99215 PR OFFICE/OUTPT VISIT, EST, LEVL V, 40-54 MIN: ICD-10-PCS | Mod: 95,,, | Performed by: NURSE PRACTITIONER

## 2021-08-03 PROCEDURE — 99499 UNLISTED E&M SERVICE: CPT | Mod: 95,,, | Performed by: NURSE PRACTITIONER

## 2021-08-03 PROCEDURE — 99499 NO LOS: ICD-10-PCS | Mod: 95,,, | Performed by: NURSE PRACTITIONER

## 2021-08-03 PROCEDURE — 99215 OFFICE O/P EST HI 40 MIN: CPT | Mod: 95,,, | Performed by: NURSE PRACTITIONER

## 2021-08-04 ENCOUNTER — PATIENT MESSAGE (OUTPATIENT)
Dept: ENDOCRINOLOGY | Facility: CLINIC | Age: 56
End: 2021-08-04

## 2021-08-04 ENCOUNTER — LAB VISIT (OUTPATIENT)
Dept: LAB | Facility: HOSPITAL | Age: 56
End: 2021-08-04
Attending: NURSE PRACTITIONER
Payer: COMMERCIAL

## 2021-08-04 ENCOUNTER — TELEPHONE (OUTPATIENT)
Dept: ENDOCRINOLOGY | Facility: CLINIC | Age: 56
End: 2021-08-04

## 2021-08-04 DIAGNOSIS — N18.4 CKD (CHRONIC KIDNEY DISEASE), SYMPTOM MANAGEMENT ONLY, STAGE 4 (SEVERE): Primary | ICD-10-CM

## 2021-08-04 DIAGNOSIS — Z79.4 TYPE 2 DIABETES MELLITUS WITHOUT COMPLICATION, WITH LONG-TERM CURRENT USE OF INSULIN: ICD-10-CM

## 2021-08-04 DIAGNOSIS — E11.9 TYPE 2 DIABETES MELLITUS WITHOUT COMPLICATION, WITH LONG-TERM CURRENT USE OF INSULIN: ICD-10-CM

## 2021-08-04 DIAGNOSIS — G63 POLYNEUROPATHY ASSOCIATED WITH UNDERLYING DISEASE: ICD-10-CM

## 2021-08-04 LAB
ALBUMIN SERPL BCP-MCNC: 3.3 G/DL (ref 3.5–5.2)
ALP SERPL-CCNC: 81 U/L (ref 55–135)
ALT SERPL W/O P-5'-P-CCNC: 14 U/L (ref 10–44)
ANION GAP SERPL CALC-SCNC: 8 MMOL/L (ref 8–16)
AST SERPL-CCNC: 17 U/L (ref 10–40)
BILIRUB SERPL-MCNC: 0.5 MG/DL (ref 0.1–1)
BUN SERPL-MCNC: 49 MG/DL (ref 6–20)
CALCIUM SERPL-MCNC: 9.1 MG/DL (ref 8.7–10.5)
CHLORIDE SERPL-SCNC: 100 MMOL/L (ref 95–110)
CHOLEST SERPL-MCNC: 133 MG/DL (ref 120–199)
CHOLEST/HDLC SERPL: 4.8 {RATIO} (ref 2–5)
CO2 SERPL-SCNC: 29 MMOL/L (ref 23–29)
CREAT SERPL-MCNC: 3.5 MG/DL (ref 0.5–1.4)
EST. GFR  (AFRICAN AMERICAN): 16.2 ML/MIN/1.73 M^2
EST. GFR  (NON AFRICAN AMERICAN): 14.1 ML/MIN/1.73 M^2
ESTIMATED AVG GLUCOSE: 117 MG/DL (ref 68–131)
GLUCOSE SERPL-MCNC: 78 MG/DL (ref 70–110)
HBA1C MFR BLD: 5.7 % (ref 4–5.6)
HDLC SERPL-MCNC: 28 MG/DL (ref 40–75)
HDLC SERPL: 21.1 % (ref 20–50)
LDLC SERPL CALC-MCNC: 60 MG/DL (ref 63–159)
NONHDLC SERPL-MCNC: 105 MG/DL
POTASSIUM SERPL-SCNC: 4.9 MMOL/L (ref 3.5–5.1)
PROT SERPL-MCNC: 6.5 G/DL (ref 6–8.4)
SODIUM SERPL-SCNC: 137 MMOL/L (ref 136–145)
TRIGL SERPL-MCNC: 225 MG/DL (ref 30–150)

## 2021-08-04 PROCEDURE — 83036 HEMOGLOBIN GLYCOSYLATED A1C: CPT | Performed by: NURSE PRACTITIONER

## 2021-08-04 PROCEDURE — 80061 LIPID PANEL: CPT | Performed by: NURSE PRACTITIONER

## 2021-08-04 PROCEDURE — 80053 COMPREHEN METABOLIC PANEL: CPT | Performed by: NURSE PRACTITIONER

## 2021-08-04 PROCEDURE — 36415 COLL VENOUS BLD VENIPUNCTURE: CPT | Mod: PO | Performed by: NURSE PRACTITIONER

## 2021-08-04 RX ORDER — PREGABALIN 150 MG/1
300 CAPSULE ORAL 2 TIMES DAILY
Qty: 120 CAPSULE | Refills: 1 | Status: SHIPPED | OUTPATIENT
Start: 2021-08-04 | End: 2021-10-04 | Stop reason: SDUPTHER

## 2021-08-05 ENCOUNTER — OFFICE VISIT (OUTPATIENT)
Dept: PODIATRY | Facility: CLINIC | Age: 56
End: 2021-08-05
Payer: COMMERCIAL

## 2021-08-05 VITALS
HEIGHT: 65 IN | DIASTOLIC BLOOD PRESSURE: 61 MMHG | SYSTOLIC BLOOD PRESSURE: 113 MMHG | WEIGHT: 263.88 LBS | HEART RATE: 82 BPM | BODY MASS INDEX: 43.96 KG/M2

## 2021-08-05 DIAGNOSIS — N18.4 CKD (CHRONIC KIDNEY DISEASE), SYMPTOM MANAGEMENT ONLY, STAGE 4 (SEVERE): Primary | ICD-10-CM

## 2021-08-05 DIAGNOSIS — R60.9 SWELLING: ICD-10-CM

## 2021-08-05 DIAGNOSIS — M76.61 ACHILLES TENDINITIS OF RIGHT LOWER EXTREMITY: ICD-10-CM

## 2021-08-05 DIAGNOSIS — M79.672 PAIN OF LEFT HEEL: ICD-10-CM

## 2021-08-05 PROCEDURE — 99999 PR PBB SHADOW E&M-EST. PATIENT-LVL V: CPT | Mod: PBBFAC,,, | Performed by: PODIATRIST

## 2021-08-05 PROCEDURE — 99203 OFFICE O/P NEW LOW 30 MIN: CPT | Mod: S$GLB,,, | Performed by: PODIATRIST

## 2021-08-05 PROCEDURE — 99999 PR PBB SHADOW E&M-EST. PATIENT-LVL V: ICD-10-PCS | Mod: PBBFAC,,, | Performed by: PODIATRIST

## 2021-08-05 PROCEDURE — 99203 PR OFFICE/OUTPT VISIT, NEW, LEVL III, 30-44 MIN: ICD-10-PCS | Mod: S$GLB,,, | Performed by: PODIATRIST

## 2021-08-05 RX ORDER — METHYLPREDNISOLONE 4 MG/1
TABLET ORAL
Qty: 1 PACKAGE | Refills: 0 | Status: SHIPPED | OUTPATIENT
Start: 2021-08-05 | End: 2021-08-26

## 2021-08-06 ENCOUNTER — OFFICE VISIT (OUTPATIENT)
Dept: NEPHROLOGY | Facility: CLINIC | Age: 56
End: 2021-08-06
Payer: COMMERCIAL

## 2021-08-06 ENCOUNTER — PATIENT MESSAGE (OUTPATIENT)
Dept: NEPHROLOGY | Facility: CLINIC | Age: 56
End: 2021-08-06

## 2021-08-06 ENCOUNTER — LAB VISIT (OUTPATIENT)
Dept: LAB | Facility: HOSPITAL | Age: 56
End: 2021-08-06
Attending: INTERNAL MEDICINE
Payer: COMMERCIAL

## 2021-08-06 DIAGNOSIS — N39.3 STRESS INCONTINENCE: ICD-10-CM

## 2021-08-06 DIAGNOSIS — N18.30 STAGE 3 CHRONIC KIDNEY DISEASE, UNSPECIFIED WHETHER STAGE 3A OR 3B CKD: Primary | ICD-10-CM

## 2021-08-06 DIAGNOSIS — N28.1 ACQUIRED CYST OF KIDNEY: ICD-10-CM

## 2021-08-06 DIAGNOSIS — N18.4 CKD (CHRONIC KIDNEY DISEASE), SYMPTOM MANAGEMENT ONLY, STAGE 4 (SEVERE): ICD-10-CM

## 2021-08-06 LAB
25(OH)D3+25(OH)D2 SERPL-MCNC: 99 NG/ML (ref 30–96)
ANION GAP SERPL CALC-SCNC: 9 MMOL/L (ref 8–16)
BASOPHILS # BLD AUTO: 0.04 K/UL (ref 0–0.2)
BASOPHILS NFR BLD: 0.5 % (ref 0–1.9)
BUN SERPL-MCNC: 46 MG/DL (ref 6–20)
CALCIUM SERPL-MCNC: 9.8 MG/DL (ref 8.7–10.5)
CHLORIDE SERPL-SCNC: 104 MMOL/L (ref 95–110)
CO2 SERPL-SCNC: 27 MMOL/L (ref 23–29)
CREAT SERPL-MCNC: 2.8 MG/DL (ref 0.5–1.4)
DIFFERENTIAL METHOD: ABNORMAL
EOSINOPHIL # BLD AUTO: 0.2 K/UL (ref 0–0.5)
EOSINOPHIL NFR BLD: 2.6 % (ref 0–8)
ERYTHROCYTE [DISTWIDTH] IN BLOOD BY AUTOMATED COUNT: 13.6 % (ref 11.5–14.5)
EST. GFR  (AFRICAN AMERICAN): 21.3 ML/MIN/1.73 M^2
EST. GFR  (NON AFRICAN AMERICAN): 18.4 ML/MIN/1.73 M^2
GLUCOSE SERPL-MCNC: 111 MG/DL (ref 70–110)
HCT VFR BLD AUTO: 29.1 % (ref 37–48.5)
HGB BLD-MCNC: 9.3 G/DL (ref 12–16)
IMM GRANULOCYTES # BLD AUTO: 0.03 K/UL (ref 0–0.04)
IMM GRANULOCYTES NFR BLD AUTO: 0.4 % (ref 0–0.5)
LYMPHOCYTES # BLD AUTO: 2 K/UL (ref 1–4.8)
LYMPHOCYTES NFR BLD: 25.4 % (ref 18–48)
MCH RBC QN AUTO: 29.3 PG (ref 27–31)
MCHC RBC AUTO-ENTMCNC: 32 G/DL (ref 32–36)
MCV RBC AUTO: 92 FL (ref 82–98)
MONOCYTES # BLD AUTO: 0.6 K/UL (ref 0.3–1)
MONOCYTES NFR BLD: 7.1 % (ref 4–15)
NEUTROPHILS # BLD AUTO: 5 K/UL (ref 1.8–7.7)
NEUTROPHILS NFR BLD: 64 % (ref 38–73)
NRBC BLD-RTO: 0 /100 WBC
PLATELET # BLD AUTO: 200 K/UL (ref 150–450)
PMV BLD AUTO: 9.9 FL (ref 9.2–12.9)
POTASSIUM SERPL-SCNC: 4.3 MMOL/L (ref 3.5–5.1)
PTH-INTACT SERPL-MCNC: 114.3 PG/ML (ref 9–77)
RBC # BLD AUTO: 3.17 M/UL (ref 4–5.4)
SODIUM SERPL-SCNC: 140 MMOL/L (ref 136–145)
WBC # BLD AUTO: 7.75 K/UL (ref 3.9–12.7)

## 2021-08-06 PROCEDURE — 80048 BASIC METABOLIC PNL TOTAL CA: CPT | Performed by: NURSE PRACTITIONER

## 2021-08-06 PROCEDURE — 82306 VITAMIN D 25 HYDROXY: CPT | Performed by: NURSE PRACTITIONER

## 2021-08-06 PROCEDURE — 85025 COMPLETE CBC W/AUTO DIFF WBC: CPT | Performed by: NURSE PRACTITIONER

## 2021-08-06 PROCEDURE — 99214 PR OFFICE/OUTPT VISIT, EST, LEVL IV, 30-39 MIN: ICD-10-PCS | Mod: 95,,, | Performed by: INTERNAL MEDICINE

## 2021-08-06 PROCEDURE — 36415 COLL VENOUS BLD VENIPUNCTURE: CPT | Mod: PO | Performed by: NURSE PRACTITIONER

## 2021-08-06 PROCEDURE — 99214 OFFICE O/P EST MOD 30 MIN: CPT | Mod: 95,,, | Performed by: INTERNAL MEDICINE

## 2021-08-06 PROCEDURE — 82607 VITAMIN B-12: CPT | Performed by: NURSE PRACTITIONER

## 2021-08-06 PROCEDURE — 83970 ASSAY OF PARATHORMONE: CPT | Performed by: NURSE PRACTITIONER

## 2021-08-07 LAB — VIT B12 SERPL-MCNC: 738 NG/L (ref 180–914)

## 2021-08-09 ENCOUNTER — PATIENT MESSAGE (OUTPATIENT)
Dept: NEPHROLOGY | Facility: CLINIC | Age: 56
End: 2021-08-09

## 2021-08-10 ENCOUNTER — PATIENT MESSAGE (OUTPATIENT)
Dept: NEPHROLOGY | Facility: CLINIC | Age: 56
End: 2021-08-10

## 2021-08-10 ENCOUNTER — LAB VISIT (OUTPATIENT)
Dept: LAB | Facility: HOSPITAL | Age: 56
End: 2021-08-10
Attending: NURSE PRACTITIONER
Payer: COMMERCIAL

## 2021-08-10 DIAGNOSIS — N17.9 ACUTE RENAL FAILURE, UNSPECIFIED ACUTE RENAL FAILURE TYPE: ICD-10-CM

## 2021-08-10 DIAGNOSIS — N18.30 STAGE 3 CHRONIC KIDNEY DISEASE, UNSPECIFIED WHETHER STAGE 3A OR 3B CKD: Primary | ICD-10-CM

## 2021-08-10 DIAGNOSIS — N18.30 STAGE 3 CHRONIC KIDNEY DISEASE, UNSPECIFIED WHETHER STAGE 3A OR 3B CKD: ICD-10-CM

## 2021-08-10 DIAGNOSIS — N28.1 ACQUIRED CYST OF KIDNEY: ICD-10-CM

## 2021-08-10 DIAGNOSIS — N18.4 CKD (CHRONIC KIDNEY DISEASE), SYMPTOM MANAGEMENT ONLY, STAGE 4 (SEVERE): ICD-10-CM

## 2021-08-10 LAB
ALBUMIN SERPL BCP-MCNC: 3.5 G/DL (ref 3.5–5.2)
ANION GAP SERPL CALC-SCNC: 8 MMOL/L (ref 8–16)
BUN SERPL-MCNC: 40 MG/DL (ref 6–20)
CALCIUM SERPL-MCNC: 9.8 MG/DL (ref 8.7–10.5)
CHLORIDE SERPL-SCNC: 104 MMOL/L (ref 95–110)
CO2 SERPL-SCNC: 29 MMOL/L (ref 23–29)
CREAT SERPL-MCNC: 1.9 MG/DL (ref 0.5–1.4)
EST. GFR  (AFRICAN AMERICAN): 34 ML/MIN/1.73 M^2
EST. GFR  (NON AFRICAN AMERICAN): 29.5 ML/MIN/1.73 M^2
GLUCOSE SERPL-MCNC: 136 MG/DL (ref 70–110)
PHOSPHATE SERPL-MCNC: 5 MG/DL (ref 2.7–4.5)
POTASSIUM SERPL-SCNC: 4.7 MMOL/L (ref 3.5–5.1)
PTH-INTACT SERPL-MCNC: 132.5 PG/ML (ref 9–77)
PTH-INTACT SERPL-MCNC: 132.5 PG/ML (ref 9–77)
SODIUM SERPL-SCNC: 141 MMOL/L (ref 136–145)

## 2021-08-10 PROCEDURE — 83970 ASSAY OF PARATHORMONE: CPT | Performed by: NURSE PRACTITIONER

## 2021-08-10 PROCEDURE — 80069 RENAL FUNCTION PANEL: CPT | Performed by: INTERNAL MEDICINE

## 2021-08-10 PROCEDURE — 36415 COLL VENOUS BLD VENIPUNCTURE: CPT | Mod: PO | Performed by: INTERNAL MEDICINE

## 2021-08-11 ENCOUNTER — TELEPHONE (OUTPATIENT)
Dept: NEUROLOGY | Facility: CLINIC | Age: 56
End: 2021-08-11

## 2021-08-11 ENCOUNTER — TELEPHONE (OUTPATIENT)
Dept: NEUROLOGY | Facility: CLINIC | Age: 56
End: 2021-08-11
Payer: COMMERCIAL

## 2021-08-23 RX ORDER — ALPRAZOLAM 0.5 MG/1
0.5 TABLET ORAL DAILY
Qty: 30 TABLET | Refills: 3 | Status: CANCELLED | OUTPATIENT
Start: 2021-08-23

## 2021-08-24 ENCOUNTER — PATIENT OUTREACH (OUTPATIENT)
Dept: ADMINISTRATIVE | Facility: HOSPITAL | Age: 56
End: 2021-08-24

## 2021-08-24 DIAGNOSIS — Z12.11 COLON CANCER SCREENING: Primary | ICD-10-CM

## 2021-08-24 RX ORDER — ALPRAZOLAM 0.5 MG/1
0.5 TABLET ORAL DAILY
Qty: 30 TABLET | Refills: 3 | Status: SHIPPED | OUTPATIENT
Start: 2021-08-24 | End: 2021-12-22 | Stop reason: SDUPTHER

## 2021-08-24 RX ORDER — HYDROCODONE BITARTRATE AND ACETAMINOPHEN 7.5; 325 MG/1; MG/1
1 TABLET ORAL EVERY 6 HOURS PRN
Qty: 90 TABLET | Refills: 0 | Status: SHIPPED | OUTPATIENT
Start: 2021-08-24 | End: 2021-11-24 | Stop reason: SDUPTHER

## 2021-08-27 DIAGNOSIS — M79.671 RIGHT FOOT PAIN: Primary | ICD-10-CM

## 2021-09-01 ENCOUNTER — TELEPHONE (OUTPATIENT)
Dept: ORTHOPEDICS | Facility: CLINIC | Age: 56
End: 2021-09-01

## 2021-09-07 DIAGNOSIS — M17.0 PRIMARY OSTEOARTHRITIS OF BOTH KNEES: Primary | ICD-10-CM

## 2021-09-08 ENCOUNTER — TELEPHONE (OUTPATIENT)
Dept: ORTHOPEDICS | Facility: CLINIC | Age: 56
End: 2021-09-08

## 2021-09-28 DIAGNOSIS — Z79.4 TYPE 2 DIABETES MELLITUS WITHOUT COMPLICATION, WITH LONG-TERM CURRENT USE OF INSULIN: ICD-10-CM

## 2021-09-28 DIAGNOSIS — E11.9 TYPE 2 DIABETES MELLITUS WITHOUT COMPLICATION, WITH LONG-TERM CURRENT USE OF INSULIN: ICD-10-CM

## 2021-09-28 RX ORDER — PREGABALIN 150 MG/1
300 CAPSULE ORAL 2 TIMES DAILY
Qty: 120 CAPSULE | Refills: 1 | OUTPATIENT
Start: 2021-09-28 | End: 2021-10-28

## 2021-09-29 ENCOUNTER — PATIENT OUTREACH (OUTPATIENT)
Dept: ADMINISTRATIVE | Facility: OTHER | Age: 56
End: 2021-09-29

## 2021-10-01 ENCOUNTER — PATIENT MESSAGE (OUTPATIENT)
Dept: ORTHOPEDICS | Facility: CLINIC | Age: 56
End: 2021-10-01

## 2021-10-04 ENCOUNTER — TELEPHONE (OUTPATIENT)
Dept: ORTHOPEDICS | Facility: CLINIC | Age: 56
End: 2021-10-04

## 2021-10-04 ENCOUNTER — TELEPHONE (OUTPATIENT)
Dept: ENDOCRINOLOGY | Facility: CLINIC | Age: 56
End: 2021-10-04

## 2021-10-04 ENCOUNTER — PATIENT MESSAGE (OUTPATIENT)
Dept: INTERNAL MEDICINE | Facility: CLINIC | Age: 56
End: 2021-10-04

## 2021-10-04 ENCOUNTER — PATIENT MESSAGE (OUTPATIENT)
Dept: FAMILY MEDICINE | Facility: CLINIC | Age: 56
End: 2021-10-04

## 2021-10-04 ENCOUNTER — TELEPHONE (OUTPATIENT)
Dept: NEUROLOGY | Facility: CLINIC | Age: 56
End: 2021-10-04

## 2021-10-04 ENCOUNTER — PATIENT MESSAGE (OUTPATIENT)
Dept: ADMINISTRATIVE | Facility: HOSPITAL | Age: 56
End: 2021-10-04

## 2021-10-04 ENCOUNTER — OFFICE VISIT (OUTPATIENT)
Dept: FAMILY MEDICINE | Facility: CLINIC | Age: 56
End: 2021-10-04
Payer: COMMERCIAL

## 2021-10-04 ENCOUNTER — HOSPITAL ENCOUNTER (OUTPATIENT)
Dept: RADIOLOGY | Facility: HOSPITAL | Age: 56
Discharge: HOME | End: 2021-10-04
Attending: ORTHOPAEDIC SURGERY
Payer: COMMERCIAL

## 2021-10-04 ENCOUNTER — PATIENT MESSAGE (OUTPATIENT)
Dept: ENDOCRINOLOGY | Facility: CLINIC | Age: 56
End: 2021-10-04

## 2021-10-04 ENCOUNTER — OFFICE VISIT (OUTPATIENT)
Dept: ORTHOPEDICS | Facility: CLINIC | Age: 56
End: 2021-10-04
Payer: COMMERCIAL

## 2021-10-04 VITALS
WEIGHT: 269.19 LBS | DIASTOLIC BLOOD PRESSURE: 86 MMHG | HEART RATE: 55 BPM | OXYGEN SATURATION: 100 % | BODY MASS INDEX: 45.49 KG/M2 | SYSTOLIC BLOOD PRESSURE: 124 MMHG

## 2021-10-04 VITALS
HEIGHT: 65 IN | DIASTOLIC BLOOD PRESSURE: 83 MMHG | WEIGHT: 269.19 LBS | BODY MASS INDEX: 44.85 KG/M2 | HEART RATE: 55 BPM | SYSTOLIC BLOOD PRESSURE: 126 MMHG

## 2021-10-04 DIAGNOSIS — S63.501A WRIST SPRAIN, RIGHT, INITIAL ENCOUNTER: Primary | ICD-10-CM

## 2021-10-04 DIAGNOSIS — M76.60 INSERTIONAL ACHILLES TENDINOPATHY: Primary | ICD-10-CM

## 2021-10-04 DIAGNOSIS — M79.671 RIGHT FOOT PAIN: ICD-10-CM

## 2021-10-04 DIAGNOSIS — E11.9 TYPE 2 DIABETES MELLITUS WITHOUT COMPLICATION, WITH LONG-TERM CURRENT USE OF INSULIN: ICD-10-CM

## 2021-10-04 DIAGNOSIS — Z79.4 TYPE 2 DIABETES MELLITUS WITHOUT COMPLICATION, WITH LONG-TERM CURRENT USE OF INSULIN: ICD-10-CM

## 2021-10-04 PROCEDURE — 99999 PR PBB SHADOW E&M-EST. PATIENT-LVL III: ICD-10-PCS | Mod: PBBFAC,,, | Performed by: FAMILY MEDICINE

## 2021-10-04 PROCEDURE — 73630 X-RAY EXAM OF FOOT: CPT | Mod: 26,RT,, | Performed by: RADIOLOGY

## 2021-10-04 PROCEDURE — 73630 X-RAY EXAM OF FOOT: CPT | Mod: TC,PO,RT

## 2021-10-04 PROCEDURE — 99214 PR OFFICE/OUTPT VISIT, EST, LEVL IV, 30-39 MIN: ICD-10-PCS | Mod: S$GLB,,, | Performed by: FAMILY MEDICINE

## 2021-10-04 PROCEDURE — 99999 PR PBB SHADOW E&M-EST. PATIENT-LVL V: CPT | Mod: PBBFAC,,, | Performed by: ORTHOPAEDIC SURGERY

## 2021-10-04 PROCEDURE — 99203 PR OFFICE/OUTPT VISIT, NEW, LEVL III, 30-44 MIN: ICD-10-PCS | Mod: S$GLB,,, | Performed by: ORTHOPAEDIC SURGERY

## 2021-10-04 PROCEDURE — 73630 XR FOOT COMPLETE 3 VIEW RIGHT: ICD-10-PCS | Mod: 26,RT,, | Performed by: RADIOLOGY

## 2021-10-04 PROCEDURE — 99214 OFFICE O/P EST MOD 30 MIN: CPT | Mod: S$GLB,,, | Performed by: FAMILY MEDICINE

## 2021-10-04 PROCEDURE — 99203 OFFICE O/P NEW LOW 30 MIN: CPT | Mod: S$GLB,,, | Performed by: ORTHOPAEDIC SURGERY

## 2021-10-04 PROCEDURE — 99999 PR PBB SHADOW E&M-EST. PATIENT-LVL V: ICD-10-PCS | Mod: PBBFAC,,, | Performed by: ORTHOPAEDIC SURGERY

## 2021-10-04 PROCEDURE — 99999 PR PBB SHADOW E&M-EST. PATIENT-LVL III: CPT | Mod: PBBFAC,,, | Performed by: FAMILY MEDICINE

## 2021-10-04 RX ORDER — METFORMIN HYDROCHLORIDE 1000 MG/1
1000 TABLET ORAL 2 TIMES DAILY
COMMUNITY
Start: 2021-08-15 | End: 2022-02-24

## 2021-10-04 RX ORDER — PREGABALIN 150 MG/1
300 CAPSULE ORAL 2 TIMES DAILY
Qty: 120 CAPSULE | Refills: 5 | Status: SHIPPED | OUTPATIENT
Start: 2021-10-04 | End: 2022-03-29

## 2021-10-04 RX ORDER — MULTIVIT WITH IRON,MINERALS
1 TABLET,CHEWABLE ORAL DAILY
COMMUNITY
Start: 2021-08-12

## 2021-10-04 RX ORDER — GABAPENTIN 800 MG/1
800 TABLET ORAL 2 TIMES DAILY
COMMUNITY
Start: 2021-08-16 | End: 2022-02-24

## 2021-10-05 ENCOUNTER — PATIENT MESSAGE (OUTPATIENT)
Dept: PHYSICAL MEDICINE AND REHAB | Facility: CLINIC | Age: 56
End: 2021-10-05

## 2021-10-05 ENCOUNTER — DOCUMENTATION ONLY (OUTPATIENT)
Dept: FAMILY MEDICINE | Facility: CLINIC | Age: 56
End: 2021-10-05
Payer: COMMERCIAL

## 2021-10-06 ENCOUNTER — OFFICE VISIT (OUTPATIENT)
Dept: PHYSICAL MEDICINE AND REHAB | Facility: CLINIC | Age: 56
End: 2021-10-06
Payer: COMMERCIAL

## 2021-10-06 DIAGNOSIS — M76.61 RIGHT ACHILLES TENDINITIS: Primary | ICD-10-CM

## 2021-10-06 PROCEDURE — 99203 PR OFFICE/OUTPT VISIT, NEW, LEVL III, 30-44 MIN: ICD-10-PCS | Mod: 95,,, | Performed by: PHYSICAL MEDICINE & REHABILITATION

## 2021-10-06 PROCEDURE — 99203 OFFICE O/P NEW LOW 30 MIN: CPT | Mod: 95,,, | Performed by: PHYSICAL MEDICINE & REHABILITATION

## 2021-10-07 ENCOUNTER — TELEPHONE (OUTPATIENT)
Dept: PHYSICAL MEDICINE AND REHAB | Facility: CLINIC | Age: 56
End: 2021-10-07

## 2021-10-14 ENCOUNTER — PATIENT OUTREACH (OUTPATIENT)
Dept: ADMINISTRATIVE | Facility: HOSPITAL | Age: 56
End: 2021-10-14

## 2021-10-14 ENCOUNTER — PATIENT MESSAGE (OUTPATIENT)
Dept: ADMINISTRATIVE | Facility: HOSPITAL | Age: 56
End: 2021-10-14
Payer: COMMERCIAL

## 2021-10-18 ENCOUNTER — PATIENT MESSAGE (OUTPATIENT)
Dept: ADMINISTRATIVE | Facility: HOSPITAL | Age: 56
End: 2021-10-18
Payer: COMMERCIAL

## 2021-10-21 LAB — NONINV COLON CA DNA+OCC BLD SCRN STL QL: NEGATIVE

## 2021-11-02 ENCOUNTER — TELEPHONE (OUTPATIENT)
Dept: ENDOCRINOLOGY | Facility: CLINIC | Age: 56
End: 2021-11-02
Payer: COMMERCIAL

## 2021-11-04 ENCOUNTER — TELEPHONE (OUTPATIENT)
Dept: PHYSICAL MEDICINE AND REHAB | Facility: CLINIC | Age: 56
End: 2021-11-04
Payer: COMMERCIAL

## 2021-11-09 DIAGNOSIS — E11.9 TYPE 2 DIABETES MELLITUS WITHOUT COMPLICATION, WITH LONG-TERM CURRENT USE OF INSULIN: ICD-10-CM

## 2021-11-09 DIAGNOSIS — E78.5 DYSLIPIDEMIA: ICD-10-CM

## 2021-11-09 DIAGNOSIS — Z79.4 TYPE 2 DIABETES MELLITUS WITHOUT COMPLICATION, WITH LONG-TERM CURRENT USE OF INSULIN: ICD-10-CM

## 2021-11-09 DIAGNOSIS — I10 BENIGN HYPERTENSION: ICD-10-CM

## 2021-11-09 DIAGNOSIS — R93.1 ELEVATED CORONARY ARTERY CALCIUM SCORE: ICD-10-CM

## 2021-11-09 DIAGNOSIS — E66.01 MORBID OBESITY, UNSPECIFIED OBESITY TYPE: ICD-10-CM

## 2021-11-10 RX ORDER — ATORVASTATIN CALCIUM 10 MG/1
TABLET, FILM COATED ORAL
Qty: 90 TABLET | Refills: 3 | Status: SHIPPED | OUTPATIENT
Start: 2021-11-10 | End: 2023-10-09

## 2021-11-15 ENCOUNTER — PATIENT MESSAGE (OUTPATIENT)
Dept: INTERNAL MEDICINE | Facility: CLINIC | Age: 56
End: 2021-11-15
Payer: COMMERCIAL

## 2021-11-15 RX ORDER — HYDROCODONE BITARTRATE AND ACETAMINOPHEN 7.5; 325 MG/1; MG/1
1 TABLET ORAL EVERY 6 HOURS PRN
Qty: 90 TABLET | Refills: 0 | OUTPATIENT
Start: 2021-11-15

## 2021-11-15 RX ORDER — FLUOXETINE HYDROCHLORIDE 40 MG/1
CAPSULE ORAL
Qty: 90 CAPSULE | Refills: 1 | Status: SHIPPED | OUTPATIENT
Start: 2021-11-15 | End: 2022-03-22

## 2021-11-15 RX ORDER — FUROSEMIDE 20 MG/1
TABLET ORAL
Qty: 90 TABLET | Refills: 1 | Status: SHIPPED | OUTPATIENT
Start: 2021-11-15 | End: 2022-05-11

## 2021-11-23 ENCOUNTER — PATIENT MESSAGE (OUTPATIENT)
Dept: ORTHOPEDICS | Facility: CLINIC | Age: 56
End: 2021-11-23
Payer: COMMERCIAL

## 2021-11-23 ENCOUNTER — TELEPHONE (OUTPATIENT)
Dept: ORTHOPEDICS | Facility: CLINIC | Age: 56
End: 2021-11-23
Payer: COMMERCIAL

## 2021-11-24 ENCOUNTER — OFFICE VISIT (OUTPATIENT)
Dept: INTERNAL MEDICINE | Facility: CLINIC | Age: 56
End: 2021-11-24
Payer: COMMERCIAL

## 2021-11-24 DIAGNOSIS — M17.11 PRIMARY OSTEOARTHRITIS OF RIGHT KNEE: Primary | ICD-10-CM

## 2021-11-24 DIAGNOSIS — E10.40 DIABETIC NEUROPATHY, TYPE I DIABETES MELLITUS: ICD-10-CM

## 2021-11-24 DIAGNOSIS — E11.9 TYPE 2 DIABETES MELLITUS WITHOUT COMPLICATION, WITH LONG-TERM CURRENT USE OF INSULIN: ICD-10-CM

## 2021-11-24 DIAGNOSIS — Z00.00 ANNUAL VISIT FOR GENERAL ADULT MEDICAL EXAMINATION WITHOUT ABNORMAL FINDINGS: ICD-10-CM

## 2021-11-24 DIAGNOSIS — N18.32 STAGE 3B CHRONIC KIDNEY DISEASE: ICD-10-CM

## 2021-11-24 DIAGNOSIS — I10 ESSENTIAL HYPERTENSION: ICD-10-CM

## 2021-11-24 DIAGNOSIS — Z79.4 TYPE 2 DIABETES MELLITUS WITHOUT COMPLICATION, WITH LONG-TERM CURRENT USE OF INSULIN: ICD-10-CM

## 2021-11-24 DIAGNOSIS — S86.011D RUPTURE OF RIGHT ACHILLES TENDON, SUBSEQUENT ENCOUNTER: ICD-10-CM

## 2021-11-24 DIAGNOSIS — R25.1 TREMORS OF NERVOUS SYSTEM: ICD-10-CM

## 2021-11-24 DIAGNOSIS — M47.816 SPONDYLOSIS OF LUMBAR REGION WITHOUT MYELOPATHY OR RADICULOPATHY: ICD-10-CM

## 2021-11-24 DIAGNOSIS — G47.33 OSA (OBSTRUCTIVE SLEEP APNEA): ICD-10-CM

## 2021-11-24 DIAGNOSIS — I25.10 CORONARY ARTERY DISEASE, UNSPECIFIED VESSEL OR LESION TYPE, UNSPECIFIED WHETHER ANGINA PRESENT, UNSPECIFIED WHETHER NATIVE OR TRANSPLANTED HEART: ICD-10-CM

## 2021-11-24 PROBLEM — S86.011A RUPTURE OF RIGHT ACHILLES TENDON: Status: ACTIVE | Noted: 2021-11-24

## 2021-11-24 PROCEDURE — 99213 OFFICE O/P EST LOW 20 MIN: CPT | Mod: 95,,, | Performed by: INTERNAL MEDICINE

## 2021-11-24 PROCEDURE — 99213 PR OFFICE/OUTPT VISIT, EST, LEVL III, 20-29 MIN: ICD-10-PCS | Mod: 95,,, | Performed by: INTERNAL MEDICINE

## 2021-11-24 RX ORDER — HYDROCODONE BITARTRATE AND ACETAMINOPHEN 7.5; 325 MG/1; MG/1
1 TABLET ORAL EVERY 6 HOURS PRN
Qty: 90 TABLET | Refills: 0 | Status: SHIPPED | OUTPATIENT
Start: 2021-11-24 | End: 2022-02-22 | Stop reason: SDUPTHER

## 2021-11-30 ENCOUNTER — TELEPHONE (OUTPATIENT)
Dept: ORTHOPEDICS | Facility: CLINIC | Age: 56
End: 2021-11-30
Payer: COMMERCIAL

## 2021-11-30 ENCOUNTER — PATIENT MESSAGE (OUTPATIENT)
Dept: INTERNAL MEDICINE | Facility: CLINIC | Age: 56
End: 2021-11-30
Payer: COMMERCIAL

## 2021-11-30 DIAGNOSIS — Z00.00 ANNUAL VISIT FOR GENERAL ADULT MEDICAL EXAMINATION WITHOUT ABNORMAL FINDINGS: Primary | ICD-10-CM

## 2021-11-30 DIAGNOSIS — M17.0 PRIMARY OSTEOARTHRITIS OF BOTH KNEES: Primary | ICD-10-CM

## 2021-12-01 ENCOUNTER — OFFICE VISIT (OUTPATIENT)
Dept: ORTHOPEDICS | Facility: CLINIC | Age: 56
End: 2021-12-01
Payer: COMMERCIAL

## 2021-12-01 ENCOUNTER — HOSPITAL ENCOUNTER (OUTPATIENT)
Dept: RADIOLOGY | Facility: HOSPITAL | Age: 56
Discharge: HOME | End: 2021-12-01
Attending: ORTHOPAEDIC SURGERY
Payer: COMMERCIAL

## 2021-12-01 ENCOUNTER — OFFICE VISIT (OUTPATIENT)
Dept: PHYSICAL MEDICINE AND REHAB | Facility: CLINIC | Age: 56
End: 2021-12-01
Payer: COMMERCIAL

## 2021-12-01 VITALS — HEIGHT: 65 IN | BODY MASS INDEX: 44.82 KG/M2 | WEIGHT: 269 LBS

## 2021-12-01 VITALS — WEIGHT: 286.94 LBS | BODY MASS INDEX: 48.99 KG/M2 | HEIGHT: 64 IN

## 2021-12-01 DIAGNOSIS — M76.61 RIGHT ACHILLES TENDINITIS: Primary | ICD-10-CM

## 2021-12-01 DIAGNOSIS — M76.62 TENDONITIS, ACHILLES, LEFT: ICD-10-CM

## 2021-12-01 DIAGNOSIS — M17.0 PRIMARY OSTEOARTHRITIS OF BOTH KNEES: Primary | ICD-10-CM

## 2021-12-01 DIAGNOSIS — E11.9 TYPE 2 DIABETES MELLITUS WITHOUT COMPLICATION, WITH LONG-TERM CURRENT USE OF INSULIN: ICD-10-CM

## 2021-12-01 DIAGNOSIS — M17.0 PRIMARY OSTEOARTHRITIS OF BOTH KNEES: ICD-10-CM

## 2021-12-01 DIAGNOSIS — Z79.4 TYPE 2 DIABETES MELLITUS WITHOUT COMPLICATION, WITH LONG-TERM CURRENT USE OF INSULIN: ICD-10-CM

## 2021-12-01 PROCEDURE — 76882 PR  US,EXTREMITY,NONVASCULAR,REAL-TIME IMAGE,LIMITED: ICD-10-PCS | Mod: S$GLB,,, | Performed by: PHYSICAL MEDICINE & REHABILITATION

## 2021-12-01 PROCEDURE — 76882 US LMTD JT/FCL EVL NVASC XTR: CPT | Mod: S$GLB,,, | Performed by: PHYSICAL MEDICINE & REHABILITATION

## 2021-12-01 PROCEDURE — 73562 X-RAY EXAM OF KNEE 3: CPT | Mod: 26,50,, | Performed by: RADIOLOGY

## 2021-12-01 PROCEDURE — 99214 OFFICE O/P EST MOD 30 MIN: CPT | Mod: 25,S$GLB,, | Performed by: ORTHOPAEDIC SURGERY

## 2021-12-01 PROCEDURE — 99213 PR OFFICE/OUTPT VISIT, EST, LEVL III, 20-29 MIN: ICD-10-PCS | Mod: 25,S$GLB,, | Performed by: PHYSICAL MEDICINE & REHABILITATION

## 2021-12-01 PROCEDURE — 99214 PR OFFICE/OUTPT VISIT, EST, LEVL IV, 30-39 MIN: ICD-10-PCS | Mod: 25,S$GLB,, | Performed by: ORTHOPAEDIC SURGERY

## 2021-12-01 PROCEDURE — 99999 PR PBB SHADOW E&M-EST. PATIENT-LVL V: ICD-10-PCS | Mod: PBBFAC,,, | Performed by: PHYSICAL MEDICINE & REHABILITATION

## 2021-12-01 PROCEDURE — 99999 PR PBB SHADOW E&M-EST. PATIENT-LVL V: CPT | Mod: PBBFAC,,, | Performed by: PHYSICAL MEDICINE & REHABILITATION

## 2021-12-01 PROCEDURE — 20610 DRAIN/INJ JOINT/BURSA W/O US: CPT | Mod: 50,S$GLB,, | Performed by: ORTHOPAEDIC SURGERY

## 2021-12-01 PROCEDURE — 99213 OFFICE O/P EST LOW 20 MIN: CPT | Mod: 25,S$GLB,, | Performed by: PHYSICAL MEDICINE & REHABILITATION

## 2021-12-01 PROCEDURE — 99999 PR PBB SHADOW E&M-EST. PATIENT-LVL III: ICD-10-PCS | Mod: PBBFAC,,, | Performed by: ORTHOPAEDIC SURGERY

## 2021-12-01 PROCEDURE — 73562 XR KNEE ORTHO BILAT: ICD-10-PCS | Mod: 26,50,, | Performed by: RADIOLOGY

## 2021-12-01 PROCEDURE — 20610 LARGE JOINT ASPIRATION/INJECTION: BILATERAL KNEE: ICD-10-PCS | Mod: 50,S$GLB,, | Performed by: ORTHOPAEDIC SURGERY

## 2021-12-01 PROCEDURE — 73562 X-RAY EXAM OF KNEE 3: CPT | Mod: TC,50,PO

## 2021-12-01 PROCEDURE — 99999 PR PBB SHADOW E&M-EST. PATIENT-LVL III: CPT | Mod: PBBFAC,,, | Performed by: ORTHOPAEDIC SURGERY

## 2021-12-01 RX ORDER — TRIAMCINOLONE ACETONIDE 40 MG/ML
40 INJECTION, SUSPENSION INTRA-ARTICULAR; INTRAMUSCULAR
Status: DISCONTINUED | OUTPATIENT
Start: 2021-12-01 | End: 2021-12-01 | Stop reason: HOSPADM

## 2021-12-01 RX ADMIN — TRIAMCINOLONE ACETONIDE 40 MG: 40 INJECTION, SUSPENSION INTRA-ARTICULAR; INTRAMUSCULAR at 10:12

## 2021-12-02 RX ORDER — MIDAZOLAM HYDROCHLORIDE 1 MG/ML
2 INJECTION INTRAMUSCULAR; INTRAVENOUS ONCE AS NEEDED
Status: CANCELLED | OUTPATIENT
Start: 2021-12-02 | End: 2033-04-30

## 2021-12-02 RX ORDER — SODIUM CHLORIDE, SODIUM LACTATE, POTASSIUM CHLORIDE, CALCIUM CHLORIDE 600; 310; 30; 20 MG/100ML; MG/100ML; MG/100ML; MG/100ML
INJECTION, SOLUTION INTRAVENOUS CONTINUOUS
Status: CANCELLED | OUTPATIENT
Start: 2021-12-02

## 2021-12-02 RX ORDER — LIDOCAINE HYDROCHLORIDE 10 MG/ML
1 INJECTION, SOLUTION EPIDURAL; INFILTRATION; INTRACAUDAL; PERINEURAL ONCE
Status: CANCELLED | OUTPATIENT
Start: 2021-12-02 | End: 2021-12-02

## 2021-12-23 DIAGNOSIS — E11.9 TYPE 2 DIABETES MELLITUS WITHOUT COMPLICATION, UNSPECIFIED WHETHER LONG TERM INSULIN USE: ICD-10-CM

## 2021-12-23 RX ORDER — ALPRAZOLAM 0.5 MG/1
0.5 TABLET ORAL DAILY
Qty: 30 TABLET | Refills: 3 | Status: SHIPPED | OUTPATIENT
Start: 2021-12-23 | End: 2022-06-02 | Stop reason: SDUPTHER

## 2021-12-23 RX ORDER — ALPRAZOLAM 0.5 MG/1
0.5 TABLET ORAL DAILY
Qty: 30 TABLET | Refills: 3 | OUTPATIENT
Start: 2021-12-23

## 2021-12-28 DIAGNOSIS — R25.1 TREMORS OF NERVOUS SYSTEM: ICD-10-CM

## 2021-12-29 RX ORDER — PROPRANOLOL HYDROCHLORIDE 20 MG/1
TABLET ORAL
Qty: 180 TABLET | Refills: 3 | Status: SHIPPED | OUTPATIENT
Start: 2021-12-29 | End: 2022-08-24

## 2022-01-05 DIAGNOSIS — K52.9 GE (GASTROENTERITIS): ICD-10-CM

## 2022-01-05 RX ORDER — ONDANSETRON 4 MG/1
TABLET, FILM COATED ORAL
Qty: 30 TABLET | Refills: 1 | Status: SHIPPED | OUTPATIENT
Start: 2022-01-05 | End: 2022-04-20

## 2022-01-05 NOTE — TELEPHONE ENCOUNTER
This Rx Request does not qualify for assessment with the OR.  Please review protocol details and the Care Due Message for extra clinical information    Reasons Rx Request may be deferred:  Labs/Vitals overdue  Labs/Vitals abnormal  Patient has been seen in the ED/Hospital since the last PCP visit  Medication or dx is non-delegated  Provider is a non-participating provider

## 2022-01-05 NOTE — TELEPHONE ENCOUNTER
No new care gaps identified.  Powered by [employer]. Reference number: [id number].   1/05/2022 3:10:54 PM CST

## 2022-01-07 NOTE — TELEPHONE ENCOUNTER
----- Message from Lata Kemp sent at 7/12/2017 12:49 PM CDT -----  Contact: Pt at [phone]  Doctor appointment and lab have been scheduled.  Please link lab orders to the lab appointment.  Date of doctor appointment:  7/18  Physical or EP:  physical  Date of lab appointment:  7/14  Comments:        Audrey Conway,  calling in reference to physical therapy  Audrey Conway would like to know if Dr Jacob Gonzales wants the patient to complete outpatient PT  Please advise      Audrey Conway [phone]

## 2022-01-10 ENCOUNTER — PATIENT MESSAGE (OUTPATIENT)
Dept: INTERNAL MEDICINE | Facility: CLINIC | Age: 57
End: 2022-01-10
Payer: COMMERCIAL

## 2022-01-10 ENCOUNTER — PATIENT OUTREACH (OUTPATIENT)
Dept: ADMINISTRATIVE | Facility: OTHER | Age: 57
End: 2022-01-10
Payer: COMMERCIAL

## 2022-01-10 NOTE — PROGRESS NOTES
Health Maintenance Due   Topic Date Due    Hepatitis C Screening  Never done    HIV Screening  Never done    Mammogram  12/10/2014    Foot Exam  11/07/2020    Eye Exam  03/04/2021     Updates were requested from care everywhere.  Chart was reviewed for overdue [facility] Encounters ([facility]) topics (CRS, Breast Cancer Screening, Eye exam)  Health Maintenance has been updated.  LINKS immunization registry triggered.  Immunizations were reconciled.

## 2022-01-17 ENCOUNTER — PATIENT MESSAGE (OUTPATIENT)
Dept: ADMINISTRATIVE | Facility: OTHER | Age: 57
End: 2022-01-17
Payer: COMMERCIAL

## 2022-01-18 ENCOUNTER — CLINICAL SUPPORT (OUTPATIENT)
Dept: URGENT CARE | Facility: CLINIC | Age: 57
End: 2022-01-18
Payer: COMMERCIAL

## 2022-01-18 DIAGNOSIS — Z01.818 PRE-OP TESTING: ICD-10-CM

## 2022-01-18 PROCEDURE — U0003 INFECTIOUS AGENT DETECTION BY NUCLEIC ACID (DNA OR RNA); SEVERE ACUTE RESPIRATORY SYNDROME CORONAVIRUS 2 (SARS-COV-2) (CORONAVIRUS DISEASE [COVID-19]), AMPLIFIED PROBE TECHNIQUE, MAKING USE OF HIGH THROUGHPUT TECHNOLOGIES AS DESCRIBED BY CMS-2020-01-R: HCPCS | Performed by: PHYSICAL MEDICINE & REHABILITATION

## 2022-01-18 PROCEDURE — U0005 INFEC AGEN DETEC AMPLI PROBE: HCPCS | Performed by: PHYSICAL MEDICINE & REHABILITATION

## 2022-01-19 ENCOUNTER — TELEPHONE (OUTPATIENT)
Dept: PHYSICAL MEDICINE AND REHAB | Facility: CLINIC | Age: 57
End: 2022-01-19
Payer: COMMERCIAL

## 2022-01-19 DIAGNOSIS — Z01.818 PRE-OP TESTING: ICD-10-CM

## 2022-01-19 LAB
SARS-COV-2 RNA RESP QL NAA+PROBE: NOT DETECTED
SARS-COV-2- CYCLE NUMBER: NORMAL

## 2022-01-19 NOTE — TELEPHONE ENCOUNTER
Pt called to reschedule procedure to 2/4/22  ----- Message from Katerina Perales MA sent at 1/19/2022  7:50 AM CST -----  Contact: pt  Wants to reschedule procedure  Call back

## 2022-01-25 ENCOUNTER — TELEPHONE (OUTPATIENT)
Dept: ENDOCRINOLOGY | Facility: CLINIC | Age: 57
End: 2022-01-25
Payer: COMMERCIAL

## 2022-01-25 NOTE — LETTER
January 20, 2022    Mary Ann Vidales  [address]  [facility] [address]             [place] [facility] End[facility]in[facility]  [address]  [facility] [address]  Phone: [phone]  Fax: [phone] Dear Mary Ann Vidales:    We are sorry that you missed your appointment with NICOLE Nieto on 01/20/22. Your health and follow-up medical care are important to us. Please call our office as soon as possible so that we may reschedule your appointment. If you have already rescheduled your appointment, please disregard this letter. Please understand that it is [facility] policy that 3 or more no show may result in discontinuation of care from our clinic.      Sincerely,        NICOLE Bejarano,ANP-C

## 2022-01-25 NOTE — TELEPHONE ENCOUNTER
----- Message from NICOLE Bejarano,ANP-C sent at 1/25/2022  3:17 PM CST -----  Please print NS letters for both jan 12, and Jan 20.      Thank you,

## 2022-01-25 NOTE — LETTER
January 12, 2022  Mary Ann Vidales  [address]  [facility] [address]             [place] [facility] Endocrinology  [address]  [facility] [address]  Phone: [phone]  Fax: [phone] Dear Mary Ann Vidales:    We are sorry that you missed your appointment with NICOLE Nieto on 1/12/22. Your health and follow-up medical care are important to us. Please call our office as soon as possible so that we may reschedule your appointment. If you have already rescheduled your appointment, please disregard this letter.  Please understand that it is [facility] policy that 3 or more no show may result in discontinuation of care from our clinic.    Sincerely,        NICOLE Bejarano,ANP-C

## 2022-02-22 RX ORDER — HYDROCODONE BITARTRATE AND ACETAMINOPHEN 7.5; 325 MG/1; MG/1
1 TABLET ORAL EVERY 6 HOURS PRN
Qty: 90 TABLET | Refills: 0 | Status: SHIPPED | OUTPATIENT
Start: 2022-02-22 | End: 2022-06-02 | Stop reason: SDUPTHER

## 2022-02-22 NOTE — TELEPHONE ENCOUNTER
Care Due:                  Date            Visit Type   Department     Provider  --------------------------------------------------------------------------------                                SAME DAY -                              ESTABLISHED   [facility]  Last Visit: 10-      PATIENT      MEDICINE       [place]  Next Visit: None Scheduled  None         None Found                                                            Last  Test          Frequency    Reason                     Performed    Due Date  --------------------------------------------------------------------------------    HBA1C.......  6 months...  glimepiride..............  08- 01-    Powered by [employer]. Reference number: [id number].   2/22/2022 3:33:37 PM CST

## 2022-02-22 NOTE — TELEPHONE ENCOUNTER
Last seen November.  And last refilled 11/24/21.    Can you ok and I will let her know this is last refill by dr read and she needs to see dr in office for her next refill and appt needs to be booked now??     She is not booked till June to see dr chang.

## 2022-02-24 ENCOUNTER — OFFICE VISIT (OUTPATIENT)
Dept: ENDOCRINOLOGY | Facility: CLINIC | Age: 57
End: 2022-02-24
Payer: COMMERCIAL

## 2022-02-24 ENCOUNTER — PATIENT MESSAGE (OUTPATIENT)
Dept: ENDOCRINOLOGY | Facility: CLINIC | Age: 57
End: 2022-02-24

## 2022-02-24 DIAGNOSIS — E11.9 TYPE 2 DIABETES MELLITUS WITHOUT COMPLICATION, WITH LONG-TERM CURRENT USE OF INSULIN: Primary | ICD-10-CM

## 2022-02-24 DIAGNOSIS — E78.5 DYSLIPIDEMIA: ICD-10-CM

## 2022-02-24 DIAGNOSIS — N18.32 STAGE 3B CHRONIC KIDNEY DISEASE: ICD-10-CM

## 2022-02-24 DIAGNOSIS — Z79.4 TYPE 2 DIABETES MELLITUS WITHOUT COMPLICATION, WITH LONG-TERM CURRENT USE OF INSULIN: Primary | ICD-10-CM

## 2022-02-24 DIAGNOSIS — G62.9 PERIPHERAL POLYNEUROPATHY: ICD-10-CM

## 2022-02-24 DIAGNOSIS — I10 ESSENTIAL HYPERTENSION: ICD-10-CM

## 2022-02-24 PROCEDURE — 99215 OFFICE O/P EST HI 40 MIN: CPT | Mod: 95,,, | Performed by: NURSE PRACTITIONER

## 2022-02-24 PROCEDURE — 99215 PR OFFICE/OUTPT VISIT, EST, LEVL V, 40-54 MIN: ICD-10-PCS | Mod: 95,,, | Performed by: NURSE PRACTITIONER

## 2022-02-24 NOTE — PROGRESS NOTES
Subjective:      Patient ID: Mary Ann Vidales is a 57 y.o. female.    Chief Complaint:  Diabetes f/u    The patient location is: home   The chief complaint leading to consultation is: routine f/u Type 1 DM     Visit type: audiovisual    Face to Face time with patient: 2:08 to  2:37  minutes of total time spent on the encounter, which includes face to face time and non-face to face time preparing to see the patient (eg, review of tests), Obtaining and/or reviewing separately obtained history, Documenting clinical information in the electronic or other health record, Independently interpreting results (not separately reported) and communicating results to the patient/family/caregiver, or Care coordination (not separately reported).     Each patient to whom he or she provides medical services by telemedicine is:  (1) informed of the relationship between the physician and patient and the respective role of any other health care provider with respect to management of the patient; and (2) notified that he or she may decline to receive medical services by telemedicine and may withdraw from such care at any time.    Notes:   History of Present Illness  Pt is a 56y/o female with TYPE 2 DM since approx 2000, and chronic conditions pending review including HTN, neuropathy. Dyslipidemia, morbid obesity.      Interim Events:  Not seen since Aug, lost 30 lbs--was having hypoglycemia, so stopped metformin and glimipiride.  Only taking Ozempic 1 mg.    Checking gluoses bid 140-150.  Pt biggest issue has been painful peripheral neuropathy.  I had prescribed her a one time dose to Lyrica 300 mg BID--that was to be assumed by her primary care. And has.  But she never did totally stop the gabapentin, and is also taking 800 bid.  (this was stopped with rx of Lyrica).   She is pending cards f/u.  Having lots of LE edema. She is also pending ankle surgery  (sounds like spurs rubbing on achilles).        Diabetes Flow  Sheet:  Diabetes Medications:  Metformin 1000 BID,  glimipiride 4 mg, ozempci 1 mg   Bydureon d/c r/t hacking cough   Diabetes Complications:peripheral neuropathy   Aspirin:   Statin: none   ACE/ARB:losartan hctz 100/25 qd.   Last Urine Microalbumin:   Last Eye exam: 10/16Last Diabetic Education:   Glucometer--Accucheck Verio     Review of Systems   Constitutional: Negative for activity change and fatigue.   HENT: Negative for hearing loss and trouble swallowing.    Eyes: Negative for visual disturbance.   Respiratory: Positive for shortness of breath. Negative for cough.    Cardiovascular: Positive for leg swelling (L leg bigger.  ). Negative for chest pain and palpitations.   Gastrointestinal: Negative for constipation and diarrhea.   Genitourinary: Negative for difficulty urinating and frequency.   Musculoskeletal: Positive for arthralgias. Negative for myalgias.        Knees   Skin: Negative for rash and wound.   Neurological: Positive for tremors and numbness (feet worsened, hands). Negative for weakness and light-headedness.   Hematological: Does not bruise/bleed easily.   Psychiatric/Behavioral: Negative for agitation and decreased concentration. The patient is not nervous/anxious.        Objective:   Physical Exam  Constitutional:       Appearance: She is well-developed.   Psychiatric:         Behavior: Behavior normal.         Thought Content: Thought content normal.         Judgment: Judgment normal.             Lab Review:   Hemoglobin A1C   Date Value Ref Range Status   08/04/2021 5.7 (H) 4.0 - 5.6 % Final     Comment:     ADA Screening Guidelines:  5.7-6.4%  Consistent with prediabetes  >or=6.5%  Consistent with diabetes    High levels of fetal hemoglobin interfere with the HbA1C  assay. Heterozygous hemoglobin variants (HbS, HgC, etc)do  not significantly interfere with this assay.   However, presence of multiple variants may affect accuracy.     01/07/2021 6.5 (H) 4.0 - 5.6 % Final     Comment:      ADA Screening Guidelines:  5.7-6.4%  Consistent with prediabetes  >or=6.5%  Consistent with diabetes  High levels of fetal hemoglobin interfere with the HbA1C  assay. Heterozygous hemoglobin variants (HbS, HgC, etc)do  not significantly interfere with this assay.   However, presence of multiple variants may affect accuracy.     08/11/2020 6.6 (H) 4.0 - 5.6 % Final     Comment:     ADA Screening Guidelines:  5.7-6.4%  Consistent with prediabetes  >or=6.5%  Consistent with diabetes  High levels of fetal hemoglobin interfere with the HbA1C  assay. Heterozygous hemoglobin variants (HbS, HgC, etc)do  not significantly interfere with this assay.   However, presence of multiple variants may affect accuracy.       Lab Results   Component Value Date    LDLCALC 60.0 (L) 08/04/2021     Lab Results   Component Value Date    TSH 1.642 01/07/2021       Chemistry        Component Value Date/Time     08/10/2021 0720     08/10/2021 0720     08/10/2021 0720     08/10/2021 0720    K 4.7 08/10/2021 0720    K 4.7 08/10/2021 0720    K 4.7 08/10/2021 0720    K 4.7 08/10/2021 0720     08/10/2021 0720     08/10/2021 0720     08/10/2021 0720     08/10/2021 0720    CO2 29 08/10/2021 0720    CO2 29 08/10/2021 0720    CO2 29 08/10/2021 0720    CO2 29 08/10/2021 0720    BUN 40 (H) 08/10/2021 0720    BUN 40 (H) 08/10/2021 0720    BUN 40 (H) 08/10/2021 0720    BUN 40 (H) 08/10/2021 0720    CREATININE 1.9 (H) 08/10/2021 0720    CREATININE 1.9 (H) 08/10/2021 0720    CREATININE 1.9 (H) 08/10/2021 0720    CREATININE 1.9 (H) 08/10/2021 0720     (H) 08/10/2021 0720     (H) 08/10/2021 0720     (H) 08/10/2021 0720     (H) 08/10/2021 0720        Component Value Date/Time    CALCIUM 9.8 08/10/2021 0720    CALCIUM 9.8 08/10/2021 0720    CALCIUM 9.8 08/10/2021 0720    CALCIUM 9.8 08/10/2021 0720    ALKPHOS 81 08/04/2021 0723    AST 17 08/04/2021 0723    ALT 14 08/04/2021 0723     BILITOT 0.5 08/04/2021 0723            Assessment:     1. Type 2 diabetes mellitus without complication, with long-term current use of insulin  Comprehensive Metabolic Panel    Hemoglobin A1C    Lipid Panel    Microalbumin/Creatinine Ratio, Urine   2. Essential hypertension     3. Dyslipidemia     4. Stage 3b chronic kidney disease     5. Peripheral polyneuropathy           Plan:      advised she is on max dose of lyrica, and should not be taking gabapentin.   Advise to decrease yan to 1/2 pill bid for a week, and then 1/2 pill qd for a week and then stop.         ORDERS 02/24/2022          3 mo with  Fasting cmp, lipids,  A1c,  Urine m/c

## 2022-03-02 ENCOUNTER — LAB VISIT (OUTPATIENT)
Dept: LAB | Facility: HOSPITAL | Age: 57
End: 2022-03-02
Attending: NURSE PRACTITIONER
Payer: COMMERCIAL

## 2022-03-02 DIAGNOSIS — Z79.4 TYPE 2 DIABETES MELLITUS WITHOUT COMPLICATION, WITH LONG-TERM CURRENT USE OF INSULIN: ICD-10-CM

## 2022-03-02 DIAGNOSIS — E11.9 TYPE 2 DIABETES MELLITUS WITHOUT COMPLICATION, WITH LONG-TERM CURRENT USE OF INSULIN: ICD-10-CM

## 2022-03-02 LAB
ALBUMIN SERPL BCP-MCNC: 3.4 G/DL (ref 3.5–5.2)
ALBUMIN/CREAT UR: 146 UG/MG (ref 0–30)
ALP SERPL-CCNC: 108 U/L (ref 55–135)
ALT SERPL W/O P-5'-P-CCNC: 20 U/L (ref 10–44)
ANION GAP SERPL CALC-SCNC: 11 MMOL/L (ref 8–16)
AST SERPL-CCNC: 18 U/L (ref 10–40)
BILIRUB SERPL-MCNC: 0.7 MG/DL (ref 0.1–1)
BUN SERPL-MCNC: 42 MG/DL (ref 6–20)
CALCIUM SERPL-MCNC: 9.2 MG/DL (ref 8.7–10.5)
CHLORIDE SERPL-SCNC: 104 MMOL/L (ref 95–110)
CHOLEST SERPL-MCNC: 149 MG/DL (ref 120–199)
CHOLEST/HDLC SERPL: 3.6 {RATIO} (ref 2–5)
CO2 SERPL-SCNC: 25 MMOL/L (ref 23–29)
CREAT SERPL-MCNC: 2 MG/DL (ref 0.5–1.4)
CREAT UR-MCNC: 87 MG/DL (ref 15–325)
EST. GFR  (AFRICAN AMERICAN): 31.7 ML/MIN/1.73 M^2
EST. GFR  (NON AFRICAN AMERICAN): 27.5 ML/MIN/1.73 M^2
ESTIMATED AVG GLUCOSE: 134 MG/DL (ref 68–131)
GLUCOSE SERPL-MCNC: 193 MG/DL (ref 70–110)
HBA1C MFR BLD: 6.3 % (ref 4–5.6)
HDLC SERPL-MCNC: 41 MG/DL (ref 40–75)
HDLC SERPL: 27.5 % (ref 20–50)
LDLC SERPL CALC-MCNC: 84 MG/DL (ref 63–159)
MICROALBUMIN UR DL<=1MG/L-MCNC: 127 UG/ML
NONHDLC SERPL-MCNC: 108 MG/DL
POTASSIUM SERPL-SCNC: 4.7 MMOL/L (ref 3.5–5.1)
PROT SERPL-MCNC: 6.5 G/DL (ref 6–8.4)
SODIUM SERPL-SCNC: 140 MMOL/L (ref 136–145)
TRIGL SERPL-MCNC: 120 MG/DL (ref 30–150)

## 2022-03-02 PROCEDURE — 80061 LIPID PANEL: CPT | Performed by: NURSE PRACTITIONER

## 2022-03-02 PROCEDURE — 36415 COLL VENOUS BLD VENIPUNCTURE: CPT | Mod: PO | Performed by: NURSE PRACTITIONER

## 2022-03-02 PROCEDURE — 83036 HEMOGLOBIN GLYCOSYLATED A1C: CPT | Performed by: NURSE PRACTITIONER

## 2022-03-02 PROCEDURE — 80053 COMPREHEN METABOLIC PANEL: CPT | Performed by: NURSE PRACTITIONER

## 2022-03-02 PROCEDURE — 82570 ASSAY OF URINE CREATININE: CPT | Performed by: NURSE PRACTITIONER

## 2022-03-02 PROCEDURE — 82043 UR ALBUMIN QUANTITATIVE: CPT | Performed by: NURSE PRACTITIONER

## 2022-03-04 ENCOUNTER — HOSPITAL ENCOUNTER (OUTPATIENT)
Facility: HOSPITAL | Age: 57
Discharge: HOME | End: 2022-03-04
Attending: PHYSICAL MEDICINE & REHABILITATION | Admitting: PHYSICAL MEDICINE & REHABILITATION
Payer: COMMERCIAL

## 2022-03-04 VITALS
OXYGEN SATURATION: 100 % | TEMPERATURE: 98 F | SYSTOLIC BLOOD PRESSURE: 156 MMHG | RESPIRATION RATE: 16 BRPM | BODY MASS INDEX: 44.9 KG/M2 | DIASTOLIC BLOOD PRESSURE: 83 MMHG | WEIGHT: 263 LBS | HEIGHT: 64 IN | HEART RATE: 72 BPM

## 2022-03-04 DIAGNOSIS — M76.62 TENDONITIS, ACHILLES, LEFT: ICD-10-CM

## 2022-03-04 LAB — GLUCOSE SERPL-MCNC: 185 MG/DL (ref 70–110)

## 2022-03-04 PROCEDURE — 27605 INCISION OF ACHILLES TENDON: CPT | Mod: LT,,, | Performed by: PHYSICAL MEDICINE & REHABILITATION

## 2022-03-04 PROCEDURE — 99152 MOD SED SAME PHYS/QHP 5/>YRS: CPT | Mod: ,,, | Performed by: PHYSICAL MEDICINE & REHABILITATION

## 2022-03-04 PROCEDURE — 63600175 PHARM REV CODE 636 W HCPCS: Mod: PO | Performed by: PHYSICAL MEDICINE & REHABILITATION

## 2022-03-04 PROCEDURE — 27605 PR INCIS ACHILLES TENDON+LOCAL ANESTH: ICD-10-PCS | Mod: LT,,, | Performed by: PHYSICAL MEDICINE & REHABILITATION

## 2022-03-04 PROCEDURE — 36000704 HC OR TIME LEV I 1ST 15 MIN: Mod: PO | Performed by: PHYSICAL MEDICINE & REHABILITATION

## 2022-03-04 PROCEDURE — 27201423 OPTIME MED/SURG SUP & DEVICES STERILE SUPPLY: Mod: PO | Performed by: PHYSICAL MEDICINE & REHABILITATION

## 2022-03-04 PROCEDURE — 36000705 HC OR TIME LEV I EA ADD 15 MIN: Mod: PO | Performed by: PHYSICAL MEDICINE & REHABILITATION

## 2022-03-04 PROCEDURE — 71000015 HC POSTOP RECOV 1ST HR: Mod: PO | Performed by: PHYSICAL MEDICINE & REHABILITATION

## 2022-03-04 PROCEDURE — 25000003 PHARM REV CODE 250: Mod: PO | Performed by: PHYSICAL MEDICINE & REHABILITATION

## 2022-03-04 PROCEDURE — 99152 PR MOD CONSCIOUS SEDATION, SAME PHYS, 5+ YRS, FIRST 15 MIN: ICD-10-PCS | Mod: ,,, | Performed by: PHYSICAL MEDICINE & REHABILITATION

## 2022-03-04 RX ORDER — SODIUM CHLORIDE, SODIUM LACTATE, POTASSIUM CHLORIDE, CALCIUM CHLORIDE 600; 310; 30; 20 MG/100ML; MG/100ML; MG/100ML; MG/100ML
INJECTION, SOLUTION INTRAVENOUS CONTINUOUS
Status: DISCONTINUED | OUTPATIENT
Start: 2022-03-04 | End: 2022-03-04 | Stop reason: HOSPADM

## 2022-03-04 RX ORDER — FENTANYL CITRATE 50 UG/ML
INJECTION, SOLUTION INTRAMUSCULAR; INTRAVENOUS
Status: DISCONTINUED | OUTPATIENT
Start: 2022-03-04 | End: 2022-03-04 | Stop reason: HOSPADM

## 2022-03-04 RX ORDER — LIDOCAINE HYDROCHLORIDE 10 MG/ML
INJECTION INFILTRATION; PERINEURAL
Status: DISCONTINUED | OUTPATIENT
Start: 2022-03-04 | End: 2022-03-04 | Stop reason: HOSPADM

## 2022-03-04 RX ORDER — LIDOCAINE HYDROCHLORIDE 10 MG/ML
1 INJECTION, SOLUTION EPIDURAL; INFILTRATION; INTRACAUDAL; PERINEURAL ONCE
Status: DISCONTINUED | OUTPATIENT
Start: 2022-03-04 | End: 2022-03-04 | Stop reason: HOSPADM

## 2022-03-04 RX ORDER — MIDAZOLAM HYDROCHLORIDE 1 MG/ML
2 INJECTION INTRAMUSCULAR; INTRAVENOUS ONCE AS NEEDED
Status: COMPLETED | OUTPATIENT
Start: 2022-03-04 | End: 2022-03-04

## 2022-03-04 RX ADMIN — SODIUM CHLORIDE, SODIUM LACTATE, POTASSIUM CHLORIDE, AND CALCIUM CHLORIDE: .6; .31; .03; .02 INJECTION, SOLUTION INTRAVENOUS at 08:03

## 2022-03-04 NOTE — H&P
CHIEF COMPLAINT:   Chief Complaint   Patient presents with    Tendonitis       Right achilles      HISTORY OF PRESENT ILLNESS: Mary Ann Vidales is a 56 y.o. female who presents to me for follow up for Tenex procedure for the right achilles tendon.     Review of Systems   Constitutional: Negative for fever.   HENT: Negative for drooling.    Eyes: Negative for discharge.   Respiratory: Negative for choking.    Cardiovascular: Negative for chest pain.   Genitourinary: Negative for flank pain.   Skin: Negative for wound.   Allergic/Immunologic: Negative for immunocompromised state.   Neurological: Negative for tremors and syncope.   Psychiatric/Behavioral: Negative for behavioral problems.      Past Medical History:        Past Medical History:   Diagnosis Date    Anticoagulant long-term use      Chronic asthma      Coronary artery disease      Diabetes mellitus type II      Fibroids      Hypertension      Incontinence      Morbid obesity 11/3/2015    Neuropathy in diabetes      Obesity      TINA (obstructive sleep apnea)       uses CPAP    Panic attacks      Renal disorder       STAGE 3         Past Surgical History:         Past Surgical History:   Procedure Laterality Date    CARDIAC CATHETERIZATION   07/05/2019     STENT IN LAD    CORONARY ANGIOGRAPHY N/A 11/6/2020     Procedure: ANGIOGRAM, CORONARY ARTERY;  Surgeon: John Francis MD;  [facility] CATH;  Service: Cardiology;  Laterality: N/A;    CORONARY ANGIOPLASTY WITH STENT PLACEMENT   2019    ECTOPIC PREGNANCY SURGERY        HERNIA REPAIR        HYSTERECTOMY        OOPHORECTOMY        TONSILLECTOMY             Family History:         Family History   Problem Relation Age of Onset    Diabetes Mother      Heart disease Mother      COPD Mother      Heart failure Father      Breast cancer Neg Hx      Ovarian cancer Neg Hx           Medications:          Current Outpatient Medications on File Prior to Visit   Medication Sig  Dispense Refill    ALPRAZolam (XANAX) 0.5 MG tablet TAKE 1 TABLET (0.5 MG TOTAL) BY MOUTH ONCE DAILY. 30 tablet 3    aspirin (ECOTRIN) 81 MG EC tablet Take 81 mg by mouth once daily.        atorvastatin (LIPITOR) 10 MG tablet TAKE 1 TABLET BY MOUTH EVERY DAY 90 tablet 3    blood sugar diagnostic Strp Pt to check glucoses  strip 12    blood-glucose meter kit Pt to check glucoses Tid 1 each prn    clopidogrel (PLAVIX) 75 mg tablet Take 75 mg by mouth once daily.   11    FLUoxetine 40 MG capsule TAKE 1 CAPSULE BY MOUTH EVERY DAY 90 capsule 1    furosemide (LASIX) 20 MG tablet TAKE 1 TABLET BY MOUTH EVERY DAY 90 tablet 1    gabapentin (NEURONTIN) 800 MG tablet Take 800 mg by mouth 2 (two) times daily.        glimepiride (AMARYL) 2 MG tablet TAKE 2 TABLETS BY MOUTH BEFORE BREAKFAST 180 tablet 3    glucosamine-chondroitin 250-200 mg Tab Take 1 tablet by mouth once daily.        HYDROcodone-acetaminophen (NORCO) 7.5-325 mg per tablet Take 1 tablet by mouth every 6 (six) hours as needed for Pain. 90 tablet 0    lancets (ONETOUCH ULTRASOFT LANCETS) Misc Use to check glucoses up to TID. 100 each 0    losartan (COZAAR) 50 MG tablet Take 50 mg by mouth once daily.        metFORMIN (GLUCOPHAGE) 1000 MG tablet Take 1,000 mg by mouth 2 (two) times daily.        nitroGLYCERIN (NITROSTAT) 0.4 MG SL tablet PLACE 1 TABLET UNDER TONGUE EVERY 5 MINS, UP TO 3 DOSES AS NEEDED FOR CHEST PAIN   11    ondansetron (ZOFRAN) 4 MG tablet Take 1 tablet (4 mg total) by mouth every 8 (eight) hours as needed for Nausea. 30 tablet 0    pantoprazole (PROTONIX) 40 MG tablet TAKE 1 TABLET BY MOUTH EVERY DAY 90 tablet 3    pregabalin (LYRICA) 150 MG capsule Take 2 capsules (300 mg total) by mouth 2 (two) times daily. 120 capsule 5    propranoloL (INDERAL) 20 MG tablet Take 1 tablet (20 mg total) by mouth 2 (two) times daily. 180 tablet 3    semaglutide (OZEMPIC) 1 mg/dose (2 mg/1.5 mL) PnIj INJECT 1MG INTO THE SKIN EVERY 7  DAYS 9 pen 1      No current facility-administered medications on file prior to visit.         Allergies:         Review of patient's allergies indicates:   Allergen Reactions    Captopril         Other reaction(s): cough    Lantus [insulin glargine] Swelling and Other (See Comments)       Burning and redness in the legs    Levemir [insulin detemir] Swelling and Other (See Comments)       Burning and redness of lower extremities    Lisinopril Other (See Comments)       Other reaction(s): cough    Other         Tape causes welps & hives    Antiseptic swabs Rash       Antiseptic wipes given prior to surgery caused severe rash.          Social History:   Social History            Socioeconomic History    Marital status:    Tobacco Use    Smoking status: Never Smoker    Smokeless tobacco: Never Used   Substance and Sexual Activity    Alcohol use: No       Alcohol/week: 0.0 standard drinks    Drug use: No    Sexual activity: Yes   Social History Narrative      at car [employer]. Lives with her partner, who smokes.      Social Determinants of Health          Financial Resource Strain: Low Risk     Difficulty of Paying Living Expenses: Not very hard   Food Insecurity: No Food Insecurity    Worried About Running Out of Food in the Last Year: Never true    Ran Out of Food in the Last Year: Never true   Transportation Needs: No Transportation Needs    Lack of Transportation (Medical): No    Lack of Transportation (Non-Medical): No   Physical Activity: Inactive    Days of Exercise per Week: 0 days    Minutes of Exercise per Session: 0 min   Stress: Stress Concern Present    Feeling of Stress : Very much   Social Connections: Unknown    Frequency of Communication with Friends and Family: More than three times a week    Frequency of Social Gatherings with Friends and Family: Never    Active Member of Clubs or Organizations: No    Attends Club or Organization Meetings: Never    Marital  "Status: Living with partner   Housing Stability: High Risk    Unable to Pay for Housing in the Last Year: Yes    Number of Places Lived in the Last Year: 1    Unstable Housing in the Last Year: No      PHYSICAL EXAMINATION:   General     Vitals       Vitals:     12/01/21 1515   Weight: 130.2 kg (286 lb 14.9 oz)   Height: 5' 4" (1.626 m)         Constitutional: Oriented to person, place, and time. No apparent distress. Pleasant.  HENT:   Head: Normocephalic and atraumatic.   Eyes: Right eye exhibits no discharge. Left eye exhibits no discharge. No scleral icterus.   Pulmonary/Chest: Effort normal. No respiratory distress.   Abdominal: There is no guarding.   Neurological: Alert and oriented to person, place, and time.   Psychiatric: Behavior is normal.   Right Ankle Exam      Tenderness   Right ankle tenderness location: achilles insertion.  Swelling: mild     Range of Motion   Dorsiflexion: 20   Plantar flexion: 45   Eversion: normal   Inversion: normal      Muscle Strength   Dorsiflexion:  5/5  Plantar flexion:  5/5  Posterior tibial:  5/5  Peroneal muscle:  5/5     Tests   Anterior drawer: negative  Varus tilt: negative     Other   Erythema: absent  Scars: absent  Sensation: normal  Pulse: present      Comments:  Pain with calcaneal squeeze  Tenderness to palpation posterior aspect of achilles tendon near insertion site      Heel pain with resisted plantar flexion      Left Ankle Exam      Range of Motion   Dorsiflexion: 20   Plantar flexion: 45      Muscle Strength   Dorsiflexion:  5/5   Plantar flexion:  5/5      Tests   Anterior drawer: negative  Varus tilt: negative       INSPECTION: There is no ecchymoses, erythema of the ankle.  GAIT/DYNAMIC: Somewhat antalgic.     Imaging    FINDINGS:  No fracture or dislocation.  There is small marginal osteophyte formation about the 1st metatarsophalangeal joint.  There are multiple hammertoes.  There is minimal Achilles enthesophyte formation.     Diagnostic " ultrasound exam  Limited diagnostic exam was reviewed of the right Achilles tendon.  The images were saved will be uploaded to EMR.  The distal 1/3 Achilles tendon was significantly heterogenous echotexture along with significant calcaneal enthesophytes at the insertion upon the calcaneus.  There is no significant retrocalcaneal bursitis.  There was an area of hypoechoic echotexture and a linear fashion within the distal 1/3 tendon substance that likely represents some intrasubstance tearing.  There was significant hyperemia/neovascularization seen on color Doppler function within the distal 1/3 of the Achilles tendon.     Data Reviewed: X-ray, ultrasound     Supportive Actions: Independent visualization of images or test specimens     ASSESSMENT:        Encounter Diagnosis   Name Primary?    Tendonitis, Achilles, left        PLAN:      1. We will proceed today with Tenex of the right achilles tendon. Pt consented.     2. Mallampati score II, ASA 2.    3. RTC 2 weeks.

## 2022-03-04 NOTE — OP NOTE
Operative Note       Surgery Date: 3/4/2022     Surgeon(s) and Role:     * Sarwat Vaughn MD - Primary    Pre-op Diagnosis:  Tendonitis, Achilles, right    Post-op Diagnosis: Post-Op Diagnosis Codes:     * Tendonitis, Achilles, right    Procedure(s) (LRB):  TENOTOMY- Right achilles (Right)    Anesthesia: RN IV Sedation    Procedure in Detail/Findings:    Indications:       This is a 57 y.o. female with recalcitrant right achilles tendonosis who presents for elective percutaneous tenotomy of the right achilles tendon. We discussed the risk, benefits and alternatives, and she elected to proceed.       Description of Procedure:   Once she was brought to the operating room, a time-out was performed confirming the name, the location and the procedure. The patient was then turned prone with the right ankle slightly hanging over the edge of the table. The ankle and foot were prepped in the usual sterile fashion. Using ultrasound, the distal medial 1/3 aspect of the right Achilles tendon was observed to be heterogenous and hypoechoic consistent with tendinopathy, along with significant hyperechoic enthesophytes. This pathologic area correlated with the patient's point of maximal tenderness on exam. A 27-gauge needle was then inserted in a proximal to distal approach while observing the Achilles tendon in long axis at the middle area of pathology. This was done to infuse the area with local anesthetic. We used approximately 10 cc of 1% lidocaine for this purpose. With a #11 blade, a small incision was made allowing the TXBone handpiece to be introduced down to the observed pathologic area in a proximal to distal fashion at the distal 1/3 portion of the tendon. Next, a 19-gauge Angiocath was inserted to further clear a path and formulate a feasible target approach to target the pathologic areas with the TX B handpiece. This was done in an in-plane proximal to distal approach in long axis. The angiocath was removed in  favor of the TXB handpiece. With 4 minutes and 31 seconds of energy time, the pathologic areas were ablated. The Tenex handpiece was then removed.        A sterile dressing was applied consisting of Steri-Strips and an Ace wrap. The patient was returned to the recovery area in stable condition. She was placed in a walking boot. We will follow up with her in two weeks in the clinic to discuss postoperative rehabilitation. She was instructed to use the walking boot at all times except for bed and hygiene.    Estimated Blood Loss: Minimal           Condition: Good    Attestation:  I performed the procedure.    The patient was given conscious sedation by a registered nurse with me in attendance. The agents used were fentanyl and Versed. There was continuous monitoring of EKG, blood pressure and pulse oximetry. Total time for conscious sedation was 33 minutes.          Discharge Note    Admit Date: 3/4/2022    Attending Physician: Sarwat Vaughn MD     Discharge Physician: Sarwat Vaughn MD    Final Diagnosis: Post-Op Diagnosis Codes:     * Tendonitis, Achilles, left [M76.62]    Disposition: Home or Self Care, pt discharged and will f/u in clinic in 2 weeks.    Patient Instructions:   Current Discharge Medication List      CONTINUE these medications which have NOT CHANGED    Details   ALPRAZolam (XANAX) 0.5 MG tablet Take 1 tablet (0.5 mg total) by mouth once daily.  Qty: 30 tablet, Refills: 3    Comments: Not to exceed 2 additional fills before 08/29/2021.      aspirin (ECOTRIN) 81 MG EC tablet Take 81 mg by mouth once daily.      atorvastatin (LIPITOR) 10 MG tablet TAKE 1 TABLET BY MOUTH EVERY DAY  Qty: 90 tablet, Refills: 3    Comments: PUT ON HOLD  Associated Diagnoses: Elevated coronary artery calcium score; Benign hypertension; Morbid obesity, unspecified obesity type; Type 2 diabetes mellitus without complication, with long-term current use of insulin; Dyslipidemia      blood sugar diagnostic Strp Pt  to check glucoses TID  Qty: 100 strip, Refills: 12    Associated Diagnoses: Type 2 diabetes mellitus without complication, with long-term current use of insulin      blood-glucose meter kit Pt to check glucoses Tid  Qty: 1 each, Refills: prn    Associated Diagnoses: Type 2 diabetes mellitus without complication, with long-term current use of insulin      clopidogrel (PLAVIX) 75 mg tablet Take 75 mg by mouth once daily.  Refills: 11      FLUoxetine 40 MG capsule TAKE 1 CAPSULE BY MOUTH EVERY DAY  Qty: 90 capsule, Refills: 1      furosemide (LASIX) 20 MG tablet TAKE 1 TABLET BY MOUTH EVERY DAY  Qty: 90 tablet, Refills: 1      glucosamine-chondroitin 250-200 mg Tab Take 1 tablet by mouth once daily.      HYDROcodone-acetaminophen (NORCO) 7.5-325 mg per tablet Take 1 tablet by mouth every 6 (six) hours as needed for Pain.  Qty: 90 tablet, Refills: 0    Comments: Quantity prescribed more than 7 day supply? Yes, quantity medically necessary      lancets (ONETOUCH ULTRASOFT LANCETS) Misc Use to check glucoses up to TID.  Qty: 100 each, Refills: 0    Associated Diagnoses: Type 2 diabetes mellitus without complication, with long-term current use of insulin      losartan (COZAAR) 50 MG tablet Take 50 mg by mouth once daily.    Comments: .      ondansetron (ZOFRAN) 4 MG tablet TAKE 1 TABLET BY MOUTH EVERY 8 HOURS AS NEEDED FOR NAUSEA(18/21D)  Qty: 30 tablet, Refills: 1    Associated Diagnoses: GE (gastroenteritis)      OZEMPIC 1 mg/dose (4 mg/3 mL) INJECT 1MG INTO THE SKIN EVERY 7 DAYS  Qty: 2 pen, Refills: 6    Associated Diagnoses: Type 2 diabetes mellitus without complication, with long-term current use of insulin      pantoprazole (PROTONIX) 40 MG tablet TAKE 1 TABLET BY MOUTH EVERY DAY  Qty: 90 tablet, Refills: 3      pregabalin (LYRICA) 150 MG capsule Take 2 capsules (300 mg total) by mouth 2 (two) times daily.  Qty: 120 capsule, Refills: 5    Associated Diagnoses: Type 2 diabetes mellitus without complication, with  long-term current use of insulin      propranoloL (INDERAL) 20 MG tablet TAKE 1 TABLET BY MOUTH TWICE A DAY  Qty: 180 tablet, Refills: 3    Comments: EMERGENCY HOLDOVER PROVIDED: RX#[id number]. [facility] GAVE 6 PROPRANOLOL 20 MG TABLET. MD CONTACTED FOR AUTHORIZATION ON 12/28/2021  Associated Diagnoses: Tremors of nervous system      nitroGLYCERIN (NITROSTAT) 0.4 MG SL tablet PLACE 1 TABLET UNDER TONGUE EVERY 5 MINS, UP TO 3 DOSES AS NEEDED FOR CHEST PAIN  Refills: 11             Discharge Procedure Orders (must include Diet, Follow-up, Activity)   Discharge Procedure Orders (must include Diet, Follow-up, Activity)   Diet general     Ice to affected area     Call MD for:  temperature >100.4     Call MD for:  persistent nausea and vomiting     Call MD for:  severe uncontrolled pain     Call MD for:  difficulty breathing, headache or visual disturbances     Call MD for:  redness, tenderness, or signs of infection (pain, swelling, redness, odor or green/yellow discharge around incision site)     Call MD for:  hives     Call MD for:  persistent dizziness or light-headedness     Call MD for:  extreme fatigue     Remove dressing in 48 hours     Shower on day dressing removed (No bath)        Discharge Date: 3/4/22

## 2022-03-04 NOTE — PLAN OF CARE
Pt tolerated procedure well . No pain or nausea  Tolerates fluids well. Ambulates. Pt discharged home with surgical boot

## 2022-03-11 ENCOUNTER — HOSPITAL ENCOUNTER (OUTPATIENT)
Dept: RADIOLOGY | Facility: HOSPITAL | Age: 57
Discharge: HOME | End: 2022-03-11
Attending: NURSE PRACTITIONER
Payer: COMMERCIAL

## 2022-03-11 ENCOUNTER — OFFICE VISIT (OUTPATIENT)
Dept: ORTHOPEDICS | Facility: CLINIC | Age: 57
End: 2022-03-11
Payer: COMMERCIAL

## 2022-03-11 VITALS — HEIGHT: 64 IN | WEIGHT: 263 LBS | BODY MASS INDEX: 44.9 KG/M2

## 2022-03-11 DIAGNOSIS — M76.61 ACHILLES TENDINITIS OF RIGHT LOWER EXTREMITY: ICD-10-CM

## 2022-03-11 DIAGNOSIS — M79.671 RIGHT FOOT PAIN: ICD-10-CM

## 2022-03-11 DIAGNOSIS — M77.50 ANKLE BONE SPUR: Primary | ICD-10-CM

## 2022-03-11 PROCEDURE — 99213 PR OFFICE/OUTPT VISIT, EST, LEVL III, 20-29 MIN: ICD-10-PCS | Mod: S$GLB,,, | Performed by: NURSE PRACTITIONER

## 2022-03-11 PROCEDURE — 73610 X-RAY EXAM OF ANKLE: CPT | Mod: TC,PO,RT

## 2022-03-11 PROCEDURE — 73610 XR ANKLE COMPLETE 3 VIEW RIGHT: ICD-10-PCS | Mod: 26,RT,, | Performed by: RADIOLOGY

## 2022-03-11 PROCEDURE — 99999 PR PBB SHADOW E&M-EST. PATIENT-LVL IV: CPT | Mod: PBBFAC,,, | Performed by: NURSE PRACTITIONER

## 2022-03-11 PROCEDURE — 99213 OFFICE O/P EST LOW 20 MIN: CPT | Mod: S$GLB,,, | Performed by: NURSE PRACTITIONER

## 2022-03-11 PROCEDURE — 99999 PR PBB SHADOW E&M-EST. PATIENT-LVL IV: ICD-10-PCS | Mod: PBBFAC,,, | Performed by: NURSE PRACTITIONER

## 2022-03-11 PROCEDURE — 73610 X-RAY EXAM OF ANKLE: CPT | Mod: 26,RT,, | Performed by: RADIOLOGY

## 2022-03-11 NOTE — PROGRESS NOTES
Chief Complaint   Patient presents with    Left Knee - Pain    Right Knee - Pain       HPI:    This is a 57 y.o. F who presents today complaining of right foot pain for 2 days after she underwent tenotomy for achilles pain. Pain is more with walking; states her walking boot is too small. No numbness or tingling. No associated signs or symptoms.      Past Medical History:   Diagnosis Date    Anticoagulant long-term use     Chronic asthma     Coronary artery disease     Diabetes mellitus type II     Fibroids     Hypertension     Incontinence     Morbid obesity 11/3/2015    Neuropathy in diabetes     Obesity     TINA (obstructive sleep apnea)     uses CPAP    Panic attacks     Renal disorder     STAGE 3      Past Surgical History:   Procedure Laterality Date    CARDIAC CATHETERIZATION  07/05/2019    STENT IN LAD    CORONARY ANGIOGRAPHY N/A 11/6/2020    Procedure: ANGIOGRAM, CORONARY ARTERY;  Surgeon: John Francis MD;  Location: [facility] CATH;  Service: Cardiology;  Laterality: N/A;    CORONARY ANGIOPLASTY WITH STENT PLACEMENT  2019    ECTOPIC PREGNANCY SURGERY      HERNIA REPAIR      HYSTERECTOMY      OOPHORECTOMY      TENOTOMY Right 3/4/2022    Procedure: TENOTOMY- Tx Bone Left achilles;  Surgeon: Sarawt Vaughn MD;  Location: [facility] OR;  Service: Orthopedics;  Laterality: Right;    TONSILLECTOMY        Current Outpatient Medications on File Prior to Visit   Medication Sig Dispense Refill    ALPRAZolam (XANAX) 0.5 MG tablet Take 1 tablet (0.5 mg total) by mouth once daily. 30 tablet 3    aspirin (ECOTRIN) 81 MG EC tablet Take 81 mg by mouth once daily.      atorvastatin (LIPITOR) 10 MG tablet TAKE 1 TABLET BY MOUTH EVERY DAY 90 tablet 3    blood sugar diagnostic Strp Pt to check glucoses  strip 12    blood-glucose meter kit Pt to check glucoses Tid 1 each prn    clopidogrel (PLAVIX) 75 mg tablet Take 75 mg by mouth once daily.  11    FLUoxetine 40 MG capsule TAKE 1 CAPSULE BY  MOUTH EVERY DAY 90 capsule 1    furosemide (LASIX) 20 MG tablet TAKE 1 TABLET BY MOUTH EVERY DAY 90 tablet 1    glucosamine-chondroitin 250-200 mg Tab Take 1 tablet by mouth once daily.      HYDROcodone-acetaminophen (NORCO) 7.5-325 mg per tablet Take 1 tablet by mouth every 6 (six) hours as needed for Pain. 90 tablet 0    lancets (ONETOUCH ULTRASOFT LANCETS) Misc Use to check glucoses up to TID. 100 each 0    losartan (COZAAR) 50 MG tablet Take 50 mg by mouth once daily.      nitroGLYCERIN (NITROSTAT) 0.4 MG SL tablet PLACE 1 TABLET UNDER TONGUE EVERY 5 MINS, UP TO 3 DOSES AS NEEDED FOR CHEST PAIN  11    ondansetron (ZOFRAN) 4 MG tablet TAKE 1 TABLET BY MOUTH EVERY 8 HOURS AS NEEDED FOR NAUSEA(18/21D) 30 tablet 1    OZEMPIC 1 mg/dose (4 mg/3 mL) INJECT 1MG INTO THE SKIN EVERY 7 DAYS 2 pen 6    pantoprazole (PROTONIX) 40 MG tablet TAKE 1 TABLET BY MOUTH EVERY DAY 90 tablet 3    propranoloL (INDERAL) 20 MG tablet TAKE 1 TABLET BY MOUTH TWICE A  tablet 3    pregabalin (LYRICA) 150 MG capsule Take 2 capsules (300 mg total) by mouth 2 (two) times daily. 120 capsule 5     No current facility-administered medications on file prior to visit.      Review of patient's allergies indicates:   Allergen Reactions    Captopril      Other reaction(s): cough    Lantus [insulin glargine] Swelling and Other (See Comments)     Burning and redness in the legs    Levemir [insulin detemir] Swelling and Other (See Comments)     Burning and redness of lower extremities    Lisinopril Other (See Comments)     Other reaction(s): cough    Other      Tape causes welps & hives    Antiseptic swabs Rash     Antiseptic wipes given prior to surgery caused severe rash.       Family History not pertinent   Social History     Socioeconomic History    Marital status:    Tobacco Use    Smoking status: Never Smoker    Smokeless tobacco: Never Used   Substance and Sexual Activity    Alcohol use: No     Alcohol/week: 0.0  standard drinks    Drug use: No    Sexual activity: Yes   Social History Narrative     at car [employer]. Lives with her partner, who smokes.     Social Determinants of Health     Financial Resource Strain: Low Risk     Difficulty of Paying Living Expenses: Not very hard   Food Insecurity: No Food Insecurity    Worried About Running Out of Food in the Last Year: Never true    Ran Out of Food in the Last Year: Never true   Transportation Needs: No Transportation Needs    Lack of Transportation (Medical): No    Lack of Transportation (Non-Medical): No   Physical Activity: Inactive    Days of Exercise per Week: 0 days    Minutes of Exercise per Session: 0 min   Stress: No Stress Concern Present    Feeling of Stress : Only a little   Social Connections: Unknown    Frequency of Communication with Friends and Family: More than three times a week    Frequency of Social Gatherings with Friends and Family: Once a week    Active Member of Clubs or Organizations: No    Attends Club or Organization Meetings: Never    Marital Status: Living with partner   Housing Stability: Low Risk     Unable to Pay for Housing in the Last Year: No    Number of Places Lived in the Last Year: 1    Unstable Housing in the Last Year: No         Review of Systems:   Constitutional:  Denies fever or chills    Eyes:  Denies change in visual acuity    HENT:  Denies nasal congestion or sore throat    Respiratory:  Denies cough or shortness of breath    Cardiovascular:  Denies chest pain or edema    GI:  Denies abdominal pain, nausea, vomiting, bloody stools or diarrhea    :  Denies dysuria    Integument:  Denies rash    Neurologic:  Denies headache, focal weakness or sensory changes    Endocrine:  Denies polyuria or polydipsia    Lymphatic:  Denies swollen glands    Psychiatric:  Denies depression or anxiety       Physical Exam:    Constitutional:  Well developed, well nourished, no acute distress, non-toxic appearance     Integument:  Well hydrated, no rash    Lymphatic:  No lymphadenopathy noted    Neurologic:  Alert & oriented x 3,     Psychiatric:  Speech and behavior appropriate    Gi: abdomen soft  Eyes: EOMI   Musc: right foot in walking boot.    Imaging   X-rays were performed today, personally reviewed by me and findings discussed with the patient.   2 views of the right ankle show some spurring, otherwise neg.         Assessment   Ankle bone spur  -     Ambulatory referral/consult to Orthopedics; Future; Expected date: 03/18/2022    Right foot pain  -     X-Ray Ankle Complete Right; Future; Expected date: 03/11/2022  -     Ambulatory referral/consult to Orthopedics; Future; Expected date: 03/18/2022    Achilles tendinitis of right lower extremity  -     Ambulatory referral/consult to Orthopedics; Future; Expected date: 03/18/2022      Plan  Tried another size walking boot on her, but having the same issue to wear calf area is snug. CAM boot will also be tight.   Will place her in surgical shoe with felt roll. This will likely fit her better, and she will be able to tolerate. Discussed with   Dr. Conway who states to refer her to foot/ankle specialist for spur causing achilles pain.   RTC as needed.

## 2022-03-15 ENCOUNTER — TELEPHONE (OUTPATIENT)
Dept: ORTHOPEDICS | Facility: CLINIC | Age: 57
End: 2022-03-15
Payer: COMMERCIAL

## 2022-03-15 NOTE — TELEPHONE ENCOUNTER
"Robo Call, no message left   I referred her to Dr. Vaughn for PRP vs Tenex.  She is not a surgical candidate at this time due to her BMI."  Copied from message from Dr Bhagat    "

## 2022-03-17 NOTE — TELEPHONE ENCOUNTER
No new care gaps identified.  Powered by [employer]. Reference number: [id number].   3/17/2022 6:58:58 PM CDT

## 2022-03-22 RX ORDER — FLUOXETINE HYDROCHLORIDE 40 MG/1
CAPSULE ORAL
Qty: 90 CAPSULE | Refills: 3 | Status: SHIPPED | OUTPATIENT
Start: 2022-03-22 | End: 2023-03-27

## 2022-03-22 NOTE — TELEPHONE ENCOUNTER
This Rx Request does not qualify for assessment with the [facility]   Please review protocol details and the Care Due Message for extra clinical information    Reasons Rx Request may be deferred:  Patient has been seen in the ED/Hospital since the last PCP visit    Note composed:12:25 PM 03/22/2022

## 2022-03-30 DIAGNOSIS — Z12.31 OTHER SCREENING MAMMOGRAM: ICD-10-CM

## 2022-04-01 ENCOUNTER — PATIENT OUTREACH (OUTPATIENT)
Dept: ADMINISTRATIVE | Facility: OTHER | Age: 57
End: 2022-04-01
Payer: COMMERCIAL

## 2022-04-04 ENCOUNTER — OFFICE VISIT (OUTPATIENT)
Dept: PODIATRY | Facility: CLINIC | Age: 57
End: 2022-04-04
Payer: COMMERCIAL

## 2022-04-04 DIAGNOSIS — M76.61 ACHILLES TENDINITIS OF RIGHT LOWER EXTREMITY: Primary | ICD-10-CM

## 2022-04-04 DIAGNOSIS — M89.8X7 RETROCALCANEAL EXOSTOSIS: ICD-10-CM

## 2022-04-04 DIAGNOSIS — E11.49 TYPE II DIABETES MELLITUS WITH NEUROLOGICAL MANIFESTATIONS: ICD-10-CM

## 2022-04-04 DIAGNOSIS — B35.1 ONYCHOMYCOSIS DUE TO DERMATOPHYTE: ICD-10-CM

## 2022-04-04 PROCEDURE — 99999 PR PBB SHADOW E&M-EST. PATIENT-LVL III: CPT | Mod: PBBFAC,,, | Performed by: PODIATRIST

## 2022-04-04 PROCEDURE — 99214 OFFICE O/P EST MOD 30 MIN: CPT | Mod: S$GLB,,, | Performed by: PODIATRIST

## 2022-04-04 PROCEDURE — 99999 PR PBB SHADOW E&M-EST. PATIENT-LVL III: ICD-10-PCS | Mod: PBBFAC,,, | Performed by: PODIATRIST

## 2022-04-04 PROCEDURE — 99214 PR OFFICE/OUTPT VISIT, EST, LEVL IV, 30-39 MIN: ICD-10-PCS | Mod: S$GLB,,, | Performed by: PODIATRIST

## 2022-04-04 RX ORDER — TERBINAFINE HYDROCHLORIDE 250 MG/1
250 TABLET ORAL DAILY
Qty: 30 TABLET | Refills: 2 | Status: SHIPPED | OUTPATIENT
Start: 2022-04-04 | End: 2022-06-02

## 2022-04-04 NOTE — PROGRESS NOTES
Subjective:      Patient ID: Mary Ann Vidales is a 57 y.o. female.    Chief Complaint: Heel Pain (Right )    Mary Ann is a 57 y.o. female who presents to the clinic for evaluation and treatment of high risk feet. Mary Ann has a past medical history of Anticoagulant long-term use, Chronic asthma, Coronary artery disease, Diabetes mellitus type II, Fibroids, Hypertension, Incontinence, Morbid obesity (11/3/2015), Neuropathy in diabetes, Obesity, TINA (obstructive sleep apnea), Panic attacks, and Renal disorder. The patient's chief complaint is right posterior heel painwith weight bearing activities and shoes on. This patient has documented high risk feet requiring routine maintenance secondary to diabetes mellitis and those secondary complications of diabetes, as mentioned.    PCP: Louis Bah MD        Current shoe gear:   Casual shoes    Hemoglobin A1C   Date Value Ref Range Status   03/02/2022 6.3 (H) 4.0 - 5.6 % Final     Comment:     ADA Screening Guidelines:  5.7-6.4%  Consistent with prediabetes  >or=6.5%  Consistent with diabetes    High levels of fetal hemoglobin interfere with the HbA1C  assay. Heterozygous hemoglobin variants (HbS, HgC, etc)do  not significantly interfere with this assay.   However, presence of multiple variants may affect accuracy.     08/04/2021 5.7 (H) 4.0 - 5.6 % Final     Comment:     ADA Screening Guidelines:  5.7-6.4%  Consistent with prediabetes  >or=6.5%  Consistent with diabetes    High levels of fetal hemoglobin interfere with the HbA1C  assay. Heterozygous hemoglobin variants (HbS, HgC, etc)do  not significantly interfere with this assay.   However, presence of multiple variants may affect accuracy.     01/07/2021 6.5 (H) 4.0 - 5.6 % Final     Comment:     ADA Screening Guidelines:  5.7-6.4%  Consistent with prediabetes  >or=6.5%  Consistent with diabetes  High levels of fetal hemoglobin interfere with the HbA1C  assay. Heterozygous hemoglobin variants (HbS, HgC,  etc)do  not significantly interfere with this assay.   However, presence of multiple variants may affect accuracy.         Review of Systems   Constitutional: Negative for chills and fever.   Cardiovascular: Positive for leg swelling. Negative for claudication.   Respiratory: Negative for shortness of breath.    Skin: Positive for nail changes. Negative for itching and rash.   Musculoskeletal: Positive for arthritis. Negative for muscle cramps, muscle weakness and myalgias.   Gastrointestinal: Negative for nausea and vomiting.   Neurological: Positive for paresthesias. Negative for focal weakness, loss of balance and numbness.           Objective:      Physical Exam  Constitutional:       General: She is not in acute distress.     Appearance: She is well-developed. She is not diaphoretic.   Cardiovascular:      Pulses:           Dorsalis pedis pulses are 2+ on the right side and 2+ on the left side.        Posterior tibial pulses are 2+ on the right side and 2+ on the left side.      Comments: < 3 sec capillary refill time to toes 1-5 bilateral. Feet are warm to touch proximally with distal cooling b/l. There is diminished hair growth on the feet and toes b/l. There is mild edema b/l. No spider veins or varicosities present b/l.     Musculoskeletal:      Comments: Equinus noted b/l ankles with < 10 deg DF noted. MMT 5/5 in DF/PF/Inv/Ev resistance with no reproduction of pain in any direction. Passive range of motion of ankle and pedal joints is painless b/l.    Semi reducible contractures to bilateral toes 2-5 at the MTPJ and PIPJ.    RIght posterior heel there is a palpable spur with pain to palpation at the site of the achilles tendon insertion on the calcaneus   Skin:     General: Skin is warm and dry.      Coloration: Skin is not pale.      Findings: No abrasion, bruising, burn, ecchymosis, erythema, laceration, lesion, petechiae or rash.      Nails: There is no clubbing.      Comments: Skin is thin and atrophic  bilateral    Nails 2-5 bilateral are thick 3-4 mm, long 3-6 mm, and discolored with subungual debris    Right hallux nail is gone with underlying nail bed intact slight bruising noted. Left hallux nail lytic with surrounding bruising noted. No erythema or drainage.     Neurological:      Mental Status: She is alert and oriented to person, place, and time.      Sensory: Sensory deficit present.      Motor: No tremor, atrophy or abnormal muscle tone.      Comments: Negative tinel sign bilateral. Diminished vibratory sensation bilateral   Psychiatric:         Behavior: Behavior normal.               Assessment:       Encounter Diagnoses   Name Primary?    Achilles tendinitis of right lower extremity Yes    Retrocalcaneal exostosis     Onychomycosis due to dermatophyte     Type II diabetes mellitus with neurological manifestations          Plan:       Mary Ann was seen today for heel pain.    Diagnoses and all orders for this visit:    Achilles tendinitis of right lower extremity  -     ORTHOTIC DEVICE (DME)    Retrocalcaneal exostosis  -     ORTHOTIC DEVICE (DME)    Onychomycosis due to dermatophyte  -     Hepatic Function Panel; Future    Type II diabetes mellitus with neurological manifestations  -     Ambulatory referral/consult to Pain Clinic; Future    Other orders  -     terbinafine HCL (LAMISIL) 250 mg tablet; Take 1 tablet (250 mg total) by mouth once daily.      I counseled the patient on her conditions, their implications and medical management.    Shoe inspection. Diabetic Foot Education. Patient reminded of the importance of good nutrition and blood sugar control to help prevent podiatric complications of diabetes. Patient instructed on proper foot hygeine. We discussed wearing proper shoe gear, daily foot inspections, never walking without protective shoe gear, never putting sharp instruments to feet    Prescription for nail fungus We discussed using Lamisil for onychomycosis. This drug offers a fairly  high but not universal cure rate. A 12 week treatment course is recommended. The patient is aware that rare cases of liver injury have been reported; and agrees to come in for liver function tests at 6 weeks of treatment. The symptoms of liver disease have been discussed; call if such occurs. In addition, some insurance plans do not cover the expense of this drug for treating a cosmetic condition, and the patient understands they may have to pay for the medication. Other side effects, such as headaches and rashes, have also been discussed.    Patient will stretch the tendo achilles complex three times daily as demonstrated in the office.  Literature was dispensed illustrating proper stretching technique.      Return in 6 weeks to follow up after she gets and has been wearing the diabetic shoes.    Lorne Burgess DPM

## 2022-04-14 ENCOUNTER — OFFICE VISIT (OUTPATIENT)
Dept: PAIN MEDICINE | Facility: CLINIC | Age: 57
End: 2022-04-14
Payer: COMMERCIAL

## 2022-04-14 ENCOUNTER — TELEPHONE (OUTPATIENT)
Dept: NEPHROLOGY | Facility: CLINIC | Age: 57
End: 2022-04-14
Payer: COMMERCIAL

## 2022-04-14 VITALS
OXYGEN SATURATION: 100 % | HEART RATE: 75 BPM | BODY MASS INDEX: 50.02 KG/M2 | WEIGHT: 293 LBS | DIASTOLIC BLOOD PRESSURE: 59 MMHG | SYSTOLIC BLOOD PRESSURE: 117 MMHG | HEIGHT: 64 IN

## 2022-04-14 DIAGNOSIS — E11.49 TYPE II DIABETES MELLITUS WITH NEUROLOGICAL MANIFESTATIONS: ICD-10-CM

## 2022-04-14 DIAGNOSIS — E11.40 PAINFUL DIABETIC NEUROPATHY: Primary | ICD-10-CM

## 2022-04-14 PROCEDURE — 99999 PR PBB SHADOW E&M-EST. PATIENT-LVL V: CPT | Mod: PBBFAC,,, | Performed by: ANESTHESIOLOGY

## 2022-04-14 PROCEDURE — 99204 OFFICE O/P NEW MOD 45 MIN: CPT | Mod: S$GLB,,, | Performed by: ANESTHESIOLOGY

## 2022-04-14 PROCEDURE — 99204 PR OFFICE/OUTPT VISIT, NEW, LEVL IV, 45-59 MIN: ICD-10-PCS | Mod: S$GLB,,, | Performed by: ANESTHESIOLOGY

## 2022-04-14 PROCEDURE — 99999 PR PBB SHADOW E&M-EST. PATIENT-LVL V: ICD-10-PCS | Mod: PBBFAC,,, | Performed by: ANESTHESIOLOGY

## 2022-04-14 NOTE — TELEPHONE ENCOUNTER
----- Message from Mayuri Sullivan sent at 4/14/2022  2:44 PM CDT -----  Contact: Patient  Type:  Sooner Apoointment Request    Caller is requesting a sooner appointment.  Caller declined first available appointment listed below.  Caller will not accept being placed on the waitlist and is requesting a message be sent to doctor.    Name of Caller: Patient    When is the first available appointment? 06/06      Would the patient rather a call back or a response via [facility]?  Call    Best Call Back Number: [phone] (home)     Additional Information: Patient needs to be seen sooner than 06/06    Please call to advise

## 2022-04-14 NOTE — TELEPHONE ENCOUNTER
Gaining fluid weight , out of breath and needs to see Nephrology - will see Dr. Cespedes since he can see her sooner. Will schedule pt she will check her MyChart and I will tell her and schedule while on the phone with her today

## 2022-04-18 DIAGNOSIS — K52.9 GE (GASTROENTERITIS): ICD-10-CM

## 2022-04-18 NOTE — TELEPHONE ENCOUNTER
No new care gaps identified.  Powered by [employer]. Reference number: [id number].   4/18/2022 6:01:09 PM CDT

## 2022-04-19 NOTE — TELEPHONE ENCOUNTER
Refill Routing Note   Medication(s) are not appropriate for processing by [facility] for the following reason(s):      - Outside of protocol    [facility] action(s):  Route          Medication reconciliation completed: No     Appointments  past 12m or future 3m with PCP    Date Provider   Last Visit   10/4/2021 Louis Bah MD   Next Visit   Visit date not found Louis Bah MD   ED visits in past 90 days: 0        Note composed:3:25 PM 04/19/2022

## 2022-04-20 ENCOUNTER — OFFICE VISIT (OUTPATIENT)
Dept: NEPHROLOGY | Facility: CLINIC | Age: 57
End: 2022-04-20
Payer: COMMERCIAL

## 2022-04-20 VITALS
HEIGHT: 64 IN | DIASTOLIC BLOOD PRESSURE: 70 MMHG | OXYGEN SATURATION: 99 % | HEART RATE: 75 BPM | BODY MASS INDEX: 50.02 KG/M2 | SYSTOLIC BLOOD PRESSURE: 128 MMHG | WEIGHT: 293 LBS

## 2022-04-20 DIAGNOSIS — M17.11 PRIMARY OSTEOARTHRITIS OF RIGHT KNEE: ICD-10-CM

## 2022-04-20 DIAGNOSIS — E55.9 VITAMIN D DEFICIENCY DISEASE: ICD-10-CM

## 2022-04-20 DIAGNOSIS — Z79.4 TYPE 2 DIABETES MELLITUS WITHOUT COMPLICATION, WITH LONG-TERM CURRENT USE OF INSULIN: ICD-10-CM

## 2022-04-20 DIAGNOSIS — N18.30 STAGE 3 CHRONIC KIDNEY DISEASE, UNSPECIFIED WHETHER STAGE 3A OR 3B CKD: ICD-10-CM

## 2022-04-20 DIAGNOSIS — D63.1 ANEMIA IN STAGE 4 CHRONIC KIDNEY DISEASE: ICD-10-CM

## 2022-04-20 DIAGNOSIS — I10 ESSENTIAL HYPERTENSION: ICD-10-CM

## 2022-04-20 DIAGNOSIS — N18.4 CHRONIC KIDNEY DISEASE, STAGE 4 (SEVERE): Primary | ICD-10-CM

## 2022-04-20 DIAGNOSIS — I25.10 CORONARY ARTERY DISEASE, UNSPECIFIED VESSEL OR LESION TYPE, UNSPECIFIED WHETHER ANGINA PRESENT, UNSPECIFIED WHETHER NATIVE OR TRANSPLANTED HEART: ICD-10-CM

## 2022-04-20 DIAGNOSIS — N18.4 ANEMIA IN STAGE 4 CHRONIC KIDNEY DISEASE: ICD-10-CM

## 2022-04-20 DIAGNOSIS — E11.9 TYPE 2 DIABETES MELLITUS WITHOUT COMPLICATION, WITH LONG-TERM CURRENT USE OF INSULIN: ICD-10-CM

## 2022-04-20 PROCEDURE — 99214 OFFICE O/P EST MOD 30 MIN: CPT | Mod: S$GLB,,, | Performed by: INTERNAL MEDICINE

## 2022-04-20 PROCEDURE — 99999 PR PBB SHADOW E&M-EST. PATIENT-LVL IV: ICD-10-PCS | Mod: PBBFAC,,, | Performed by: INTERNAL MEDICINE

## 2022-04-20 PROCEDURE — 99999 PR PBB SHADOW E&M-EST. PATIENT-LVL IV: CPT | Mod: PBBFAC,,, | Performed by: INTERNAL MEDICINE

## 2022-04-20 PROCEDURE — 99214 PR OFFICE/OUTPT VISIT, EST, LEVL IV, 30-39 MIN: ICD-10-PCS | Mod: S$GLB,,, | Performed by: INTERNAL MEDICINE

## 2022-04-20 RX ORDER — ONDANSETRON 4 MG/1
TABLET, FILM COATED ORAL
Qty: 30 TABLET | Refills: 3 | Status: SHIPPED | OUTPATIENT
Start: 2022-04-20 | End: 2023-11-17 | Stop reason: SDUPTHER

## 2022-04-20 NOTE — PROGRESS NOTES
"Subjective:       Patient ID: Mary Ann Vidales is a 57 y.o. White female who presents for follow up on CKD.     Since last seen at nephrology clinic, Mary Ann Vidales denies any new hospitalizations or new medications.  No uremic symptoms, not volume overloaded.    Reports taking their medications on time, without missed doses. As to their chronic conditions:  - CKD: Last serum creatinine at baseline with mg/dL corresponding to CKD stage G4A2. Denies use of NSAIDs.   2021: baseline sr cr of mg/dL with 1.7 - 2.0 with moderately increase proteinuria of UPC 0.35   2018 Renal US R 11.1 cm  and L 10.4 cm   - Dmt2: pt reports good glycemic control with BG checks within 160-170  - HTN: well controlled, does not follow low salt diet. Denies uncontrolled HTN, dizziness/lightheadedness or hypotension/syncopal episodes.  - CAD: s/p stent placement to Prox LAD lesion with 65%      Review of Systems   Constitutional: Positive for fatigue. Negative for chills and fever.   HENT: Negative for hearing loss, trouble swallowing and voice change.    Respiratory: Positive for shortness of breath. Negative for cough and wheezing.    Cardiovascular: Negative for chest pain, palpitations and leg swelling.   Gastrointestinal: Negative for abdominal distention, abdominal pain, constipation and diarrhea.   Endocrine: Negative for polydipsia, polyphagia and polyuria.   Genitourinary: Negative for dysuria, flank pain, frequency and hematuria.   Musculoskeletal: Positive for arthralgias. Negative for back pain and myalgias.   Skin: Negative for rash and wound.   Neurological: Negative for dizziness, syncope, weakness and light-headedness.   Hematological: Negative for adenopathy. Does not bruise/bleed easily.       The past medical, family and social histories were reviewed for this encounter.     /70 (BP Location: Right arm, Patient Position: Sitting, BP Method: Large (Manual))   Pulse 75   Ht 5' 4" (1.626 m)   Wt (!) 138.3 kg " (305 lb)   LMP 12/20/2013   SpO2 99%   BMI 52.35 kg/m²     Objective:      Physical Exam  Vitals and nursing note reviewed.   Constitutional:       Appearance: Normal appearance. She is obese.   HENT:      Head: Normocephalic and atraumatic.      Mouth/Throat:      Mouth: Mucous membranes are moist.      Pharynx: Oropharynx is clear.   Eyes:      Extraocular Movements: Extraocular movements intact.      Conjunctiva/sclera: Conjunctivae normal.   Neck:      Vascular: No carotid bruit.   Cardiovascular:      Rate and Rhythm: Normal rate and regular rhythm.      Heart sounds: Normal heart sounds.   Pulmonary:      Effort: Pulmonary effort is normal. No respiratory distress.      Breath sounds: Normal breath sounds.   Abdominal:      General: Bowel sounds are normal. There is no distension.      Palpations: Abdomen is soft.      Tenderness: There is no abdominal tenderness.   Musculoskeletal:         General: No tenderness. Normal range of motion.      Cervical back: Normal range of motion. No tenderness.      Right lower leg: Edema present.      Left lower leg: Edema present.   Lymphadenopathy:      Cervical: No cervical adenopathy.   Skin:     General: Skin is warm and dry.      Capillary Refill: Capillary refill takes less than 2 seconds.      Coloration: Skin is not jaundiced.   Neurological:      General: No focal deficit present.      Mental Status: She is alert and oriented to person, place, and time.   Psychiatric:         Mood and Affect: Mood normal.         Behavior: Behavior normal.         Judgment: Judgment normal.         Assessment:       1. Chronic kidney disease, stage 4 (severe)    2. Stage 3 chronic kidney disease, unspecified whether stage 3a or 3b CKD    3. Coronary artery disease, unspecified vessel or lesion type, unspecified whether angina present, unspecified whether native or transplanted heart    4. Essential hypertension    5. Type 2 diabetes mellitus without complication, with long-term  current use of insulin    6. Vitamin D deficiency disease- menopausal    7. Primary osteoarthritis of right knee    8. Anemia in stage 4 chronic kidney disease        Plan:   Return to clinic in 3 months    CKD in a setting of NSAIDs, cardiorenal pathophysiology  Baseline creatinine is 1.7 - 2 mg/dL              - Last known sr cr of 2.0 mg/dL corresponding to CKD stage G4A2 and UPC of 0.35   - Hypervolemic   - Pt understands importance of blood pressure and glycemic control and is aware that uncontrolled HTN and DM leads to progression of CKD   - Pt will benefit from initiation of SGLT2i for cario-renal protection   - Complete avoidance of NSAIDs   - Low salt diet with < 2000 mg/day   - Dose adjust medications to GFR of 30   - Avoid nephrotoxins including IV contrast    HTN   - BP well controlled: continue current medications, avoid hypotension and dehydration    DM   - DM without diabetic retinopathy: well controlled with most recent HgbA1c of 6.3%, continue yearly optho checks    LE swelling   - Echo pending    - Start Lasix 40 mg PO BID   - Low salt diet    Med changes: Increase Lasix 40 mg PO BID

## 2022-04-20 NOTE — PATIENT INSTRUCTIONS
DO NOT take any of NSAIDs or pain medications listed below, instead use Tylenol as written on the bottle.   DO NOT take and of the following  ibuprofen.  naproxen.  diclofenac.  celecoxib.  mefenamic acid.  etoricoxib.  indomethacin.  high-dose aspirin (low-dose aspirin is not normally considered to be an NSAID).    Ok to take Tylenol    Low Salt diet < 2000 mg per day    Start lasix 40 mg twice daily then call in 3 days

## 2022-04-21 ENCOUNTER — TELEPHONE (OUTPATIENT)
Dept: NEPHROLOGY | Facility: CLINIC | Age: 57
End: 2022-04-21
Payer: COMMERCIAL

## 2022-04-21 DIAGNOSIS — N18.30 STAGE 3 CHRONIC KIDNEY DISEASE, UNSPECIFIED WHETHER STAGE 3A OR 3B CKD: Primary | ICD-10-CM

## 2022-04-21 NOTE — TELEPHONE ENCOUNTER
Patient can not make a sooner appointment due to her job. Patient stated she has the echo bubble and ultrasound already scheduled with her cardiologist. She will go to that appointment in early May.I did not schedule per patients request.

## 2022-04-26 DIAGNOSIS — Z79.4 TYPE 2 DIABETES MELLITUS WITHOUT COMPLICATION, WITH LONG-TERM CURRENT USE OF INSULIN: ICD-10-CM

## 2022-04-26 DIAGNOSIS — E11.9 TYPE 2 DIABETES MELLITUS WITHOUT COMPLICATION, WITH LONG-TERM CURRENT USE OF INSULIN: ICD-10-CM

## 2022-04-26 NOTE — TELEPHONE ENCOUNTER
No new care gaps identified.  Powered by [employer]. Reference number: [id number].   4/26/2022 6:46:35 PM CDT

## 2022-04-27 RX ORDER — PREGABALIN 150 MG/1
300 CAPSULE ORAL 2 TIMES DAILY
Qty: 120 CAPSULE | Refills: 6 | Status: SHIPPED | OUTPATIENT
Start: 2022-04-27 | End: 2022-05-31

## 2022-04-29 NOTE — TELEPHONE ENCOUNTER
No new care gaps identified.  Powered by [employer]. Reference number: [id number].   4/29/2022 5:56:21 PM CDT

## 2022-05-02 ENCOUNTER — PATIENT MESSAGE (OUTPATIENT)
Dept: ADMINISTRATIVE | Facility: OTHER | Age: 57
End: 2022-05-02
Payer: COMMERCIAL

## 2022-05-02 ENCOUNTER — PATIENT OUTREACH (OUTPATIENT)
Dept: ADMINISTRATIVE | Facility: OTHER | Age: 57
End: 2022-05-02
Payer: COMMERCIAL

## 2022-05-02 RX ORDER — ALPRAZOLAM 0.5 MG/1
TABLET ORAL
Qty: 30 TABLET | Refills: 3 | OUTPATIENT
Start: 2022-05-02

## 2022-05-02 NOTE — TELEPHONE ENCOUNTER
Please advise med refill request  Order pending  Lov 10/04/21  Patient notified she needs an appointment

## 2022-05-02 NOTE — PROGRESS NOTES
Health Maintenance Due   Topic Date Due    Hepatitis C Screening  Never done    HIV Screening  Never done    Mammogram  12/10/2014    Foot Exam  11/07/2020    Eye Exam  03/04/2021    COVID-19 Vaccine (4 - Booster for Moderna series) 03/17/2022     Updates were requested from care everywhere.  Chart was reviewed for overdue Proactive [facility] Encounters ([facility]) topics (CRS, Breast Cancer Screening, Eye exam)  Health Maintenance has been updated.  LINKS immunization registry triggered.  Immunizations were reconciled.

## 2022-05-03 ENCOUNTER — TELEPHONE (OUTPATIENT)
Dept: PAIN MEDICINE | Facility: CLINIC | Age: 57
End: 2022-05-03

## 2022-05-03 NOTE — TELEPHONE ENCOUNTER
----- Message from Jimi Carolina MD sent at 5/3/2022  9:35 AM CDT -----  Rudy,    Please get this patient an in-office procedure appointment with Chandan hernandez Qutenza patch.  1 hour slot.

## 2022-05-04 ENCOUNTER — TELEPHONE (OUTPATIENT)
Dept: INTERNAL MEDICINE | Facility: CLINIC | Age: 57
End: 2022-05-04
Payer: COMMERCIAL

## 2022-05-04 NOTE — TELEPHONE ENCOUNTER
From: Melida Gupta MA   Sent: 2/14/2022  11:31 AM CST   To: Melida Gupta MA   Subject: FW: Appointment rescheduled from Patient Por*         ----- Message -----   From: Travel NotesgiovannyGaia Herbs Rohan Message   Sent: 2/14/2022  11:27 AM CST   To: [facility] Im Clinical Support   Subject: Appointment rescheduled from Patient Portal        Appointment For: Mary Ann Vidales ([id number])   Visit Type: EP - PRIMARY CARE ([facility]) (479)      6/2/2022     4:00 PM  30 mins.  Davon Kan MD        [facility] INTERNAL MEDICINE      Patient Comments:      ----------------------------------   This appointment rescheduled from:   4/21/2022    3:30 PM  30 mins.  Davon Kan MD        [facility] INTERNAL MEDICINE         Orders are still in to schedule .  Sent msg to patient.

## 2022-05-06 ENCOUNTER — TELEPHONE (OUTPATIENT)
Dept: BARIATRICS | Facility: CLINIC | Age: 57
End: 2022-05-06
Payer: COMMERCIAL

## 2022-05-06 NOTE — TELEPHONE ENCOUNTER
----- Message from Iban Rizo MA sent at 5/6/2022  1:34 PM CDT -----  Contact: TAMI ROSALES [[id number]]  Type: Needs Medical Advice    Who Called:TAMI ROSALES [[id number]]  Best Call Back Number: [phone]  Inquiry/Question: Would you kindly call TAMI ROSALES [[id number]] regarding a new pt appt Thank you~

## 2022-05-10 ENCOUNTER — TELEPHONE (OUTPATIENT)
Dept: PAIN MEDICINE | Facility: CLINIC | Age: 57
End: 2022-05-10
Payer: COMMERCIAL

## 2022-05-10 ENCOUNTER — PATIENT MESSAGE (OUTPATIENT)
Dept: INTERNAL MEDICINE | Facility: CLINIC | Age: 57
End: 2022-05-10
Payer: COMMERCIAL

## 2022-05-10 DIAGNOSIS — E11.42 DIABETIC PERIPHERAL NEUROPATHY: Primary | ICD-10-CM

## 2022-05-10 NOTE — TELEPHONE ENCOUNTER
----- Message from Ellen Brody LPN sent at 5/5/2022  4:21 PM CDT -----    ----- Message -----  From: Jimi Carolina MD  Sent: 5/3/2022   9:35 AM CDT  To: Caity Krause Staff    [facility],    Please get this patient an in-office procedure appointment with Chandan hernandez Qutenza patch.  1 hour slot.

## 2022-05-11 ENCOUNTER — TELEPHONE (OUTPATIENT)
Dept: BARIATRICS | Facility: CLINIC | Age: 57
End: 2022-05-11

## 2022-05-11 RX ORDER — FUROSEMIDE 20 MG/1
TABLET ORAL
Qty: 90 TABLET | Refills: 1 | Status: SHIPPED | OUTPATIENT
Start: 2022-05-11 | End: 2023-01-24

## 2022-05-11 NOTE — TELEPHONE ENCOUNTER
Refill Routing Note   Medication(s) are not appropriate for processing by [facility] for the following reason(s):      - Required laboratory values are abnormal  - Patient has been seen in the ED/Hospital since the last PCP visit    [facility] action(s):  Defer          Medication reconciliation completed: No     Appointments  past 12m or future 3m with PCP    Date Provider   Last Visit   10/4/2021 Louis Bah MD   Next Visit   Visit date not found Louis Bah MD   ED visits in past 90 days: 0        Note composed:8:32 AM 05/11/2022

## 2022-05-11 NOTE — TELEPHONE ENCOUNTER
"----- Message from Kenyetta Bueno sent at 5/11/2022  2:03 PM CDT -----  "Type:  Patient Call Back    Who Called:TAMI ROSALES [[id number]]    What is the reqeust in detail:Pt requesting call back to schedule an appointment. Please advise     Can the clinic reply by [facility]?no    Best Call Back Number:[phone]      Additional Information:          "

## 2022-05-11 NOTE — TELEPHONE ENCOUNTER
No new care gaps identified.  [facility] Embedded Care Gaps. Reference number: [id number]. 5/11/2022   2:28:27 AM HEAVENT

## 2022-05-16 ENCOUNTER — OFFICE VISIT (OUTPATIENT)
Dept: PAIN MEDICINE | Facility: CLINIC | Age: 57
End: 2022-05-16
Payer: COMMERCIAL

## 2022-05-16 ENCOUNTER — LAB VISIT (OUTPATIENT)
Dept: LAB | Facility: HOSPITAL | Age: 57
End: 2022-05-16
Attending: PODIATRIST
Payer: COMMERCIAL

## 2022-05-16 VITALS
OXYGEN SATURATION: 97 % | HEIGHT: 64 IN | HEART RATE: 75 BPM | SYSTOLIC BLOOD PRESSURE: 141 MMHG | BODY MASS INDEX: 52.35 KG/M2 | DIASTOLIC BLOOD PRESSURE: 90 MMHG

## 2022-05-16 DIAGNOSIS — N18.30 STAGE 3 CHRONIC KIDNEY DISEASE, UNSPECIFIED WHETHER STAGE 3A OR 3B CKD: ICD-10-CM

## 2022-05-16 DIAGNOSIS — E11.42 DIABETIC PERIPHERAL NEUROPATHY: Primary | ICD-10-CM

## 2022-05-16 DIAGNOSIS — Z00.00 ANNUAL VISIT FOR GENERAL ADULT MEDICAL EXAMINATION WITHOUT ABNORMAL FINDINGS: ICD-10-CM

## 2022-05-16 DIAGNOSIS — B35.1 ONYCHOMYCOSIS DUE TO DERMATOPHYTE: ICD-10-CM

## 2022-05-16 LAB
25(OH)D3+25(OH)D2 SERPL-MCNC: 30 NG/ML (ref 30–96)
ALBUMIN SERPL BCP-MCNC: 3.7 G/DL (ref 3.5–5.2)
ALP SERPL-CCNC: 91 U/L (ref 55–135)
ALP SERPL-CCNC: 91 U/L (ref 55–135)
ALT SERPL W/O P-5'-P-CCNC: 16 U/L (ref 10–44)
ALT SERPL W/O P-5'-P-CCNC: 16 U/L (ref 10–44)
ANION GAP SERPL CALC-SCNC: 11 MMOL/L (ref 8–16)
ANION GAP SERPL CALC-SCNC: 11 MMOL/L (ref 8–16)
AST SERPL-CCNC: 17 U/L (ref 10–40)
AST SERPL-CCNC: 17 U/L (ref 10–40)
BASOPHILS # BLD AUTO: 0.05 K/UL (ref 0–0.2)
BASOPHILS # BLD AUTO: 0.05 K/UL (ref 0–0.2)
BASOPHILS NFR BLD: 0.8 % (ref 0–1.9)
BASOPHILS NFR BLD: 0.8 % (ref 0–1.9)
BILIRUB DIRECT SERPL-MCNC: 0.2 MG/DL (ref 0.1–0.3)
BILIRUB SERPL-MCNC: 0.4 MG/DL (ref 0.1–1)
BILIRUB SERPL-MCNC: 0.4 MG/DL (ref 0.1–1)
BUN SERPL-MCNC: 57 MG/DL (ref 6–20)
BUN SERPL-MCNC: 57 MG/DL (ref 6–20)
CALCIUM SERPL-MCNC: 9.5 MG/DL (ref 8.7–10.5)
CALCIUM SERPL-MCNC: 9.5 MG/DL (ref 8.7–10.5)
CHLORIDE SERPL-SCNC: 103 MMOL/L (ref 95–110)
CHLORIDE SERPL-SCNC: 103 MMOL/L (ref 95–110)
CO2 SERPL-SCNC: 26 MMOL/L (ref 23–29)
CO2 SERPL-SCNC: 26 MMOL/L (ref 23–29)
CREAT SERPL-MCNC: 2.6 MG/DL (ref 0.5–1.4)
CREAT SERPL-MCNC: 2.6 MG/DL (ref 0.5–1.4)
DIFFERENTIAL METHOD: ABNORMAL
DIFFERENTIAL METHOD: ABNORMAL
EOSINOPHIL # BLD AUTO: 0.2 K/UL (ref 0–0.5)
EOSINOPHIL # BLD AUTO: 0.2 K/UL (ref 0–0.5)
EOSINOPHIL NFR BLD: 3.2 % (ref 0–8)
EOSINOPHIL NFR BLD: 3.2 % (ref 0–8)
ERYTHROCYTE [DISTWIDTH] IN BLOOD BY AUTOMATED COUNT: 13.5 % (ref 11.5–14.5)
ERYTHROCYTE [DISTWIDTH] IN BLOOD BY AUTOMATED COUNT: 13.5 % (ref 11.5–14.5)
EST. GFR  (AFRICAN AMERICAN): 23.1 ML/MIN/1.73 M^2
EST. GFR  (AFRICAN AMERICAN): 23.1 ML/MIN/1.73 M^2
EST. GFR  (NON AFRICAN AMERICAN): 20 ML/MIN/1.73 M^2
EST. GFR  (NON AFRICAN AMERICAN): 20 ML/MIN/1.73 M^2
ESTIMATED AVG GLUCOSE: 171 MG/DL (ref 68–131)
FERRITIN SERPL-MCNC: 33 NG/ML (ref 20–300)
GLUCOSE SERPL-MCNC: 222 MG/DL (ref 70–110)
GLUCOSE SERPL-MCNC: 222 MG/DL (ref 70–110)
HBA1C MFR BLD: 7.6 % (ref 4–5.6)
HCT VFR BLD AUTO: 29.6 % (ref 37–48.5)
HCT VFR BLD AUTO: 29.6 % (ref 37–48.5)
HGB BLD-MCNC: 9.5 G/DL (ref 12–16)
HGB BLD-MCNC: 9.5 G/DL (ref 12–16)
IMM GRANULOCYTES # BLD AUTO: 0.02 K/UL (ref 0–0.04)
IMM GRANULOCYTES # BLD AUTO: 0.02 K/UL (ref 0–0.04)
IMM GRANULOCYTES NFR BLD AUTO: 0.3 % (ref 0–0.5)
IMM GRANULOCYTES NFR BLD AUTO: 0.3 % (ref 0–0.5)
IRON SERPL-MCNC: 60 UG/DL (ref 30–160)
LYMPHOCYTES # BLD AUTO: 1.9 K/UL (ref 1–4.8)
LYMPHOCYTES # BLD AUTO: 1.9 K/UL (ref 1–4.8)
LYMPHOCYTES NFR BLD: 32.1 % (ref 18–48)
LYMPHOCYTES NFR BLD: 32.1 % (ref 18–48)
MAGNESIUM SERPL-MCNC: 1.6 MG/DL (ref 1.6–2.6)
MCH RBC QN AUTO: 29.6 PG (ref 27–31)
MCH RBC QN AUTO: 29.6 PG (ref 27–31)
MCHC RBC AUTO-ENTMCNC: 32.1 G/DL (ref 32–36)
MCHC RBC AUTO-ENTMCNC: 32.1 G/DL (ref 32–36)
MCV RBC AUTO: 92 FL (ref 82–98)
MCV RBC AUTO: 92 FL (ref 82–98)
MONOCYTES # BLD AUTO: 0.6 K/UL (ref 0.3–1)
MONOCYTES # BLD AUTO: 0.6 K/UL (ref 0.3–1)
MONOCYTES NFR BLD: 9.3 % (ref 4–15)
MONOCYTES NFR BLD: 9.3 % (ref 4–15)
NEUTROPHILS # BLD AUTO: 3.2 K/UL (ref 1.8–7.7)
NEUTROPHILS # BLD AUTO: 3.2 K/UL (ref 1.8–7.7)
NEUTROPHILS NFR BLD: 54.3 % (ref 38–73)
NEUTROPHILS NFR BLD: 54.3 % (ref 38–73)
NRBC BLD-RTO: 0 /100 WBC
NRBC BLD-RTO: 0 /100 WBC
PHOSPHATE SERPL-MCNC: 3.8 MG/DL (ref 2.7–4.5)
PLATELET # BLD AUTO: 95 K/UL (ref 150–450)
PLATELET # BLD AUTO: 95 K/UL (ref 150–450)
PMV BLD AUTO: 11.5 FL (ref 9.2–12.9)
PMV BLD AUTO: 11.5 FL (ref 9.2–12.9)
POTASSIUM SERPL-SCNC: 4.8 MMOL/L (ref 3.5–5.1)
POTASSIUM SERPL-SCNC: 4.8 MMOL/L (ref 3.5–5.1)
PROT SERPL-MCNC: 6.5 G/DL (ref 6–8.4)
PROT SERPL-MCNC: 6.5 G/DL (ref 6–8.4)
PTH-INTACT SERPL-MCNC: 159.8 PG/ML (ref 9–77)
RBC # BLD AUTO: 3.21 M/UL (ref 4–5.4)
RBC # BLD AUTO: 3.21 M/UL (ref 4–5.4)
SATURATED IRON: 15 % (ref 20–50)
SODIUM SERPL-SCNC: 140 MMOL/L (ref 136–145)
SODIUM SERPL-SCNC: 140 MMOL/L (ref 136–145)
T4 FREE SERPL-MCNC: 0.99 NG/DL (ref 0.71–1.51)
TOTAL IRON BINDING CAPACITY: 411 UG/DL (ref 250–450)
TRANSFERRIN SERPL-MCNC: 278 MG/DL (ref 200–375)
TSH SERPL DL<=0.005 MIU/L-ACNC: 2.15 UIU/ML (ref 0.4–4)
URATE SERPL-MCNC: 11.7 MG/DL (ref 2.4–5.7)
WBC # BLD AUTO: 5.91 K/UL (ref 3.9–12.7)
WBC # BLD AUTO: 5.91 K/UL (ref 3.9–12.7)

## 2022-05-16 PROCEDURE — 84165 PATHOLOGIST INTERPRETATION SPE: ICD-10-PCS | Mod: 26,,, | Performed by: PATHOLOGY

## 2022-05-16 PROCEDURE — 82728 ASSAY OF FERRITIN: CPT | Performed by: INTERNAL MEDICINE

## 2022-05-16 PROCEDURE — 84466 ASSAY OF TRANSFERRIN: CPT | Performed by: INTERNAL MEDICINE

## 2022-05-16 PROCEDURE — 83520 IMMUNOASSAY QUANT NOS NONAB: CPT | Performed by: INTERNAL MEDICINE

## 2022-05-16 PROCEDURE — 83970 ASSAY OF PARATHORMONE: CPT | Performed by: INTERNAL MEDICINE

## 2022-05-16 PROCEDURE — 82306 VITAMIN D 25 HYDROXY: CPT | Performed by: INTERNAL MEDICINE

## 2022-05-16 PROCEDURE — 84550 ASSAY OF BLOOD/URIC ACID: CPT | Performed by: INTERNAL MEDICINE

## 2022-05-16 PROCEDURE — 83735 ASSAY OF MAGNESIUM: CPT | Performed by: INTERNAL MEDICINE

## 2022-05-16 PROCEDURE — 99999 PR PBB SHADOW E&M-EST. PATIENT-LVL IV: CPT | Mod: PBBFAC,,,

## 2022-05-16 PROCEDURE — 84165 PROTEIN E-PHORESIS SERUM: CPT | Performed by: INTERNAL MEDICINE

## 2022-05-16 PROCEDURE — 80069 RENAL FUNCTION PANEL: CPT | Performed by: INTERNAL MEDICINE

## 2022-05-16 PROCEDURE — 80076 HEPATIC FUNCTION PANEL: CPT | Mod: 59 | Performed by: PODIATRIST

## 2022-05-16 PROCEDURE — 36415 COLL VENOUS BLD VENIPUNCTURE: CPT | Mod: PO | Performed by: INTERNAL MEDICINE

## 2022-05-16 PROCEDURE — 84165 PROTEIN E-PHORESIS SERUM: CPT | Mod: 26,,, | Performed by: PATHOLOGY

## 2022-05-16 PROCEDURE — 84439 ASSAY OF FREE THYROXINE: CPT | Performed by: INTERNAL MEDICINE

## 2022-05-16 PROCEDURE — 83036 HEMOGLOBIN GLYCOSYLATED A1C: CPT | Performed by: INTERNAL MEDICINE

## 2022-05-16 PROCEDURE — 84443 ASSAY THYROID STIM HORMONE: CPT | Performed by: INTERNAL MEDICINE

## 2022-05-16 PROCEDURE — 85025 COMPLETE CBC W/AUTO DIFF WBC: CPT | Performed by: INTERNAL MEDICINE

## 2022-05-16 PROCEDURE — 99999 PR PBB SHADOW E&M-EST. PATIENT-LVL IV: ICD-10-PCS | Mod: PBBFAC,,,

## 2022-05-16 PROCEDURE — 80053 COMPREHEN METABOLIC PANEL: CPT | Performed by: INTERNAL MEDICINE

## 2022-05-16 NOTE — PROGRESS NOTES
[facility] Follow Up Evaluation    Referred by: Dr. Burgess  Reason for referral: foot pain    CC:   Bilateral foot pain     No flowsheet data found.    Interval HPI 5/17/2022: Mary Ann Vidales returns to the clinic for follow up.  She returns with continued complaints of chronic bilateral foot pain, 6/10, constant, burning, electric, numb.  She also reports right-sided heel pain that she is unsure if it is due to the neuropathy or from previous Achilles surgery.  The pain is bothersome to her is worse with prolonged sitting or standing.  She denies any joy weakness      HPI:   Mary Ann Vidales is a 57 y.o. female who presents with foot pain.  She has had chronic pain in her feet for over the last 10 years.  Today she reports her foot pain is 6/10, constant, burning, electric, numb.  Pain is constant but can get worse with sitting or standing.    History:    Current Outpatient Medications:     ALPRAZolam (XANAX) 0.5 MG tablet, Take 1 tablet (0.5 mg total) by mouth once daily., Disp: 30 tablet, Rfl: 3    aspirin (ECOTRIN) 81 MG EC tablet, Take 81 mg by mouth once daily., Disp: , Rfl:     atorvastatin (LIPITOR) 10 MG tablet, TAKE 1 TABLET BY MOUTH EVERY DAY, Disp: 90 tablet, Rfl: 3    blood sugar diagnostic Strp, Pt to check glucoses TID, Disp: 100 strip, Rfl: 12    blood-glucose meter kit, Pt to check glucoses Tid, Disp: 1 each, Rfl: prn    clopidogrel (PLAVIX) 75 mg tablet, Take 75 mg by mouth once daily., Disp: , Rfl: 11    FLUoxetine 40 MG capsule, TAKE 1 CAPSULE BY MOUTH EVERY DAY, Disp: 90 capsule, Rfl: 3    furosemide (LASIX) 20 MG tablet, TAKE 1 TABLET BY MOUTH EVERY DAY, Disp: 90 tablet, Rfl: 1    glucosamine-chondroitin 250-200 mg Tab, Take 1 tablet by mouth once daily., Disp: , Rfl:     HYDROcodone-acetaminophen (NORCO) 7.5-325 mg per tablet, Take 1 tablet by mouth every 6 (six) hours as needed for Pain., Disp: 90 tablet, Rfl: 0    lancets (ONETOUCH ULTRASOFT LANCETS) Misc,  Use to check glucoses up to TID., Disp: 100 each, Rfl: 0    losartan (COZAAR) 50 MG tablet, Take 50 mg by mouth once daily., Disp: , Rfl:     nitroGLYCERIN (NITROSTAT) 0.4 MG SL tablet, PLACE 1 TABLET UNDER TONGUE EVERY 5 MINS, UP TO 3 DOSES AS NEEDED FOR CHEST PAIN, Disp: , Rfl: 11    ondansetron (ZOFRAN) 4 MG tablet, TAKE 1 TABLET BY MOUTH EVERY 8 HOURS AS NEEDED FOR NAUSEA(18/21D), Disp: 30 tablet, Rfl: 3    OZEMPIC 1 mg/dose (4 mg/3 mL), INJECT 1MG INTO THE SKIN EVERY 7 DAYS, Disp: 2 pen, Rfl: 6    pantoprazole (PROTONIX) 40 MG tablet, TAKE 1 TABLET BY MOUTH EVERY DAY, Disp: 90 tablet, Rfl: 3    pregabalin (LYRICA) 150 MG capsule, Take 2 capsules (300 mg total) by mouth 2 (two) times daily., Disp: 120 capsule, Rfl: 6    propranoloL (INDERAL) 20 MG tablet, TAKE 1 TABLET BY MOUTH TWICE A DAY, Disp: 180 tablet, Rfl: 3    terbinafine HCL (LAMISIL) 250 mg tablet, Take 1 tablet (250 mg total) by mouth once daily., Disp: 30 tablet, Rfl: 2    allopurinoL (ZYLOPRIM) 100 MG tablet, Take 2 tablets (200 mg total) by mouth once daily., Disp: 180 tablet, Rfl: 3    Past Medical History:   Diagnosis Date    Anticoagulant long-term use     Chronic asthma     Coronary artery disease     Diabetes mellitus type II     Fibroids     Hypertension     Incontinence     Morbid obesity 11/3/2015    Neuropathy in diabetes     Obesity     TINA (obstructive sleep apnea)     uses CPAP    Panic attacks     Renal disorder     STAGE 3       Past Surgical History:   Procedure Laterality Date    CARDIAC CATHETERIZATION  07/05/2019    STENT IN LAD    CORONARY ANGIOGRAPHY N/A 11/6/2020    Procedure: ANGIOGRAM, CORONARY ARTERY;  Surgeon: John Francis MD;  [facility] C[facility];  Service: Cardiology;  Laterality: N/A;    CORONARY ANGIOPLASTY WITH STENT PLACEMENT  2019    ECTOPIC PREGNANCY SURGERY      HERNIA REPAIR      HYSTERECTOMY      OOPHORECTOMY      TENOTOMY Right 3/4/2022    Procedure: TENOTOMY- Tx Bone Left  mary;  Surgeon: Sarwat Vaughn MD;  Location: [facility];  Service: Orthopedics;  Laterality: Right;    TONSILLECTOMY         Family History   Problem Relation Age of Onset    Diabetes Mother     Heart disease Mother     COPD Mother     Heart failure Father     Breast cancer Neg Hx     Ovarian cancer Neg Hx        Social History     Socioeconomic History    Marital status:    Tobacco Use    Smoking status: Never Smoker    Smokeless tobacco: Never Used   Substance and Sexual Activity    Alcohol use: No     Alcohol/week: 0.0 standard drinks    Drug use: No    Sexual activity: Yes   Social History Narrative     at [employer]. Lives with her partner, who smokes.     Social Determinants of Health     Financial Resource Strain: Low Risk     Difficulty of Paying Living Expenses: Not very hard   Food Insecurity: No Food Insecurity    Worried About Running Out of Food in the Last Year: Never true    Ran Out of Food in the Last Year: Never true   Transportation Needs: No Transportation Needs    Lack of Transportation (Medical): No    Lack of Transportation (Non-Medical): No   Physical Activity: Inactive    Days of Exercise per Week: 0 days    Minutes of Exercise per Session: 0 min   Stress: No Stress Concern Present    Feeling of Stress : Only a little   Social Connections: Unknown    Frequency of Communication with Friends and Family: More than three times a week    Frequency of Social Gatherings with Friends and Family: Once a week    Active Member of Clubs or Organizations: No    Attends Club or Organization Meetings: Never    Marital Status: Living with partner   Housing Stability: Low Risk     Unable to Pay for Housing in the Last Year: No    Number of Places Lived in the Last Year: 1    Unstable Housing in the Last Year: No       Review of patient's allergies indicates:   Allergen Reactions    Captopril      Other reaction(s): cough    Lantus [insulin  "glargine] Swelling and Other (See Comments)     Burning and redness in the legs    Levemir [insulin detemir] Swelling and Other (See Comments)     Burning and redness of lower extremities    Lisinopril Other (See Comments)     Other reaction(s): cough    Other      Tape causes welps & hives    Antiseptic swabs Rash     Antiseptic wipes given prior to surgery caused severe rash.        Review of Systems:  General ROS: negative for - fever  Psychological ROS: negative for - hostility  Hematological and Lymphatic ROS: negative for - bleeding problems  Endocrine ROS: negative for - unexpected weight changes  Respiratory ROS: no cough, shortness of breath, or wheezing  Cardiovascular ROS: no chest pain or dyspnea on exertion  Gastrointestinal ROS: no abdominal pain, change in bowel habits, or black or bloody stools  Musculoskeletal ROS: negative for - muscular weakness  Neurological ROS: positive for - numbness/tingling  Dermatological ROS: negative for rash    Physical Exam:  Vitals:    05/16/22 1613 05/16/22 1638 05/16/22 1653   BP: (!) 144/66 114/65 (!) 141/90   Pulse: 81 81 75   SpO2: 97%     Height: 5' 4" (1.626 m)     PainSc:   8     PainLoc: Ankle       Body mass index is 52.35 kg/m².    Gen: NAD  Gait: gait intact  Psych:  Mood appropriate for given condition  HEENT: eyes anicteric   Lungs: breathing unlabored   ROM:, full ROM at ankles, knees and hips  Sensation:  Decreased sensation to light touch in a nondermatomal distribution over her bilateral feet and ankle  Tone: normal in the b/l knees and hips   Skin: intact, no open sores, wounds  Extremities:  Pitting edema in bilateral lower extremities from mid calf down  Provacative tests:      Imaging:  none    Labs:  BMP  Lab Results   Component Value Date     05/16/2022     05/16/2022    K 4.8 05/16/2022    K 4.8 05/16/2022     05/16/2022     05/16/2022    CO2 26 05/16/2022    CO2 26 05/16/2022    BUN 57 (H) 05/16/2022    BUN 57 (H) " 05/16/2022    CREATININE 2.6 (H) 05/16/2022    CREATININE 2.6 (H) 05/16/2022    CALCIUM 9.5 05/16/2022    CALCIUM 9.5 05/16/2022    ANIONGAP 11 05/16/2022    ANIONGAP 11 05/16/2022    ESTGFRAFRICA 23.1 (A) 05/16/2022    ESTGFRAFRICA 23.1 (A) 05/16/2022    EGFRNONAA 20.0 (A) 05/16/2022    EGFRNONAA 20.0 (A) 05/16/2022     Lab Results   Component Value Date    ALT 16 05/16/2022    ALT 16 05/16/2022    AST 17 05/16/2022    AST 17 05/16/2022    ALKPHOS 91 05/16/2022    ALKPHOS 91 05/16/2022    BILITOT 0.4 05/16/2022    BILITOT 0.4 05/16/2022       Assessment:   Problem List Items Addressed This Visit    None         57 y.o. year old female with PMH HTN, CKD, TINA, DM II with painful diabetic neuropathy who presents with foot pain.  She has had chronic pain in her feet for over the last 10 years.  Today she reports her foot pain is 6/10, constant, burning, electric, numb.  Pain is constant but can get worse with sitting or standing.    5/17/2022: Mary Ann Vidales returns to the clinic for follow up.  She returns with continued complaints of chronic bilateral foot pain, 6/10, constant, burning, electric, numb.  She also reports right-sided heel pain that she is unsure if it is due to the neuropathy or from previous Achilles surgery.  The pain is bothersome to her is worse with prolonged sitting or standing.  She denies any joy weakness    - on exam she has full strength and decreased sensation to light touch in a nondermatomal distribution over her feet and ankles.  - today we discussed the application of the Qutenza patches.  We discussed the potential risks involved of irritation to her eyes, feelings of discomfort during application, and sensitivity to hot water and sunlight for the next 3-4 days.  She verbalized understanding and wished to proceed with the application.  - she is not finding significant relief with Lyrica of her diabetic peripheral neuropathy    This is a pain clinic procedure note.    Procedure:  Qutenza Patch Application   Procedure Date: 05/16/2022  Location applied:  Dorsum and plantar aspect of bilateral feet  Subjective:  Preoperative Diagnosis:  Diabetic peripheral neuropathy  Post Procedure Diagnosis:  Diabetic peripheral neuropathy  Complications: None  Anesthetic:  None    Procedure details: Hands washed, dried and gloves donned prior to patient care. Examined the pt's feet for any signs of open wounds or broken skin. Skin intact with no open areas noted. Patches applied to plantar and dorsal areas of both clean, dry feet, wrapped with cast padding to hold in place. Examined skin 10 min after application, no sign of redness or irritation noted, pt denies any pain.  Patches placed at 4:23 p.m.     Hands washed, dried and gloves donned prior to removal of patches. Patches removed after 30 min, disposed of properly in biohazard receptacle. Feet cleansed with Qutenza cleanser provided, patted dry. Pt tolerated well.  Patches removed at 4:53 p.m.    Dispo: no complications, pt tolerated the procedure well.    Plan: RTC in 6 weeks      This note was completed with dictation software and grammatical errors may exist.

## 2022-05-17 LAB
ALBUMIN SERPL ELPH-MCNC: 3.82 G/DL (ref 3.35–5.55)
ALPHA1 GLOB SERPL ELPH-MCNC: 0.29 G/DL (ref 0.17–0.41)
ALPHA2 GLOB SERPL ELPH-MCNC: 0.76 G/DL (ref 0.43–0.99)
B-GLOBULIN SERPL ELPH-MCNC: 0.75 G/DL (ref 0.5–1.1)
GAMMA GLOB SERPL ELPH-MCNC: 0.58 G/DL (ref 0.67–1.58)
KAPPA LC SER QL IA: 2.87 MG/DL (ref 0.33–1.94)
KAPPA LC/LAMBDA SER IA: 1.39 (ref 0.26–1.65)
LAMBDA LC SER QL IA: 2.06 MG/DL (ref 0.57–2.63)
PROT SERPL-MCNC: 6.2 G/DL (ref 6–8.4)

## 2022-05-17 RX ORDER — ALLOPURINOL 100 MG/1
200 TABLET ORAL DAILY
Qty: 180 TABLET | Refills: 3 | Status: SHIPPED | OUTPATIENT
Start: 2022-05-17 | End: 2023-04-24

## 2022-05-18 ENCOUNTER — PATIENT MESSAGE (OUTPATIENT)
Dept: BARIATRICS | Facility: CLINIC | Age: 57
End: 2022-05-18
Payer: COMMERCIAL

## 2022-05-18 LAB — PATHOLOGIST INTERPRETATION SPE: NORMAL

## 2022-05-24 ENCOUNTER — OFFICE VISIT (OUTPATIENT)
Dept: ENDOCRINOLOGY | Facility: CLINIC | Age: 57
End: 2022-05-24
Payer: COMMERCIAL

## 2022-05-24 DIAGNOSIS — N18.4 CKD (CHRONIC KIDNEY DISEASE), SYMPTOM MANAGEMENT ONLY, STAGE 4 (SEVERE): ICD-10-CM

## 2022-05-24 DIAGNOSIS — E78.5 DYSLIPIDEMIA: ICD-10-CM

## 2022-05-24 DIAGNOSIS — I10 ESSENTIAL HYPERTENSION: ICD-10-CM

## 2022-05-24 DIAGNOSIS — Z79.4 TYPE 2 DIABETES MELLITUS WITHOUT COMPLICATION, WITH LONG-TERM CURRENT USE OF INSULIN: Primary | ICD-10-CM

## 2022-05-24 DIAGNOSIS — E66.01 MORBID OBESITY: ICD-10-CM

## 2022-05-24 DIAGNOSIS — R60.9 EDEMA, UNSPECIFIED TYPE: ICD-10-CM

## 2022-05-24 DIAGNOSIS — E11.9 TYPE 2 DIABETES MELLITUS WITHOUT COMPLICATION, WITH LONG-TERM CURRENT USE OF INSULIN: Primary | ICD-10-CM

## 2022-05-24 PROCEDURE — 99214 PR OFFICE/OUTPT VISIT, EST, LEVL IV, 30-39 MIN: ICD-10-PCS | Mod: 95,,, | Performed by: NURSE PRACTITIONER

## 2022-05-24 PROCEDURE — 99214 OFFICE O/P EST MOD 30 MIN: CPT | Mod: 95,,, | Performed by: NURSE PRACTITIONER

## 2022-05-24 RX ORDER — EMPAGLIFLOZIN 10 MG/1
10 TABLET, FILM COATED ORAL DAILY
Qty: 30 TABLET | Refills: 6 | Status: SHIPPED | OUTPATIENT
Start: 2022-05-24 | End: 2022-12-09

## 2022-05-24 NOTE — PROGRESS NOTES
Subjective:      Patient ID: Mary Ann Vidales is a 57 y.o. female.    Chief Complaint:  Diabetes f/u    The patient location is: home   The chief complaint leading to consultation is: routine f/u Type 1 DM     Visit type: audiovisual    Face to Face time with patient:10.54---11:17   --30   minutes of total time spent on the encounter, which includes face to face time and non-face to face time preparing to see the patient (eg, review of tests), Obtaining and/or reviewing separately obtained history, Documenting clinical information in the electronic or other health record, Independently interpreting results (not separately reported) and communicating results to the patient/family/caregiver, or Care coordination (not separately reported).     Each patient to whom he or she provides medical services by telemedicine is:  (1) informed of the relationship between the physician and patient and the respective role of any other health care provider with respect to management of the patient; and (2) notified that he or she may decline to receive medical services by telemedicine and may withdraw from such care at any time.    Notes:   History of Present Illness  Pt is a 58y/o female with TYPE 2 DM since approx 2000, and chronic conditions pending review including HTN, neuropathy. Dyslipidemia, morbid obesity.      Interim Events: Pt 308 lbs--put back on 30lbs--over 6 mo.  Kidney function has rapidly decreased.  Also with increased LE edema--states considerable.   taking Ozempic 1 mg. She resumed her metformin related to increased glucoses.    She was previously on  A marked dose of gabapentin for neuropathy and was switched to 300 mg bid of Lyrica--which she is taking  (and was previously taking it with gabapentin though had been advised to stop the gabapentin--which she did after our last visit.  Also states uric acid high, and taking allopurinol.  She is undergoing cardiac w/u and also US for LE edema with one much  larger than the other.      -250    250-260 in evening.     Diabetes Flow Sheet:  Diabetes Medications:  Metformin 1000 BID,  glimipiride 4 mg, ozempci 1 mg   Bydureon d/c r/t hacking cough   Diabetes Complications:peripheral neuropathy   Aspirin:   Statin: none   ACE/ARB:losartan hctz 100/25 qd.   Last Urine Microalbumin:   Last Eye exam: 10/16Last Diabetic Education:   Glucometer--Accucheck Verio     Review of Systems   Constitutional: Negative for activity change and fatigue.   HENT: Negative for hearing loss and trouble swallowing.    Eyes: Negative for visual disturbance.   Respiratory: Positive for shortness of breath. Negative for cough.    Cardiovascular: Positive for leg swelling (L leg bigger.  ). Negative for chest pain and palpitations.   Gastrointestinal: Negative for constipation and diarrhea.   Genitourinary: Negative for difficulty urinating and frequency.   Musculoskeletal: Positive for arthralgias. Negative for myalgias.        Knees   Skin: Negative for rash and wound.   Neurological: Positive for tremors and numbness (feet worsened, hands). Negative for weakness and light-headedness.   Hematological: Does not bruise/bleed easily.   Psychiatric/Behavioral: Negative for agitation and decreased concentration. The patient is not nervous/anxious.        Objective:   Physical Exam  Constitutional:       Appearance: She is well-developed.   Psychiatric:         Behavior: Behavior normal.         Thought Content: Thought content normal.         Judgment: Judgment normal.             Lab Review:   Hemoglobin A1C   Date Value Ref Range Status   05/16/2022 7.6 (H) 4.0 - 5.6 % Final     Comment:     ADA Screening Guidelines:  5.7-6.4%  Consistent with prediabetes  >or=6.5%  Consistent with diabetes    High levels of fetal hemoglobin interfere with the HbA1C  assay. Heterozygous hemoglobin variants (HbS, HgC, etc)do  not significantly interfere with this assay.   However, presence of multiple variants  may affect accuracy.     03/02/2022 6.3 (H) 4.0 - 5.6 % Final     Comment:     ADA Screening Guidelines:  5.7-6.4%  Consistent with prediabetes  >or=6.5%  Consistent with diabetes    High levels of fetal hemoglobin interfere with the HbA1C  assay. Heterozygous hemoglobin variants (HbS, HgC, etc)do  not significantly interfere with this assay.   However, presence of multiple variants may affect accuracy.     08/04/2021 5.7 (H) 4.0 - 5.6 % Final     Comment:     ADA Screening Guidelines:  5.7-6.4%  Consistent with prediabetes  >or=6.5%  Consistent with diabetes    High levels of fetal hemoglobin interfere with the HbA1C  assay. Heterozygous hemoglobin variants (HbS, HgC, etc)do  not significantly interfere with this assay.   However, presence of multiple variants may affect accuracy.       Lab Results   Component Value Date    LDLCALC 84.0 03/02/2022     Lab Results   Component Value Date    TSH 2.145 05/16/2022       Chemistry        Component Value Date/Time     05/16/2022 0736     05/16/2022 0736    K 4.8 05/16/2022 0736    K 4.8 05/16/2022 0736     05/16/2022 0736     05/16/2022 0736    CO2 26 05/16/2022 0736    CO2 26 05/16/2022 0736    BUN 57 (H) 05/16/2022 0736    BUN 57 (H) 05/16/2022 0736    CREATININE 2.6 (H) 05/16/2022 0736    CREATININE 2.6 (H) 05/16/2022 0736     (H) 05/16/2022 0736     (H) 05/16/2022 0736        Component Value Date/Time    CALCIUM 9.5 05/16/2022 0736    CALCIUM 9.5 05/16/2022 0736    ALKPHOS 91 05/16/2022 0736    ALKPHOS 91 05/16/2022 0736    AST 17 05/16/2022 0736    AST 17 05/16/2022 0736    ALT 16 05/16/2022 0736    ALT 16 05/16/2022 0736    BILITOT 0.4 05/16/2022 0736    BILITOT 0.4 05/16/2022 0736            Assessment:     1. Type 2 diabetes mellitus without complication, with long-term current use of insulin  empagliflozin (JARDIANCE) 10 mg tablet   2. Essential hypertension     3. Dyslipidemia     4. CKD (chronic kidney disease), symptom  management only, stage 4 (severe)     5. Edema, unspecified type     6. Morbid obesity           Plan:     Decrease lyrica to 150 bid---not 300 bid--which is still higher than renal dosing.    This needs to be further decreased, and pt states she will go back to gabapentin if needed.     Pending renal f/u---but she wants to talk to Dr. Peralta before more aggressive decreases in Lyrica.     Cautiously add Jardiance 10 mg--await Dr. Peralta review.   Pt counseled we may just need to do insulin with renal concerns.      ORDERS 05/24/2022     F/u 3 months with a1c prior

## 2022-05-26 ENCOUNTER — OFFICE VISIT (OUTPATIENT)
Dept: NEPHROLOGY | Facility: CLINIC | Age: 57
End: 2022-05-26
Payer: COMMERCIAL

## 2022-05-26 VITALS
OXYGEN SATURATION: 97 % | HEIGHT: 64 IN | WEIGHT: 293 LBS | DIASTOLIC BLOOD PRESSURE: 64 MMHG | HEART RATE: 86 BPM | BODY MASS INDEX: 50.02 KG/M2 | SYSTOLIC BLOOD PRESSURE: 118 MMHG

## 2022-05-26 DIAGNOSIS — E11.9 TYPE 2 DIABETES MELLITUS WITHOUT COMPLICATION, WITH LONG-TERM CURRENT USE OF INSULIN: ICD-10-CM

## 2022-05-26 DIAGNOSIS — D63.1 ANEMIA IN STAGE 4 CHRONIC KIDNEY DISEASE: ICD-10-CM

## 2022-05-26 DIAGNOSIS — N18.4 ANEMIA IN STAGE 4 CHRONIC KIDNEY DISEASE: ICD-10-CM

## 2022-05-26 DIAGNOSIS — N28.1 ACQUIRED CYST OF KIDNEY: ICD-10-CM

## 2022-05-26 DIAGNOSIS — N18.30 STAGE 3 CHRONIC KIDNEY DISEASE, UNSPECIFIED WHETHER STAGE 3A OR 3B CKD: Primary | ICD-10-CM

## 2022-05-26 DIAGNOSIS — Z79.4 TYPE 2 DIABETES MELLITUS WITHOUT COMPLICATION, WITH LONG-TERM CURRENT USE OF INSULIN: ICD-10-CM

## 2022-05-26 DIAGNOSIS — I10 ESSENTIAL HYPERTENSION: ICD-10-CM

## 2022-05-26 PROCEDURE — 99214 PR OFFICE/OUTPT VISIT, EST, LEVL IV, 30-39 MIN: ICD-10-PCS | Mod: S$GLB,,, | Performed by: INTERNAL MEDICINE

## 2022-05-26 PROCEDURE — 99214 OFFICE O/P EST MOD 30 MIN: CPT | Mod: S$GLB,,, | Performed by: INTERNAL MEDICINE

## 2022-05-26 PROCEDURE — 99999 PR PBB SHADOW E&M-EST. PATIENT-LVL III: ICD-10-PCS | Mod: PBBFAC,,, | Performed by: INTERNAL MEDICINE

## 2022-05-26 PROCEDURE — 99999 PR PBB SHADOW E&M-EST. PATIENT-LVL III: CPT | Mod: PBBFAC,,, | Performed by: INTERNAL MEDICINE

## 2022-05-26 RX ORDER — PRIMIDONE 50 MG/1
50 TABLET ORAL 2 TIMES DAILY
COMMUNITY
Start: 2022-05-16

## 2022-05-26 NOTE — PROGRESS NOTES
"Subjective:       Patient ID: Mary Ann Vidales is a 57 y.o. White female who presents for return patient evaluation for chronic renal failure.      She has a 2-3 month history of edema, dyspnea, fatigue, weight gain.  She has been dealing with neuropathy in her legs as she is off of gabapentin.  She reports the only new medications lately are lyrica, primidone and jardiance.  She also had a recent echo with Dr. Francis.      Review of Systems   Constitutional: Positive for fatigue and unexpected weight change. Negative for appetite change, chills and fever.   Eyes: Negative for visual disturbance.   Respiratory: Positive for shortness of breath. Negative for cough.    Cardiovascular: Positive for leg swelling. Negative for chest pain.   Gastrointestinal: Negative for diarrhea, nausea and vomiting.   Genitourinary: Positive for decreased urine volume and difficulty urinating. Negative for dysuria and hematuria.   Musculoskeletal: Positive for arthralgias (knees) and back pain. Negative for myalgias.   Skin: Negative for rash.   Neurological: Positive for numbness. Negative for headaches.   Hematological: Bruises/bleeds easily.   Psychiatric/Behavioral: Negative for sleep disturbance.       The past medical, family and social histories were reviewed for this encounter.     /64   Pulse 86   Ht 5' 4" (1.626 m)   Wt (!) 137.5 kg (303 lb 3.9 oz)   LMP 12/20/2013   SpO2 97%   BMI 52.05 kg/m²     Objective:      Physical Exam  Vitals reviewed.   Constitutional:       General: She is not in acute distress.     Appearance: She is well-developed. She is obese.   HENT:      Head: Normocephalic and atraumatic.   Eyes:      Conjunctiva/sclera: Conjunctivae normal.   Neck:      Vascular: No JVD.   Cardiovascular:      Rate and Rhythm: Normal rate and regular rhythm.      Heart sounds: No murmur heard.    No friction rub. No gallop.      Comments: Diminished tones   Pulmonary:      Effort: Pulmonary effort is normal. " No respiratory distress.      Breath sounds: No wheezing or rales.      Comments: diminished bs B  Abdominal:      General: Bowel sounds are normal. There is no distension.      Palpations: Abdomen is soft.      Tenderness: There is no abdominal tenderness.   Musculoskeletal:      Cervical back: Neck supple.      Right lower leg: Edema (1+) present.      Left lower leg: Edema (1+) present.   Skin:     General: Skin is warm and dry.      Findings: No rash.   Neurological:      Mental Status: She is alert and oriented to person, place, and time.         Assessment:       1. Stage 3 chronic kidney disease, unspecified whether stage 3a or 3b CKD    2. Essential hypertension    3. Type 2 diabetes mellitus without complication, with long-term current use of insulin    4. Acquired cyst of kidney    5. Anemia in stage 4 chronic kidney disease        Plan:   Return to clinic in 3 months.  Labs for next visit include rp, upc, pth uric.  RP in 1 month.  Baseline creatinine is 1.0 since 2005.  She has then been 1.6-1.8 since 2015.  Since 2018 she has been 1.8-2.2.  PTH is 160 with a calcium of 9.5.  I do not know why she went from a creatinine of 1.0 in 2015 to 1.5 in 2017 and 1.8 in 2018 with bland urine sediment and a negative renal US.  There appears to be no other precipitants although AIN is also a possibility.  Her urine sediment appears bland thus I think it is less likely that she has an acute GN.  She does not wish to have a biopsy at this time and I am not pushing for one.  She has a gap from 10/15-7/17 where she seemed to bump her baseline.  Renal US showed R 11.1 cm, L 10.6 cm.  Please avoid or minimize all NSAIDS (ibuprofen, motrin, aleve, indocin, naprosyn) to minimize the risk to your kidneys.  Aspirin in a dose of 325 mg or less a day is likely the safest with regards to kidney function.  If you are able to take aspirin and do not have any bleeding problems or ulcers, this may be your best therapy.   Alternatively, acetaminophen (Tylenol) is the safer than NSAIDs with regards to kidney function.  I would ask you take this as directed on the bottle.  It is best to stay under 2 grams a day (4-500 mg tablets a day maximum).  Uric acid is 11.7.  She is now on allopurinol.  I suspect she is having symptoms now directly related to her lyrica.

## 2022-05-27 ENCOUNTER — TELEPHONE (OUTPATIENT)
Dept: BARIATRICS | Facility: CLINIC | Age: 57
End: 2022-05-27
Payer: COMMERCIAL

## 2022-05-31 ENCOUNTER — PATIENT MESSAGE (OUTPATIENT)
Dept: ENDOCRINOLOGY | Facility: CLINIC | Age: 57
End: 2022-05-31
Payer: COMMERCIAL

## 2022-05-31 ENCOUNTER — PATIENT MESSAGE (OUTPATIENT)
Dept: ADMINISTRATIVE | Facility: HOSPITAL | Age: 57
End: 2022-05-31
Payer: COMMERCIAL

## 2022-05-31 DIAGNOSIS — G63 POLYNEUROPATHY ASSOCIATED WITH UNDERLYING DISEASE: Primary | ICD-10-CM

## 2022-05-31 DIAGNOSIS — G63 POLYNEUROPATHY ASSOCIATED WITH UNDERLYING DISEASE: ICD-10-CM

## 2022-05-31 DIAGNOSIS — Z79.4 TYPE 2 DIABETES MELLITUS WITHOUT COMPLICATION, WITH LONG-TERM CURRENT USE OF INSULIN: Primary | ICD-10-CM

## 2022-05-31 DIAGNOSIS — E11.9 TYPE 2 DIABETES MELLITUS WITHOUT COMPLICATION, WITH LONG-TERM CURRENT USE OF INSULIN: Primary | ICD-10-CM

## 2022-05-31 RX ORDER — GABAPENTIN 300 MG/1
300 CAPSULE ORAL 2 TIMES DAILY
Qty: 60 CAPSULE | Refills: 11 | Status: SHIPPED | OUTPATIENT
Start: 2022-05-31 | End: 2023-05-31

## 2022-06-02 ENCOUNTER — OFFICE VISIT (OUTPATIENT)
Dept: INTERNAL MEDICINE | Facility: CLINIC | Age: 57
End: 2022-06-02
Payer: COMMERCIAL

## 2022-06-02 VITALS
OXYGEN SATURATION: 99 % | WEIGHT: 293 LBS | SYSTOLIC BLOOD PRESSURE: 126 MMHG | TEMPERATURE: 98 F | DIASTOLIC BLOOD PRESSURE: 82 MMHG | BODY MASS INDEX: 48.82 KG/M2 | HEART RATE: 99 BPM | HEIGHT: 65 IN | RESPIRATION RATE: 16 BRPM

## 2022-06-02 DIAGNOSIS — F41.9 ANXIETY: ICD-10-CM

## 2022-06-02 DIAGNOSIS — G89.29 CHRONIC BILATERAL LOW BACK PAIN, UNSPECIFIED WHETHER SCIATICA PRESENT: ICD-10-CM

## 2022-06-02 DIAGNOSIS — I10 ESSENTIAL HYPERTENSION: ICD-10-CM

## 2022-06-02 DIAGNOSIS — I25.10 CORONARY ARTERY DISEASE, UNSPECIFIED VESSEL OR LESION TYPE, UNSPECIFIED WHETHER ANGINA PRESENT, UNSPECIFIED WHETHER NATIVE OR TRANSPLANTED HEART: ICD-10-CM

## 2022-06-02 DIAGNOSIS — K21.9 GASTROESOPHAGEAL REFLUX DISEASE, UNSPECIFIED WHETHER ESOPHAGITIS PRESENT: ICD-10-CM

## 2022-06-02 DIAGNOSIS — G47.33 OSA (OBSTRUCTIVE SLEEP APNEA): ICD-10-CM

## 2022-06-02 DIAGNOSIS — D63.1 ANEMIA IN STAGE 4 CHRONIC KIDNEY DISEASE: ICD-10-CM

## 2022-06-02 DIAGNOSIS — M17.11 PRIMARY OSTEOARTHRITIS OF RIGHT KNEE: ICD-10-CM

## 2022-06-02 DIAGNOSIS — F51.01 PRIMARY INSOMNIA: ICD-10-CM

## 2022-06-02 DIAGNOSIS — N18.4 ANEMIA IN STAGE 4 CHRONIC KIDNEY DISEASE: ICD-10-CM

## 2022-06-02 DIAGNOSIS — M54.50 CHRONIC BILATERAL LOW BACK PAIN, UNSPECIFIED WHETHER SCIATICA PRESENT: ICD-10-CM

## 2022-06-02 DIAGNOSIS — R60.9 EDEMA, UNSPECIFIED TYPE: ICD-10-CM

## 2022-06-02 DIAGNOSIS — E79.0 HYPERURICEMIA: ICD-10-CM

## 2022-06-02 DIAGNOSIS — E11.42 TYPE 2 DIABETES MELLITUS WITH DIABETIC POLYNEUROPATHY, WITHOUT LONG-TERM CURRENT USE OF INSULIN: Primary | ICD-10-CM

## 2022-06-02 DIAGNOSIS — N18.4 CHRONIC KIDNEY DISEASE, STAGE 4 (SEVERE): ICD-10-CM

## 2022-06-02 DIAGNOSIS — F32.A DEPRESSION, UNSPECIFIED DEPRESSION TYPE: ICD-10-CM

## 2022-06-02 DIAGNOSIS — E78.5 DYSLIPIDEMIA: ICD-10-CM

## 2022-06-02 PROCEDURE — 99215 PR OFFICE/OUTPT VISIT, EST, LEVL V, 40-54 MIN: ICD-10-PCS | Mod: S$GLB,,, | Performed by: INTERNAL MEDICINE

## 2022-06-02 PROCEDURE — 99999 PR PBB SHADOW E&M-EST. PATIENT-LVL V: CPT | Mod: PBBFAC,,, | Performed by: INTERNAL MEDICINE

## 2022-06-02 PROCEDURE — 99215 OFFICE O/P EST HI 40 MIN: CPT | Mod: S$GLB,,, | Performed by: INTERNAL MEDICINE

## 2022-06-02 PROCEDURE — 99999 PR PBB SHADOW E&M-EST. PATIENT-LVL V: ICD-10-PCS | Mod: PBBFAC,,, | Performed by: INTERNAL MEDICINE

## 2022-06-02 RX ORDER — HYDROCODONE BITARTRATE AND ACETAMINOPHEN 7.5; 325 MG/1; MG/1
1 TABLET ORAL EVERY 6 HOURS PRN
Qty: 90 TABLET | Refills: 0 | Status: SHIPPED | OUTPATIENT
Start: 2022-06-02 | End: 2022-09-01 | Stop reason: SDUPTHER

## 2022-06-02 RX ORDER — ALPRAZOLAM 0.5 MG/1
0.5 TABLET ORAL DAILY
Qty: 30 TABLET | Refills: 3 | Status: SHIPPED | OUTPATIENT
Start: 2022-06-02 | End: 2022-10-14

## 2022-06-03 ENCOUNTER — PATIENT MESSAGE (OUTPATIENT)
Dept: INTERNAL MEDICINE | Facility: CLINIC | Age: 57
End: 2022-06-03
Payer: COMMERCIAL

## 2022-06-06 ENCOUNTER — PATIENT MESSAGE (OUTPATIENT)
Dept: BARIATRICS | Facility: CLINIC | Age: 57
End: 2022-06-06
Payer: COMMERCIAL

## 2022-07-06 ENCOUNTER — TELEPHONE (OUTPATIENT)
Dept: INTERNAL MEDICINE | Facility: CLINIC | Age: 57
End: 2022-07-06
Payer: COMMERCIAL

## 2022-07-06 DIAGNOSIS — U07.1 COVID: ICD-10-CM

## 2022-07-06 DIAGNOSIS — E11.42 TYPE 2 DIABETES MELLITUS WITH DIABETIC POLYNEUROPATHY, WITHOUT LONG-TERM CURRENT USE OF INSULIN: ICD-10-CM

## 2022-07-06 DIAGNOSIS — N18.4 CHRONIC KIDNEY DISEASE, STAGE 4 (SEVERE): ICD-10-CM

## 2022-07-06 DIAGNOSIS — U07.1 COVID-19 VIRUS INFECTION: Primary | ICD-10-CM

## 2022-07-06 RX ORDER — PROMETHAZINE HYDROCHLORIDE AND CODEINE PHOSPHATE 6.25; 1 MG/5ML; MG/5ML
5 SOLUTION ORAL
Qty: 240 ML | Refills: 0 | Status: SHIPPED | OUTPATIENT
Start: 2022-07-06 | End: 2022-07-16

## 2022-07-06 RX ORDER — BENZONATATE 200 MG/1
200 CAPSULE ORAL 3 TIMES DAILY PRN
Qty: 40 CAPSULE | Refills: 1 | Status: SHIPPED | OUTPATIENT
Start: 2022-07-06 | End: 2022-07-16

## 2022-07-06 RX ORDER — ALBUTEROL SULFATE 90 UG/1
2 AEROSOL, METERED RESPIRATORY (INHALATION)
Qty: 18 G | Refills: 1 | Status: SHIPPED | OUTPATIENT
Start: 2022-07-06 | End: 2022-09-08

## 2022-07-06 NOTE — TELEPHONE ENCOUNTER
----- Message from Alvaro Olmos sent at 7/6/2022  9:42 AM CDT -----  Contact: Pt [phone] or [phone]  1MEDICALADVICE     Patient is calling for Medical Advice regarding: Covid positive 7/2/22    How long has patient had these symptoms: chest congestion, coughing, and headache    Pharmacy name and phone#:   Phone: [phone] Fax: [phone]    [id number] [place], [place] - [address] [place] [id number]  Phone: [phone] Fax: [phone]        Would like response via [employer]:      Comments: Patient states she has taken antibiotics and steroid, but it is still in chest. If no answer at patients number please contact patient's partner Osiel .[phone]

## 2022-07-06 NOTE — TELEPHONE ENCOUNTER
A referral order has been entered for IV infusion therapy.  (The patient cannot Paxlovid due to her  chronic kidney disease stage 4).     An order for an albuterol inhaler will also be sent to the patient's pharmacy to help with bronchial congestion.  Additional cough medication will be prescribed.

## 2022-07-06 NOTE — TELEPHONE ENCOUNTER
I reached her at .    Homes test was positive for covid on Tuesday.  Went to [facility] Saturday because was coughing a lot and tested positive too..  They gave her amoxil 875 bid 10 days.  . she had steroid pack at home and started it.   They told her sinus infection and covid.   Strep and flu test were negative.    Chest not improving. She says gets bad bronchitis when she gets sick.  Has a little bit of quafenesin cough syrup left that is 2 years old.   Has 1 day left of steroids.  Has 5 days left of antibiotic.    What else do you recommend?   Coughing frequently while on phone.   She says feels short of breath when coughing a lot or trying to talk..   Need inhaler. ??     Gets hot/cold at night.  No thermometer at home.    Willing to do paxlovid or iv infusion if you feel she should do.  Any rx send to [facility].    Please advise.thanks hamlet      .

## 2022-07-06 NOTE — TELEPHONE ENCOUNTER
I told her everything dr chang ordered and about covid monitoring.  Told her infusion dept  will be calling.  Please call me when needs anything.

## 2022-07-07 ENCOUNTER — INFUSION (OUTPATIENT)
Dept: INFECTIOUS DISEASES | Facility: HOSPITAL | Age: 57
End: 2022-07-07
Attending: INTERNAL MEDICINE
Payer: COMMERCIAL

## 2022-07-07 VITALS
DIASTOLIC BLOOD PRESSURE: 82 MMHG | OXYGEN SATURATION: 97 % | RESPIRATION RATE: 16 BRPM | TEMPERATURE: 98 F | HEART RATE: 79 BPM | SYSTOLIC BLOOD PRESSURE: 141 MMHG

## 2022-07-07 DIAGNOSIS — N18.4 CHRONIC KIDNEY DISEASE, STAGE 4 (SEVERE): ICD-10-CM

## 2022-07-07 DIAGNOSIS — E11.42 TYPE 2 DIABETES MELLITUS WITH DIABETIC POLYNEUROPATHY, WITHOUT LONG-TERM CURRENT USE OF INSULIN: ICD-10-CM

## 2022-07-07 DIAGNOSIS — U07.1 COVID-19 VIRUS INFECTION: ICD-10-CM

## 2022-07-07 RX ORDER — ONDANSETRON 4 MG/1
4 TABLET, ORALLY DISINTEGRATING ORAL
Status: ACTIVE | OUTPATIENT
Start: 2022-07-07 | End: 2022-07-08

## 2022-07-07 RX ORDER — ALBUTEROL SULFATE 90 UG/1
2 AEROSOL, METERED RESPIRATORY (INHALATION)
Status: ACTIVE | OUTPATIENT
Start: 2022-07-07 | End: 2022-07-10

## 2022-07-07 RX ORDER — DIPHENHYDRAMINE HYDROCHLORIDE 50 MG/ML
25 INJECTION INTRAMUSCULAR; INTRAVENOUS
Status: ACTIVE | OUTPATIENT
Start: 2022-07-07 | End: 2022-07-08

## 2022-07-07 RX ORDER — EPINEPHRINE 0.3 MG/.3ML
0.3 INJECTION SUBCUTANEOUS
Status: ACTIVE | OUTPATIENT
Start: 2022-07-07 | End: 2022-07-10

## 2022-07-07 RX ORDER — ACETAMINOPHEN 325 MG/1
650 TABLET ORAL
Status: ACTIVE | OUTPATIENT
Start: 2022-07-07 | End: 2022-07-08

## 2022-07-07 RX ORDER — BEBTELOVIMAB 87.5 MG/ML
175 INJECTION, SOLUTION INTRAVENOUS
Status: COMPLETED | OUTPATIENT
Start: 2022-07-07 | End: 2022-07-07

## 2022-07-07 RX ADMIN — BEBTELOVIMAB 175 MG: 87.5 INJECTION, SOLUTION INTRAVENOUS at 01:07

## 2022-07-07 NOTE — PLAN OF CARE
Patient tolerated Covid EUA medication (per MAR) well, d/c in no acute distress after wait period with AVS in hand.    
Yes

## 2022-07-12 ENCOUNTER — PATIENT MESSAGE (OUTPATIENT)
Dept: ADMINISTRATIVE | Facility: HOSPITAL | Age: 57
End: 2022-07-12
Payer: COMMERCIAL

## 2022-08-17 ENCOUNTER — LAB VISIT (OUTPATIENT)
Dept: LAB | Facility: HOSPITAL | Age: 57
End: 2022-08-17
Payer: COMMERCIAL

## 2022-08-17 DIAGNOSIS — E11.9 TYPE 2 DIABETES MELLITUS WITHOUT COMPLICATION, WITH LONG-TERM CURRENT USE OF INSULIN: ICD-10-CM

## 2022-08-17 DIAGNOSIS — Z79.4 TYPE 2 DIABETES MELLITUS WITHOUT COMPLICATION, WITH LONG-TERM CURRENT USE OF INSULIN: ICD-10-CM

## 2022-08-17 DIAGNOSIS — N18.30 STAGE 3 CHRONIC KIDNEY DISEASE, UNSPECIFIED WHETHER STAGE 3A OR 3B CKD: ICD-10-CM

## 2022-08-17 LAB
ALBUMIN SERPL BCP-MCNC: 3.6 G/DL (ref 3.5–5.2)
ANION GAP SERPL CALC-SCNC: 9 MMOL/L (ref 8–16)
BUN SERPL-MCNC: 42 MG/DL (ref 6–20)
CALCIUM SERPL-MCNC: 9.6 MG/DL (ref 8.7–10.5)
CHLORIDE SERPL-SCNC: 101 MMOL/L (ref 95–110)
CO2 SERPL-SCNC: 26 MMOL/L (ref 23–29)
CREAT SERPL-MCNC: 2.2 MG/DL (ref 0.5–1.4)
EST. GFR  (NO RACE VARIABLE): 25.8 ML/MIN/1.73 M^2
ESTIMATED AVG GLUCOSE: 212 MG/DL (ref 68–131)
GLUCOSE SERPL-MCNC: 250 MG/DL (ref 70–110)
HBA1C MFR BLD: 9 % (ref 4–5.6)
PHOSPHATE SERPL-MCNC: 4.6 MG/DL (ref 2.7–4.5)
POTASSIUM SERPL-SCNC: 4.4 MMOL/L (ref 3.5–5.1)
PTH-INTACT SERPL-MCNC: 152.7 PG/ML (ref 9–77)
SODIUM SERPL-SCNC: 136 MMOL/L (ref 136–145)
URATE SERPL-MCNC: 6.2 MG/DL (ref 2.4–5.7)

## 2022-08-17 PROCEDURE — 36415 COLL VENOUS BLD VENIPUNCTURE: CPT | Mod: PO | Performed by: INTERNAL MEDICINE

## 2022-08-17 PROCEDURE — 83036 HEMOGLOBIN GLYCOSYLATED A1C: CPT | Performed by: NURSE PRACTITIONER

## 2022-08-17 PROCEDURE — 83970 ASSAY OF PARATHORMONE: CPT | Performed by: INTERNAL MEDICINE

## 2022-08-17 PROCEDURE — 84550 ASSAY OF BLOOD/URIC ACID: CPT | Performed by: INTERNAL MEDICINE

## 2022-08-17 PROCEDURE — 80069 RENAL FUNCTION PANEL: CPT | Performed by: INTERNAL MEDICINE

## 2022-08-22 ENCOUNTER — OFFICE VISIT (OUTPATIENT)
Dept: PODIATRY | Facility: CLINIC | Age: 57
End: 2022-08-22
Payer: COMMERCIAL

## 2022-08-22 ENCOUNTER — HOSPITAL ENCOUNTER (OUTPATIENT)
Dept: RADIOLOGY | Facility: HOSPITAL | Age: 57
Discharge: HOME | End: 2022-08-22
Attending: PODIATRIST
Payer: COMMERCIAL

## 2022-08-22 DIAGNOSIS — M89.8X7 RETROCALCANEAL EXOSTOSIS: ICD-10-CM

## 2022-08-22 DIAGNOSIS — M76.61 ACHILLES TENDINITIS OF RIGHT LOWER EXTREMITY: ICD-10-CM

## 2022-08-22 DIAGNOSIS — E11.49 TYPE II DIABETES MELLITUS WITH NEUROLOGICAL MANIFESTATIONS: ICD-10-CM

## 2022-08-22 DIAGNOSIS — M76.61 ACHILLES TENDINITIS OF RIGHT LOWER EXTREMITY: Primary | ICD-10-CM

## 2022-08-22 PROCEDURE — 99999 PR PBB SHADOW E&M-EST. PATIENT-LVL IV: ICD-10-PCS | Mod: PBBFAC,,, | Performed by: PODIATRIST

## 2022-08-22 PROCEDURE — 99214 OFFICE O/P EST MOD 30 MIN: CPT | Mod: S$GLB,,, | Performed by: PODIATRIST

## 2022-08-22 PROCEDURE — 73650 XR CALCANEUS 2 VIEW RIGHT: ICD-10-PCS | Mod: 26,RT,, | Performed by: RADIOLOGY

## 2022-08-22 PROCEDURE — 73650 X-RAY EXAM OF HEEL: CPT | Mod: TC,FY,PO,RT

## 2022-08-22 PROCEDURE — 99999 PR PBB SHADOW E&M-EST. PATIENT-LVL IV: CPT | Mod: PBBFAC,,, | Performed by: PODIATRIST

## 2022-08-22 PROCEDURE — 99214 PR OFFICE/OUTPT VISIT, EST, LEVL IV, 30-39 MIN: ICD-10-PCS | Mod: S$GLB,,, | Performed by: PODIATRIST

## 2022-08-22 PROCEDURE — 73650 X-RAY EXAM OF HEEL: CPT | Mod: 26,RT,, | Performed by: RADIOLOGY

## 2022-08-22 NOTE — PROGRESS NOTES
Subjective:      Patient ID: Mary Ann Vidales is a 57 y.o. female.    Chief Complaint: Diabetes Mellitus and Foot Pain (B/l foot pain)    Mary Ann is a 57 y.o. female who presents to the clinic for evaluation and treatment of high risk feet. Mary Ann has a past medical history of Anticoagulant long-term use, Chronic asthma, Coronary artery disease, Diabetes mellitus type II, Fibroids, Hypertension, Incontinence, Morbid obesity (11/3/2015), Neuropathy in diabetes, Obesity, TINA (obstructive sleep apnea), Panic attacks, and Renal disorder. The patient's chief complaint is right posterior heel pain with weight bearing activities and shoes on. This patient has documented high risk feet requiring routine maintenance secondary to diabetes mellitis and those secondary complications of diabetes, as mentioned.    8/22/22: Patient returns for follow up right posterior heel pain, the pain is constant and intense sometimes she takes a vicodin and 3 tylenol to treat it when the pain is intense. Unable to walk on it much or stand for long periods of time.     PCP: Davon Kan MD        Current shoe gear:   Casual shoes    Hemoglobin A1C   Date Value Ref Range Status   08/17/2022 9.0 (H) 4.0 - 5.6 % Final     Comment:     ADA Screening Guidelines:  5.7-6.4%  Consistent with prediabetes  >or=6.5%  Consistent with diabetes    High levels of fetal hemoglobin interfere with the HbA1C  assay. Heterozygous hemoglobin variants (HbS, HgC, etc)do  not significantly interfere with this assay.   However, presence of multiple variants may affect accuracy.     05/16/2022 7.6 (H) 4.0 - 5.6 % Final     Comment:     ADA Screening Guidelines:  5.7-6.4%  Consistent with prediabetes  >or=6.5%  Consistent with diabetes    High levels of fetal hemoglobin interfere with the HbA1C  assay. Heterozygous hemoglobin variants (HbS, HgC, etc)do  not significantly interfere with this assay.   However, presence of multiple variants may affect accuracy.      03/02/2022 6.3 (H) 4.0 - 5.6 % Final     Comment:     ADA Screening Guidelines:  5.7-6.4%  Consistent with prediabetes  >or=6.5%  Consistent with diabetes    High levels of fetal hemoglobin interfere with the HbA1C  assay. Heterozygous hemoglobin variants (HbS, HgC, etc)do  not significantly interfere with this assay.   However, presence of multiple variants may affect accuracy.         Review of Systems   Constitutional: Negative for chills and fever.   Cardiovascular: Positive for leg swelling. Negative for claudication.   Respiratory: Negative for shortness of breath.    Skin: Positive for nail changes. Negative for itching and rash.   Musculoskeletal: Positive for arthritis. Negative for muscle cramps, muscle weakness and myalgias.   Gastrointestinal: Negative for nausea and vomiting.   Neurological: Positive for paresthesias. Negative for focal weakness, loss of balance and numbness.           Objective:      Physical Exam  Constitutional:       General: She is not in acute distress.     Appearance: She is well-developed. She is not diaphoretic.   Cardiovascular:      Pulses:           Dorsalis pedis pulses are 2+ on the right side and 2+ on the left side.        Posterior tibial pulses are 2+ on the right side and 2+ on the left side.      Comments: < 3 sec capillary refill time to toes 1-5 bilateral. Feet are warm to touch proximally with distal cooling b/l. There is diminished hair growth on the feet and toes b/l. There is mild edema b/l. No spider veins or varicosities present b/l.     Musculoskeletal:      Comments: Equinus noted b/l ankles with < 10 deg DF noted. MMT 5/5 in DF/PF/Inv/Ev resistance with no reproduction of pain in any direction. Passive range of motion of ankle and pedal joints is painless b/l.    Semi reducible contractures to bilateral toes 2-5 at the MTPJ and PIPJ.    RIght posterior heel there is a palpable spur with pain to palpation at the site of the achilles tendon insertion on  the calcaneus as well as bulging in the tendon more proximal with intense pain to palpation   Skin:     General: Skin is warm and dry.      Coloration: Skin is not pale.      Findings: No abrasion, bruising, burn, ecchymosis, erythema, laceration, lesion, petechiae or rash.      Nails: There is no clubbing.      Comments: Skin is thin and atrophic bilateral    Nails 2-5 bilateral are thick 3-4 mm, long 3-6 mm, and discolored with subungual debris    Right hallux nail is gone with underlying nail bed intact slight bruising noted. Left hallux nail lytic with surrounding bruising noted. No erythema or drainage.     Neurological:      Mental Status: She is alert and oriented to person, place, and time.      Sensory: Sensory deficit present.      Motor: No tremor, atrophy or abnormal muscle tone.      Comments: Negative tinel sign bilateral. Diminished vibratory sensation bilateral   Psychiatric:         Behavior: Behavior normal.               Assessment:       Encounter Diagnoses   Name Primary?    Achilles tendinitis of right lower extremity Yes    Retrocalcaneal exostosis     Type II diabetes mellitus with neurological manifestations          Plan:       Mary Ann was seen today for diabetes mellitus and foot pain.    Diagnoses and all orders for this visit:    Achilles tendinitis of right lower extremity  -     X-Ray Calcaneus 2 View Right; Future  -     MRI Foot (Hindfoot) Right Without Contrast; Future    Retrocalcaneal exostosis  -     X-Ray Calcaneus 2 View Right; Future  -     MRI Foot (Hindfoot) Right Without Contrast; Future    Type II diabetes mellitus with neurological manifestations  -     X-Ray Calcaneus 2 View Right; Future  -     MRI Foot (Hindfoot) Right Without Contrast; Future      I counseled the patient on her conditions, their implications and medical management.    Shoe inspection. Diabetic Foot Education. Patient reminded of the importance of good nutrition and blood sugar control to help  prevent podiatric complications of diabetes. Patient instructed on proper foot hygeine. We discussed wearing proper shoe gear, daily foot inspections, never walking without protective shoe gear, never putting sharp instruments to feet    She will get x-rays today and MRI ordered I am concerned there is some damage to the tendon itself more proximally possible partial tear in the tendon    Dispensed orthopedic boot for ambulation    Return after MRI to review and discuss next steps    Discussed that she cannot take tylenol with the vicodin or she will damage her liver    Lorne Burgess DPM

## 2022-08-24 ENCOUNTER — OFFICE VISIT (OUTPATIENT)
Dept: NEPHROLOGY | Facility: CLINIC | Age: 57
End: 2022-08-24
Payer: COMMERCIAL

## 2022-08-24 ENCOUNTER — PATIENT MESSAGE (OUTPATIENT)
Dept: ADMINISTRATIVE | Facility: HOSPITAL | Age: 57
End: 2022-08-24
Payer: COMMERCIAL

## 2022-08-24 ENCOUNTER — OFFICE VISIT (OUTPATIENT)
Dept: ENDOCRINOLOGY | Facility: CLINIC | Age: 57
End: 2022-08-24
Payer: COMMERCIAL

## 2022-08-24 VITALS
HEIGHT: 65 IN | RESPIRATION RATE: 20 BRPM | WEIGHT: 271.38 LBS | HEART RATE: 88 BPM | BODY MASS INDEX: 45.22 KG/M2 | SYSTOLIC BLOOD PRESSURE: 116 MMHG | DIASTOLIC BLOOD PRESSURE: 76 MMHG

## 2022-08-24 VITALS
HEIGHT: 64 IN | DIASTOLIC BLOOD PRESSURE: 76 MMHG | SYSTOLIC BLOOD PRESSURE: 116 MMHG | WEIGHT: 271 LBS | BODY MASS INDEX: 46.26 KG/M2

## 2022-08-24 DIAGNOSIS — E66.01 MORBID OBESITY: ICD-10-CM

## 2022-08-24 DIAGNOSIS — Z79.4 TYPE 2 DIABETES MELLITUS WITHOUT COMPLICATION, WITH LONG-TERM CURRENT USE OF INSULIN: Primary | ICD-10-CM

## 2022-08-24 DIAGNOSIS — N18.4 CKD (CHRONIC KIDNEY DISEASE) STAGE 4, GFR 15-29 ML/MIN: ICD-10-CM

## 2022-08-24 DIAGNOSIS — G62.9 PERIPHERAL POLYNEUROPATHY: ICD-10-CM

## 2022-08-24 DIAGNOSIS — I25.10 CORONARY ARTERY DISEASE, UNSPECIFIED VESSEL OR LESION TYPE, UNSPECIFIED WHETHER ANGINA PRESENT, UNSPECIFIED WHETHER NATIVE OR TRANSPLANTED HEART: ICD-10-CM

## 2022-08-24 DIAGNOSIS — I10 ESSENTIAL HYPERTENSION: ICD-10-CM

## 2022-08-24 DIAGNOSIS — E11.9 TYPE 2 DIABETES MELLITUS WITHOUT COMPLICATION, WITH LONG-TERM CURRENT USE OF INSULIN: Primary | ICD-10-CM

## 2022-08-24 DIAGNOSIS — E78.5 DYSLIPIDEMIA: ICD-10-CM

## 2022-08-24 DIAGNOSIS — E11.21 DIABETIC NEPHROPATHY ASSOCIATED WITH TYPE 2 DIABETES MELLITUS: ICD-10-CM

## 2022-08-24 DIAGNOSIS — N25.81 SECONDARY HYPERPARATHYROIDISM OF RENAL ORIGIN: ICD-10-CM

## 2022-08-24 DIAGNOSIS — N18.4 CKD (CHRONIC KIDNEY DISEASE) STAGE 4, GFR 15-29 ML/MIN: Primary | ICD-10-CM

## 2022-08-24 DIAGNOSIS — N28.1 ACQUIRED CYST OF KIDNEY: ICD-10-CM

## 2022-08-24 PROCEDURE — 99214 PR OFFICE/OUTPT VISIT, EST, LEVL IV, 30-39 MIN: ICD-10-PCS | Mod: S$GLB,,, | Performed by: INTERNAL MEDICINE

## 2022-08-24 PROCEDURE — 99999 PR PBB SHADOW E&M-EST. PATIENT-LVL II: CPT | Mod: PBBFAC,,, | Performed by: INTERNAL MEDICINE

## 2022-08-24 PROCEDURE — 99214 OFFICE O/P EST MOD 30 MIN: CPT | Mod: S$GLB,,, | Performed by: NURSE PRACTITIONER

## 2022-08-24 PROCEDURE — 99999 PR PBB SHADOW E&M-EST. PATIENT-LVL IV: CPT | Mod: PBBFAC,,, | Performed by: NURSE PRACTITIONER

## 2022-08-24 PROCEDURE — 99214 OFFICE O/P EST MOD 30 MIN: CPT | Mod: S$GLB,,, | Performed by: INTERNAL MEDICINE

## 2022-08-24 PROCEDURE — 99999 PR PBB SHADOW E&M-EST. PATIENT-LVL II: ICD-10-PCS | Mod: PBBFAC,,, | Performed by: INTERNAL MEDICINE

## 2022-08-24 PROCEDURE — 99214 PR OFFICE/OUTPT VISIT, EST, LEVL IV, 30-39 MIN: ICD-10-PCS | Mod: S$GLB,,, | Performed by: NURSE PRACTITIONER

## 2022-08-24 PROCEDURE — 99999 PR PBB SHADOW E&M-EST. PATIENT-LVL IV: ICD-10-PCS | Mod: PBBFAC,,, | Performed by: NURSE PRACTITIONER

## 2022-08-24 RX ORDER — INSULIN PUMP SYRINGE, 3 ML
EACH MISCELLANEOUS
Qty: 1 EACH | Refills: 0 | Status: SHIPPED | OUTPATIENT
Start: 2022-08-24 | End: 2023-08-24

## 2022-08-24 RX ORDER — LANCETS
EACH MISCELLANEOUS
Qty: 100 EACH | Status: SHIPPED | OUTPATIENT
Start: 2022-08-24

## 2022-08-24 RX ORDER — GABAPENTIN 300 MG/1
300 CAPSULE ORAL 3 TIMES DAILY
Qty: 90 CAPSULE | Refills: 3 | Status: SHIPPED | OUTPATIENT
Start: 2022-08-24 | End: 2023-02-07

## 2022-08-24 RX ORDER — INSULIN GLARGINE 300 U/ML
20 INJECTION, SOLUTION SUBCUTANEOUS NIGHTLY
Qty: 1 PEN | Refills: 0 | COMMUNITY
Start: 2022-08-24 | End: 2022-09-14 | Stop reason: SDUPTHER

## 2022-08-24 NOTE — PROGRESS NOTES
Subjective:      Patient ID: Mary Ann Vidales is a 57 y.o. female.    Chief Complaint:  Diabetes f/u    History of Present Illness  Pt is a 56y/o female with TYPE 2 DM since approx 2000, and chronic conditions pending review including HTN, neuropathy, CKD state 3b , TINA on C-pap. Dyslipidemia, morbid obesity. Also TINA on cpap-    Interim Events:  Multiple issues in the interim r/t worsened renal function, marked LE edema--which was likely a result of pt being taken off gabapentin and switched to Lyrica--but since the lyrica wasn't providing the desired relief was taking more than renal dose allowed on both.  DM control has markedly worsened. She has also resumed metformin trying to improve glucoses.  Only checking 1 every day or QOD but always > 200,  And per labs 260 last week. We did add Jardiance to help wit the fluid at least visit.       Diabetes Flow Sheet:  Diabetes Medications:   ozempci 1 mg   Bydureon d/c r/t hacking cough, d/c r/t renal concerns___ Metformin 1000 BID,  glimipiride 4 mg, Levemir and Lantus---marked LE erythema and burning in LE on both insulins.   Diabetes Complications:peripheral neuropathy   Aspirin:   Statin: none   ACE/ARB:losartan hctz 100/25 qd.   Last Urine Microalbumin:   Last Eye exam: 10/16Last Diabetic Education:   Glucometer--Accucheck Verio     Review of Systems   Constitutional: Negative for activity change and fatigue.   HENT: Negative for hearing loss and trouble swallowing.    Eyes: Negative for visual disturbance.   Respiratory: Positive for shortness of breath. Negative for cough.    Cardiovascular: Positive for leg swelling (Much improved+ 1 bilaterally ). Negative for chest pain and palpitations.   Gastrointestinal: Negative for constipation and diarrhea.   Genitourinary: Negative for difficulty urinating and frequency.   Musculoskeletal: Positive for arthralgias. Negative for myalgias.        Knees   Skin: Negative for rash and wound.   Neurological: Positive  for tremors and numbness (feet worsened, hands). Negative for weakness and light-headedness.   Hematological: Does not bruise/bleed easily.   Psychiatric/Behavioral: Negative for agitation and decreased concentration. The patient is not nervous/anxious.        Objective:   Physical Exam  Vitals reviewed.   Constitutional:       Appearance: Normal appearance. She is well-developed. She is obese.   HENT:      Head: Normocephalic and atraumatic.      Nose: Nose normal.      Mouth/Throat:      Mouth: Mucous membranes are moist.      Pharynx: Oropharynx is clear.   Eyes:      Extraocular Movements: Extraocular movements intact.      Conjunctiva/sclera: Conjunctivae normal.      Pupils: Pupils are equal, round, and reactive to light.   Neck:      Vascular: No carotid bruit.   Cardiovascular:      Rate and Rhythm: Normal rate and regular rhythm.      Pulses: Normal pulses.      Heart sounds: Normal heart sounds.   Pulmonary:      Effort: Pulmonary effort is normal.      Breath sounds: Normal breath sounds.   Musculoskeletal:         General: Normal range of motion.      Cervical back: Normal range of motion and neck supple.      Right lower leg: Edema present.      Left lower leg: Edema present.   Lymphadenopathy:      Cervical: No cervical adenopathy.   Skin:     General: Skin is warm and dry.   Neurological:      General: No focal deficit present.      Mental Status: She is alert and oriented to person, place, and time.      Gait: Gait abnormal (r/t heel pain ).   Psychiatric:         Mood and Affect: Mood normal.         Behavior: Behavior normal.         Thought Content: Thought content normal.         Judgment: Judgment normal.       Lab Review:   Hemoglobin A1C   Date Value Ref Range Status   08/17/2022 9.0 (H) 4.0 - 5.6 % Final     Comment:     ADA Screening Guidelines:  5.7-6.4%  Consistent with prediabetes  >or=6.5%  Consistent with diabetes    High levels of fetal hemoglobin interfere with the HbA1C  assay.  Heterozygous hemoglobin variants (HbS, HgC, etc)do  not significantly interfere with this assay.   However, presence of multiple variants may affect accuracy.     05/16/2022 7.6 (H) 4.0 - 5.6 % Final     Comment:     ADA Screening Guidelines:  5.7-6.4%  Consistent with prediabetes  >or=6.5%  Consistent with diabetes    High levels of fetal hemoglobin interfere with the HbA1C  assay. Heterozygous hemoglobin variants (HbS, HgC, etc)do  not significantly interfere with this assay.   However, presence of multiple variants may affect accuracy.     03/02/2022 6.3 (H) 4.0 - 5.6 % Final     Comment:     ADA Screening Guidelines:  5.7-6.4%  Consistent with prediabetes  >or=6.5%  Consistent with diabetes    High levels of fetal hemoglobin interfere with the HbA1C  assay. Heterozygous hemoglobin variants (HbS, HgC, etc)do  not significantly interfere with this assay.   However, presence of multiple variants may affect accuracy.       Lab Results   Component Value Date    LDLCALC 84.0 03/02/2022     Lab Results   Component Value Date    TSH 2.145 05/16/2022       Chemistry        Component Value Date/Time     08/17/2022 0717    K 4.4 08/17/2022 0717     08/17/2022 0717    CO2 26 08/17/2022 0717    BUN 42 (H) 08/17/2022 0717    CREATININE 2.2 (H) 08/17/2022 0717     (H) 08/17/2022 0717        Component Value Date/Time    CALCIUM 9.6 08/17/2022 0717    ALKPHOS 91 05/16/2022 0736    ALKPHOS 91 05/16/2022 0736    AST 17 05/16/2022 0736    AST 17 05/16/2022 0736    ALT 16 05/16/2022 0736    ALT 16 05/16/2022 0736    BILITOT 0.4 05/16/2022 0736    BILITOT 0.4 05/16/2022 0736            Assessment:     1. Type 2 diabetes mellitus without complication, with long-term current use of insulin  blood-glucose meter kit    lancets Misc    blood sugar diagnostic Strp    gabapentin (NEURONTIN) 300 MG capsule   2. Morbid obesity     3. CKD (chronic kidney disease) stage 4, GFR 15-29 ml/min     4. Coronary artery disease,  unspecified vessel or lesion type, unspecified whether angina present, unspecified whether native or transplanted heart     5. Dyslipidemia     6. Essential hypertension     7. Peripheral polyneuropathy       Plan:   D/c metformin   Cont ozempic 1 mg  Cont jardiance 10 mg   Start Toujeo 20 units---may titrate up 2 units q 5 days  Send log in 7-10 days.     Will increase gapapentin from 300 bid to tid--pt on edge of CKD 3/4--expect improvement with insulin.     Cautiously add Jardiance 10 mg--await Dr. Peralta review.   Pt counseled we may just need to do insulin with renal concerns.      Send log via portal.      ORDERS 08/24/2022     F/u 3 months with a1c prior

## 2022-08-24 NOTE — PROGRESS NOTES
"Subjective:       Patient ID: Mary Ann Vidales is a 57 y.o. White female who presents for return patient evaluation for chronic renal failure.      She feels much better now off of lyrica and on a higher dose of lasix.  Her blood glucose has been higher but she had some of her medications changed.  She also is on aderall now.  She takes 300 mg gabapentin BID most days but sometimes has to take a third dose.      Review of Systems   Constitutional: Negative for appetite change, chills and fever.   Eyes: Negative for visual disturbance.   Respiratory: Positive for shortness of breath. Negative for cough.    Cardiovascular: Negative for chest pain and leg swelling.   Gastrointestinal: Negative for diarrhea, nausea and vomiting.   Genitourinary: Positive for decreased urine volume and difficulty urinating. Negative for dysuria and hematuria.   Musculoskeletal: Positive for arthralgias (knees) and back pain. Negative for myalgias.   Skin: Negative for rash.   Neurological: Positive for numbness. Negative for headaches.   Hematological: Bruises/bleeds easily.   Psychiatric/Behavioral: Negative for sleep disturbance.       The past medical, family and social histories were reviewed for this encounter.     /76 (BP Location: Right arm, Patient Position: Sitting)   Ht 5' 4" (1.626 m)   Wt 122.9 kg (271 lb)   LMP 12/20/2013   BMI 46.52 kg/m²     Objective:      Physical Exam  Vitals reviewed.   Constitutional:       General: She is not in acute distress.     Appearance: She is well-developed. She is obese.   HENT:      Head: Normocephalic and atraumatic.   Eyes:      Conjunctiva/sclera: Conjunctivae normal.   Neck:      Vascular: No JVD.   Cardiovascular:      Rate and Rhythm: Normal rate and regular rhythm.      Heart sounds: No murmur heard.    No friction rub. No gallop.      Comments: Diminished tones   Pulmonary:      Effort: Pulmonary effort is normal. No respiratory distress.      Breath sounds: No " wheezing or rales.      Comments: diminished bs B  Abdominal:      General: Bowel sounds are normal. There is no distension.      Palpations: Abdomen is soft.      Tenderness: There is no abdominal tenderness.   Musculoskeletal:      Cervical back: Neck supple.      Right lower leg: Edema (trace) present.      Left lower leg: Edema (trace) present.   Skin:     General: Skin is warm and dry.      Findings: No rash.   Neurological:      Mental Status: She is alert and oriented to person, place, and time.         Assessment:       1. CKD (chronic kidney disease) stage 4, GFR 15-29 ml/min    2. Essential hypertension    3. Diabetic nephropathy associated with type 2 diabetes mellitus    4. Acquired cyst of kidney    5. Secondary hyperparathyroidism of renal origin    6. Morbid obesity        Plan:   Return to clinic in 3 months.  Labs for next visit include rp, upc, pth per so.  Baseline creatinine is 1.0 since 2005.  She has then been 1.6-1.8 since 2015.  Since 2018 she has been 1.8-2.2.  PTH is 153 with a calcium of 9.6.  I do not know why she went from a creatinine of 1.0 in 2015 to 1.5 in 2017 and 1.8 in 2018 with bland urine sediment and a negative renal US.  There appears to be no other precipitants although AIN is also a possibility.  Her urine sediment appears bland thus I think it is less likely that she has an acute GN.  She does not wish to have a biopsy at this time and I am not pushing for one.  She has a gap from 10/15-7/17 where she seemed to bump her baseline.  Renal US showed R 11.1 cm, L 10.6 cm.  Please avoid or minimize all NSAIDS (ibuprofen, motrin, aleve, indocin, naprosyn) to minimize the risk to your kidneys.  Aspirin in a dose of 325 mg or less a day is likely the safest with regards to kidney function.  If you are able to take aspirin and do not have any bleeding problems or ulcers, this may be your best therapy.  Alternatively, acetaminophen (Tylenol) is the safer than NSAIDs with regards to  kidney function.  I would ask you take this as directed on the bottle.  It is best to stay under 2 grams a day (4-500 mg tablets a day maximum).  She is improved off of the lyrica and her renal function has improved.  I asked her to stay at a max of 600 mg of gabapentin a day if possible and discussed side effects.  Blood pressure is controlled on the current regimen.  The effects of diabetes as it relates to kidney function was discussed.  As for the control of your blood sugar, please follow up with your PCP or Endocrinology.

## 2022-09-01 ENCOUNTER — HOSPITAL ENCOUNTER (OUTPATIENT)
Dept: RADIOLOGY | Facility: HOSPITAL | Age: 57
Discharge: HOME | End: 2022-09-01
Attending: PODIATRIST
Payer: COMMERCIAL

## 2022-09-01 DIAGNOSIS — M76.61 ACHILLES TENDINITIS OF RIGHT LOWER EXTREMITY: ICD-10-CM

## 2022-09-01 DIAGNOSIS — E11.49 TYPE II DIABETES MELLITUS WITH NEUROLOGICAL MANIFESTATIONS: ICD-10-CM

## 2022-09-01 DIAGNOSIS — M89.8X7 RETROCALCANEAL EXOSTOSIS: ICD-10-CM

## 2022-09-01 PROCEDURE — 73718 MRI LOWER EXTREMITY W/O DYE: CPT | Mod: 26,RT,, | Performed by: RADIOLOGY

## 2022-09-01 PROCEDURE — 73718 MRI LOWER EXTREMITY W/O DYE: CPT | Mod: TC,PO,RT

## 2022-09-01 PROCEDURE — 73718 MRI FOOT (HINDFOOT) RIGHT WITHOUT CONTRAST: ICD-10-PCS | Mod: 26,RT,, | Performed by: RADIOLOGY

## 2022-09-08 ENCOUNTER — PATIENT MESSAGE (OUTPATIENT)
Dept: PODIATRY | Facility: CLINIC | Age: 57
End: 2022-09-08
Payer: COMMERCIAL

## 2022-09-14 ENCOUNTER — PATIENT MESSAGE (OUTPATIENT)
Dept: ENDOCRINOLOGY | Facility: CLINIC | Age: 57
End: 2022-09-14
Payer: COMMERCIAL

## 2022-09-14 DIAGNOSIS — Z79.4 TYPE 2 DIABETES MELLITUS WITHOUT COMPLICATION, WITH LONG-TERM CURRENT USE OF INSULIN: ICD-10-CM

## 2022-09-14 DIAGNOSIS — E11.9 TYPE 2 DIABETES MELLITUS WITHOUT COMPLICATION, WITH LONG-TERM CURRENT USE OF INSULIN: ICD-10-CM

## 2022-09-19 ENCOUNTER — OFFICE VISIT (OUTPATIENT)
Dept: PODIATRY | Facility: CLINIC | Age: 57
End: 2022-09-19
Payer: COMMERCIAL

## 2022-09-19 DIAGNOSIS — Z79.4 TYPE 2 DIABETES MELLITUS WITHOUT COMPLICATION, WITH LONG-TERM CURRENT USE OF INSULIN: ICD-10-CM

## 2022-09-19 DIAGNOSIS — M76.61 ACHILLES TENDINITIS OF RIGHT LOWER EXTREMITY: Primary | ICD-10-CM

## 2022-09-19 DIAGNOSIS — E11.9 TYPE 2 DIABETES MELLITUS WITHOUT COMPLICATION, WITH LONG-TERM CURRENT USE OF INSULIN: ICD-10-CM

## 2022-09-19 DIAGNOSIS — M89.8X7 RETROCALCANEAL EXOSTOSIS: ICD-10-CM

## 2022-09-19 PROCEDURE — 99213 PR OFFICE/OUTPT VISIT, EST, LEVL III, 20-29 MIN: ICD-10-PCS | Mod: S$GLB,,, | Performed by: PODIATRIST

## 2022-09-19 PROCEDURE — 99213 OFFICE O/P EST LOW 20 MIN: CPT | Mod: S$GLB,,, | Performed by: PODIATRIST

## 2022-09-19 PROCEDURE — 99999 PR PBB SHADOW E&M-EST. PATIENT-LVL IV: CPT | Mod: PBBFAC,,, | Performed by: PODIATRIST

## 2022-09-19 PROCEDURE — 99999 PR PBB SHADOW E&M-EST. PATIENT-LVL IV: ICD-10-PCS | Mod: PBBFAC,,, | Performed by: PODIATRIST

## 2022-09-19 RX ORDER — INSULIN GLARGINE 300 U/ML
30 INJECTION, SOLUTION SUBCUTANEOUS NIGHTLY
Qty: 2 PEN | Refills: 3 | Status: SHIPPED | OUTPATIENT
Start: 2022-09-19 | End: 2022-10-19

## 2022-09-19 NOTE — PROGRESS NOTES
Subjective:      Patient ID: Mary Ann Vidales is a 57 y.o. female.    Chief Complaint: Follow-up    Mary Ann is a 57 y.o. female who presents to the clinic for evaluation and treatment of high risk feet. Mary Ann has a past medical history of Anticoagulant long-term use, Chronic asthma, Coronary artery disease, Diabetes mellitus type II, Fibroids, Hypertension, Incontinence, Morbid obesity (11/3/2015), Neuropathy in diabetes, Obesity, TINA (obstructive sleep apnea), Panic attacks, and Renal disorder. The patient's chief complaint is right posterior heel pain with weight bearing activities and shoes on. This patient has documented high risk feet requiring routine maintenance secondary to diabetes mellitis and those secondary complications of diabetes, as mentioned.    8/22/22: Patient returns for follow up right posterior heel pain, the pain is constant and intense sometimes she takes a vicodin and 3 tylenol to treat it when the pain is intense. Unable to walk on it much or stand for long periods of time.     9/19/22: Patient returns for follow up right posterior heel pain here for MRI results and to discuss options going forward.    PCP: Davon Kan MD        Current shoe gear:   Casual shoes    Hemoglobin A1C   Date Value Ref Range Status   08/17/2022 9.0 (H) 4.0 - 5.6 % Final     Comment:     ADA Screening Guidelines:  5.7-6.4%  Consistent with prediabetes  >or=6.5%  Consistent with diabetes    High levels of fetal hemoglobin interfere with the HbA1C  assay. Heterozygous hemoglobin variants (HbS, HgC, etc)do  not significantly interfere with this assay.   However, presence of multiple variants may affect accuracy.     05/16/2022 7.6 (H) 4.0 - 5.6 % Final     Comment:     ADA Screening Guidelines:  5.7-6.4%  Consistent with prediabetes  >or=6.5%  Consistent with diabetes    High levels of fetal hemoglobin interfere with the HbA1C  assay. Heterozygous hemoglobin variants (HbS, HgC, etc)do  not significantly  interfere with this assay.   However, presence of multiple variants may affect accuracy.     03/02/2022 6.3 (H) 4.0 - 5.6 % Final     Comment:     ADA Screening Guidelines:  5.7-6.4%  Consistent with prediabetes  >or=6.5%  Consistent with diabetes    High levels of fetal hemoglobin interfere with the HbA1C  assay. Heterozygous hemoglobin variants (HbS, HgC, etc)do  not significantly interfere with this assay.   However, presence of multiple variants may affect accuracy.         Review of Systems   Constitutional: Negative for chills and fever.   Cardiovascular:  Positive for leg swelling. Negative for claudication.   Respiratory:  Negative for shortness of breath.    Skin:  Positive for nail changes. Negative for itching and rash.   Musculoskeletal:  Positive for arthritis. Negative for muscle cramps, muscle weakness and myalgias.   Gastrointestinal:  Negative for nausea and vomiting.   Neurological:  Positive for paresthesias. Negative for focal weakness, loss of balance and numbness.         Objective:      Physical Exam  Constitutional:       General: She is not in acute distress.     Appearance: She is well-developed. She is not diaphoretic.   Cardiovascular:      Pulses:           Dorsalis pedis pulses are 2+ on the right side and 2+ on the left side.        Posterior tibial pulses are 2+ on the right side and 2+ on the left side.      Comments: < 3 sec capillary refill time to toes 1-5 bilateral. Feet are warm to touch proximally with distal cooling b/l. There is diminished hair growth on the feet and toes b/l. There is mild edema b/l. No spider veins or varicosities present b/l.     Musculoskeletal:      Comments: Equinus noted b/l ankles with < 10 deg DF noted. MMT 5/5 in DF/PF/Inv/Ev resistance with no reproduction of pain in any direction. Passive range of motion of ankle and pedal joints is painless b/l.    Semi reducible contractures to bilateral toes 2-5 at the MTPJ and PIPJ.    RIght posterior heel  there is a palpable spur with pain to palpation at the site of the achilles tendon insertion on the calcaneus as well as bulging in the tendon more proximal with intense pain to palpation   Skin:     General: Skin is warm and dry.      Coloration: Skin is not pale.      Findings: No abrasion, bruising, burn, ecchymosis, erythema, laceration, lesion, petechiae or rash.      Nails: There is no clubbing.      Comments: Skin is thin and atrophic bilateral    Nails 2-5 bilateral are thick 3-4 mm, long 3-6 mm, and discolored with subungual debris    Right hallux nail is gone with underlying nail bed intact slight bruising noted. Left hallux nail lytic with surrounding bruising noted. No erythema or drainage.     Neurological:      Mental Status: She is alert and oriented to person, place, and time.      Sensory: Sensory deficit present.      Motor: No tremor, atrophy or abnormal muscle tone.      Comments: Negative tinel sign bilateral. Diminished vibratory sensation bilateral   Psychiatric:         Behavior: Behavior normal.             Assessment:       Encounter Diagnoses   Name Primary?    Achilles tendinitis of right lower extremity Yes    Retrocalcaneal exostosis            Plan:       Mary Ann was seen today for follow-up.    Diagnoses and all orders for this visit:    Achilles tendinitis of right lower extremity    Retrocalcaneal exostosis      I counseled the patient on her conditions, their implications and medical management.    Shoe inspection. Diabetic Foot Education. Patient reminded of the importance of good nutrition and blood sugar control to help prevent podiatric complications of diabetes. Patient instructed on proper foot hygeine. We discussed wearing proper shoe gear, daily foot inspections, never walking without protective shoe gear, never putting sharp instruments to feet    Discussed the importance of wearing the orthopedic boot at all times for 4-6 weeks to give this tendon a chance to heal  properly    MRI shows interstitial tearing of the tendon distally, she is not a surgical candidate due to her A1C will need to offload in tall boot at all times for 6 weeks    Return 6 weeks must wear the boot at all times      Lorne Burgess DPM

## 2022-10-06 ENCOUNTER — PATIENT MESSAGE (OUTPATIENT)
Dept: BARIATRICS | Facility: CLINIC | Age: 57
End: 2022-10-06
Payer: COMMERCIAL

## 2022-10-12 ENCOUNTER — PATIENT MESSAGE (OUTPATIENT)
Dept: ADMINISTRATIVE | Facility: HOSPITAL | Age: 57
End: 2022-10-12
Payer: COMMERCIAL

## 2022-10-27 ENCOUNTER — PATIENT OUTREACH (OUTPATIENT)
Dept: ADMINISTRATIVE | Facility: HOSPITAL | Age: 57
End: 2022-10-27
Payer: COMMERCIAL

## 2022-10-27 ENCOUNTER — PATIENT MESSAGE (OUTPATIENT)
Dept: ADMINISTRATIVE | Facility: HOSPITAL | Age: 57
End: 2022-10-27
Payer: COMMERCIAL

## 2022-11-02 ENCOUNTER — PATIENT MESSAGE (OUTPATIENT)
Dept: ADMINISTRATIVE | Facility: HOSPITAL | Age: 57
End: 2022-11-02
Payer: COMMERCIAL

## 2022-11-02 ENCOUNTER — PATIENT OUTREACH (OUTPATIENT)
Dept: ADMINISTRATIVE | Facility: HOSPITAL | Age: 57
End: 2022-11-02
Payer: COMMERCIAL

## 2022-11-28 ENCOUNTER — PATIENT MESSAGE (OUTPATIENT)
Dept: NEPHROLOGY | Facility: CLINIC | Age: 57
End: 2022-11-28
Payer: COMMERCIAL

## 2022-11-28 NOTE — TELEPHONE ENCOUNTER
Patient needs her appointment rescheduled to a VV. I am sending this to [facility] for rescheduling.

## 2022-12-02 ENCOUNTER — LAB VISIT (OUTPATIENT)
Dept: LAB | Facility: HOSPITAL | Age: 57
End: 2022-12-02
Attending: INTERNAL MEDICINE
Payer: COMMERCIAL

## 2022-12-02 DIAGNOSIS — N18.4 CKD (CHRONIC KIDNEY DISEASE) STAGE 4, GFR 15-29 ML/MIN: ICD-10-CM

## 2022-12-02 DIAGNOSIS — I10 ESSENTIAL HYPERTENSION: ICD-10-CM

## 2022-12-02 DIAGNOSIS — D63.1 ANEMIA IN STAGE 4 CHRONIC KIDNEY DISEASE: ICD-10-CM

## 2022-12-02 DIAGNOSIS — I25.10 CORONARY ARTERY DISEASE, UNSPECIFIED VESSEL OR LESION TYPE, UNSPECIFIED WHETHER ANGINA PRESENT, UNSPECIFIED WHETHER NATIVE OR TRANSPLANTED HEART: ICD-10-CM

## 2022-12-02 DIAGNOSIS — N18.4 ANEMIA IN STAGE 4 CHRONIC KIDNEY DISEASE: ICD-10-CM

## 2022-12-02 DIAGNOSIS — E78.5 DYSLIPIDEMIA: ICD-10-CM

## 2022-12-02 DIAGNOSIS — N18.4 CHRONIC KIDNEY DISEASE, STAGE 4 (SEVERE): ICD-10-CM

## 2022-12-02 DIAGNOSIS — E11.42 TYPE 2 DIABETES MELLITUS WITH DIABETIC POLYNEUROPATHY, WITHOUT LONG-TERM CURRENT USE OF INSULIN: ICD-10-CM

## 2022-12-02 LAB
ALBUMIN SERPL BCP-MCNC: 3.8 G/DL (ref 3.5–5.2)
ALBUMIN SERPL BCP-MCNC: 3.8 G/DL (ref 3.5–5.2)
ALP SERPL-CCNC: 104 U/L (ref 55–135)
ALT SERPL W/O P-5'-P-CCNC: 15 U/L (ref 10–44)
ANION GAP SERPL CALC-SCNC: 10 MMOL/L (ref 8–16)
ANION GAP SERPL CALC-SCNC: 10 MMOL/L (ref 8–16)
AST SERPL-CCNC: 16 U/L (ref 10–40)
BASOPHILS # BLD AUTO: 0.05 K/UL (ref 0–0.2)
BASOPHILS NFR BLD: 0.7 % (ref 0–1.9)
BILIRUB SERPL-MCNC: 0.3 MG/DL (ref 0.1–1)
BUN SERPL-MCNC: 44 MG/DL (ref 6–20)
BUN SERPL-MCNC: 44 MG/DL (ref 6–20)
CALCIUM SERPL-MCNC: 9.7 MG/DL (ref 8.7–10.5)
CALCIUM SERPL-MCNC: 9.7 MG/DL (ref 8.7–10.5)
CHLORIDE SERPL-SCNC: 104 MMOL/L (ref 95–110)
CHLORIDE SERPL-SCNC: 104 MMOL/L (ref 95–110)
CHOLEST SERPL-MCNC: 195 MG/DL (ref 120–199)
CHOLEST/HDLC SERPL: 7 {RATIO} (ref 2–5)
CO2 SERPL-SCNC: 27 MMOL/L (ref 23–29)
CO2 SERPL-SCNC: 27 MMOL/L (ref 23–29)
CREAT SERPL-MCNC: 2 MG/DL (ref 0.5–1.4)
CREAT SERPL-MCNC: 2 MG/DL (ref 0.5–1.4)
DIFFERENTIAL METHOD: ABNORMAL
EOSINOPHIL # BLD AUTO: 0.2 K/UL (ref 0–0.5)
EOSINOPHIL NFR BLD: 3.2 % (ref 0–8)
ERYTHROCYTE [DISTWIDTH] IN BLOOD BY AUTOMATED COUNT: 14.1 % (ref 11.5–14.5)
EST. GFR  (NO RACE VARIABLE): 29 ML/MIN/1.73 M^2
EST. GFR  (NO RACE VARIABLE): 29 ML/MIN/1.73 M^2
ESTIMATED AVG GLUCOSE: 154 MG/DL (ref 68–131)
GLUCOSE SERPL-MCNC: 130 MG/DL (ref 70–110)
GLUCOSE SERPL-MCNC: 130 MG/DL (ref 70–110)
HBA1C MFR BLD: 7 % (ref 4–5.6)
HCT VFR BLD AUTO: 34.1 % (ref 37–48.5)
HDLC SERPL-MCNC: 28 MG/DL (ref 40–75)
HDLC SERPL: 14.4 % (ref 20–50)
HGB BLD-MCNC: 10.7 G/DL (ref 12–16)
IMM GRANULOCYTES # BLD AUTO: 0.02 K/UL (ref 0–0.04)
IMM GRANULOCYTES NFR BLD AUTO: 0.3 % (ref 0–0.5)
LDLC SERPL CALC-MCNC: 104 MG/DL (ref 63–159)
LYMPHOCYTES # BLD AUTO: 1.8 K/UL (ref 1–4.8)
LYMPHOCYTES NFR BLD: 24.2 % (ref 18–48)
MCH RBC QN AUTO: 31.7 PG (ref 27–31)
MCHC RBC AUTO-ENTMCNC: 31.4 G/DL (ref 32–36)
MCV RBC AUTO: 101 FL (ref 82–98)
MONOCYTES # BLD AUTO: 0.5 K/UL (ref 0.3–1)
MONOCYTES NFR BLD: 7.2 % (ref 4–15)
NEUTROPHILS # BLD AUTO: 4.7 K/UL (ref 1.8–7.7)
NEUTROPHILS NFR BLD: 64.4 % (ref 38–73)
NONHDLC SERPL-MCNC: 167 MG/DL
NRBC BLD-RTO: 0 /100 WBC
PHOSPHATE SERPL-MCNC: 4.1 MG/DL (ref 2.7–4.5)
PLATELET # BLD AUTO: 126 K/UL (ref 150–450)
PMV BLD AUTO: 10.2 FL (ref 9.2–12.9)
POTASSIUM SERPL-SCNC: 4.2 MMOL/L (ref 3.5–5.1)
POTASSIUM SERPL-SCNC: 4.2 MMOL/L (ref 3.5–5.1)
PROT SERPL-MCNC: 7 G/DL (ref 6–8.4)
PTH-INTACT SERPL-MCNC: 117 PG/ML (ref 9–77)
RBC # BLD AUTO: 3.38 M/UL (ref 4–5.4)
SODIUM SERPL-SCNC: 141 MMOL/L (ref 136–145)
SODIUM SERPL-SCNC: 141 MMOL/L (ref 136–145)
TRIGL SERPL-MCNC: 315 MG/DL (ref 30–150)
WBC # BLD AUTO: 7.24 K/UL (ref 3.9–12.7)

## 2022-12-02 PROCEDURE — 80053 COMPREHEN METABOLIC PANEL: CPT | Performed by: INTERNAL MEDICINE

## 2022-12-02 PROCEDURE — 80061 LIPID PANEL: CPT | Performed by: INTERNAL MEDICINE

## 2022-12-02 PROCEDURE — 84100 ASSAY OF PHOSPHORUS: CPT | Performed by: INTERNAL MEDICINE

## 2022-12-02 PROCEDURE — 83970 ASSAY OF PARATHORMONE: CPT | Performed by: INTERNAL MEDICINE

## 2022-12-02 PROCEDURE — 85025 COMPLETE CBC W/AUTO DIFF WBC: CPT | Performed by: INTERNAL MEDICINE

## 2022-12-02 PROCEDURE — 36415 COLL VENOUS BLD VENIPUNCTURE: CPT | Performed by: INTERNAL MEDICINE

## 2022-12-02 PROCEDURE — 83036 HEMOGLOBIN GLYCOSYLATED A1C: CPT | Performed by: INTERNAL MEDICINE

## 2022-12-08 ENCOUNTER — OFFICE VISIT (OUTPATIENT)
Dept: NEPHROLOGY | Facility: CLINIC | Age: 57
End: 2022-12-08
Payer: COMMERCIAL

## 2022-12-08 VITALS — BODY MASS INDEX: 42.74 KG/M2 | WEIGHT: 249 LBS

## 2022-12-08 DIAGNOSIS — N28.1 ACQUIRED CYST OF KIDNEY: ICD-10-CM

## 2022-12-08 DIAGNOSIS — E66.01 MORBID OBESITY: ICD-10-CM

## 2022-12-08 DIAGNOSIS — N18.4 CKD (CHRONIC KIDNEY DISEASE) STAGE 4, GFR 15-29 ML/MIN: Primary | ICD-10-CM

## 2022-12-08 DIAGNOSIS — N25.81 SECONDARY HYPERPARATHYROIDISM OF RENAL ORIGIN: ICD-10-CM

## 2022-12-08 DIAGNOSIS — E11.21 DIABETIC NEPHROPATHY ASSOCIATED WITH TYPE 2 DIABETES MELLITUS: ICD-10-CM

## 2022-12-08 DIAGNOSIS — I10 PRIMARY HYPERTENSION: ICD-10-CM

## 2022-12-08 PROCEDURE — 99214 PR OFFICE/OUTPT VISIT, EST, LEVL IV, 30-39 MIN: ICD-10-PCS | Mod: 95,,, | Performed by: INTERNAL MEDICINE

## 2022-12-08 PROCEDURE — 99214 OFFICE O/P EST MOD 30 MIN: CPT | Mod: 95,,, | Performed by: INTERNAL MEDICINE

## 2022-12-08 NOTE — PROGRESS NOTES
Subjective:       Patient ID: Mary Ann Vidales is a 57 y.o. White female who presents for return patient evaluation for chronic renal failure.      The patient location is:  Patient Home   The chief complaint leading to consultation is: ckd  Visit type: Virtual visit with synchronous audio and video  Total time spent with patient: 13 minutes  Each patient to whom he or she provides medical services by telemedicine is:  (1) informed of the relationship between the physician and patient and the respective role of any other health care provider with respect to management of the patient; and (2) notified that he or she may decline to receive medical services by telemedicine and may withdraw from such care at any time.       She feels much better now off of lyrica and on a higher dose of lasix.  Her blood glucose has been higher but she had some of her medications changed.  She also is on aderall now.  She is feeling much better.      Review of Systems   Constitutional:  Negative for appetite change, chills and fever.   Eyes:  Negative for visual disturbance.   Respiratory:  Positive for shortness of breath. Negative for cough.    Cardiovascular:  Negative for chest pain and leg swelling.   Gastrointestinal:  Negative for diarrhea, nausea and vomiting.   Genitourinary:  Positive for decreased urine volume and difficulty urinating. Negative for dysuria and hematuria.   Musculoskeletal:  Positive for arthralgias (knees) and back pain. Negative for myalgias.   Skin:  Negative for rash.   Neurological:  Positive for numbness. Negative for headaches.   Hematological:  Bruises/bleeds easily.   Psychiatric/Behavioral:  Negative for sleep disturbance.      The past medical, family and social histories were reviewed for this encounter.     [facility] 12/20/2013     Objective:      Physical Exam  Vitals reviewed.   Constitutional:       General: She is not in acute distress.     Appearance: She is well-developed.   HENT:      Head:  Normocephalic and atraumatic.   Eyes:      General: No scleral icterus.  Pulmonary:      Effort: Pulmonary effort is normal.   Neurological:      Mental Status: She is alert and oriented to person, place, and time.   Psychiatric:         Mood and Affect: Mood normal.         Behavior: Behavior normal.       Assessment:       1. CKD (chronic kidney disease) stage 4, GFR 15-29 ml/min    2. Primary hypertension    3. Diabetic nephropathy associated with type 2 diabetes mellitus    4. Acquired cyst of kidney    5. Secondary hyperparathyroidism of renal origin    6. Morbid obesity          Plan:   Return to clinic in 3 months.  Labs for next visit include rp, upc, pth per so.  Baseline creatinine is 1.0 since 2005.  She has then been 1.6-1.8 since 2015.  Since 2018 she has been 1.8-2.2.  PTH is 117 with a calcium of 9.7.  I do not know why she went from a creatinine of 1.0 in 2015 to 1.5 in 2017 and 1.8 in 2018 with bland urine sediment and a negative renal US.  There appears to be no other precipitants although AIN is also a possibility.  Her urine sediment appears bland thus I think it is less likely that she has an acute GN.  She does not wish to have a biopsy at this time and I am not pushing for one.  She has a gap from 10/15-7/17 where she seemed to bump her baseline.  Renal US showed R 11.1 cm, L 10.6 cm.  Please avoid or minimize all NSAIDS (ibuprofen, motrin, aleve, indocin, naprosyn) to minimize the risk to your kidneys.  Aspirin in a dose of 325 mg or less a day is likely the safest with regards to kidney function.  If you are able to take aspirin and do not have any bleeding problems or ulcers, this may be your best therapy.  Alternatively, acetaminophen (Tylenol) is the safer than NSAIDs with regards to kidney function.  I would ask you take this as directed on the bottle.  It is best to stay under 2 grams a day (4-500 mg tablets a day maximum).  She is improved off of the lyrica and her renal function has  improved.  I asked her to stay at a max of 600 mg of gabapentin a day if possible and discussed side effects.  Blood pressure is controlled on the current regimen.  The effects of diabetes as it relates to kidney function was discussed.  As for the control of your blood sugar, please follow up with your PCP or Endocrinology.

## 2022-12-14 ENCOUNTER — PATIENT MESSAGE (OUTPATIENT)
Dept: ENDOCRINOLOGY | Facility: CLINIC | Age: 57
End: 2022-12-14
Payer: COMMERCIAL

## 2022-12-14 DIAGNOSIS — E11.9 TYPE 2 DIABETES MELLITUS WITHOUT COMPLICATION, WITH LONG-TERM CURRENT USE OF INSULIN: ICD-10-CM

## 2022-12-14 DIAGNOSIS — Z79.4 TYPE 2 DIABETES MELLITUS WITHOUT COMPLICATION, WITH LONG-TERM CURRENT USE OF INSULIN: ICD-10-CM

## 2022-12-15 RX ORDER — SEMAGLUTIDE 1.34 MG/ML
1 INJECTION, SOLUTION SUBCUTANEOUS
Qty: 3 PEN | Refills: 3 | Status: SHIPPED | OUTPATIENT
Start: 2022-12-15 | End: 2023-07-21 | Stop reason: ALTCHOICE

## 2023-01-11 ENCOUNTER — TELEPHONE (OUTPATIENT)
Dept: ORTHOPEDICS | Facility: CLINIC | Age: 58
End: 2023-01-11
Payer: COMMERCIAL

## 2023-01-13 ENCOUNTER — PATIENT OUTREACH (OUTPATIENT)
Dept: ADMINISTRATIVE | Facility: HOSPITAL | Age: 58
End: 2023-01-13
Payer: COMMERCIAL

## 2023-01-13 NOTE — PROGRESS NOTES
Health Maintenance Due   Topic Date Due    Hepatitis C Screening  Never done    HIV Screening  Never done    Mammogram  12/10/2014    Eye Exam  03/04/2021    Pneumococcal Vaccines (Age 0-64) (2 - PCV) 01/01/2022    COVID-19 Vaccine (5 - Booster) 01/12/2022    Diabetes Urine Screening  03/02/2023     Chart reviewed.   Immunizations: Reconciled  Orders placed: N/A  Upcoming appts to satisfy topics: N/A

## 2023-01-18 ENCOUNTER — TELEPHONE (OUTPATIENT)
Dept: FAMILY MEDICINE | Facility: CLINIC | Age: 58
End: 2023-01-18
Payer: COMMERCIAL

## 2023-01-18 NOTE — TELEPHONE ENCOUNTER
----- Message from Nora Islas, Patient Care Assistant sent at 1/18/2023  8:46 AM CST -----  Regarding: orders  Contact: pt  Type: Needs Medical Advice  Who Called:  pt     Symptoms (please be specific):  head congestion body aches chills - covid positive   How long has patient had these symptoms:  2 days       Best Call Back Number: [phone] (home) [phone]     Additional Information: pt states she would like a callback regarding orders for an covid test and infusion concerns. Please call pt to advise. Thanks!

## 2023-01-24 ENCOUNTER — PATIENT MESSAGE (OUTPATIENT)
Dept: INTERNAL MEDICINE | Facility: CLINIC | Age: 58
End: 2023-01-24

## 2023-01-24 ENCOUNTER — OFFICE VISIT (OUTPATIENT)
Dept: INTERNAL MEDICINE | Facility: CLINIC | Age: 58
End: 2023-01-24
Payer: COMMERCIAL

## 2023-01-24 VITALS
RESPIRATION RATE: 16 BRPM | TEMPERATURE: 97 F | HEIGHT: 64 IN | HEART RATE: 100 BPM | OXYGEN SATURATION: 99 % | WEIGHT: 233.69 LBS | DIASTOLIC BLOOD PRESSURE: 72 MMHG | SYSTOLIC BLOOD PRESSURE: 122 MMHG | BODY MASS INDEX: 39.9 KG/M2

## 2023-01-24 DIAGNOSIS — I10 ESSENTIAL HYPERTENSION: ICD-10-CM

## 2023-01-24 DIAGNOSIS — E78.5 DYSLIPIDEMIA: ICD-10-CM

## 2023-01-24 DIAGNOSIS — N18.4 CHRONIC KIDNEY DISEASE, STAGE 4 (SEVERE): ICD-10-CM

## 2023-01-24 DIAGNOSIS — E11.42 TYPE 2 DIABETES MELLITUS WITH DIABETIC POLYNEUROPATHY, WITHOUT LONG-TERM CURRENT USE OF INSULIN: Primary | ICD-10-CM

## 2023-01-24 DIAGNOSIS — Z12.31 ENCOUNTER FOR SCREENING MAMMOGRAM FOR BREAST CANCER: ICD-10-CM

## 2023-01-24 DIAGNOSIS — M79.672 LEFT FOOT PAIN: ICD-10-CM

## 2023-01-24 PROCEDURE — 99214 OFFICE O/P EST MOD 30 MIN: CPT | Mod: 25,S$GLB,, | Performed by: INTERNAL MEDICINE

## 2023-01-24 PROCEDURE — 90471 PNEUMOCOCCAL CONJUGATE VACCINE 20-VALENT: ICD-10-PCS | Mod: S$GLB,,, | Performed by: INTERNAL MEDICINE

## 2023-01-24 PROCEDURE — 90471 IMMUNIZATION ADMIN: CPT | Mod: S$GLB,,, | Performed by: INTERNAL MEDICINE

## 2023-01-24 PROCEDURE — 99214 PR OFFICE/OUTPT VISIT, EST, LEVL IV, 30-39 MIN: ICD-10-PCS | Mod: 25,S$GLB,, | Performed by: INTERNAL MEDICINE

## 2023-01-24 PROCEDURE — 90677 PNEUMOCOCCAL CONJUGATE VACCINE 20-VALENT: ICD-10-PCS | Mod: S$GLB,,, | Performed by: INTERNAL MEDICINE

## 2023-01-24 PROCEDURE — 99999 PR PBB SHADOW E&M-EST. PATIENT-LVL IV: CPT | Mod: PBBFAC,,, | Performed by: INTERNAL MEDICINE

## 2023-01-24 PROCEDURE — 99999 PR PBB SHADOW E&M-EST. PATIENT-LVL IV: ICD-10-PCS | Mod: PBBFAC,,, | Performed by: INTERNAL MEDICINE

## 2023-01-24 PROCEDURE — 90677 PCV20 VACCINE IM: CPT | Mod: S$GLB,,, | Performed by: INTERNAL MEDICINE

## 2023-01-24 RX ORDER — BENZONATATE 100 MG/1
200 CAPSULE ORAL
COMMUNITY
Start: 2023-01-18 | End: 2023-10-09

## 2023-01-24 RX ORDER — FUROSEMIDE 40 MG/1
40 TABLET ORAL DAILY
Qty: 90 TABLET | Refills: 3
Start: 2023-01-24

## 2023-01-24 RX ORDER — CETIRIZINE HYDROCHLORIDE 10 MG/1
10 TABLET ORAL NIGHTLY
COMMUNITY
Start: 2023-01-18 | End: 2023-07-21

## 2023-01-31 NOTE — PROGRESS NOTES
Subjective:       Patient ID: Mary Ann Vidales is a 57 y.o. female.    Chief Complaint: Follow-up (6 month follow up)    HPI  The patient presents for follow-up of medical conditions which include type 2 diabetes mellitus, hypertension, hyperlipidemia, coronary artery disease, GERD.  The patient has obstructive sleep apnea and is on nightly CPAP therapy.  She is not experienced any exertional chest pain or dyspnea.  She is using Protonix daily for control of reflux symptoms.  The patient states she has been experiencing weight loss due to changes in her diet.  She eats smaller meals and has been avoiding junk food.  She is on insulin therapy Toujeo 32 units at bedtime in addition to weekly Ozempic injections.  She has not experienced any hypoglycemic episodes.  She is recovering from a recent COVID-19 infection.  She has not experienced any residual complications such as dyspnea, chronic cough, or loss of sense of smell or taste.    Review of Systems   Constitutional:  Positive for appetite change. Negative for activity change and unexpected weight change.   Eyes:  Negative for visual disturbance.   Respiratory:  Negative for shortness of breath.    Cardiovascular:  Negative for chest pain, palpitations and leg swelling.   Gastrointestinal:  Negative for abdominal pain, blood in stool and diarrhea.   Genitourinary:  Negative for dysuria, frequency, hematuria and urgency.   Neurological:  Negative for weakness, numbness and headaches.   Psychiatric/Behavioral:  Negative for sleep disturbance.           Physical Exam  Vitals and nursing note reviewed.   Constitutional:       General: She is not in acute distress.     Appearance: Normal appearance. She is well-developed.   HENT:      Head: Normocephalic and atraumatic.   Eyes:      General: No scleral icterus.     Extraocular Movements: Extraocular movements intact.      Conjunctiva/sclera: Conjunctivae normal.   Neck:      Thyroid: No thyromegaly.      Vascular:  No JVD.   Cardiovascular:      Rate and Rhythm: Normal rate and regular rhythm.      Heart sounds: Normal heart sounds. No murmur heard.    No friction rub. No gallop.   Pulmonary:      Effort: Pulmonary effort is normal. No respiratory distress.      Breath sounds: Normal breath sounds. No wheezing or rales.   Abdominal:      General: Bowel sounds are normal.      Palpations: Abdomen is soft. There is no mass.      Tenderness: There is no abdominal tenderness.   Musculoskeletal:         General: Tenderness present. Normal range of motion.      Cervical back: Normal range of motion and neck supple.      Comments: Left foot:  Tenderness is noted along the lateral aspect to palpation.   Lymphadenopathy:      Cervical: No cervical adenopathy.   Skin:     General: Skin is warm and dry.      Findings: No rash.      Comments: No foot lesions are present.   Neurological:      Mental Status: She is alert and oriented to person, place, and time.      Cranial Nerves: No cranial nerve deficit.      Comments: Sensory exam is intact in both feet on monofilament testing.   Psychiatric:         Mood and Affect: Mood normal.         Behavior: Behavior normal.         Lab Visit on 12/02/2022   Component Date Value Ref Range Status    Sodium 12/02/2022 141  136 - 145 mmol/L Final    Potassium 12/02/2022 4.2  3.5 - 5.1 mmol/L Final    Chloride 12/02/2022 104  95 - 110 mmol/L Final    CO2 12/02/2022 27  23 - 29 mmol/L Final    Glucose 12/02/2022 130 (H)  70 - 110 mg/dL Final    BUN 12/02/2022 44 (H)  6 - 20 mg/dL Final    Creatinine 12/02/2022 2.0 (H)  0.5 - 1.4 mg/dL Final    Calcium 12/02/2022 9.7  8.7 - 10.5 mg/dL Final    Total Protein 12/02/2022 7.0  6.0 - 8.4 g/dL Final    Albumin 12/02/2022 3.8  3.5 - 5.2 g/dL Final    Total Bilirubin 12/02/2022 0.3  0.1 - 1.0 mg/dL Final    Comment: For infants and newborns, interpretation of results should be based  on gestational age, weight and in agreement with  clinical  observations.    Premature Infant recommended reference ranges:  Up to 24 hours.............<8.0 mg/dL  Up to 48 hours............<12.0 mg/dL  3-5 days..................<15.0 mg/dL  6-29 days.................<15.0 mg/dL      Alkaline Phosphatase 12/02/2022 104  55 - 135 U/L Final    AST 12/02/2022 16  10 - 40 U/L Final    ALT 12/02/2022 15  10 - 44 U/L Final    Anion Gap 12/02/2022 10  8 - 16 mmol/L Final    eGFR 12/02/2022 29.0 (A)  >60 mL/min/1.73 m^2 Final    Cholesterol 12/02/2022 195  120 - 199 mg/dL Final    Comment: The National Cholesterol Education Program (NCEP) has set the  following guidelines (reference ranges) for Cholesterol:  Optimal.....................<200 mg/dL  Borderline High.............200-239 mg/dL  High........................> or = 240 mg/dL      Triglycerides 12/02/2022 315 (H)  30 - 150 mg/dL Final    Comment: The National Cholesterol Education Program (NCEP) has set the  following guidelines (reference values) for triglycerides:  Normal......................<150 mg/dL  Borderline High.............150-199 mg/dL  High........................200-499 mg/dL      HDL 12/02/2022 28 (L)  40 - 75 mg/dL Final    Comment: The National Cholesterol Education Program (NCEP) has set the  following guidelines (reference values) for HDL Cholesterol:  Low...............<40 mg/dL  Optimal...........>60 mg/dL      LDL Cholesterol 12/02/2022 104.0  63.0 - 159.0 mg/dL Final    Comment: The National Cholesterol Education Program (NCEP) has set the  following guidelines (reference values) for LDL Cholesterol:  Optimal.......................<130 mg/dL  Borderline High...............130-159 mg/dL  High..........................160-189 mg/dL  Very High.....................>190 mg/dL      HDL/Cholesterol Ratio 12/02/2022 14.4 (L)  20.0 - 50.0 % Final    Total Cholesterol/HDL Ratio 12/02/2022 7.0 (H)  2.0 - 5.0 Final    Non-HDL Cholesterol 12/02/2022 167  mg/dL Final    Comment: Risk category and Non-HDL  cholesterol goals:  Coronary heart disease (CHD)or equivalent (10-year risk of CHD >20%):  Non-HDL cholesterol goal     <130 mg/dL  Two or more CHD risk factors and 10-year risk of CHD <= 20%:  Non-HDL cholesterol goal     <160 mg/dL  0 to 1 CHD risk factor:  Non-HDL cholesterol goal     <190 mg/dL      WBC 12/02/2022 7.24  3.90 - 12.70 K/uL Final    RBC 12/02/2022 3.38 (L)  4.00 - 5.40 M/uL Final    Hemoglobin 12/02/2022 10.7 (L)  12.0 - 16.0 g/dL Final    Hematocrit 12/02/2022 34.1 (L)  37.0 - 48.5 % Final    MCV 12/02/2022 101 (H)  82 - 98 fL Final    MCH 12/02/2022 31.7 (H)  27.0 - 31.0 pg Final    MCHC 12/02/2022 31.4 (L)  32.0 - 36.0 g/dL Final    RDW 12/02/2022 14.1  11.5 - 14.5 % Final    Platelets 12/02/2022 126 (L)  150 - 450 K/uL Final    MPV 12/02/2022 10.2  9.2 - 12.9 fL Final    Immature Granulocytes 12/02/2022 0.3  0.0 - 0.5 % Final    Gran # (ANC) 12/02/2022 4.7  1.8 - 7.7 K/uL Final    Immature Grans (Abs) 12/02/2022 0.02  0.00 - 0.04 K/uL Final    Comment: Mild elevation in immature granulocytes is non specific and   can be seen in a variety of conditions including stress response,   acute inflammation, trauma and pregnancy. Correlation with other   laboratory and clinical findings is essential.      Lymph # 12/02/2022 1.8  1.0 - 4.8 K/uL Final    Mono # 12/02/2022 0.5  0.3 - 1.0 K/uL Final    Eos # 12/02/2022 0.2  0.0 - 0.5 K/uL Final    Baso # 12/02/2022 0.05  0.00 - 0.20 K/uL Final    nRBC 12/02/2022 0  0 /100 WBC Final    Gran % 12/02/2022 64.4  38.0 - 73.0 % Final    Lymph % 12/02/2022 24.2  18.0 - 48.0 % Final    Mono % 12/02/2022 7.2  4.0 - 15.0 % Final    Eosinophil % 12/02/2022 3.2  0.0 - 8.0 % Final    Basophil % 12/02/2022 0.7  0.0 - 1.9 % Final    Differential Method 12/02/2022 Automated   Final    Hemoglobin A1C 12/02/2022 7.0 (H)  4.0 - 5.6 % Final    Comment: ADA Screening Guidelines:  5.7-6.4%  Consistent with prediabetes  >or=6.5%  Consistent with diabetes    High levels of fetal  hemoglobin interfere with the HbA1C  assay. Heterozygous hemoglobin variants (HbS, HgC, etc)do  not significantly interfere with this assay.   However, presence of multiple variants may affect accuracy.      Estimated Avg Glucose 12/02/2022 154 (H)  68 - 131 mg/dL Final    Glucose 12/02/2022 130 (H)  70 - 110 mg/dL Final    Sodium 12/02/2022 141  136 - 145 mmol/L Final    Potassium 12/02/2022 4.2  3.5 - 5.1 mmol/L Final    Chloride 12/02/2022 104  95 - 110 mmol/L Final    CO2 12/02/2022 27  23 - 29 mmol/L Final    BUN 12/02/2022 44 (H)  6 - 20 mg/dL Final    Calcium 12/02/2022 9.7  8.7 - 10.5 mg/dL Final    Creatinine 12/02/2022 2.0 (H)  0.5 - 1.4 mg/dL Final    Albumin 12/02/2022 3.8  3.5 - 5.2 g/dL Final    Phosphorus 12/02/2022 4.1  2.7 - 4.5 mg/dL Final    eGFR 12/02/2022 29.0 (A)  >60 mL/min/1.73 m^2 Final    Anion Gap 12/02/2022 10  8 - 16 mmol/L Final    PTH, Intact 12/02/2022 117.0 (H)  9.0 - 77.0 pg/mL Final       Assessment & Plan:      Mary Ann was seen today for follow-up.  Prevnar-20 vaccine will be administered today.  A screening mammogram will be ordered.  Current therapy will be continued.    An x-ray of the left foot is recommended.  The patient wishes to defer this to a later date.    Diagnoses and all orders for this visit:    Type 2 diabetes mellitus with diabetic polyneuropathy, without long-term current use of insulin  -     Comprehensive Metabolic Panel; Future  -     Lipid Panel; Future  -     CBC Auto Differential; Future  -     Hemoglobin A1C; Future    Chronic kidney disease, stage 4 (severe)  -     Comprehensive Metabolic Panel; Future  -     Lipid Panel; Future  -     CBC Auto Differential; Future  -     Hemoglobin A1C; Future    Essential hypertension  -     Comprehensive Metabolic Panel; Future  -     Lipid Panel; Future  -     CBC Auto Differential; Future  -     Hemoglobin A1C; Future    Dyslipidemia  -     Comprehensive Metabolic Panel; Future  -     Lipid Panel; Future  -     CBC  Auto Differential; Future  -     Hemoglobin A1C; Future    Left foot pain  -     X-Ray Foot Complete 3 view Left; Future    Encounter for screening mammogram for breast cancer  -     Mammo Digital Screening Bilat w/ Leon; Future    Other orders  -     furosemide (LASIX) 40 MG tablet; Take 1 tablet (40 mg total) by mouth once daily.  -     (In Office Administered) Pneumococcal Conjugate Vaccine (20 Valent) (IM)         Follow up in about 4 months (around 5/24/2023).     Davon Kan MD

## 2023-02-16 ENCOUNTER — OFFICE VISIT (OUTPATIENT)
Dept: ORTHOPEDICS | Facility: CLINIC | Age: 58
End: 2023-02-16
Payer: COMMERCIAL

## 2023-02-16 VITALS — HEIGHT: 64 IN | WEIGHT: 236 LBS | BODY MASS INDEX: 40.29 KG/M2

## 2023-02-16 DIAGNOSIS — M76.60 INSERTIONAL ACHILLES TENDINOPATHY: Primary | ICD-10-CM

## 2023-02-16 DIAGNOSIS — M65.28 CALCIFIC ACHILLES TENDINITIS OF RIGHT LOWER EXTREMITY: ICD-10-CM

## 2023-02-16 DIAGNOSIS — M92.60 HAGLUND'S DEFORMITY: ICD-10-CM

## 2023-02-16 PROCEDURE — 99204 OFFICE O/P NEW MOD 45 MIN: CPT | Mod: S$GLB,,, | Performed by: ORTHOPAEDIC SURGERY

## 2023-02-16 PROCEDURE — 99999 PR PBB SHADOW E&M-EST. PATIENT-LVL II: ICD-10-PCS | Mod: PBBFAC,,, | Performed by: ORTHOPAEDIC SURGERY

## 2023-02-16 PROCEDURE — 99999 PR PBB SHADOW E&M-EST. PATIENT-LVL II: CPT | Mod: PBBFAC,,, | Performed by: ORTHOPAEDIC SURGERY

## 2023-02-16 PROCEDURE — 99204 PR OFFICE/OUTPT VISIT, NEW, LEVL IV, 45-59 MIN: ICD-10-PCS | Mod: S$GLB,,, | Performed by: ORTHOPAEDIC SURGERY

## 2023-02-16 NOTE — PROGRESS NOTES
Subjective:   Chief complaint: right achilles pain  Referring provider: Aaareferral Self     HPI:   Mary Ann Vidales is a 58 y.o. female who presents today for evaluation of right achilles pain.  Rates pain as 5/10.  Pain has been ongoing for a few months.  Inciting event: injury;torn achilles.  Treatments tried: sx in march, boot.    Pain localized to Achilles insertion.  She underwent Tenex procedure in 2022 which made it worse.  She has tried boot, therapy and extensive medical and nonop modalities without relief.  However the surgery that was discussed with her entailed downtime which is difficult (commutes to work notably).  Presents for SMO to discuss alternatives to surgery that was discussed.    PMH notable for MD (A1c 7.0, on Ozempic + insulin + Jardance), CKD, obesity, TINA, CAD (on asa/plavix) and chronic pain (on HC 7.5 + xanax)    Does the patient use tobacco products? No  If so, what and how often? N/A    ROS:  Musculoskeletal: per HPI  Neurological: Negative for burning, tingling and numbness  Heme: Positive for blood thinners; Negative for history of blood clot  Endocrine: Positive for diabetes    Objective:   Exam:  There were no vitals filed for this visit.  General: No acute distress, well-appearing  Neurologic: Alert and oriented x3  Psychiatric: Appropriate mood and affect, cooperative  Cardiovascular: Regular pulse  Respiratory: Breathing on room air  Skin: No rashes or ulcers  Vascular: Palpable DP/PT  Musculoskeletal: Standing examination demonstrates neutral alignment.  There is no swelling or ecchymosis.  Medial longitudinal arch is neutral.      Focused exam of the right lower extremity demonstrates non-irritable (except DF) ankle range of motion.  Hindfoot is flexible.  Ankle dorsiflexion is  guarded .  Achilles palpates in continuity.  Patient is TTP over Achilles tendon insertion and medial calcaneal tuberosity.  Patient in NT over Achilles tendon mid-substance and retro-calcaneal  bursa.  There is enlargement of the calcaneal tuberosity.  There is not thickening of the Achilles tendon.  Calf squeeze creates plantar flexion.    Fires TA/GSC/PTT/peroneals.  SILT SP/DP/PT and able to localize.     Imaging:  Radiographs were ordered and independently interpreted by me.    standing 3v right foot demonstrates squaring of posterior superior calcaneal tuberosity and mild insertional calcifications of the Achilles.  Soft tissue shadow of the Achilles is in continuity.     Additional records/labs reviewed:  None       Assessment:     1. Insertional Achilles tendinopathy    2. Calcific Achilles tendinitis of right lower extremity    3. Landon's deformity         Patient is seen for multiple problems (not at treatment goal) with ADLs and/or QOL likely to be impacted without treatment.    Data:  1 results independently interpreted    Treatment plan: Counseling regarding non-operative treatment and possibility of surgery in future if persistent morbidity from symptoms     I had a lengthy discussion with the patient today regarding clinical history, imaging findings, and physical exam.  I discussed insertional versus non-insertional Achilles tendon pathology. I emphasized role for nonsurgical treatment modalities, notably eccentric stretching, anti-inflammatory medications, ice and other modalities.  I strongly advised against any injections around the achilles tendon or its insertion due to risk of rupture.  We discussed  propensity for symptoms to resolve with treatment as above, but given refractory symptoms as well as extensive insertional pathology seen on radiographs, surgery would be an option to consider.    I discussed surgery involves addressing both the tendon and the bone problem.  We discussed the tendon would be debrided of any unhealthy/scarred tissue and the prominance on the heel bone would be removed (landon resection/calcaneal exostecomy).  We then have to repair the achilles tendon  back down.  Sometimes a tendon transfer (FHL) is needed to supplement the repair if a lot of tendon (>50%) is resected.  We also discussed reasonable expectations and recovery time for this surgery which can take 6 months to a year for maximal benefit and requires 1 month of limited activity/non-weight bearing.    At this point, we decided next step is to discuss surgery and I affirmed that I think an open procedure with the recovery that entails is the best option for a reliable result.  I think her pathology is both bone and tendon and in my opinion the percutaneous or MIS surgeries do not address both pathologies as reliably as open surgery.  She has already had one procedure with sub-optimal result and I emphasized I only wanted to offer procedures that I felt would benefit her.  After this discussion she wants another opinion with someone closer to her home.       Plan:       --referral to Dr. Lovell to discuss surgery given proximity to patient and her support system    No orders of the defined types were placed in this encounter.      Past Medical History:   Diagnosis Date    Anticoagulant long-term use     Chronic asthma     Coronary artery disease     Diabetes mellitus type II     Fibroids     Hypertension     Incontinence     Morbid obesity 11/3/2015    Neuropathy in diabetes     Obesity     TINA (obstructive sleep apnea)     uses CPAP    Panic attacks     Renal disorder     STAGE 3       Past Surgical History:   Procedure Laterality Date    CARDIAC CATHETERIZATION  07/05/2019    STENT IN LAD    CORONARY ANGIOGRAPHY N/A 11/6/2020    Procedure: ANGIOGRAM, CORONARY ARTERY;  Surgeon: John Francis MD;  Location: [facility];  Service: Cardiology;  Laterality: N/A;    CORONARY ANGIOPLASTY WITH STENT PLACEMENT  2019    ECTOPIC PREGNANCY SURGERY      HERNIA REPAIR      HYSTERECTOMY      OOPHORECTOMY      TENOTOMY Right 3/4/2022    Procedure: TENOTOMY- Tx Bone Left achilles;  Surgeon: Sarwat Vaughn MD;   Location: [facility];  Service: Orthopedics;  Laterality: Right;    TONSILLECTOMY         Family History   Problem Relation Age of Onset    Diabetes Mother     Heart disease Mother     COPD Mother     Heart failure Father     Breast cancer Neg Hx     Ovarian cancer Neg Hx        Social History     Socioeconomic History    Marital status:    Tobacco Use    Smoking status: Never    Smokeless tobacco: Never   Substance and Sexual Activity    Alcohol use: No     Alcohol/week: 0.0 standard drinks    Drug use: No    Sexual activity: Yes   Social History Narrative     at [employer]. Lives with her partner, who smokes.     Social Determinants of Health     Financial Resource Strain: Medium Risk    Difficulty of Paying Living Expenses: Somewhat hard   Food Insecurity: No Food Insecurity    Worried About Running Out of Food in the Last Year: Never true    Ran Out of Food in the Last Year: Never true   Transportation Needs: No Transportation Needs    Lack of Transportation (Medical): No    Lack of Transportation (Non-Medical): No   Physical Activity: Insufficiently Active    Days of Exercise per Week: 1 day    Minutes of Exercise per Session: 10 min   Stress: Stress Concern Present    Feeling of Stress : To some extent   Social Connections: Unknown    Frequency of Communication with Friends and Family: More than three times a week    Frequency of Social Gatherings with Friends and Family: Once a week    Active Member of Clubs or Organizations: No    Attends Club or Organization Meetings: Never    Marital Status: Living with partner   Housing Stability: Low Risk     Unable to Pay for Housing in the Last Year: No    Number of Places Lived in the Last Year: 1    Unstable Housing in the Last Year: No

## 2023-02-22 ENCOUNTER — PATIENT MESSAGE (OUTPATIENT)
Dept: BARIATRICS | Facility: CLINIC | Age: 58
End: 2023-02-22
Payer: COMMERCIAL

## 2023-02-23 ENCOUNTER — OFFICE VISIT (OUTPATIENT)
Dept: ORTHOPEDICS | Facility: CLINIC | Age: 58
End: 2023-02-23
Payer: COMMERCIAL

## 2023-02-23 ENCOUNTER — HOSPITAL ENCOUNTER (OUTPATIENT)
Dept: RADIOLOGY | Facility: HOSPITAL | Age: 58
Discharge: HOME | End: 2023-02-23
Attending: ORTHOPAEDIC SURGERY
Payer: COMMERCIAL

## 2023-02-23 DIAGNOSIS — M79.671 RIGHT FOOT PAIN: Primary | ICD-10-CM

## 2023-02-23 DIAGNOSIS — M79.671 RIGHT FOOT PAIN: ICD-10-CM

## 2023-02-23 DIAGNOSIS — M62.89 TIGHTNESS OF RIGHT GASTROCNEMIUS MUSCLE: ICD-10-CM

## 2023-02-23 DIAGNOSIS — M76.60 INSERTIONAL ACHILLES TENDINOPATHY: Primary | ICD-10-CM

## 2023-02-23 PROCEDURE — 99214 OFFICE O/P EST MOD 30 MIN: CPT | Mod: S$GLB,,, | Performed by: ORTHOPAEDIC SURGERY

## 2023-02-23 PROCEDURE — 99999 PR PBB SHADOW E&M-EST. PATIENT-LVL II: CPT | Mod: PBBFAC,,, | Performed by: ORTHOPAEDIC SURGERY

## 2023-02-23 PROCEDURE — 99999 PR PBB SHADOW E&M-EST. PATIENT-LVL II: ICD-10-PCS | Mod: PBBFAC,,, | Performed by: ORTHOPAEDIC SURGERY

## 2023-02-23 PROCEDURE — 73610 XR ANKLE COMPLETE 3 VIEW RIGHT: ICD-10-PCS | Mod: 26,RT,, | Performed by: RADIOLOGY

## 2023-02-23 PROCEDURE — 73610 X-RAY EXAM OF ANKLE: CPT | Mod: TC,PO,RT

## 2023-02-23 PROCEDURE — 73610 X-RAY EXAM OF ANKLE: CPT | Mod: 26,RT,, | Performed by: RADIOLOGY

## 2023-02-23 PROCEDURE — 99214 PR OFFICE/OUTPT VISIT, EST, LEVL IV, 30-39 MIN: ICD-10-PCS | Mod: S$GLB,,, | Performed by: ORTHOPAEDIC SURGERY

## 2023-02-23 NOTE — PROGRESS NOTES
Status/Diagnosis: Right gastroc contracture; AICT.  Date of Surgery: none  Date of Injury: none  Return visit: PRN  X-rays on Return: pending patient complaint    Chief Complaint:   Chief Complaint   Patient presents with    Right Ankle - Pain     Present History:  Mary Ann Vidales is a 58 y.o. female who presents today for new patient evaluation via referral from Dr. Sindi Blas.  Patient endorses a several year history of persistent right posterior heel pain.  Has been seen by several providers over the years.  Has tried shoe wear and activity modification currently limited to wearing Crocs without a back due to severe skin irritation.  Endorses acute increase in pain after undergoing a Tenex procedure 1 year ago.  Unable to take oral NSAIDs due to stage III CKD.  Recently tried 6 weeks of boot immobilization under the direction of Dr. Burgess with minimal symptomatic relief.  Denies any numbness or tingling.    Past medical history significant for type 2 diabetes, A1c of 7.0; CAD status post stent placement on Plavix; Stage 3 CKD.  Denies tobacco use.      Past Medical History:   Diagnosis Date    Anticoagulant long-term use     Chronic asthma     Coronary artery disease     Diabetes mellitus type II     Fibroids     Hypertension     Incontinence     Morbid obesity 11/3/2015    Neuropathy in diabetes     Obesity     TINA (obstructive sleep apnea)     uses CPAP    Panic attacks     Renal disorder     STAGE 3       Past Surgical History:   Procedure Laterality Date    CARDIAC CATHETERIZATION  07/05/2019    STENT IN LAD    CORONARY ANGIOGRAPHY N/A 11/6/2020    Procedure: ANGIOGRAM, CORONARY ARTERY;  Surgeon: John Francis MD;  Location: [facility] CATH;  Service: Cardiology;  Laterality: N/A;    CORONARY ANGIOPLASTY WITH STENT PLACEMENT  2019    ECTOPIC PREGNANCY SURGERY      HERNIA REPAIR      HYSTERECTOMY      OOPHORECTOMY      TENOTOMY Right 3/4/2022    Procedure: TENOTOMY- Tx Bone Left achilles;  Surgeon:  Sarwat Vaughn MD;  Location: [facility] OR;  Service: Orthopedics;  Laterality: Right;    TONSILLECTOMY         Current Outpatient Medications   Medication Sig    albuterol (PROVENTIL/VENTOLIN HFA) 90 mcg/actuation inhaler INHALE 2 PUFFS INTO LUNGS EVERY 4 TO 6 HOURS AS NEEDED FOR SHORTNESS OF BREATH    allopurinoL (ZYLOPRIM) 100 MG tablet Take 2 tablets (200 mg total) by mouth once daily.    ALPRAZolam (XANAX) 0.5 MG tablet TAKE 1 TABLET BY MOUTH ONCE DAILY.    aspirin (ECOTRIN) 81 MG EC tablet Take 81 mg by mouth once daily.    atorvastatin (LIPITOR) 10 MG tablet TAKE 1 TABLET BY MOUTH EVERY DAY    benzonatate (TESSALON) 100 MG capsule Take 200 mg by mouth.    blood sugar diagnostic Strp Pt to check glucoses TID    blood sugar diagnostic Strp To check BG 3 times daily, to use with insurance preferred meter    blood-glucose meter kit Pt to check glucoses Tid    blood-glucose meter kit To check BG 3 times daily, to use with insurance preferred meter    cetirizine (ZYRTEC) 10 MG tablet Take 10 mg by mouth every evening.    clopidogrel (PLAVIX) 75 mg tablet Take 75 mg by mouth once daily.    FLUoxetine 40 MG capsule TAKE 1 CAPSULE BY MOUTH EVERY DAY    furosemide (LASIX) 40 MG tablet Take 1 tablet (40 mg total) by mouth once daily.    gabapentin (NEURONTIN) 300 MG capsule Take 1 capsule (300 mg total) by mouth 2 (two) times daily.    gabapentin (NEURONTIN) 300 MG capsule TAKE 1 CAPSULE BY MOUTH THREE TIMES A DAY    glucosamine-chondroitin 250-200 mg Tab Take 1 tablet by mouth once daily.    HYDROcodone-acetaminophen (NORCO) 7.5-325 mg per tablet Take 1 tablet by mouth every 6 (six) hours as needed for Pain.    insulin glargine, TOUJEO, (TOUJEO SOLOSTAR U-300 INSULIN) 300 unit/mL (1.5 mL) InPn pen INJECT 30 UNITS INTO THE SKIN EVERY EVENING    JARDIANCE 10 mg tablet TAKE 1 TABLET BY MOUTH EVERY DAY    lancets (ONETOUCH ULTRASOFT LANCETS) Misc Use to check glucoses up to TID.    lancets Misc To check BG 3 times daily,  to use with insurance preferred meter    losartan (COZAAR) 50 MG tablet Take 50 mg by mouth once daily.    nitroGLYCERIN (NITROSTAT) 0.4 MG SL tablet PLACE 1 TABLET UNDER TONGUE EVERY 5 MINS, UP TO 3 DOSES AS NEEDED FOR CHEST PAIN    ondansetron (ZOFRAN) 4 MG tablet TAKE 1 TABLET BY MOUTH EVERY 8 HOURS AS NEEDED FOR NAUSEA(18/21D)    pantoprazole (PROTONIX) 40 MG tablet TAKE 1 TABLET BY MOUTH EVERY DAY    primidone (MYSOLINE) 50 MG Tab Take 50 mg by mouth 2 (two) times daily.    semaglutide (OZEMPIC) 1 mg/dose (4 mg/3 mL) Inject 1 mg into the skin every 7 days.     No current facility-administered medications for this visit.       Review of patient's allergies indicates:   Allergen Reactions    Captopril      Other reaction(s): cough    Lantus [insulin glargine] Swelling and Other (See Comments)     Burning and redness in the legs    Levemir [insulin detemir] Swelling and Other (See Comments)     Burning and redness of lower extremities    Lisinopril Other (See Comments)     Other reaction(s): cough    Other      Tape causes welps & hives    Antiseptic swabs Rash     Antiseptic wipes given prior to surgery caused severe rash.        Family History   Problem Relation Age of Onset    Diabetes Mother     Heart disease Mother     COPD Mother     Heart failure Father     Breast cancer Neg Hx     Ovarian cancer Neg Hx        Social History     Socioeconomic History    Marital status:    Tobacco Use    Smoking status: Never    Smokeless tobacco: Never   Substance and Sexual Activity    Alcohol use: No     Alcohol/week: 0.0 standard drinks    Drug use: No    Sexual activity: Yes   Social History Narrative     at [employer]. Lives with her partner, who smokes.     Social Determinants of Health     Financial Resource Strain: Medium Risk    Difficulty of Paying Living Expenses: Somewhat hard   Food Insecurity: No Food Insecurity    Worried About Running Out of Food in the Last Year: Never true    Ran Out  of Food in the Last Year: Never true   Transportation Needs: No Transportation Needs    Lack of Transportation (Medical): No    Lack of Transportation (Non-Medical): No   Physical Activity: Insufficiently Active    Days of Exercise per Week: 1 day    Minutes of Exercise per Session: 10 min   Stress: Stress Concern Present    Feeling of Stress : To some extent   Social Connections: Unknown    Frequency of Communication with Friends and Family: More than three times a week    Frequency of Social Gatherings with Friends and Family: Once a week    Active Member of Clubs or Organizations: No    Attends Club or Organization Meetings: Never    Marital Status: Living with partner   Housing Stability: Low Risk     Unable to Pay for Housing in the Last Year: No    Number of Places Lived in the Last Year: 1    Unstable Housing in the Last Year: No       Physical exam:  There were no vitals filed for this visit.  There is no height or weight on file to calculate BMI.  General: In no apparent distress; well developed and well nourished.  HEENT: normocephalic; atraumatic.  Cardiovascular: regular rate.  Respiratory: no increased work of breathing.  Musculoskeletal:   Gait: antalgic  Inspection:  Large body habitus.  Moderate flatfoot deformity with associated hindfoot valgus bilaterally.  Bony prominence at the right Achilles insertion with palpable osteophyte.  Also with swelling/fullness at the Achilles insertion with exquisite tenderness to palpation.  Some degree of hypersensitivity at this site.  Minimal tenderness along the midsubstance.  No palpable defect.  No plantar heel tenderness.  No pain referable to the ankle, midfoot, forefoot.  Silfverskiold: positive  Alignment:  Knee: neutral               Ankle: neutral              Hindfoot: neutral              Forefoot: neutral   Strength:              Dorsiflexion 5/5  Plantar flexion 4+/5 with pain  Inversion 5/5   Eversion 5/5   Sensation:              Good sensation on  monofilament testing  ROM:              Ankle: Full and painless.              Subtalar: Painless inversion and eversion   Pulses: 2+ DP/PT pulses.                   Imaging Studies/Outside documentation:  I have ordered/reviewed/interpreted the following images/outside documentation:  1. Weight bearing 3-views of Right foot and ankle:  No acute bony abnormality noted.  Large Landon deformity with adjacent osteophyte at the Achilles insertion.  Moderate decreased calcaneal pitch.  Hindfoot valgus.  Congruent ankle mortise.  MRI right hindfoot w/o contrast on 09/01/2022:   1. Achilles tendinosis and low-grade interstitial tear at the insertion.  Accompanying mild retrocalcaneal bursitis.  2. Mild degenerative change in the hind and midfoot.  3. Nonspecific edema of the ankle and foot.  Some considerations include cellulitis, noninfectious inflammation or venous/lymphatic occlusion.        Assessment:  Mary Ann Vidales is a 58 y.o. female with Right gastroc contracture; AICT.  Past medical history significant for type 2 diabetes, A1c of 7.0; CAD status post stent placement on Plavix; Stage 3 CKD.     Plan:   Clinical and radiographic findings were discussed.  Operative versus nonoperative treatment options were described.  Patient has persistent right insertional Achilles pain despite exhausting conservative treatment measures.  Patient currently unable to wear shoes with any sort of back due to irritation.  Patient is somewhat high risk of perioperative complications given history of obesity, type 2 diabetes, and Plavix use however I do feel it is reasonable to proceed with Achilles tendon debridement and repair, Landon resection, and gastrocnemius recession.  Patient would like to hold off on surgery if at all possible.  We did discuss the possibility of a PRP injection.  Information provided today.  Patient will also look into shoe heel lift for symptomatic relief.  Patient voiced understanding.  All  questions were answered.  She will contact us with her decision.    This note was created using voice recognition software and may contain grammatical errors.

## 2023-03-16 ENCOUNTER — PATIENT OUTREACH (OUTPATIENT)
Dept: ADMINISTRATIVE | Facility: HOSPITAL | Age: 58
End: 2023-03-16
Payer: COMMERCIAL

## 2023-03-16 ENCOUNTER — OFFICE VISIT (OUTPATIENT)
Dept: NEPHROLOGY | Facility: CLINIC | Age: 58
End: 2023-03-16
Payer: COMMERCIAL

## 2023-03-16 ENCOUNTER — TELEPHONE (OUTPATIENT)
Dept: NEPHROLOGY | Facility: CLINIC | Age: 58
End: 2023-03-16

## 2023-03-16 ENCOUNTER — PATIENT MESSAGE (OUTPATIENT)
Dept: ADMINISTRATIVE | Facility: HOSPITAL | Age: 58
End: 2023-03-16
Payer: COMMERCIAL

## 2023-03-16 DIAGNOSIS — I10 PRIMARY HYPERTENSION: ICD-10-CM

## 2023-03-16 DIAGNOSIS — E66.01 MORBID OBESITY: ICD-10-CM

## 2023-03-16 DIAGNOSIS — E11.21 DIABETIC NEPHROPATHY ASSOCIATED WITH TYPE 2 DIABETES MELLITUS: ICD-10-CM

## 2023-03-16 DIAGNOSIS — N25.81 SECONDARY HYPERPARATHYROIDISM OF RENAL ORIGIN: ICD-10-CM

## 2023-03-16 DIAGNOSIS — N28.1 ACQUIRED CYST OF KIDNEY: ICD-10-CM

## 2023-03-16 DIAGNOSIS — N18.4 CKD (CHRONIC KIDNEY DISEASE) STAGE 4, GFR 15-29 ML/MIN: Primary | ICD-10-CM

## 2023-03-16 PROCEDURE — 99214 OFFICE O/P EST MOD 30 MIN: CPT | Mod: 95,,, | Performed by: INTERNAL MEDICINE

## 2023-03-16 PROCEDURE — 99214 PR OFFICE/OUTPT VISIT, EST, LEVL IV, 30-39 MIN: ICD-10-PCS | Mod: 95,,, | Performed by: INTERNAL MEDICINE

## 2023-03-16 NOTE — PROGRESS NOTES
Subjective:       Patient ID: Mary Ann Vidales is a 58 y.o. White female who presents for return patient evaluation for chronic renal failure.      The patient location is:  Patient Home   The chief complaint leading to consultation is: ckd  Visit type: Virtual visit with synchronous audio and video  Total time spent with patient: 12 minutes  Each patient to whom he or she provides medical services by telemedicine is:  (1) informed of the relationship between the physician and patient and the respective role of any other health care provider with respect to management of the patient; and (2) notified that he or she may decline to receive medical services by telemedicine and may withdraw from such care at any time.       She did not tolerate lyrica.  She has no uremic or urinary symptoms and is in her usual state of health.  There have been no recent illnesses, hospitalizations or procedures.  She is feeling much better and is down to 232 pounds.  She occasionally gets shooting pains in the big toe of her left foot.      Review of Systems   Constitutional:  Negative for appetite change, chills and fever.   HENT:  Negative for congestion.    Eyes:  Negative for visual disturbance.   Respiratory:  Negative for cough and shortness of breath.    Cardiovascular:  Negative for chest pain and leg swelling.   Gastrointestinal:  Negative for diarrhea, nausea and vomiting.   Genitourinary:  Negative for difficulty urinating, dysuria and hematuria.   Musculoskeletal:  Positive for arthralgias (knees) and back pain. Negative for myalgias.   Skin:  Negative for rash.   Neurological:  Positive for numbness (toes and feet). Negative for headaches.   Hematological:  Bruises/bleeds easily.   Psychiatric/Behavioral:  Negative for sleep disturbance.        The past medical, family and social histories were reviewed for this encounter.     The patient's last visit with me was on 12/8/2022.     LMP 12/20/2013     Objective:       Physical Exam  Vitals reviewed.   Constitutional:       General: She is not in acute distress.     Appearance: She is well-developed.   HENT:      Head: Normocephalic and atraumatic.   Eyes:      General: No scleral icterus.  Pulmonary:      Effort: Pulmonary effort is normal.   Neurological:      Mental Status: She is alert and oriented to person, place, and time.   Psychiatric:         Mood and Affect: Mood normal.         Behavior: Behavior normal.       Assessment:       1. CKD (chronic kidney disease) stage 4, GFR 15-29 ml/min    2. Primary hypertension    3. Diabetic nephropathy associated with type 2 diabetes mellitus    4. Acquired cyst of kidney    5. Secondary hyperparathyroidism of renal origin    6. Morbid obesity       Lab Results   Component Value Date    CREATININE 2.0 (H) 12/02/2022    CREATININE 2.0 (H) 12/02/2022    BUN 44 (H) 12/02/2022    BUN 44 (H) 12/02/2022     12/02/2022     12/02/2022    K 4.2 12/02/2022    K 4.2 12/02/2022     12/02/2022     12/02/2022    CO2 27 12/02/2022    CO2 27 12/02/2022     Lab Results   Component Value Date    .0 (H) 12/02/2022    CALCIUM 9.7 12/02/2022    CALCIUM 9.7 12/02/2022    PHOS 4.1 12/02/2022     Lab Results   Component Value Date    HCT 34.1 (L) 12/02/2022     Prot/Creat Ratio, Urine   Date Value Ref Range Status   08/10/2021 0.15 0.00 - 0.20 Final   08/10/2021 0.15 0.00 - 0.20 Final   08/06/2021 0.35 (H) 0.00 - 0.20 Final     Plan:   Return to clinic in 3 months.  Labs for next visit include rp, upc, pth per standing orders .  Baseline creatinine is 1.0 since 2005.  She has then been 1.6-1.8 since 2015.  Since 2018 she has been 1.8-2.2.  PTH is 117 with a calcium of 9.7.  I do not know why she went from a creatinine of 1.0 in 2015 to 1.5 in 2017 and 1.8 in 2018 with bland urine sediment and a negative renal US.  There appears to be no other precipitants although AIN is also a possibility.  Her urine sediment appears bland  thus I think it is less likely that she has an acute GN.  She does not wish to have a biopsy at this time and I am not pushing for one.  She has a gap from 10/15-7/17 where she seemed to bump her baseline.  Renal US showed R 11.1 cm, L 10.6 cm.  Please avoid or minimize all NSAIDS (ibuprofen, motrin, aleve, indocin, naprosyn) to minimize the risk to your kidneys.  Aspirin in a dose of 325 mg or less a day is likely the safest with regards to kidney function.  If you are able to take aspirin and do not have any bleeding problems or ulcers, this may be your best therapy.  Alternatively, acetaminophen (Tylenol) is the safer than NSAIDs with regards to kidney function.  I would ask you take this as directed on the bottle.  It is best to stay under 2 grams a day (4-500 mg tablets a day maximum).  Uric acid is 6.2.  I asked her to stay at a max of 600 mg of gabapentin a day if possible and discussed side effects.  Blood pressure is controlled on the current regimen.  The effects of diabetes as it relates to kidney function was discussed.  As for the control of your blood sugar, please follow up with your PCP or Endocrinology.

## 2023-03-20 NOTE — TELEPHONE ENCOUNTER
No new care gaps identified.  [facility] Embedded Care Gaps. Reference number: [id number]. 3/20/2023   5:40:52 PM CDT

## 2023-03-21 RX ORDER — HYDROCODONE BITARTRATE AND ACETAMINOPHEN 7.5; 325 MG/1; MG/1
1 TABLET ORAL EVERY 6 HOURS PRN
Qty: 90 TABLET | Refills: 0 | Status: SHIPPED | OUTPATIENT
Start: 2023-03-21 | End: 2023-06-06 | Stop reason: SDUPTHER

## 2023-04-03 ENCOUNTER — PATIENT MESSAGE (OUTPATIENT)
Dept: ADMINISTRATIVE | Facility: HOSPITAL | Age: 58
End: 2023-04-03
Payer: COMMERCIAL

## 2023-04-19 ENCOUNTER — PATIENT OUTREACH (OUTPATIENT)
Dept: ADMINISTRATIVE | Facility: HOSPITAL | Age: 58
End: 2023-04-19
Payer: COMMERCIAL

## 2023-05-01 ENCOUNTER — PATIENT MESSAGE (OUTPATIENT)
Dept: ENDOCRINOLOGY | Facility: CLINIC | Age: 58
End: 2023-05-01
Payer: COMMERCIAL

## 2023-05-10 RX ORDER — INSULIN GLARGINE 300 U/ML
INJECTION, SOLUTION SUBCUTANEOUS
Qty: 2 PEN | Refills: 6 | Status: SHIPPED | OUTPATIENT
Start: 2023-05-10 | End: 2024-02-12

## 2023-05-23 ENCOUNTER — PATIENT OUTREACH (OUTPATIENT)
Dept: ADMINISTRATIVE | Facility: HOSPITAL | Age: 58
End: 2023-05-23
Payer: COMMERCIAL

## 2023-05-23 DIAGNOSIS — E11.9 TYPE 2 DIABETES MELLITUS WITHOUT COMPLICATION, UNSPECIFIED WHETHER LONG TERM INSULIN USE: Primary | ICD-10-CM

## 2023-05-23 NOTE — PROGRESS NOTES
Health Maintenance Due   Topic Date Due    Hepatitis C Screening  Never done    HIV Screening  Never done    Mammogram  12/10/2014    Eye Exam  03/04/2021    COVID-19 Vaccine (5 - Booster) 01/12/2022    Foot Exam  06/02/2023     Chart reviewed.   Immunizations: Reconciled  Orders placed: Diabetic urine screening  Upcoming appts to satisfy PURA topics: Labs 5/30/2023

## 2023-06-01 ENCOUNTER — LAB VISIT (OUTPATIENT)
Dept: LAB | Facility: HOSPITAL | Age: 58
End: 2023-06-01
Payer: COMMERCIAL

## 2023-06-01 DIAGNOSIS — I10 ESSENTIAL HYPERTENSION: ICD-10-CM

## 2023-06-01 DIAGNOSIS — E11.42 TYPE 2 DIABETES MELLITUS WITH DIABETIC POLYNEUROPATHY, WITHOUT LONG-TERM CURRENT USE OF INSULIN: ICD-10-CM

## 2023-06-01 DIAGNOSIS — E78.5 DYSLIPIDEMIA: ICD-10-CM

## 2023-06-01 DIAGNOSIS — N18.4 CHRONIC KIDNEY DISEASE, STAGE 4 (SEVERE): ICD-10-CM

## 2023-06-01 DIAGNOSIS — N18.4 CKD (CHRONIC KIDNEY DISEASE) STAGE 4, GFR 15-29 ML/MIN: ICD-10-CM

## 2023-06-01 DIAGNOSIS — E11.9 TYPE 2 DIABETES MELLITUS WITHOUT COMPLICATION, UNSPECIFIED WHETHER LONG TERM INSULIN USE: ICD-10-CM

## 2023-06-01 LAB
ALBUMIN SERPL BCP-MCNC: 3.4 G/DL (ref 3.5–5.2)
ALBUMIN SERPL BCP-MCNC: 3.6 G/DL (ref 3.5–5.2)
ALBUMIN/CREAT UR: 127.9 UG/MG (ref 0–30)
ALP SERPL-CCNC: 107 U/L (ref 55–135)
ALT SERPL W/O P-5'-P-CCNC: 13 U/L (ref 10–44)
ANION GAP SERPL CALC-SCNC: 12 MMOL/L (ref 8–16)
ANION GAP SERPL CALC-SCNC: 9 MMOL/L (ref 8–16)
AST SERPL-CCNC: 16 U/L (ref 10–40)
BASOPHILS # BLD AUTO: 0.03 K/UL (ref 0–0.2)
BASOPHILS NFR BLD: 0.5 % (ref 0–1.9)
BILIRUB SERPL-MCNC: 0.2 MG/DL (ref 0.1–1)
BUN SERPL-MCNC: 42 MG/DL (ref 6–20)
BUN SERPL-MCNC: 45 MG/DL (ref 6–20)
CALCIUM SERPL-MCNC: 9.5 MG/DL (ref 8.7–10.5)
CALCIUM SERPL-MCNC: 9.6 MG/DL (ref 8.7–10.5)
CHLORIDE SERPL-SCNC: 106 MMOL/L (ref 95–110)
CHLORIDE SERPL-SCNC: 107 MMOL/L (ref 95–110)
CHOLEST SERPL-MCNC: 164 MG/DL (ref 120–199)
CHOLEST/HDLC SERPL: 4.6 {RATIO} (ref 2–5)
CO2 SERPL-SCNC: 24 MMOL/L (ref 23–29)
CO2 SERPL-SCNC: 25 MMOL/L (ref 23–29)
CREAT SERPL-MCNC: 2 MG/DL (ref 0.5–1.4)
CREAT SERPL-MCNC: 2.2 MG/DL (ref 0.5–1.4)
CREAT UR-MCNC: 86 MG/DL (ref 15–325)
CREAT UR-MCNC: 87 MG/DL (ref 15–325)
DIFFERENTIAL METHOD: ABNORMAL
EOSINOPHIL # BLD AUTO: 0.2 K/UL (ref 0–0.5)
EOSINOPHIL NFR BLD: 3.5 % (ref 0–8)
ERYTHROCYTE [DISTWIDTH] IN BLOOD BY AUTOMATED COUNT: 14 % (ref 11.5–14.5)
EST. GFR  (NO RACE VARIABLE): 25.7 ML/MIN/1.73 M^2
EST. GFR  (NO RACE VARIABLE): 28.8 ML/MIN/1.73 M^2
ESTIMATED AVG GLUCOSE: 117 MG/DL (ref 68–131)
GLUCOSE SERPL-MCNC: 100 MG/DL (ref 70–110)
GLUCOSE SERPL-MCNC: 102 MG/DL (ref 70–110)
HBA1C MFR BLD: 5.7 % (ref 4–5.6)
HCT VFR BLD AUTO: 31.2 % (ref 37–48.5)
HDLC SERPL-MCNC: 36 MG/DL (ref 40–75)
HDLC SERPL: 22 % (ref 20–50)
HGB BLD-MCNC: 9.9 G/DL (ref 12–16)
IMM GRANULOCYTES # BLD AUTO: 0.02 K/UL (ref 0–0.04)
IMM GRANULOCYTES NFR BLD AUTO: 0.3 % (ref 0–0.5)
LDLC SERPL CALC-MCNC: 92.4 MG/DL (ref 63–159)
LYMPHOCYTES # BLD AUTO: 1.9 K/UL (ref 1–4.8)
LYMPHOCYTES NFR BLD: 30 % (ref 18–48)
MCH RBC QN AUTO: 31.3 PG (ref 27–31)
MCHC RBC AUTO-ENTMCNC: 31.7 G/DL (ref 32–36)
MCV RBC AUTO: 99 FL (ref 82–98)
MICROALBUMIN UR DL<=1MG/L-MCNC: 110 UG/ML
MONOCYTES # BLD AUTO: 0.5 K/UL (ref 0.3–1)
MONOCYTES NFR BLD: 7.3 % (ref 4–15)
NEUTROPHILS # BLD AUTO: 3.7 K/UL (ref 1.8–7.7)
NEUTROPHILS NFR BLD: 58.4 % (ref 38–73)
NONHDLC SERPL-MCNC: 128 MG/DL
NRBC BLD-RTO: 0 /100 WBC
PHOSPHATE SERPL-MCNC: 5.4 MG/DL (ref 2.7–4.5)
PLATELET # BLD AUTO: 124 K/UL (ref 150–450)
PMV BLD AUTO: 9.7 FL (ref 9.2–12.9)
POTASSIUM SERPL-SCNC: 4.2 MMOL/L (ref 3.5–5.1)
POTASSIUM SERPL-SCNC: 4.5 MMOL/L (ref 3.5–5.1)
PROT SERPL-MCNC: 6.5 G/DL (ref 6–8.4)
PROT UR-MCNC: 31 MG/DL (ref 0–15)
PROT/CREAT UR: 0.36 MG/G{CREAT} (ref 0–0.2)
PTH-INTACT SERPL-MCNC: 160.4 PG/ML (ref 9–77)
RBC # BLD AUTO: 3.16 M/UL (ref 4–5.4)
SODIUM SERPL-SCNC: 141 MMOL/L (ref 136–145)
SODIUM SERPL-SCNC: 142 MMOL/L (ref 136–145)
TRIGL SERPL-MCNC: 178 MG/DL (ref 30–150)
WBC # BLD AUTO: 6.33 K/UL (ref 3.9–12.7)

## 2023-06-01 PROCEDURE — 85025 COMPLETE CBC W/AUTO DIFF WBC: CPT | Performed by: INTERNAL MEDICINE

## 2023-06-01 PROCEDURE — 83970 ASSAY OF PARATHORMONE: CPT | Performed by: INTERNAL MEDICINE

## 2023-06-01 PROCEDURE — 80061 LIPID PANEL: CPT | Performed by: INTERNAL MEDICINE

## 2023-06-01 PROCEDURE — 83036 HEMOGLOBIN GLYCOSYLATED A1C: CPT | Performed by: INTERNAL MEDICINE

## 2023-06-01 PROCEDURE — 80053 COMPREHEN METABOLIC PANEL: CPT | Performed by: INTERNAL MEDICINE

## 2023-06-01 PROCEDURE — 36415 COLL VENOUS BLD VENIPUNCTURE: CPT | Mod: PO | Performed by: INTERNAL MEDICINE

## 2023-06-01 PROCEDURE — 80069 RENAL FUNCTION PANEL: CPT | Performed by: INTERNAL MEDICINE

## 2023-06-01 PROCEDURE — 82570 ASSAY OF URINE CREATININE: CPT | Performed by: INTERNAL MEDICINE

## 2023-06-01 PROCEDURE — 82570 ASSAY OF URINE CREATININE: CPT | Mod: 91 | Performed by: INTERNAL MEDICINE

## 2023-06-05 ENCOUNTER — TELEPHONE (OUTPATIENT)
Dept: INTERNAL MEDICINE | Facility: CLINIC | Age: 58
End: 2023-06-05
Payer: COMMERCIAL

## 2023-06-05 NOTE — TELEPHONE ENCOUNTER
I spoke to pt, she was notified Dr. Kan's scheduled is completely booked up.  She will keep her appt as scheduled

## 2023-06-05 NOTE — TELEPHONE ENCOUNTER
----- Message from Luz Boland sent at 6/5/2023  1:53 PM CDT -----  Contact: [phone]  Patient called, would like to know if is possible to be seen around 12-1 pm (unable to find an early appointment). Patient is been told to leave early work therefor is asking for an early nicolas. Please advise. Thank you

## 2023-06-06 ENCOUNTER — OFFICE VISIT (OUTPATIENT)
Dept: INTERNAL MEDICINE | Facility: CLINIC | Age: 58
End: 2023-06-06
Payer: COMMERCIAL

## 2023-06-06 VITALS
DIASTOLIC BLOOD PRESSURE: 78 MMHG | HEART RATE: 90 BPM | RESPIRATION RATE: 18 BRPM | HEIGHT: 64 IN | OXYGEN SATURATION: 97 % | BODY MASS INDEX: 38.84 KG/M2 | TEMPERATURE: 98 F | SYSTOLIC BLOOD PRESSURE: 128 MMHG | WEIGHT: 227.5 LBS

## 2023-06-06 DIAGNOSIS — E11.42 TYPE 2 DIABETES MELLITUS WITH DIABETIC POLYNEUROPATHY, WITHOUT LONG-TERM CURRENT USE OF INSULIN: Primary | ICD-10-CM

## 2023-06-06 DIAGNOSIS — Z12.31 ENCOUNTER FOR SCREENING MAMMOGRAM FOR BREAST CANCER: ICD-10-CM

## 2023-06-06 DIAGNOSIS — N18.4 CHRONIC KIDNEY DISEASE, STAGE 4 (SEVERE): ICD-10-CM

## 2023-06-06 DIAGNOSIS — D63.1 ANEMIA IN STAGE 4 CHRONIC KIDNEY DISEASE: ICD-10-CM

## 2023-06-06 DIAGNOSIS — I10 ESSENTIAL HYPERTENSION: ICD-10-CM

## 2023-06-06 DIAGNOSIS — N18.4 ANEMIA IN STAGE 4 CHRONIC KIDNEY DISEASE: ICD-10-CM

## 2023-06-06 DIAGNOSIS — K21.9 GASTROESOPHAGEAL REFLUX DISEASE, UNSPECIFIED WHETHER ESOPHAGITIS PRESENT: ICD-10-CM

## 2023-06-06 DIAGNOSIS — E78.5 DYSLIPIDEMIA: ICD-10-CM

## 2023-06-06 DIAGNOSIS — S86.011S RUPTURE OF RIGHT ACHILLES TENDON, SEQUELA: ICD-10-CM

## 2023-06-06 DIAGNOSIS — E79.0 HYPERURICEMIA: ICD-10-CM

## 2023-06-06 PROCEDURE — 99214 OFFICE O/P EST MOD 30 MIN: CPT | Mod: S$GLB,,, | Performed by: INTERNAL MEDICINE

## 2023-06-06 PROCEDURE — 99999 PR PBB SHADOW E&M-EST. PATIENT-LVL V: CPT | Mod: PBBFAC,,, | Performed by: INTERNAL MEDICINE

## 2023-06-06 PROCEDURE — 99999 PR PBB SHADOW E&M-EST. PATIENT-LVL V: ICD-10-PCS | Mod: PBBFAC,,, | Performed by: INTERNAL MEDICINE

## 2023-06-06 PROCEDURE — 99214 PR OFFICE/OUTPT VISIT, EST, LEVL IV, 30-39 MIN: ICD-10-PCS | Mod: S$GLB,,, | Performed by: INTERNAL MEDICINE

## 2023-06-06 RX ORDER — HYDROCODONE BITARTRATE AND ACETAMINOPHEN 7.5; 325 MG/1; MG/1
1 TABLET ORAL EVERY 6 HOURS PRN
Qty: 90 TABLET | Refills: 0 | Status: SHIPPED | OUTPATIENT
Start: 2023-06-06 | End: 2023-09-02 | Stop reason: SDUPTHER

## 2023-06-09 ENCOUNTER — HOSPITAL ENCOUNTER (OUTPATIENT)
Dept: RADIOLOGY | Facility: HOSPITAL | Age: 58
Discharge: HOME | End: 2023-06-09
Attending: INTERNAL MEDICINE
Payer: COMMERCIAL

## 2023-06-09 VITALS — BODY MASS INDEX: 38.76 KG/M2 | HEIGHT: 64 IN | WEIGHT: 227 LBS

## 2023-06-09 DIAGNOSIS — Z12.31 ENCOUNTER FOR SCREENING MAMMOGRAM FOR BREAST CANCER: ICD-10-CM

## 2023-06-09 PROCEDURE — 77067 SCR MAMMO BI INCL CAD: CPT | Mod: TC

## 2023-06-09 PROCEDURE — 77067 MAMMO DIGITAL SCREENING BILAT WITH TOMO: ICD-10-PCS | Mod: 26,,, | Performed by: RADIOLOGY

## 2023-06-09 PROCEDURE — 77063 MAMMO DIGITAL SCREENING BILAT WITH TOMO: ICD-10-PCS | Mod: 26,,, | Performed by: RADIOLOGY

## 2023-06-09 PROCEDURE — 77067 SCR MAMMO BI INCL CAD: CPT | Mod: 26,,, | Performed by: RADIOLOGY

## 2023-06-09 PROCEDURE — 77063 BREAST TOMOSYNTHESIS BI: CPT | Mod: 26,,, | Performed by: RADIOLOGY

## 2023-06-13 ENCOUNTER — OFFICE VISIT (OUTPATIENT)
Dept: NEPHROLOGY | Facility: CLINIC | Age: 58
End: 2023-06-13
Payer: COMMERCIAL

## 2023-06-13 DIAGNOSIS — E11.21 DIABETIC NEPHROPATHY ASSOCIATED WITH TYPE 2 DIABETES MELLITUS: ICD-10-CM

## 2023-06-13 DIAGNOSIS — N18.4 CKD (CHRONIC KIDNEY DISEASE) STAGE 4, GFR 15-29 ML/MIN: Primary | ICD-10-CM

## 2023-06-13 DIAGNOSIS — N28.1 ACQUIRED CYST OF KIDNEY: ICD-10-CM

## 2023-06-13 DIAGNOSIS — I10 PRIMARY HYPERTENSION: ICD-10-CM

## 2023-06-13 DIAGNOSIS — N25.81 SECONDARY HYPERPARATHYROIDISM OF RENAL ORIGIN: ICD-10-CM

## 2023-06-13 DIAGNOSIS — E66.01 MORBID OBESITY: ICD-10-CM

## 2023-06-13 PROCEDURE — 99214 OFFICE O/P EST MOD 30 MIN: CPT | Mod: 95,,, | Performed by: INTERNAL MEDICINE

## 2023-06-13 PROCEDURE — 99214 PR OFFICE/OUTPT VISIT, EST, LEVL IV, 30-39 MIN: ICD-10-PCS | Mod: 95,,, | Performed by: INTERNAL MEDICINE

## 2023-06-13 NOTE — PROGRESS NOTES
Subjective:       Patient ID: Mary Ann Vidales is a 58 y.o. White female who presents for return patient evaluation for chronic renal failure.      The patient location is:  Patient Home   The chief complaint leading to consultation is: ckd  Visit type: Virtual visit with synchronous audio and video  Total time spent with patient: 14 minutes  Each patient to whom he or she provides medical services by telemedicine is:  (1) informed of the relationship between the physician and patient and the respective role of any other health care provider with respect to management of the patient; and (2) notified that he or she may decline to receive medical services by telemedicine and may withdraw from such care at any time.       She did not tolerate lyrica.  She has no uremic or urinary symptoms and is in her usual state of health.  There have been no recent illnesses, hospitalizations or procedures.  She is still losing weight and is down to 225 pounds now down from 299 last year.  She is on insulin currently.      Review of Systems   Constitutional:  Negative for appetite change, chills and fever.   HENT:  Negative for congestion.    Eyes:  Negative for visual disturbance.   Respiratory:  Negative for cough and shortness of breath.    Cardiovascular:  Negative for chest pain and leg swelling.   Gastrointestinal:  Negative for diarrhea, nausea and vomiting.   Genitourinary:  Negative for difficulty urinating, dysuria and hematuria.   Musculoskeletal:  Positive for arthralgias (knees) and back pain. Negative for myalgias.   Skin:  Negative for rash.   Neurological:  Positive for numbness (toes and feet). Negative for headaches.   Hematological:  Bruises/bleeds easily.   Psychiatric/Behavioral:  Negative for sleep disturbance.        The past medical, family and social histories were reviewed for this encounter.     [facility] 12/20/2013     Objective:      Physical Exam  Vitals reviewed.   Constitutional:       General: She  is not in acute distress.     Appearance: She is well-developed.   HENT:      Head: Normocephalic and atraumatic.   Eyes:      General: No scleral icterus.  Pulmonary:      Effort: Pulmonary effort is normal.   Neurological:      Mental Status: She is alert and oriented to person, place, and time.   Psychiatric:         Mood and Affect: Mood normal.         Behavior: Behavior normal.       Assessment:       1. CKD (chronic kidney disease) stage 4, GFR 15-29 ml/min    2. Primary hypertension    3. Diabetic nephropathy associated with type 2 diabetes mellitus    4. Acquired cyst of kidney    5. Secondary hyperparathyroidism of renal origin    6. Morbid obesity       Lab Results   Component Value Date    CREATININE 2.2 (H) 06/01/2023    CREATININE 2.0 (H) 06/01/2023    BUN 45 (H) 06/01/2023    BUN 42 (H) 06/01/2023     06/01/2023     06/01/2023    K 4.5 06/01/2023    K 4.2 06/01/2023     06/01/2023     06/01/2023    CO2 25 06/01/2023    CO2 24 06/01/2023     Lab Results   Component Value Date    .4 (H) 06/01/2023    CALCIUM 9.5 06/01/2023    CALCIUM 9.6 06/01/2023    PHOS 5.4 (H) 06/01/2023     Lab Results   Component Value Date    HCT 31.2 (L) 06/01/2023     Prot/Creat Ratio, Urine   Date Value Ref Range Status   06/01/2023 0.36 (H) 0.00 - 0.20 Final   08/10/2021 0.15 0.00 - 0.20 Final   08/10/2021 0.15 0.00 - 0.20 Final     Plan:   Return to clinic in 3 months.  Labs for next visit include rp, upc, pth per standing orders .  US next time.  Baseline creatinine is 1.0 since 2005.  She has then been 1.6-1.8 since 2015.  Since 2018 she has been 1.8-2.2.  PTH is 160 with a calcium of 9.5.  I do not know why she went from a creatinine of 1.0 in 2015 to 1.5 in 2017 and 1.8 in 2018 with bland urine sediment and a negative renal US.  There appears to be no other precipitants although AIN is also a possibility.  Her urine sediment appears bland thus I think it is less likely that she has an acute  GN.  She does not wish to have a biopsy at this time and I am not pushing for one.  She has a gap from 10/15-7/17 where she seemed to bump her baseline.  Renal US showed R 11.1 cm, L 10.6 cm.  Please avoid or minimize all NSAIDS (ibuprofen, motrin, aleve, indocin, naprosyn) to minimize the risk to your kidneys.  Aspirin in a dose of 325 mg or less a day is likely the safest with regards to kidney function.  If you are able to take aspirin and do not have any bleeding problems or ulcers, this may be your best therapy.  Alternatively, acetaminophen (Tylenol) is the safer than NSAIDs with regards to kidney function.  I would ask you take this as directed on the bottle.  It is best to stay under 2 grams a day (4-500 mg tablets a day maximum).  I asked her to stay at a max of 600 mg of gabapentin a day if possible and discussed side effects.  Blood pressure is controlled on the current regimen.  The effects of diabetes as it relates to kidney function was discussed.  As for the control of your blood sugar, please follow up with your PCP or Endocrinology.

## 2023-06-30 ENCOUNTER — PATIENT MESSAGE (OUTPATIENT)
Dept: ENDOCRINOLOGY | Facility: CLINIC | Age: 58
End: 2023-06-30
Payer: COMMERCIAL

## 2023-07-17 NOTE — PROGRESS NOTES
Subjective:       Patient ID: Mary Ann Vidales is a 58 y.o. female.    Chief Complaint: Follow-up    HPI  The patient presents for follow-up of medical conditions which include type 2 diabetes mellitus, diabetic neuropathy, hypertension, hyperlipidemia, coronary artery disease, GERD, hyperuricemia, chronic kidney disease stage 4.  The patient also has a personal history of depression which has been controlled with daily use of Prozac.  She uses Xanax intermittently as needed for acute anxiety.  She has obstructive sleep apnea and uses her CPAP therapy every night.  She is not experiencing any daytime sleepiness.  She has chronic foot pain due to diabetic neuropathy.  She also has been dealing with pain from ruptured right Achilles tendon.    Reflux symptoms have been controlled with daily use of Protonix.  She does note breakthrough symptoms off of the medication.    She feels blood sugar levels have been stable.  No hypoglycemia has been noted recently.  She does not monitor blood pressures regularly.  She denies having any exertional chest pain or dyspnea.  No PND or orthopnea is noted.    Review of Systems   Constitutional:  Negative for activity change, appetite change and unexpected weight change.   Eyes:  Negative for visual disturbance.   Respiratory:  Negative for shortness of breath.    Cardiovascular:  Negative for chest pain, palpitations and leg swelling.   Gastrointestinal:  Positive for reflux. Negative for abdominal pain, blood in stool and diarrhea.   Genitourinary:  Negative for dysuria, frequency, hematuria and urgency.   Neurological:  Positive for numbness. Negative for weakness and headaches.   Psychiatric/Behavioral:  Negative for sleep disturbance.           Physical Exam  Vitals and nursing note reviewed.   Constitutional:       General: She is not in acute distress.     Appearance: Normal appearance. She is well-developed.   HENT:      Head: Normocephalic and atraumatic.   Eyes:       General: No scleral icterus.     Extraocular Movements: Extraocular movements intact.      Conjunctiva/sclera: Conjunctivae normal.   Neck:      Thyroid: No thyromegaly.      Vascular: No JVD.   Cardiovascular:      Rate and Rhythm: Normal rate and regular rhythm.      Heart sounds: Normal heart sounds. No murmur heard.    No friction rub. No gallop.   Pulmonary:      Effort: Pulmonary effort is normal. No respiratory distress.      Breath sounds: Normal breath sounds. No wheezing or rales.   Abdominal:      General: Bowel sounds are normal.      Palpations: Abdomen is soft. There is no mass.      Tenderness: There is no abdominal tenderness. There is no right CVA tenderness or left CVA tenderness.   Musculoskeletal:         General: Swelling (Right ankle exhibit swelling along the posterior aspect at the Achilles tendon site.) and tenderness present. Normal range of motion.      Cervical back: Normal range of motion and neck supple.      Right lower leg: No edema.      Left lower leg: No edema.      Comments: Left foot:  Tenderness is noted along the lateral aspect of the 5th metatarsal on palpation.  No soft tissue swelling or discoloration is noted.   Lymphadenopathy:      Cervical: No cervical adenopathy.   Skin:     General: Skin is warm and dry.      Findings: No rash.   Neurological:      Mental Status: She is alert and oriented to person, place, and time.      Cranial Nerves: No cranial nerve deficit.   Psychiatric:         Mood and Affect: Mood normal.         Behavior: Behavior normal.       Protective Sensation (w/ 10 gram monofilament):  Right: Intact  Left: diminished    Visual Inspection:  normal    Pedal Pulses:   Right: Present  Left: Present    Posterior Tibialis Pulses:   Right:Present  Left: Present    Lab Visit on 06/01/2023   Component Date Value Ref Range Status    Sodium 06/01/2023 141  136 - 145 mmol/L Final    Potassium 06/01/2023 4.5  3.5 - 5.1 mmol/L Final    Chloride 06/01/2023 107  95 -  110 mmol/L Final    CO2 06/01/2023 25  23 - 29 mmol/L Final    Glucose 06/01/2023 102  70 - 110 mg/dL Final    BUN 06/01/2023 45 (H)  6 - 20 mg/dL Final    Creatinine 06/01/2023 2.2 (H)  0.5 - 1.4 mg/dL Final    Calcium 06/01/2023 9.5  8.7 - 10.5 mg/dL Final    Total Protein 06/01/2023 6.5  6.0 - 8.4 g/dL Final    Albumin 06/01/2023 3.6  3.5 - 5.2 g/dL Final    Total Bilirubin 06/01/2023 0.2  0.1 - 1.0 mg/dL Final    Comment: For infants and newborns, interpretation of results should be based  on gestational age, weight and in agreement with clinical  observations.    Premature Infant recommended reference ranges:  Up to 24 hours.............<8.0 mg/dL  Up to 48 hours............<12.0 mg/dL  3-5 days..................<15.0 mg/dL  6-29 days.................<15.0 mg/dL      Alkaline Phosphatase 06/01/2023 107  55 - 135 U/L Final    AST 06/01/2023 16  10 - 40 U/L Final    ALT 06/01/2023 13  10 - 44 U/L Final    Anion Gap 06/01/2023 9  8 - 16 mmol/L Final    eGFR 06/01/2023 25.7 (A)  >60 mL/min/1.73 m^2 Final    Cholesterol 06/01/2023 164  120 - 199 mg/dL Final    Comment: The National Cholesterol Education Program (NCEP) has set the  following guidelines (reference ranges) for Cholesterol:  Optimal.....................<200 mg/dL  Borderline High.............200-239 mg/dL  High........................> or = 240 mg/dL      Triglycerides 06/01/2023 178 (H)  30 - 150 mg/dL Final    Comment: The National Cholesterol Education Program (NCEP) has set the  following guidelines (reference values) for triglycerides:  Normal......................<150 mg/dL  Borderline High.............150-199 mg/dL  High........................200-499 mg/dL      HDL 06/01/2023 36 (L)  40 - 75 mg/dL Final    Comment: The National Cholesterol Education Program (NCEP) has set the  following guidelines (reference values) for HDL Cholesterol:  Low...............<40 mg/dL  Optimal...........>60 mg/dL      LDL Cholesterol 06/01/2023 92.4  63.0 - 159.0  mg/dL Final    Comment: The National Cholesterol Education Program (NCEP) has set the  following guidelines (reference values) for LDL Cholesterol:  Optimal.......................<130 mg/dL  Borderline High...............130-159 mg/dL  High..........................160-189 mg/dL  Very High.....................>190 mg/dL      HDL/Cholesterol Ratio 06/01/2023 22.0  20.0 - 50.0 % Final    Total Cholesterol/HDL Ratio 06/01/2023 4.6  2.0 - 5.0 Final    Non-HDL Cholesterol 06/01/2023 128  mg/dL Final    Comment: Risk category and Non-HDL cholesterol goals:  Coronary heart disease (CHD)or equivalent (10-year risk of CHD >20%):  Non-HDL cholesterol goal     <130 mg/dL  Two or more CHD risk factors and 10-year risk of CHD <= 20%:  Non-HDL cholesterol goal     <160 mg/dL  0 to 1 CHD risk factor:  Non-HDL cholesterol goal     <190 mg/dL      WBC 06/01/2023 6.33  3.90 - 12.70 K/uL Final    RBC 06/01/2023 3.16 (L)  4.00 - 5.40 M/uL Final    Hemoglobin 06/01/2023 9.9 (L)  12.0 - 16.0 g/dL Final    Hematocrit 06/01/2023 31.2 (L)  37.0 - 48.5 % Final    MCV 06/01/2023 99 (H)  82 - 98 fL Final    MCH 06/01/2023 31.3 (H)  27.0 - 31.0 pg Final    MCHC 06/01/2023 31.7 (L)  32.0 - 36.0 g/dL Final    RDW 06/01/2023 14.0  11.5 - 14.5 % Final    Platelets 06/01/2023 124 (L)  150 - 450 K/uL Final    MPV 06/01/2023 9.7  9.2 - 12.9 fL Final    Immature Granulocytes 06/01/2023 0.3  0.0 - 0.5 % Final    Gran # (ANC) 06/01/2023 3.7  1.8 - 7.7 K/uL Final    Immature Grans (Abs) 06/01/2023 0.02  0.00 - 0.04 K/uL Final    Comment: Mild elevation in immature granulocytes is non specific and   can be seen in a variety of conditions including stress response,   acute inflammation, trauma and pregnancy. Correlation with other   laboratory and clinical findings is essential.      Lymph # 06/01/2023 1.9  1.0 - 4.8 K/uL Final    Mono # 06/01/2023 0.5  0.3 - 1.0 K/uL Final    Eos # 06/01/2023 0.2  0.0 - 0.5 K/uL Final    Baso # 06/01/2023 0.03  0.00 - 0.20  K/uL Final    nRBC 06/01/2023 0  0 /100 WBC Final    Gran % 06/01/2023 58.4  38.0 - 73.0 % Final    Lymph % 06/01/2023 30.0  18.0 - 48.0 % Final    Mono % 06/01/2023 7.3  4.0 - 15.0 % Final    Eosinophil % 06/01/2023 3.5  0.0 - 8.0 % Final    Basophil % 06/01/2023 0.5  0.0 - 1.9 % Final    Differential Method 06/01/2023 Automated   Final    Hemoglobin A1C 06/01/2023 5.7 (H)  4.0 - 5.6 % Final    Comment: ADA Screening Guidelines:  5.7-6.4%  Consistent with prediabetes  >or=6.5%  Consistent with diabetes    High levels of fetal hemoglobin interfere with the HbA1C  assay. Heterozygous hemoglobin variants (HbS, HgC, etc)do  not significantly interfere with this assay.   However, presence of multiple variants may affect accuracy.      Estimated Avg Glucose 06/01/2023 117  68 - 131 mg/dL Final    Microalbumin, Urine 06/01/2023 110.0  ug/mL Final    Creatinine, Urine 06/01/2023 86.0  15.0 - 325.0 mg/dL Final    Microalb/Creat Ratio 06/01/2023 127.9 (H)  0.0 - 30.0 ug/mg Final    Glucose 06/01/2023 100  70 - 110 mg/dL Final    Sodium 06/01/2023 142  136 - 145 mmol/L Final    Potassium 06/01/2023 4.2  3.5 - 5.1 mmol/L Final    Chloride 06/01/2023 106  95 - 110 mmol/L Final    CO2 06/01/2023 24  23 - 29 mmol/L Final    BUN 06/01/2023 42 (H)  6 - 20 mg/dL Final    Calcium 06/01/2023 9.6  8.7 - 10.5 mg/dL Final    Creatinine 06/01/2023 2.0 (H)  0.5 - 1.4 mg/dL Final    Albumin 06/01/2023 3.4 (L)  3.5 - 5.2 g/dL Final    Phosphorus 06/01/2023 5.4 (H)  2.7 - 4.5 mg/dL Final    eGFR 06/01/2023 28.8 (A)  >60 mL/min/1.73 m^2 Final    Anion Gap 06/01/2023 12  8 - 16 mmol/L Final    PTH, Intact 06/01/2023 160.4 (H)  9.0 - 77.0 pg/mL Final    Protein, Urine Random 06/01/2023 31 (H)  0 - 15 mg/dL Final    Creatinine, Urine 06/01/2023 87.0  15.0 - 325.0 mg/dL Final    Prot/Creat Ratio, Urine 06/01/2023 0.36 (H)  0.00 - 0.20 Final       Assessment & Plan:      Mary Ann was seen today for follow-up.  A screening mammogram will be  ordered.    Current medications will be continued for management of hypertension, hyperlipidemia, GERD, and hyperuricemia.  Blood tests will be obtained in 4 months.    Diagnoses and all orders for this visit:    Type 2 diabetes mellitus with diabetic polyneuropathy, without long-term current use of insulin  -     HYDROcodone-acetaminophen (NORCO) 7.5-325 mg per tablet; Take 1 tablet by mouth every 6 (six) hours as needed for Pain.  -     Comprehensive Metabolic Panel; Future  -     Hemoglobin A1C; Future    Essential hypertension    Dyslipidemia    Rupture of right Achilles tendon, sequela  -     HYDROcodone-acetaminophen (NORCO) 7.5-325 mg per tablet; Take 1 tablet by mouth every 6 (six) hours as needed for Pain.    Anemia in stage 4 chronic kidney disease    Encounter for screening mammogram for breast cancer  -     Mammo Digital Screening Bilat w/ Leon; Future    Gastroesophageal reflux disease, unspecified whether esophagitis present    Hyperuricemia    Chronic kidney disease, stage 4 (severe)         Follow up in about 4 months (around 10/6/2023).     Davon Kan MD

## 2023-07-19 ENCOUNTER — PATIENT MESSAGE (OUTPATIENT)
Dept: ENDOCRINOLOGY | Facility: CLINIC | Age: 58
End: 2023-07-19
Payer: COMMERCIAL

## 2023-07-19 DIAGNOSIS — M25.562 LEFT KNEE PAIN: ICD-10-CM

## 2023-07-19 DIAGNOSIS — M25.561 RIGHT KNEE PAIN: Primary | ICD-10-CM

## 2023-07-20 ENCOUNTER — TELEPHONE (OUTPATIENT)
Dept: ENDOCRINOLOGY | Facility: CLINIC | Age: 58
End: 2023-07-20
Payer: COMMERCIAL

## 2023-07-20 NOTE — TELEPHONE ENCOUNTER
----- Message from Mary Carmen Quinteros sent at 7/20/2023  3:59 PM CDT -----  Type:  Patient Returning Call    Who Called:  pt   Who Left Message for Patient:  Perla Clark,  Does the patient know what this is regarding?:  yes   Best Call Back Number:  [phone] (home)     Additional Information:  please advise thank you

## 2023-07-20 NOTE — TELEPHONE ENCOUNTER
----- Message from Britt Sinha sent at 7/20/2023  3:27 PM CDT -----  Contact: self  Type: RX Refill Request        Who Called: Patient   Refill or New Rx: refill  RX Name and Strength: Mounjaro  How is the patient currently taking it? (ex. 1XDay): n/a  Is this a 30 day or 90 day RX: 90 day   Preferred Pharmacy with phone number:   [facility]/pharmacy #[id number] - [place] - [address] AT [facility]  [address] [place] [address]  Phone: [phone] Fax: [phone]  Local or Mail Order: local   Ordering Provider: Dr. Joaquin Chung Call Back Number: [phone] (home)   Additional Information: Pt states she is 100 percent covered for Mounjaro. Pt also wants a SmartNews Ar for her medicine. Thanks

## 2023-07-20 NOTE — TELEPHONE ENCOUNTER
S/w pt. She needed to talk to Katherin Zavala.    Pt scheduled for VV tomorrow @ 7am.Pt verb understanding

## 2023-07-21 ENCOUNTER — HOSPITAL ENCOUNTER (OUTPATIENT)
Dept: RADIOLOGY | Facility: HOSPITAL | Age: 58
Discharge: HOME | End: 2023-07-21
Attending: ORTHOPAEDIC SURGERY
Payer: COMMERCIAL

## 2023-07-21 ENCOUNTER — OFFICE VISIT (OUTPATIENT)
Dept: ENDOCRINOLOGY | Facility: CLINIC | Age: 58
End: 2023-07-21
Payer: COMMERCIAL

## 2023-07-21 ENCOUNTER — OFFICE VISIT (OUTPATIENT)
Dept: ORTHOPEDICS | Facility: CLINIC | Age: 58
End: 2023-07-21
Payer: COMMERCIAL

## 2023-07-21 VITALS — BODY MASS INDEX: 38.76 KG/M2 | WEIGHT: 227 LBS | HEIGHT: 64 IN

## 2023-07-21 DIAGNOSIS — N18.4 ANEMIA IN STAGE 4 CHRONIC KIDNEY DISEASE: ICD-10-CM

## 2023-07-21 DIAGNOSIS — M17.0 PRIMARY OSTEOARTHRITIS OF BOTH KNEES: Primary | ICD-10-CM

## 2023-07-21 DIAGNOSIS — E66.01 MORBID OBESITY: ICD-10-CM

## 2023-07-21 DIAGNOSIS — I25.10 CORONARY ARTERY DISEASE, UNSPECIFIED VESSEL OR LESION TYPE, UNSPECIFIED WHETHER ANGINA PRESENT, UNSPECIFIED WHETHER NATIVE OR TRANSPLANTED HEART: ICD-10-CM

## 2023-07-21 DIAGNOSIS — M25.561 RIGHT KNEE PAIN: ICD-10-CM

## 2023-07-21 DIAGNOSIS — M25.552 LEFT HIP PAIN: ICD-10-CM

## 2023-07-21 DIAGNOSIS — Z79.4 TYPE 2 DIABETES MELLITUS WITHOUT COMPLICATION, WITH LONG-TERM CURRENT USE OF INSULIN: Primary | ICD-10-CM

## 2023-07-21 DIAGNOSIS — D63.1 ANEMIA IN STAGE 4 CHRONIC KIDNEY DISEASE: ICD-10-CM

## 2023-07-21 DIAGNOSIS — M25.561 ACUTE PAIN OF RIGHT KNEE: Primary | ICD-10-CM

## 2023-07-21 DIAGNOSIS — M25.552 LEFT HIP PAIN: Primary | ICD-10-CM

## 2023-07-21 DIAGNOSIS — M25.562 LEFT KNEE PAIN: ICD-10-CM

## 2023-07-21 DIAGNOSIS — E11.9 TYPE 2 DIABETES MELLITUS WITHOUT COMPLICATION, WITH LONG-TERM CURRENT USE OF INSULIN: Primary | ICD-10-CM

## 2023-07-21 DIAGNOSIS — N18.4 CKD (CHRONIC KIDNEY DISEASE) STAGE 4, GFR 15-29 ML/MIN: ICD-10-CM

## 2023-07-21 DIAGNOSIS — E78.5 DYSLIPIDEMIA: ICD-10-CM

## 2023-07-21 PROCEDURE — 73562 X-RAY EXAM OF KNEE 3: CPT | Mod: 26,50,, | Performed by: RADIOLOGY

## 2023-07-21 PROCEDURE — 99999 PR PBB SHADOW E&M-EST. PATIENT-LVL III: CPT | Mod: PBBFAC,,, | Performed by: ORTHOPAEDIC SURGERY

## 2023-07-21 PROCEDURE — 99214 PR OFFICE/OUTPT VISIT, EST, LEVL IV, 30-39 MIN: ICD-10-PCS | Mod: 25,S$GLB,, | Performed by: ORTHOPAEDIC SURGERY

## 2023-07-21 PROCEDURE — 73502 X-RAY EXAM HIP UNI 2-3 VIEWS: CPT | Mod: 26,LT,, | Performed by: RADIOLOGY

## 2023-07-21 PROCEDURE — 99214 PR OFFICE/OUTPT VISIT, EST, LEVL IV, 30-39 MIN: ICD-10-PCS | Mod: 95,,, | Performed by: NURSE PRACTITIONER

## 2023-07-21 PROCEDURE — 73562 XR KNEE ORTHO BILAT: ICD-10-PCS | Mod: 26,50,, | Performed by: RADIOLOGY

## 2023-07-21 PROCEDURE — 99214 OFFICE O/P EST MOD 30 MIN: CPT | Mod: 25,S$GLB,, | Performed by: ORTHOPAEDIC SURGERY

## 2023-07-21 PROCEDURE — 20610 LARGE JOINT ASPIRATION/INJECTION: BILATERAL KNEE: ICD-10-PCS | Mod: 50,S$GLB,, | Performed by: ORTHOPAEDIC SURGERY

## 2023-07-21 PROCEDURE — 99214 OFFICE O/P EST MOD 30 MIN: CPT | Mod: 95,,, | Performed by: NURSE PRACTITIONER

## 2023-07-21 PROCEDURE — 73502 X-RAY EXAM HIP UNI 2-3 VIEWS: CPT | Mod: TC,PO,LT

## 2023-07-21 PROCEDURE — 73502 XR HIP WITH PELVIS WHEN PERFORMED, 2 OR 3 VIEWS LEFT: ICD-10-PCS | Mod: 26,LT,, | Performed by: RADIOLOGY

## 2023-07-21 PROCEDURE — 73562 X-RAY EXAM OF KNEE 3: CPT | Mod: TC,50,PO

## 2023-07-21 PROCEDURE — 99999 PR PBB SHADOW E&M-EST. PATIENT-LVL III: ICD-10-PCS | Mod: PBBFAC,,, | Performed by: ORTHOPAEDIC SURGERY

## 2023-07-21 PROCEDURE — 20610 DRAIN/INJ JOINT/BURSA W/O US: CPT | Mod: 50,S$GLB,, | Performed by: ORTHOPAEDIC SURGERY

## 2023-07-21 RX ORDER — TIRZEPATIDE 5 MG/.5ML
5 INJECTION, SOLUTION SUBCUTANEOUS
Qty: 4 PEN | Refills: 0 | Status: SHIPPED | OUTPATIENT
Start: 2023-07-21 | End: 2023-08-15 | Stop reason: ALTCHOICE

## 2023-07-21 RX ORDER — ALPRAZOLAM 0.5 MG/1
TABLET ORAL
Qty: 30 TABLET | Refills: 3 | Status: SHIPPED | OUTPATIENT
Start: 2023-07-21 | End: 2024-02-28

## 2023-07-21 RX ORDER — TRIAMCINOLONE ACETONIDE 40 MG/ML
40 INJECTION, SUSPENSION INTRA-ARTICULAR; INTRAMUSCULAR
Status: DISCONTINUED | OUTPATIENT
Start: 2023-07-21 | End: 2023-07-21 | Stop reason: HOSPADM

## 2023-07-21 RX ADMIN — TRIAMCINOLONE ACETONIDE 40 MG: 40 INJECTION, SUSPENSION INTRA-ARTICULAR; INTRAMUSCULAR at 08:07

## 2023-07-21 NOTE — PROGRESS NOTES
Congratulations on 80 lb weight loss         58 y.o. year old presents to the clinic today with recurring pain in the bilateral knee.  Patient has had Kenlaog injections in the past and responded well.  Last injection was 19 months ago. Patient is requesting injection today into bilateral knee    Exam shows tenderness at the joint line without signs of infection or instability    X-rays show arthritic changes    Assessment:  bilateral knee arthrosis    Plan:  Kenlaog into the bilateral knee.  Encourage strengthening over time.  Followup as needed.      Imaging studies ordered and reviewed by me    Further History  Aching pain  Worse with activity  Relieved with rest  No other associated symptoms  No other radiation    Further Exam  Alert and oriented  Pleasant  Contralateral limb has appropriate range of motion for age and condition  Contralateral limb has appropriate strength for age and condition  Contralateral limb has appropriate stability  for age and condition  No adenopathy  Pulses are appropriate for current condition  Skin is intact        Chief Complaint    Chief Complaint   Patient presents with    Left Knee - Pain    Right Knee - Pain       HPI  Mary Ann Vidales is a 58 y.o.  female who presents with       Past Medical History  Past Medical History:   Diagnosis Date    Anticoagulant long-term use     Chronic asthma     Coronary artery disease     Diabetes mellitus type II     Fibroids     Hypertension     Incontinence     Morbid obesity 11/3/2015    Neuropathy in diabetes     Obesity     TINA (obstructive sleep apnea)     uses CPAP    Panic attacks     Renal disorder     STAGE 3       Past Surgical History  Past Surgical History:   Procedure Laterality Date    CARDIAC CATHETERIZATION  07/05/2019    STENT IN LAD    CORONARY ANGIOGRAPHY N/A 11/6/2020    Procedure: ANGIOGRAM, CORONARY ARTERY;  Surgeon: John Francis MD;  Location: [facility] CATH;  Service: Cardiology;  Laterality: N/A;    CORONARY  ANGIOPLASTY WITH STENT PLACEMENT  2019    ECTOPIC PREGNANCY SURGERY      HERNIA REPAIR      HYSTERECTOMY      OOPHORECTOMY      TENOTOMY Right 3/4/2022    Procedure: TENOTOMY- Tx Bone Left achilles;  Surgeon: Sarwat Vaughn MD;  [facility];  Service: Orthopedics;  Laterality: Right;    TONSILLECTOMY         Medications  Current Outpatient Medications   Medication Sig    albuterol (PROVENTIL/VENTOLIN HFA) 90 mcg/actuation inhaler INHALE 2 PUFFS INTO LUNGS EVERY 4 TO 6 HOURS AS NEEDED FOR SHORTNESS OF BREATH    allopurinoL (ZYLOPRIM) 100 MG tablet TAKE 2 TABLETS BY MOUTH ONCE DAILY    ALPRAZolam (XANAX) 0.5 MG tablet Take 1 tablet (0.5 mg total) by mouth once daily.    atorvastatin (LIPITOR) 10 MG tablet TAKE 1 TABLET BY MOUTH EVERY DAY    benzonatate (TESSALON) 100 MG capsule Take 200 mg by mouth.    blood sugar diagnostic Strp Pt to check glucoses TID    blood sugar diagnostic Strp To check BG 3 times daily, to use with insurance preferred meter    blood-glucose meter kit Pt to check glucoses Tid    blood-glucose meter kit To check BG 3 times daily, to use with insurance preferred meter    clopidogrel (PLAVIX) 75 mg tablet Take 75 mg by mouth once daily.    FLUoxetine 40 MG capsule TAKE 1 CAPSULE BY MOUTH EVERY DAY    furosemide (LASIX) 40 MG tablet Take 1 tablet (40 mg total) by mouth once daily.    gabapentin (NEURONTIN) 300 MG capsule Take 1 capsule (300 mg total) by mouth 3 (three) times daily.    glucosamine-chondroitin 250-200 mg Tab Take 1 tablet by mouth once daily.    HYDROcodone-acetaminophen (NORCO) 7.5-325 mg per tablet Take 1 tablet by mouth every 6 (six) hours as needed for Pain.    lancets (ONETOUCH ULTRASOFT LANCETS) Misc Use to check glucoses up to TID.    lancets Misc To check BG 3 times daily, to use with insurance preferred meter    losartan (COZAAR) 50 MG tablet Take 50 mg by mouth once daily.    nitroGLYCERIN (NITROSTAT) 0.4 MG SL tablet PLACE 1 TABLET UNDER TONGUE EVERY 5 MINS,  UP TO 3 DOSES AS NEEDED FOR CHEST PAIN    ondansetron (ZOFRAN) 4 MG tablet TAKE 1 TABLET BY MOUTH EVERY 8 HOURS AS NEEDED FOR NAUSEA(18/21D)    pantoprazole (PROTONIX) 40 MG tablet TAKE 1 TABLET BY MOUTH EVERY DAY    primidone (MYSOLINE) 50 MG Tab Take 50 mg by mouth 2 (two) times daily.    tirzepatide (MOUNJARO) 5 mg/0.5 mL PnIj Inject 5 mg into the skin every 7 days.    TOUJEO SOLOSTAR U-300 INSULIN 300 unit/mL (1.5 mL) InPn pen INJECT 30 UNITS INTO THE SKIN EVERY EVENING     No current facility-administered medications for this visit.       Allergies  Review of patient's allergies indicates:   Allergen Reactions    Captopril      Other reaction(s): cough    Lantus [insulin glargine] Swelling and Other (See Comments)     Burning and redness in the legs    Levemir [insulin detemir] Swelling and Other (See Comments)     Burning and redness of lower extremities    Lisinopril Other (See Comments)     Other reaction(s): cough    Other      Tape causes welps & hives    Antiseptic swabs Rash     Antiseptic wipes given prior to surgery caused severe rash.        Family History  Family History   Problem Relation Age of Onset    Diabetes Mother     Heart disease Mother     COPD Mother     Heart failure Father     Breast cancer Neg Hx     Ovarian cancer Neg Hx        Social History  Social History     Socioeconomic History    Marital status:    Tobacco Use    Smoking status: Never    Smokeless tobacco: Never   Substance and Sexual Activity    Alcohol use: No     Alcohol/week: 0.0 standard drinks    Drug use: No    Sexual activity: Yes   Social History Narrative     at car [employer]. Lives with her partner, who smokes.     Social Determinants of Health     Financial Resource Strain: Low Risk     Difficulty of Paying Living Expenses: Not very hard   Food Insecurity: No Food Insecurity    Worried About Running Out of Food in the Last Year: Never true    Ran Out of Food in the Last Year: Never true    Transportation Needs: No Transportation Needs    Lack of Transportation (Medical): No    Lack of Transportation (Non-Medical): No   Physical Activity: Insufficiently Active    Days of Exercise per Week: 3 days    Minutes of Exercise per Session: 30 min   Stress: No Stress Concern Present    Feeling of Stress : Not at all   Social Connections: Unknown    Frequency of Communication with Friends and Family: More than three times a week    Frequency of Social Gatherings with Friends and Family: Once a week    Active Member of Clubs or Organizations: No    Attends Club or Organization Meetings: Never    Marital Status: Living with partner   Housing Stability: Low Risk     Unable to Pay for Housing in the Last Year: No    Number of Places Lived in the Last Year: 1    Unstable Housing in the Last Year: No               Review of Systems     Constitutional: Negative    HENT: Negative  Eyes: Negative  Respiratory: Negative  Cardiovascular: Negative  Musculoskeletal: HPI  Skin: Negative  Neurological: Negative  Hematological: Negative  Endocrine: Negative                 Physical Exam    There were no vitals filed for this visit.  Body mass index is 38.96 kg/m².  Physical Examination:     General appearance -  well appearing, and in no distress  Mental status - awake  Neck - supple  Chest -  symmetric air entry  Heart - normal rate   Abdomen - soft      Assessment     1. Primary osteoarthritis of both knees          Plan

## 2023-07-21 NOTE — PROCEDURES
Large Joint Aspiration/Injection: bilateral knee    Date/Time: 7/21/2023 8:15 AM  Performed by: Caleb Felton MD  Authorized by: Caleb Felton MD     Consent Done?:  Yes (Verbal)  Timeout: prior to procedure the correct patient, procedure, and site was verified    Prep: patient was prepped and draped in usual sterile fashion      Details:  Needle Size:  21 G  Approach:  Anterolateral  Location:  Knee  Laterality:  Bilateral  Site:  Bilateral knee  Medications (Right):  40 mg triamcinolone acetonide 40 mg/mL  Medications (Left):  40 mg triamcinolone acetonide 40 mg/mL  Patient tolerance:  Patient tolerated the procedure well with no immediate complications

## 2023-07-21 NOTE — TELEPHONE ENCOUNTER
No care due was identified.  [facility] Embedded Care Due Messages. Reference number: [id number].   7/21/2023 10:52:43 AM CDT

## 2023-07-21 NOTE — PROGRESS NOTES
Subjective:      Patient ID: Mary Ann Vidales is a 58 y.o. female.    Chief Complaint:  Diabetes f/u    The patient location is: home   The chief complaint leading to consultation is: diabetes     Visit type: audiovisual    Face to Face time with patient: 9  20  minutes of total time spent on the encounter, which includes face to face time and non-face to face time preparing to see the patient (eg, review of tests), Obtaining and/or reviewing separately obtained history, Documenting clinical information in the electronic or other health record, Independently interpreting results (not separately reported) and communicating results to the patient/family/caregiver, or Care coordination (not separately reported).     Each patient to whom he or she provides medical services by telemedicine is:  (1) informed of the relationship between the physician and patient and the respective role of any other health care provider with respect to management of the patient; and (2) notified that he or she may decline to receive medical services by telemedicine and may withdraw from such care at any time.    Notes:Covid    History of Present Illness  Pt is a 58y/o female with TYPE 2 DM since approx 2000, and chronic conditions pending review including HTN, neuropathy, CKD state 3b , TINA on C-pap. Dyslipidemia, morbid obesity. Also TINA on cpap-    Interim Events:  Pt not seen in almost a year.  Being followed by renal for now CKD stage 4--but has been stable with GFR hovering 35 to 29 last year.  As a result I did advise her to stop the jardiance a couple of months ago.  She has lost close to 100 lbs over the last year and reports wt as 225 lbs.  She is inquiring about mounjaro for better wt loss benefits as a alternate to Ozempic     Current DM meds:   30 toujeo,  1 mg ozempic.        Failed DM meds:  metformin, jardicance (renal). Levemir and Lantus---marked LE erythema and burning in LE on both insulins       Back up plan for  insulin pump hiatus:   Statin: atorvastatin 10 mg        Not tolerated statin : na   ACE/ARB:losartan 50 mg        Not tolerated ACE/ARB: na   Known Diabetic complications: nephropathy, neuropathy.     Aug 2022L Multiple issues in the interim r/t worsened renal function, marked LE edema--which was likely a result of pt being taken off gabapentin and switched to Lyrica--but since the lyrica wasn't providing the desired relief was taking more than renal dose allowed on both.  DM control has markedly worsened. She has also resumed metformin trying to improve glucoses.  Only checking 1 every day or QOD but always > 200,  And per labs 260 last week. We did add Jardiance to help wit the fluid at least visit.     Review of Systems   Constitutional: Negative.      Objective:   Physical Exam  Constitutional:       Appearance: Normal appearance. She is obese.   HENT:      Head: Normocephalic and atraumatic.      Nose: Nose normal.   Neurological:      General: No focal deficit present.      Mental Status: She is alert and oriented to person, place, and time.   Psychiatric:         Mood and Affect: Mood normal.         Behavior: Behavior normal.         Thought Content: Thought content normal.         Judgment: Judgment normal.     Lab Review:   Hemoglobin A1C   Date Value Ref Range Status   06/01/2023 5.7 (H) 4.0 - 5.6 % Final     Comment:     ADA Screening Guidelines:  5.7-6.4%  Consistent with prediabetes  >or=6.5%  Consistent with diabetes    High levels of fetal hemoglobin interfere with the HbA1C  assay. Heterozygous hemoglobin variants (HbS, HgC, etc)do  not significantly interfere with this assay.   However, presence of multiple variants may affect accuracy.     12/02/2022 7.0 (H) 4.0 - 5.6 % Final     Comment:     ADA Screening Guidelines:  5.7-6.4%  Consistent with prediabetes  >or=6.5%  Consistent with diabetes    High levels of fetal hemoglobin interfere with the HbA1C  assay. Heterozygous hemoglobin variants (HbS,  HgC, etc)do  not significantly interfere with this assay.   However, presence of multiple variants may affect accuracy.     08/17/2022 9.0 (H) 4.0 - 5.6 % Final     Comment:     ADA Screening Guidelines:  5.7-6.4%  Consistent with prediabetes  >or=6.5%  Consistent with diabetes    High levels of fetal hemoglobin interfere with the HbA1C  assay. Heterozygous hemoglobin variants (HbS, HgC, etc)do  not significantly interfere with this assay.   However, presence of multiple variants may affect accuracy.       Lab Results   Component Value Date    LDLCALC 92.4 06/01/2023     Lab Results   Component Value Date    TSH 2.145 05/16/2022       Chemistry        Component Value Date/Time     06/01/2023 0703     06/01/2023 0703    K 4.5 06/01/2023 0703    K 4.2 06/01/2023 0703     06/01/2023 0703     06/01/2023 0703    CO2 25 06/01/2023 0703    CO2 24 06/01/2023 0703    BUN 45 (H) 06/01/2023 0703    BUN 42 (H) 06/01/2023 0703    CREATININE 2.2 (H) 06/01/2023 0703    CREATININE 2.0 (H) 06/01/2023 0703     06/01/2023 0703     06/01/2023 0703        Component Value Date/Time    CALCIUM 9.5 06/01/2023 0703    CALCIUM 9.6 06/01/2023 0703    ALKPHOS 107 06/01/2023 0703    AST 16 06/01/2023 0703    ALT 13 06/01/2023 0703    BILITOT 0.2 06/01/2023 0703            Assessment:     1. Type 2 diabetes mellitus without complication, with long-term current use of insulin  tirzepatide (MOUNJARO) 5 mg/0.5 mL PnIj      2. Morbid obesity        3. Anemia in stage 4 chronic kidney disease        4. CKD (chronic kidney disease) stage 4, GFR 15-29 ml/min        5. Coronary artery disease, unspecified vessel or lesion type, unspecified whether angina present, unspecified whether native or transplanted heart        6. Dyslipidemia            Plan:     Mounjaro 5 mg weekly to start.     Can call and request increase next month.      ORDERS 07/21/2023     Pt has appt in November with me schedule.

## 2023-07-25 ENCOUNTER — HOSPITAL ENCOUNTER (OUTPATIENT)
Dept: RADIOLOGY | Facility: HOSPITAL | Age: 58
Discharge: HOME | End: 2023-07-25
Attending: ORTHOPAEDIC SURGERY
Payer: COMMERCIAL

## 2023-07-25 DIAGNOSIS — M25.561 ACUTE PAIN OF RIGHT KNEE: ICD-10-CM

## 2023-07-25 PROCEDURE — 73700 CT LOWER EXTREMITY W/O DYE: CPT | Mod: TC,PO,LT

## 2023-07-25 PROCEDURE — 73700 CT KNEE WITHOUT CONTRAST LEFT: ICD-10-PCS | Mod: 26,LT,, | Performed by: RADIOLOGY

## 2023-07-25 PROCEDURE — 73700 CT LOWER EXTREMITY W/O DYE: CPT | Mod: 26,LT,, | Performed by: RADIOLOGY

## 2023-07-26 ENCOUNTER — PATIENT MESSAGE (OUTPATIENT)
Dept: ORTHOPEDICS | Facility: CLINIC | Age: 58
End: 2023-07-26
Payer: COMMERCIAL

## 2023-07-27 ENCOUNTER — TELEPHONE (OUTPATIENT)
Dept: ORTHOPEDICS | Facility: CLINIC | Age: 58
End: 2023-07-27
Payer: COMMERCIAL

## 2023-08-14 ENCOUNTER — PATIENT MESSAGE (OUTPATIENT)
Dept: ENDOCRINOLOGY | Facility: CLINIC | Age: 58
End: 2023-08-14
Payer: COMMERCIAL

## 2023-08-15 DIAGNOSIS — E11.9 TYPE 2 DIABETES MELLITUS WITHOUT COMPLICATION, WITH LONG-TERM CURRENT USE OF INSULIN: Primary | ICD-10-CM

## 2023-08-15 DIAGNOSIS — Z79.4 TYPE 2 DIABETES MELLITUS WITHOUT COMPLICATION, WITH LONG-TERM CURRENT USE OF INSULIN: Primary | ICD-10-CM

## 2023-08-15 RX ORDER — SEMAGLUTIDE 1.34 MG/ML
1 INJECTION, SOLUTION SUBCUTANEOUS
Qty: 3 ML | Refills: 11 | Status: SHIPPED | OUTPATIENT
Start: 2023-08-15 | End: 2024-08-14

## 2023-08-23 RX ORDER — PANTOPRAZOLE SODIUM 40 MG/1
40 TABLET, DELAYED RELEASE ORAL DAILY
Qty: 90 TABLET | Refills: 3 | Status: SHIPPED | OUTPATIENT
Start: 2023-08-23

## 2023-08-23 NOTE — TELEPHONE ENCOUNTER
No care due was identified.  [facility] Embedded Care Due Messages. Reference number: [id number].   8/23/2023 12:22:21 AM CDT

## 2023-08-23 NOTE — TELEPHONE ENCOUNTER
Refill Decision Note   Mary Ann Vidales  is requesting a refill authorization.  Brief Assessment and Rationale for Refill:  Approve     Medication Therapy Plan:       Medication Reconciliation Completed: No    Comments:     No Care Gaps recommended.     Note composed:11:31 AM 08/23/2023

## 2023-08-31 ENCOUNTER — HOSPITAL ENCOUNTER (OUTPATIENT)
Dept: RADIOLOGY | Facility: HOSPITAL | Age: 58
Discharge: HOME | End: 2023-08-31
Attending: PHYSICIAN ASSISTANT
Payer: COMMERCIAL

## 2023-08-31 ENCOUNTER — OFFICE VISIT (OUTPATIENT)
Dept: PAIN MEDICINE | Facility: CLINIC | Age: 58
End: 2023-08-31
Payer: COMMERCIAL

## 2023-08-31 ENCOUNTER — OFFICE VISIT (OUTPATIENT)
Dept: OTOLARYNGOLOGY | Facility: CLINIC | Age: 58
End: 2023-08-31
Payer: COMMERCIAL

## 2023-08-31 ENCOUNTER — CLINICAL SUPPORT (OUTPATIENT)
Dept: AUDIOLOGY | Facility: CLINIC | Age: 58
End: 2023-08-31
Payer: COMMERCIAL

## 2023-08-31 VITALS — WEIGHT: 225.06 LBS | BODY MASS INDEX: 38.42 KG/M2 | HEIGHT: 64 IN

## 2023-08-31 VITALS
BODY MASS INDEX: 38.41 KG/M2 | HEIGHT: 64 IN | DIASTOLIC BLOOD PRESSURE: 81 MMHG | HEART RATE: 86 BPM | WEIGHT: 225 LBS | SYSTOLIC BLOOD PRESSURE: 126 MMHG

## 2023-08-31 DIAGNOSIS — R09.89 THROAT CLEARING: ICD-10-CM

## 2023-08-31 DIAGNOSIS — H93.8X3 EAR POPPING, BILATERAL: Primary | ICD-10-CM

## 2023-08-31 DIAGNOSIS — M54.50 CHRONIC LEFT-SIDED LOW BACK PAIN, UNSPECIFIED WHETHER SCIATICA PRESENT: ICD-10-CM

## 2023-08-31 DIAGNOSIS — H93.292 ABNORMAL AUDITORY PERCEPTION OF LEFT EAR: Primary | ICD-10-CM

## 2023-08-31 DIAGNOSIS — G89.29 CHRONIC LEFT-SIDED LOW BACK PAIN, UNSPECIFIED WHETHER SCIATICA PRESENT: ICD-10-CM

## 2023-08-31 DIAGNOSIS — H91.93 SUBJECTIVE HEARING CHANGE OF BOTH EARS: ICD-10-CM

## 2023-08-31 DIAGNOSIS — G89.29 CHRONIC BILATERAL THORACIC BACK PAIN: Primary | ICD-10-CM

## 2023-08-31 DIAGNOSIS — G89.29 CHRONIC BILATERAL THORACIC BACK PAIN: ICD-10-CM

## 2023-08-31 DIAGNOSIS — M54.6 CHRONIC BILATERAL THORACIC BACK PAIN: ICD-10-CM

## 2023-08-31 DIAGNOSIS — M54.6 CHRONIC BILATERAL THORACIC BACK PAIN: Primary | ICD-10-CM

## 2023-08-31 DIAGNOSIS — R09.A2 GLOBUS SENSATION: ICD-10-CM

## 2023-08-31 PROCEDURE — 72114 X-RAY EXAM L-S SPINE BENDING: CPT | Mod: 26,,, | Performed by: RADIOLOGY

## 2023-08-31 PROCEDURE — 99203 OFFICE O/P NEW LOW 30 MIN: CPT | Mod: S$GLB,,, | Performed by: NURSE PRACTITIONER

## 2023-08-31 PROCEDURE — 99999 PR PBB SHADOW E&M-EST. PATIENT-LVL V: ICD-10-PCS | Mod: PBBFAC,,, | Performed by: PHYSICIAN ASSISTANT

## 2023-08-31 PROCEDURE — 92567 PR TYMPA2METRY: ICD-10-PCS | Mod: S$GLB,,, | Performed by: AUDIOLOGIST

## 2023-08-31 PROCEDURE — 99999 PR PBB SHADOW E&M-EST. PATIENT-LVL IV: ICD-10-PCS | Mod: PBBFAC,,, | Performed by: NURSE PRACTITIONER

## 2023-08-31 PROCEDURE — 72114 XR LUMBAR SPINE 5 VIEW WITH FLEX AND EXT: ICD-10-PCS | Mod: 26,,, | Performed by: RADIOLOGY

## 2023-08-31 PROCEDURE — 99213 OFFICE O/P EST LOW 20 MIN: CPT | Mod: S$GLB,,, | Performed by: PHYSICIAN ASSISTANT

## 2023-08-31 PROCEDURE — 72070 X-RAY EXAM THORAC SPINE 2VWS: CPT | Mod: 26,,, | Performed by: RADIOLOGY

## 2023-08-31 PROCEDURE — 72114 X-RAY EXAM L-S SPINE BENDING: CPT | Mod: TC,PO

## 2023-08-31 PROCEDURE — 99999 PR PBB SHADOW E&M-EST. PATIENT-LVL V: CPT | Mod: PBBFAC,,, | Performed by: PHYSICIAN ASSISTANT

## 2023-08-31 PROCEDURE — 72070 X-RAY EXAM THORAC SPINE 2VWS: CPT | Mod: TC,PO

## 2023-08-31 PROCEDURE — 99999 PR PBB SHADOW E&M-EST. PATIENT-LVL IV: CPT | Mod: PBBFAC,,, | Performed by: NURSE PRACTITIONER

## 2023-08-31 PROCEDURE — 72070 XR THORACIC SPINE AP LATERAL: ICD-10-PCS | Mod: 26,,, | Performed by: RADIOLOGY

## 2023-08-31 PROCEDURE — 92567 TYMPANOMETRY: CPT | Mod: S$GLB,,, | Performed by: AUDIOLOGIST

## 2023-08-31 PROCEDURE — 99203 PR OFFICE/OUTPT VISIT, NEW, LEVL III, 30-44 MIN: ICD-10-PCS | Mod: S$GLB,,, | Performed by: NURSE PRACTITIONER

## 2023-08-31 PROCEDURE — 99213 PR OFFICE/OUTPT VISIT, EST, LEVL III, 20-29 MIN: ICD-10-PCS | Mod: S$GLB,,, | Performed by: PHYSICIAN ASSISTANT

## 2023-08-31 RX ORDER — TIZANIDINE 4 MG/1
4 TABLET ORAL NIGHTLY PRN
Qty: 40 TABLET | Refills: 0 | Status: SHIPPED | OUTPATIENT
Start: 2023-08-31 | End: 2023-09-19

## 2023-08-31 NOTE — PROGRESS NOTES
Tympanometry completed per order from Sheyla Ramsay NP.    Type A tympanogram obtained AD.  Bifid tympanogram with normal ear volume, compliance and peak pressure obtained AS.      Medical evaluation recommended.  Patient referred back to Sheyla Ramsay NP for follow-up evaluation.

## 2023-08-31 NOTE — PROGRESS NOTES
[facility] New Patient Evaluation      Referred by: Dr. Caleb Felton    PCP:  Davon Kan MD    CC:   Chief Complaint   Patient presents with    Mid-back Pain     Fell backward in March. Pain is located along the bra line.          HPI:   Mary Ann Vidales is a 58 y.o. year old female patient who has a past medical history of Anticoagulant long-term use, Chronic asthma, Coronary artery disease, Diabetes mellitus type II, Fibroids, Hypertension, Incontinence, Morbid obesity, Neuropathy in diabetes, Obesity, TINA (obstructive sleep apnea), Panic attacks, and Renal disorder. She presents in referral from Dr. Caleb Felton for thoracic back pain. She fell backward in March 2023. Since then, she has felt pain in the mid to lower thoracic region at the level of the bra strap and just below it.  Pain is focused in the back without any radicular chest wall pain, tingling.  She has some lower back and left hip region pain with occasional left leg pain as well.  Pain increases with walking.  She has been taking gabapentin, norco, tylenol, for pain.    Denies bowel/ bladder incontinence.    Past and current medications:  Antineuropathics:  gabapentin  NSAIDs:  Antidepressants:  Muscle relaxers:  Opioids: hydrocodone  Antiplatelets/Anticoagulants:  plavix  Others:  tylenol    Physical Therapy/ Chiropractic care:  none    Pain Intervention History:  none    Past Spine Surgical History:  none        History:    Current Outpatient Medications:     allopurinoL (ZYLOPRIM) 100 MG tablet, TAKE 2 TABLETS BY MOUTH ONCE DAILY, Disp: 180 tablet, Rfl: 3    ALPRAZolam (XANAX) 0.5 MG tablet, TAKE 1 TABLET BY MOUTH ONCE DAILY., Disp: 30 tablet, Rfl: 3    atorvastatin (LIPITOR) 10 MG tablet, TAKE 1 TABLET BY MOUTH EVERY DAY, Disp: 90 tablet, Rfl: 3    benzonatate (TESSALON) 100 MG capsule, Take 200 mg by mouth., Disp: , Rfl:     blood sugar diagnostic Strp, Pt to check glucoses TID, Disp: 100 strip, Rfl: 12    blood  sugar diagnostic Strp, To check BG 3 times daily, to use with insurance preferred meter, Disp: 100 each, Rfl: prn    blood-glucose meter kit, Pt to check glucoses Tid, Disp: 1 each, Rfl: prn    clopidogrel (PLAVIX) 75 mg tablet, Take 75 mg by mouth once daily., Disp: , Rfl: 11    FLUoxetine 40 MG capsule, TAKE 1 CAPSULE BY MOUTH EVERY DAY, Disp: 90 capsule, Rfl: 3    furosemide (LASIX) 40 MG tablet, Take 1 tablet (40 mg total) by mouth once daily., Disp: 90 tablet, Rfl: 3    gabapentin (NEURONTIN) 300 MG capsule, Take 1 capsule (300 mg total) by mouth 3 (three) times daily., Disp: 90 capsule, Rfl: 3    glucosamine-chondroitin 250-200 mg Tab, Take 1 tablet by mouth once daily., Disp: , Rfl:     lancets (ONETOUCH ULTRASOFT LANCETS) Misc, Use to check glucoses up to TID., Disp: 100 each, Rfl: 0    lancets Misc, To check BG 3 times daily, to use with insurance preferred meter, Disp: 100 each, Rfl: prn    losartan (COZAAR) 50 MG tablet, Take 50 mg by mouth once daily., Disp: , Rfl:     nitroGLYCERIN (NITROSTAT) 0.4 MG SL tablet, PLACE 1 TABLET UNDER TONGUE EVERY 5 MINS, UP TO 3 DOSES AS NEEDED FOR CHEST PAIN, Disp: , Rfl: 11    pantoprazole (PROTONIX) 40 MG tablet, Take 1 tablet (40 mg total) by mouth once daily., Disp: 90 tablet, Rfl: 3    primidone (MYSOLINE) 50 MG Tab, Take 50 mg by mouth 2 (two) times daily., Disp: , Rfl:     semaglutide (OZEMPIC) 1 mg/dose (4 mg/3 mL), Inject 1 mg into the skin every 7 days., Disp: 3 mL, Rfl: 11    TOUJEO SOLOSTAR U-300 INSULIN 300 unit/mL (1.5 mL) InPn pen, INJECT 30 UNITS INTO THE SKIN EVERY EVENING, Disp: 2 pen, Rfl: 6    albuterol (PROVENTIL/VENTOLIN HFA) 90 mcg/actuation inhaler, INHALE 2 PUFFS INTO LUNGS EVERY 4 TO 6 HOURS AS NEEDED FOR SHORTNESS OF BREATH (Patient not taking: Reported on 8/31/2023), Disp: 18 g, Rfl: 2    HYDROcodone-acetaminophen (NORCO) 7.5-325 mg per tablet, Take 1 tablet by mouth every 6 (six) hours as needed for Pain. (Patient not taking: Reported on  8/31/2023), Disp: 90 tablet, Rfl: 0    ondansetron (ZOFRAN) 4 MG tablet, TAKE 1 TABLET BY MOUTH EVERY 8 HOURS AS NEEDED FOR NAUSEA(18/21D) (Patient not taking: Reported on 8/31/2023), Disp: 30 tablet, Rfl: 3    tiZANidine (ZANAFLEX) 4 MG tablet, Take 1 tablet (4 mg total) by mouth nightly as needed (muscle spasms/ pain)., Disp: 40 tablet, Rfl: 0    Past Medical History:   Diagnosis Date    Anticoagulant long-term use     Chronic asthma     Coronary artery disease     Diabetes mellitus type II     Fibroids     Hypertension     Incontinence     Morbid obesity 11/3/2015    Neuropathy in diabetes     Obesity     TINA (obstructive sleep apnea)     uses CPAP    Panic attacks     Renal disorder     STAGE 3       Past Surgical History:   Procedure Laterality Date    CARDIAC CATHETERIZATION  07/05/2019    STENT IN LAD    CORONARY ANGIOGRAPHY N/A 11/6/2020    Procedure: ANGIOGRAM, CORONARY ARTERY;  Surgeon: John Francis MD;  Location: [facility] CATH;  Service: Cardiology;  Laterality: N/A;    CORONARY ANGIOPLASTY WITH STENT PLACEMENT  2019    ECTOPIC PREGNANCY SURGERY      HERNIA REPAIR      HYSTERECTOMY      OOPHORECTOMY      TENOTOMY Right 3/4/2022    Procedure: TENOTOMY- Tx Bone Left achilles;  Surgeon: Sarwat Vaughn MD;  Location: [facility] OR;  Service: Orthopedics;  Laterality: Right;    TONSILLECTOMY         Family History   Problem Relation Age of Onset    Diabetes Mother     Heart disease Mother     COPD Mother     Heart failure Father     Breast cancer Neg Hx     Ovarian cancer Neg Hx        Social History     Socioeconomic History    Marital status:    Tobacco Use    Smoking status: Never    Smokeless tobacco: Never   Substance and Sexual Activity    Alcohol use: No     Alcohol/week: 0.0 standard drinks of alcohol    Drug use: No    Sexual activity: Yes   Social History Narrative     at [employer]. Lives with her partner, who smokes.     Social Determinants of Health     Financial Resource  Strain: Low Risk  (7/21/2023)    Overall Financial Resource Strain (CARDIA)     Difficulty of Paying Living Expenses: Not very hard   Food Insecurity: No Food Insecurity (7/21/2023)    Hunger Vital Sign     Worried About Running Out of Food in the Last Year: Never true     Ran Out of Food in the Last Year: Never true   Transportation Needs: No Transportation Needs (7/21/2023)    PRAPARE - Transportation     Lack of Transportation (Medical): No     Lack of Transportation (Non-Medical): No   Physical Activity: Insufficiently Active (7/21/2023)    Exercise Vital Sign     Days of Exercise per Week: 3 days     Minutes of Exercise per Session: 30 min   Stress: No Stress Concern Present (7/21/2023)    [place] [employer] - Occupational Stress Questionnaire     Feeling of Stress : Not at all   Social Connections: Unknown (7/21/2023)    Social Connection and Isolation Panel [NHANES]     Frequency of Communication with Friends and Family: More than three times a week     Frequency of Social Gatherings with Friends and Family: Once a week     Active Member of Clubs or Organizations: No     Attends Club or Organization Meetings: Never     Marital Status: Living with partner   Housing Stability: Low Risk  (7/21/2023)    Housing Stability Vital Sign     Unable to Pay for Housing in the Last Year: No     Number of Places Lived in the Last Year: 1     Unstable Housing in the Last Year: No       Review of patient's allergies indicates:   Allergen Reactions    Captopril      Other reaction(s): cough    Lantus [insulin glargine] Swelling and Other (See Comments)     Burning and redness in the legs    Levemir [insulin detemir] Swelling and Other (See Comments)     Burning and redness of lower extremities    Lisinopril Other (See Comments)     Other reaction(s): cough    Other      Tape causes welps & hives    Antiseptic swabs Rash     Antiseptic wipes given prior to surgery caused severe rash.        Labs:  Lab  "Results   Component Value Date    HGBA1C 5.7 (H) 06/01/2023       Lab Results   Component Value Date    WBC 6.33 06/01/2023    HGB 9.9 (L) 06/01/2023    HCT 31.2 (L) 06/01/2023    MCV 99 (H) 06/01/2023     (L) 06/01/2023           Review of Systems:  Back pain..  Balance of review of systems is negative.    Physical Exam:  Vitals:    08/31/23 1359   BP: 126/81   Pulse: 86   Weight: 102 kg (224 lb 15.7 oz)   Height: 5' 4" (1.626 m)   PainSc:   2   PainLoc: Back     Body mass index is 38.62 kg/m².    Gen: NAD  Psych: mood appropriate for given condition  HEENT: eyes anicteric   CV: RRR, 2+ radial pulse  HEENT: anicteric   Respiratory: non-labored, no signs of respiratory distress  Abd: non-distended  Skin: warm, dry and intact.  Gait: Able to heel walk, toe walk. No antalgic gait.     Coordination:   Tandem walking coordination: normal    Cervical spine: ROM is full in flexion, extension and lateral rotation without increased pain.  Spurling's maneuver causes no neck pain to either side.  Myofascial exam: No Tenderness to palpation across cervical paraspinous region bilaterally.    Lumbar spine:  Lumbar spine: ROM is full with flexion extension and oblique extension with no increased pain.    Richie's test causes no increased pain on either side.    Supine straight leg raise is negative bilaterally.    Internal and external rotation of the hip causes no increased pain on either side.  Myofascial exam: No tenderness to palpation across lumbar paraspinous muscles. No tenderness to palpation over the bilateral greater trochanters and bilateral SI joint    Sensory:  Intact and symmetrical to light touch in C4-T1 dermatomes bilaterally. Intact and symmetrical to light touch in L1-S1 dermatomes bilaterally, except decrease sensation mid shin/ calf to the feet bilaterally consistent with neuropathy.    Motor:    Right Left   C4 Shoulder Abduction  5  5   C5 Elbow Flexion    5  5   C6 Wrist Extension  5  5   C7 Elbow " Extension   5  5   C8/T1 Hand Intrinsics   5  5        Right Left   L2/3 Iliacus Hip flexion  5  5   L3/4 Qudratus Femoris Knee Extension  5  5   L4/5 Tib Anterior Ankle Dorsiflexion   5  5   L5/S1 Extensor Hallicus Longus Great toe extension  5  5   S1/S2 Gastroc/Soleus Plantar Flexion  5  5      Right Left   Triceps DTR 0+ 0+   Biceps DTR 0+ 0+   Brachioradialis DTR 0+ 0+   Patellar DTR 0+ 0+   Achilles DTR 0+ 0+   Parr Absent  Absent   Clonus Absent Absent   Babinski Absent Absent       Imaging:  No spine imaging.     Xray hip 7-21-23:  Sacroiliac joints are symmetric.  Pubic symphysis is not widened they are well seated within the acetabula.  Left hip joint space narrowing, subchondral sclerosis with cystic change.  No acute, displaced fracture or aggressive osseous abnormality. Degenerative chagnes lower lumbar spine.      Assessment:   Mary Ann Vidales is a 58 y.o. year old female patient who has a past medical history of Anticoagulant long-term use, Chronic asthma, Coronary artery disease, Diabetes mellitus type II, Fibroids, Hypertension, Incontinence, Morbid obesity, Neuropathy in diabetes, Obesity, TINA (obstructive sleep apnea), Panic attacks, and Renal disorder. She presents in referral from Dr. Caleb Felton for thoracic back pain after a fall in March 2023.  Pain can be myofascial as she is not tender over the area and I would have expeceted if there was a fracture that it would have healed by now.  Lower back and left hip  - possible frolm degenerative chagnes in the lumbar spine seen on hip xray vs myofascial pain.  No neurological deficits.     Problem List Items Addressed This Visit    None  Visit Diagnoses       Chronic bilateral thoracic back pain    -  Primary    Relevant Medications    tiZANidine (ZANAFLEX) 4 MG tablet    Other Relevant Orders    X-Ray Thoracic Spine AP Lateral    Chronic left-sided low back pain, unspecified whether sciatica present        Relevant Medications     tiZANidine (ZANAFLEX) 4 MG tablet    Other Relevant Orders    X-Ray Lumbar Complete Including Flex And Ext            Plan:  - xray thoraic and lumbar spine to further assess alignment and any significant arthritic changes/ abnormalities contributing to pain.  - zanalfex to help with pain at night  - we will call with imaging result and discuss further.  She would benefit from PT,b ut would like to have images done firtst      Follow Up: RTC as needed    : Reviewed and consistent with medication use as prescribed.    Thank you for referring this interesting patient, and I look forward to continuing to collaborate in her care.        Joy Patino PA-C  [facility]

## 2023-08-31 NOTE — PROGRESS NOTES
"Subjective     Patient ID: Mary Ann Vidales is a 58 y.o. female.    Chief Complaint: ears popping    HPI  Patient is new to ENT, self-referred for ears popping "driving [her] crazy." Hearing is "hollow." Also reports constant globus sensation w/throat clearing.     Review of Systems   Constitutional: Negative.    HENT: Negative.     Eyes: Negative.    Respiratory: Negative.     Cardiovascular: Negative.    Gastrointestinal: Negative.    Musculoskeletal: Negative.    Integumentary:  Negative.   Neurological: Negative.    Hematological: Negative.    Psychiatric/Behavioral: Negative.            Objective     Physical Exam  Vitals and nursing note reviewed.   Constitutional:       General: She is not in acute distress.     Appearance: She is well-developed. She is not ill-appearing.      Comments: morbidly obese   HENT:      Head: Normocephalic and atraumatic.      Right Ear: Hearing, tympanic membrane, ear canal and external ear normal. No middle ear effusion. Tympanic membrane is not erythematous. Tympanic membrane has normal mobility.      Left Ear: Hearing, tympanic membrane, ear canal and external ear normal.  No middle ear effusion. Tympanic membrane is not erythematous. Tympanic membrane has normal mobility (Bifid peak, normal values for ECV, compliance, and peak pressure).      Ears:      Farias exam findings: Lateralizes right.     Right Rinne: AC > BC.     Left Rinne: AC > BC.     Nose: Nose normal.   Eyes:      General: Lids are normal. No scleral icterus.        Right eye: No discharge.         Left eye: No discharge.   Neck:      Trachea: Trachea normal. No tracheal deviation.   Cardiovascular:      Rate and Rhythm: Normal rate.   Pulmonary:      Effort: Pulmonary effort is normal. No respiratory distress.      Breath sounds: No stridor. No wheezing.   Musculoskeletal:         General: Normal range of motion.      Cervical back: Normal range of motion and neck supple.   Skin:     General: Skin is warm " and dry.   Neurological:      Mental Status: She is alert and oriented to person, place, and time.      Coordination: Coordination normal.      Gait: Gait normal.   Psychiatric:         Attention and Perception: Attention normal.         Mood and Affect: Mood normal.         Speech: Speech normal.         Behavior: Behavior normal. Behavior is cooperative.            Assessment and Plan     1. Ear popping, bilateral    2. Subjective hearing change of both ears    3. Globus sensation    4. Throat clearing    Soonest available audiogram.   Handout on globus sensation/throat clearing (AVS)       No follow-ups on file.

## 2023-08-31 NOTE — PATIENT INSTRUCTIONS
"Mucous (Post nasal drip) Management     A fullness sensation in the back of the throat is called "globus."   Many people attribute this fullness to a sensation of increased mucous, because they can feel a sticky material in the throat that they will occasionally cough up.     Nasal  / Throat Mucous  Our body NORMALLY makes 1 liter or more of saliva (spit) and nasal mucous every day. These body fluids are important for breaking down the food we eat, protecting our teeth from cavities, and clearing out the pollen and irritants that we breathe in our nose. As our body makes these fluids, we swallow them throughout the day, where they are recycled back through the body. This process is happening all our life without us thinking about it. When an adjustment to this process causes the mucous to be increased or thicker, is when we notice it.      Some problems cause an INCREASE in these body fluids, which we perceive as irritating, excess phlegm in the throat.   However, it is not always an increase. Many times there is a change in CONSISTENCY of the mucous to make it thicker. This will cause the mucous to be held up in the throat instead of being swallowed easily.     Several factors can cause these problems:  Dryness of throat and nose which increases the mucous sticking - Causes include inadequate hydration, excess caffeine / soda / sugary drinks, medication side effects.  Acid reflux  Increased mucous production from allergies or chronic sinus drainage.      We will evaluate the cause(s) of increased or thickened mucous and consider different treatment strategies:     MANY COMMON medications cause dryness of the throat, including medications for allergy, depression/anxiety, heart, and urination issues.   There is some evidence that added sugars or processed sugars in the diet (not the kind that occur naturally in honey or ripe fruit) can increase mucus, as well as too much dairy. To avoid these refined carbohydrates, " on food labels, watch out for wheat flour (also called white, refined or enriched flour) on the ingredients list.      Recommendations for Increased Mucous Sensation     Water, water, water - this cannot be understated. Most people are not drinking enough water regularly to keep up with body's demand. Recommend AT LEAST 64 oz of water per day, and more for men (up to 100 oz.) unless a medical issue prevents this.  Reduce intake of caffeine / tea / sugary drinks or soda, which all will cause increased mucous.  If your nose is the source of mucous (if you must repeated clear your nose of mucus in order to effectively pass air through your nose daily), then nasal medications discussed by your doctor as well as nasal saline can be effective to wash away the mucous.  However if your nose is essentially open and clear (no mucus), then prevention of acid reflux is advised by avoiding late-night eating (nothing 3 hours before laying down at night), greasy and spicy foods, alcohol, and acidic foods.   Humidifier in the bedroom if you find dryness increases at night.  Smoking cessation if you use tobacco  Examination your medication list with your doctor to determine medications that may be contributing     There are NUMEROUS over the counter mucous thinning agents. Here are some suggestions that can be purchased online:  Banks's Breezer's (Sugar Free), Grether's black currant pastilles, and Entertainer's Secret Throat Relief can all help dry mouth and thickened mucous.   Biotene spray and mouthwash can help lubricate a dry mouth  Mucinex can be helpful in some cases, but stop taking if you don't notice improvement after a few weeks.

## 2023-09-01 DIAGNOSIS — M54.50 CHRONIC LEFT-SIDED LOW BACK PAIN, UNSPECIFIED WHETHER SCIATICA PRESENT: ICD-10-CM

## 2023-09-01 DIAGNOSIS — M54.6 CHRONIC BILATERAL THORACIC BACK PAIN: Primary | ICD-10-CM

## 2023-09-01 DIAGNOSIS — G89.29 CHRONIC LEFT-SIDED LOW BACK PAIN, UNSPECIFIED WHETHER SCIATICA PRESENT: ICD-10-CM

## 2023-09-01 DIAGNOSIS — G89.29 CHRONIC BILATERAL THORACIC BACK PAIN: Primary | ICD-10-CM

## 2023-09-02 DIAGNOSIS — S86.011S RUPTURE OF RIGHT ACHILLES TENDON, SEQUELA: ICD-10-CM

## 2023-09-02 DIAGNOSIS — E11.42 TYPE 2 DIABETES MELLITUS WITH DIABETIC POLYNEUROPATHY, WITHOUT LONG-TERM CURRENT USE OF INSULIN: ICD-10-CM

## 2023-09-02 NOTE — TELEPHONE ENCOUNTER
No care due was identified.  [facility] Embedded Care Due Messages. Reference number: [id number].   9/02/2023 10:56:34 AM CDT

## 2023-09-06 RX ORDER — HYDROCODONE BITARTRATE AND ACETAMINOPHEN 7.5; 325 MG/1; MG/1
1 TABLET ORAL EVERY 6 HOURS PRN
Qty: 90 TABLET | Refills: 0 | Status: SHIPPED | OUTPATIENT
Start: 2023-09-06 | End: 2023-12-06 | Stop reason: SDUPTHER

## 2023-09-11 ENCOUNTER — CLINICAL SUPPORT (OUTPATIENT)
Dept: AUDIOLOGY | Facility: CLINIC | Age: 58
End: 2023-09-11
Payer: COMMERCIAL

## 2023-09-11 DIAGNOSIS — H91.93 SUBJECTIVE HEARING CHANGE OF BOTH EARS: Primary | ICD-10-CM

## 2023-09-11 DIAGNOSIS — H90.3 BILATERAL HIGH FREQUENCY SENSORINEURAL HEARING LOSS: ICD-10-CM

## 2023-09-11 DIAGNOSIS — H91.93 SUBJECTIVE HEARING CHANGE OF BOTH EARS: ICD-10-CM

## 2023-09-11 DIAGNOSIS — H93.8X3 EAR POPPING, BILATERAL: Primary | ICD-10-CM

## 2023-09-11 PROCEDURE — 92557 PR COMPREHENSIVE HEARING TEST: ICD-10-PCS | Mod: S$GLB,,, | Performed by: AUDIOLOGIST-HEARING AID FITTER

## 2023-09-11 PROCEDURE — 99999 PR PBB SHADOW E&M-EST. PATIENT-LVL I: CPT | Mod: PBBFAC,,, | Performed by: AUDIOLOGIST-HEARING AID FITTER

## 2023-09-11 PROCEDURE — 92557 COMPREHENSIVE HEARING TEST: CPT | Mod: S$GLB,,, | Performed by: AUDIOLOGIST-HEARING AID FITTER

## 2023-09-11 PROCEDURE — 99999 PR PBB SHADOW E&M-EST. PATIENT-LVL I: ICD-10-PCS | Mod: PBBFAC,,, | Performed by: AUDIOLOGIST-HEARING AID FITTER

## 2023-09-11 NOTE — Clinical Note
Hearing testing completed. Results reveal a bilateral normal-to-moderate HF sensorineural hearing loss.    Speech Reception Thresholds were  10 dBHL for the right ear and 10 dBHL for the left ear.    Word recognition scores were excellent for the right ear and excellent for the left ear.   Tympanograms were completed on 8/31/23. Rec a trial of binaural amplification pending medical clearance and repeat hearing testing in one year. Pt would like you to call her to discuss Tx options for the ear popping.

## 2023-09-11 NOTE — PROGRESS NOTES
Mary Ann Vidales was seen 09/11/2023 for an audiological evaluation. Pt was alone during today's visit. Pertinent complaints today include hearing loss and ear popping. Pt denies history of loud noise exposure and denies early onset of genetic family history of hearing loss. Otoscopy revealed no cerumen in both ears. The tympanic membrane was visualized AU prior to proceeding with the hearing testing.     Results reveal a bilateral normal-to-moderate HF sensorineural hearing loss.    Speech Reception Thresholds were  10 dBHL for the right ear and 10 dBHL for the left ear.    Word recognition scores were excellent for the right ear and excellent for the left ear.   Tympanograms were completed on 8/31/23.    Audiogram results were reviewed in detail with patient and all questions were answered. Results will be reviewed by the referring provider at the completion of this note. All complaints were addressed during this visit to the patient's satisfaction. Rec a trial of binaural amplification pending medical clearance, repeat hearing testing in one year and bilateral hearing protection with either muffs or in-ear protection in loud noises.  Plan of care was discussed in detail with the patient, who agreed with the plan as above.

## 2023-09-13 ENCOUNTER — PATIENT MESSAGE (OUTPATIENT)
Dept: AUDIOLOGY | Facility: CLINIC | Age: 58
End: 2023-09-13
Payer: COMMERCIAL

## 2023-09-16 DIAGNOSIS — G89.29 CHRONIC BILATERAL THORACIC BACK PAIN: ICD-10-CM

## 2023-09-16 DIAGNOSIS — M54.6 CHRONIC BILATERAL THORACIC BACK PAIN: ICD-10-CM

## 2023-09-16 DIAGNOSIS — M54.50 CHRONIC LEFT-SIDED LOW BACK PAIN, UNSPECIFIED WHETHER SCIATICA PRESENT: ICD-10-CM

## 2023-09-16 DIAGNOSIS — G89.29 CHRONIC LEFT-SIDED LOW BACK PAIN, UNSPECIFIED WHETHER SCIATICA PRESENT: ICD-10-CM

## 2023-09-19 ENCOUNTER — PATIENT OUTREACH (OUTPATIENT)
Dept: ADMINISTRATIVE | Facility: HOSPITAL | Age: 58
End: 2023-09-19
Payer: COMMERCIAL

## 2023-09-19 RX ORDER — TIZANIDINE 4 MG/1
4 TABLET ORAL NIGHTLY PRN
Qty: 40 TABLET | Refills: 0 | Status: SHIPPED | OUTPATIENT
Start: 2023-09-19 | End: 2023-10-23

## 2023-10-06 ENCOUNTER — TELEPHONE (OUTPATIENT)
Dept: INTERNAL MEDICINE | Facility: CLINIC | Age: 58
End: 2023-10-06
Payer: COMMERCIAL

## 2023-10-06 NOTE — TELEPHONE ENCOUNTER
Told her if we cancel Monday, won't be till late dec or January till next opening.    We are keeping appt. Lab appt mvoed to Monday since she missed.

## 2023-10-06 NOTE — TELEPHONE ENCOUNTER
----- Message from Betsy Choudhary sent at 10/6/2023  8:29 AM CDT -----  Contact: [phone]  Caller is requesting an earlier appointment then we can schedule.  Caller is requesting a message be sent to the provider.  If this is for urgent care symptoms, did you offer other providers at this location, providers at other locations, or [facility]? (yes, no, n/a):n/a  If this is for the patients physical, did you offer to schedule next available and put on wait list, or to see NP or PA for their physical?  (yes, no, n/a):  yes  When is the next available appointment with their provider:  booked out   Reason for the appointment:  f/u with labs, lab orders arent in and pt wants to r/s  Patient preference of timeframe to be scheduled:  asap as early as possible  Would the patient like a call back, or a response through their [facility] portal?:   callback   Comments:

## 2023-10-09 ENCOUNTER — OFFICE VISIT (OUTPATIENT)
Dept: INTERNAL MEDICINE | Facility: CLINIC | Age: 58
End: 2023-10-09
Payer: COMMERCIAL

## 2023-10-09 ENCOUNTER — LAB VISIT (OUTPATIENT)
Dept: LAB | Facility: HOSPITAL | Age: 58
End: 2023-10-09
Attending: INTERNAL MEDICINE
Payer: COMMERCIAL

## 2023-10-09 ENCOUNTER — PATIENT MESSAGE (OUTPATIENT)
Dept: INTERNAL MEDICINE | Facility: CLINIC | Age: 58
End: 2023-10-09

## 2023-10-09 VITALS
RESPIRATION RATE: 19 BRPM | HEART RATE: 92 BPM | HEIGHT: 65 IN | SYSTOLIC BLOOD PRESSURE: 108 MMHG | BODY MASS INDEX: 36.73 KG/M2 | WEIGHT: 220.44 LBS | TEMPERATURE: 98 F | DIASTOLIC BLOOD PRESSURE: 62 MMHG | OXYGEN SATURATION: 98 %

## 2023-10-09 DIAGNOSIS — E11.42 TYPE 2 DIABETES MELLITUS WITH DIABETIC POLYNEUROPATHY, WITHOUT LONG-TERM CURRENT USE OF INSULIN: Primary | ICD-10-CM

## 2023-10-09 DIAGNOSIS — I10 ESSENTIAL HYPERTENSION: ICD-10-CM

## 2023-10-09 DIAGNOSIS — E78.5 DYSLIPIDEMIA: ICD-10-CM

## 2023-10-09 DIAGNOSIS — E11.42 TYPE 2 DIABETES MELLITUS WITH DIABETIC POLYNEUROPATHY, WITHOUT LONG-TERM CURRENT USE OF INSULIN: ICD-10-CM

## 2023-10-09 DIAGNOSIS — N18.4 CHRONIC KIDNEY DISEASE, STAGE 4 (SEVERE): ICD-10-CM

## 2023-10-09 PROCEDURE — 90471 IMMUNIZATION ADMIN: CPT | Mod: S$GLB,,, | Performed by: INTERNAL MEDICINE

## 2023-10-09 PROCEDURE — 99214 PR OFFICE/OUTPT VISIT, EST, LEVL IV, 30-39 MIN: ICD-10-PCS | Mod: 25,S$GLB,, | Performed by: INTERNAL MEDICINE

## 2023-10-09 PROCEDURE — 90686 FLU VACCINE (QUAD) GREATER THAN OR EQUAL TO 3YO PRESERVATIVE FREE IM: ICD-10-PCS | Mod: S$GLB,,, | Performed by: INTERNAL MEDICINE

## 2023-10-09 PROCEDURE — 80053 COMPREHEN METABOLIC PANEL: CPT | Performed by: INTERNAL MEDICINE

## 2023-10-09 PROCEDURE — 83036 HEMOGLOBIN GLYCOSYLATED A1C: CPT | Performed by: INTERNAL MEDICINE

## 2023-10-09 PROCEDURE — 99214 OFFICE O/P EST MOD 30 MIN: CPT | Mod: 25,S$GLB,, | Performed by: INTERNAL MEDICINE

## 2023-10-09 PROCEDURE — 36415 COLL VENOUS BLD VENIPUNCTURE: CPT | Mod: PO | Performed by: INTERNAL MEDICINE

## 2023-10-09 PROCEDURE — 90686 IIV4 VACC NO PRSV 0.5 ML IM: CPT | Mod: S$GLB,,, | Performed by: INTERNAL MEDICINE

## 2023-10-09 PROCEDURE — 90471 FLU VACCINE (QUAD) GREATER THAN OR EQUAL TO 3YO PRESERVATIVE FREE IM: ICD-10-PCS | Mod: S$GLB,,, | Performed by: INTERNAL MEDICINE

## 2023-10-09 PROCEDURE — 99999 PR PBB SHADOW E&M-EST. PATIENT-LVL V: ICD-10-PCS | Mod: PBBFAC,,, | Performed by: INTERNAL MEDICINE

## 2023-10-09 PROCEDURE — 99999 PR PBB SHADOW E&M-EST. PATIENT-LVL V: CPT | Mod: PBBFAC,,, | Performed by: INTERNAL MEDICINE

## 2023-10-09 RX ORDER — DEXTROAMPHETAMINE SACCHARATE, AMPHETAMINE ASPARTATE, DEXTROAMPHETAMINE SULFATE AND AMPHETAMINE SULFATE 7.5; 7.5; 7.5; 7.5 MG/1; MG/1; MG/1; MG/1
1 TABLET ORAL EVERY MORNING
COMMUNITY
Start: 2023-09-22

## 2023-10-09 RX ORDER — ATORVASTATIN CALCIUM 20 MG/1
20 TABLET, FILM COATED ORAL
COMMUNITY
Start: 2023-08-30

## 2023-10-09 NOTE — PROGRESS NOTES
Subjective:       Patient ID: Mary Ann Vidales is a 58 y.o. female.    Chief Complaint: Follow-up (4 mo)    HPI  The patient presents for follow-up of medical conditions which include type 2 diabetes mellitus, hypertension, diabetic neuropathy, and chronic kidney disease.  The patient also has dyslipidemia.  She feels her condition has been stable.  She still experiences numbness in both feet and shooting pains in the left great toe due to her diabetic neuropathy.  She remains on gabapentin therapy.  Blood sugar levels have been variable.  She is currently using 32 units of Toujeo insulin daily and Ozempic 1 mg subQ every 7 days.  No recent episodes of hypoglycemia have been noted.  She feels her vision is stable.  She does not monitor blood pressures regularly but she is tolerating her medication well without side effects.  She remains active but does not have a structured exercise regimen.    Review of Systems   Constitutional:  Negative for activity change, appetite change and unexpected weight change.   Eyes:  Negative for visual disturbance.   Respiratory:  Negative for shortness of breath.    Cardiovascular:  Negative for chest pain, palpitations and leg swelling.   Gastrointestinal:  Negative for abdominal pain, blood in stool and diarrhea.   Genitourinary:  Negative for dysuria, frequency, hematuria and urgency.   Neurological:  Positive for numbness. Negative for weakness and headaches.   Psychiatric/Behavioral:  Negative for sleep disturbance.             Physical Exam  Vitals and nursing note reviewed.   Constitutional:       General: She is not in acute distress.     Appearance: Normal appearance. She is well-developed.   HENT:      Head: Normocephalic and atraumatic.   Eyes:      General: No scleral icterus.     Extraocular Movements: Extraocular movements intact.      Conjunctiva/sclera: Conjunctivae normal.   Neck:      Thyroid: No thyromegaly.      Vascular: No JVD.   Cardiovascular:      Rate  and Rhythm: Normal rate and regular rhythm.      Heart sounds: Normal heart sounds. No murmur heard.     No friction rub. No gallop.   Pulmonary:      Effort: Pulmonary effort is normal. No respiratory distress.      Breath sounds: Normal breath sounds. No wheezing or rales.   Abdominal:      General: Bowel sounds are normal.      Palpations: Abdomen is soft. There is no mass.      Tenderness: There is no abdominal tenderness.   Musculoskeletal:         General: No tenderness. Normal range of motion.      Cervical back: Normal range of motion and neck supple.   Lymphadenopathy:      Cervical: No cervical adenopathy.   Skin:     General: Skin is warm and dry.      Findings: No rash.      Comments: No foot lesions are present.   Neurological:      Mental Status: She is alert and oriented to person, place, and time.      Cranial Nerves: No cranial nerve deficit.   Psychiatric:         Mood and Affect: Mood normal.         Behavior: Behavior normal.       Protective Sensation (w/ 10 gram monofilament):  Right: Decreased  Left: Decreased    Visual Inspection:  Onychomycosis -  Bilateral    Pedal Pulses:   Right: Present  Left: Present    Posterior Tibialis Pulses:   Right:Present  Left: Present      No visits with results within 3 Month(s) from this visit.   Latest known visit with results is:   Lab Visit on 06/01/2023   Component Date Value Ref Range Status    Sodium 06/01/2023 141  136 - 145 mmol/L Final    Potassium 06/01/2023 4.5  3.5 - 5.1 mmol/L Final    Chloride 06/01/2023 107  95 - 110 mmol/L Final    CO2 06/01/2023 25  23 - 29 mmol/L Final    Glucose 06/01/2023 102  70 - 110 mg/dL Final    BUN 06/01/2023 45 (H)  6 - 20 mg/dL Final    Creatinine 06/01/2023 2.2 (H)  0.5 - 1.4 mg/dL Final    Calcium 06/01/2023 9.5  8.7 - 10.5 mg/dL Final    Total Protein 06/01/2023 6.5  6.0 - 8.4 g/dL Final    Albumin 06/01/2023 3.6  3.5 - 5.2 g/dL Final    Total Bilirubin 06/01/2023 0.2  0.1 - 1.0 mg/dL Final    Comment: For  infants and newborns, interpretation of results should be based  on gestational age, weight and in agreement with clinical  observations.    Premature Infant recommended reference ranges:  Up to 24 hours.............<8.0 mg/dL  Up to 48 hours............<12.0 mg/dL  3-5 days..................<15.0 mg/dL  6-29 days.................<15.0 mg/dL      Alkaline Phosphatase 06/01/2023 107  55 - 135 U/L Final    AST 06/01/2023 16  10 - 40 U/L Final    ALT 06/01/2023 13  10 - 44 U/L Final    Anion Gap 06/01/2023 9  8 - 16 mmol/L Final    eGFR 06/01/2023 25.7 (A)  >60 mL/min/1.73 m^2 Final    Cholesterol 06/01/2023 164  120 - 199 mg/dL Final    Comment: The National Cholesterol Education Program (NCEP) has set the  following guidelines (reference ranges) for Cholesterol:  Optimal.....................<200 mg/dL  Borderline High.............200-239 mg/dL  High........................> or = 240 mg/dL      Triglycerides 06/01/2023 178 (H)  30 - 150 mg/dL Final    Comment: The National Cholesterol Education Program (NCEP) has set the  following guidelines (reference values) for triglycerides:  Normal......................<150 mg/dL  Borderline High.............150-199 mg/dL  High........................200-499 mg/dL      HDL 06/01/2023 36 (L)  40 - 75 mg/dL Final    Comment: The National Cholesterol Education Program (NCEP) has set the  following guidelines (reference values) for HDL Cholesterol:  Low...............<40 mg/dL  Optimal...........>60 mg/dL      LDL Cholesterol 06/01/2023 92.4  63.0 - 159.0 mg/dL Final    Comment: The National Cholesterol Education Program (NCEP) has set the  following guidelines (reference values) for LDL Cholesterol:  Optimal.......................<130 mg/dL  Borderline High...............130-159 mg/dL  High..........................160-189 mg/dL  Very High.....................>190 mg/dL      HDL/Cholesterol Ratio 06/01/2023 22.0  20.0 - 50.0 % Final    Total Cholesterol/HDL Ratio 06/01/2023 4.6   2.0 - 5.0 Final    Non-HDL Cholesterol 06/01/2023 128  mg/dL Final    Comment: Risk category and Non-HDL cholesterol goals:  Coronary heart disease (CHD)or equivalent (10-year risk of CHD >20%):  Non-HDL cholesterol goal     <130 mg/dL  Two or more CHD risk factors and 10-year risk of CHD <= 20%:  Non-HDL cholesterol goal     <160 mg/dL  0 to 1 CHD risk factor:  Non-HDL cholesterol goal     <190 mg/dL      WBC 06/01/2023 6.33  3.90 - 12.70 K/uL Final    RBC 06/01/2023 3.16 (L)  4.00 - 5.40 M/uL Final    Hemoglobin 06/01/2023 9.9 (L)  12.0 - 16.0 g/dL Final    Hematocrit 06/01/2023 31.2 (L)  37.0 - 48.5 % Final    MCV 06/01/2023 99 (H)  82 - 98 fL Final    MCH 06/01/2023 31.3 (H)  27.0 - 31.0 pg Final    MCHC 06/01/2023 31.7 (L)  32.0 - 36.0 g/dL Final    RDW 06/01/2023 14.0  11.5 - 14.5 % Final    Platelets 06/01/2023 124 (L)  150 - 450 K/uL Final    MPV 06/01/2023 9.7  9.2 - 12.9 fL Final    Immature Granulocytes 06/01/2023 0.3  0.0 - 0.5 % Final    Gran # (ANC) 06/01/2023 3.7  1.8 - 7.7 K/uL Final    Immature Grans (Abs) 06/01/2023 0.02  0.00 - 0.04 K/uL Final    Comment: Mild elevation in immature granulocytes is non specific and   can be seen in a variety of conditions including stress response,   acute inflammation, trauma and pregnancy. Correlation with other   laboratory and clinical findings is essential.      Lymph # 06/01/2023 1.9  1.0 - 4.8 K/uL Final    Mono # 06/01/2023 0.5  0.3 - 1.0 K/uL Final    Eos # 06/01/2023 0.2  0.0 - 0.5 K/uL Final    Baso # 06/01/2023 0.03  0.00 - 0.20 K/uL Final    nRBC 06/01/2023 0  0 /100 WBC Final    Gran % 06/01/2023 58.4  38.0 - 73.0 % Final    Lymph % 06/01/2023 30.0  18.0 - 48.0 % Final    Mono % 06/01/2023 7.3  4.0 - 15.0 % Final    Eosinophil % 06/01/2023 3.5  0.0 - 8.0 % Final    Basophil % 06/01/2023 0.5  0.0 - 1.9 % Final    Differential Method 06/01/2023 Automated   Final    Hemoglobin A1C 06/01/2023 5.7 (H)  4.0 - 5.6 % Final    Comment: ADA Screening  Guidelines:  5.7-6.4%  Consistent with prediabetes  >or=6.5%  Consistent with diabetes    High levels of fetal hemoglobin interfere with the HbA1C  assay. Heterozygous hemoglobin variants (HbS, HgC, etc)do  not significantly interfere with this assay.   However, presence of multiple variants may affect accuracy.      Estimated Avg Glucose 06/01/2023 117  68 - 131 mg/dL Final    Microalbumin, Urine 06/01/2023 110.0  ug/mL Final    Creatinine, Urine 06/01/2023 86.0  15.0 - 325.0 mg/dL Final    Microalb/Creat Ratio 06/01/2023 127.9 (H)  0.0 - 30.0 ug/mg Final    Glucose 06/01/2023 100  70 - 110 mg/dL Final    Sodium 06/01/2023 142  136 - 145 mmol/L Final    Potassium 06/01/2023 4.2  3.5 - 5.1 mmol/L Final    Chloride 06/01/2023 106  95 - 110 mmol/L Final    CO2 06/01/2023 24  23 - 29 mmol/L Final    BUN 06/01/2023 42 (H)  6 - 20 mg/dL Final    Calcium 06/01/2023 9.6  8.7 - 10.5 mg/dL Final    Creatinine 06/01/2023 2.0 (H)  0.5 - 1.4 mg/dL Final    Albumin 06/01/2023 3.4 (L)  3.5 - 5.2 g/dL Final    Phosphorus 06/01/2023 5.4 (H)  2.7 - 4.5 mg/dL Final    eGFR 06/01/2023 28.8 (A)  >60 mL/min/1.73 m^2 Final    Anion Gap 06/01/2023 12  8 - 16 mmol/L Final    PTH, Intact 06/01/2023 160.4 (H)  9.0 - 77.0 pg/mL Final    Protein, Urine Random 06/01/2023 31 (H)  0 - 15 mg/dL Final    Creatinine, Urine 06/01/2023 87.0  15.0 - 325.0 mg/dL Final    Prot/Creat Ratio, Urine 06/01/2023 0.36 (H)  0.00 - 0.20 Final       Assessment & Plan:      Mary Ann was seen today for follow-up.  Influenza vaccine will be administered today.  Current medications will be continued for management of diabetes, hypertension, and dyslipidemia.  Gabapentin will be continued for neuropathy management.  Lab results were drawn today and results are pending.  Fasting blood tests will be repeated in 4 months.    Diagnoses and all orders for this visit:    Type 2 diabetes mellitus with diabetic polyneuropathy, without long-term current use of insulin  -      Comprehensive Metabolic Panel; Future  -     Lipid Panel; Future  -     Hemoglobin A1C; Future    Essential hypertension  -     Comprehensive Metabolic Panel; Future  -     Lipid Panel; Future  -     Hemoglobin A1C; Future    Dyslipidemia  -     Comprehensive Metabolic Panel; Future  -     Lipid Panel; Future  -     Hemoglobin A1C; Future    Chronic kidney disease, stage 4 (severe)  -     Comprehensive Metabolic Panel; Future  -     Lipid Panel; Future  -     Hemoglobin A1C; Future    Other orders  -     Influenza - Quadrivalent (PF)         Follow up in about 4 months (around 2/9/2024).     Davon Kan MD

## 2023-10-10 ENCOUNTER — TELEPHONE (OUTPATIENT)
Dept: NEPHROLOGY | Facility: CLINIC | Age: 58
End: 2023-10-10
Payer: COMMERCIAL

## 2023-10-10 LAB
ALBUMIN SERPL BCP-MCNC: 3.9 G/DL (ref 3.5–5.2)
ALP SERPL-CCNC: 109 U/L (ref 55–135)
ALT SERPL W/O P-5'-P-CCNC: 21 U/L (ref 10–44)
ANION GAP SERPL CALC-SCNC: 12 MMOL/L (ref 8–16)
AST SERPL-CCNC: 22 U/L (ref 10–40)
BILIRUB SERPL-MCNC: 0.3 MG/DL (ref 0.1–1)
BUN SERPL-MCNC: 39 MG/DL (ref 6–20)
CALCIUM SERPL-MCNC: 9.7 MG/DL (ref 8.7–10.5)
CHLORIDE SERPL-SCNC: 106 MMOL/L (ref 95–110)
CO2 SERPL-SCNC: 26 MMOL/L (ref 23–29)
CREAT SERPL-MCNC: 1.9 MG/DL (ref 0.5–1.4)
EST. GFR  (NO RACE VARIABLE): 30.6 ML/MIN/1.73 M^2
ESTIMATED AVG GLUCOSE: 108 MG/DL (ref 68–131)
GLUCOSE SERPL-MCNC: 92 MG/DL (ref 70–110)
HBA1C MFR BLD: 5.4 % (ref 4–5.6)
POTASSIUM SERPL-SCNC: 4 MMOL/L (ref 3.5–5.1)
PROT SERPL-MCNC: 7.1 G/DL (ref 6–8.4)
SODIUM SERPL-SCNC: 144 MMOL/L (ref 136–145)

## 2023-10-10 NOTE — TELEPHONE ENCOUNTER
----- Message from Celestine Alonso sent at 10/10/2023  8:55 AM CDT -----  Type: Needs Medical Advice  Who Called:  pt  Best Call Back Number: [phone]  Additional Information: pt is calling the office to see if the appt she has on 10/23 can be made virtual instead. She is aware that she is due for an in person appt but would like the next one following this appt to be in person instead. Please call back to advise Thanks!

## 2023-10-10 NOTE — TELEPHONE ENCOUNTER
is okay with patient doing  one more vv but the next visit has to be in person. I am sending this message to Florecita for scheduling.

## 2023-10-11 NOTE — TELEPHONE ENCOUNTER
----- Message from Mony Sibley sent at 10/11/2023 12:09 PM CDT -----  Contact: Patient  Type:  Appointment Request    Caller is requesting a sooner appointment.  Caller declined first available appointment listed below.  Caller will not accept being placed on the waitlist and is requesting a message be sent to doctor.    Name of Caller:  Patient  When is the first available appointment?  N/A    Would the patient rather a call back or a response via [facility]?   Call back  Best Call Back Number:  [phone]    Additional Information:   States she would like to speak with someone in the office about her 10/23 appointment being changed to a virtual - please call to advise - thank you

## 2023-10-22 DIAGNOSIS — G89.29 CHRONIC BILATERAL THORACIC BACK PAIN: ICD-10-CM

## 2023-10-22 DIAGNOSIS — G89.29 CHRONIC LEFT-SIDED LOW BACK PAIN, UNSPECIFIED WHETHER SCIATICA PRESENT: ICD-10-CM

## 2023-10-22 DIAGNOSIS — M54.6 CHRONIC BILATERAL THORACIC BACK PAIN: ICD-10-CM

## 2023-10-22 DIAGNOSIS — M54.50 CHRONIC LEFT-SIDED LOW BACK PAIN, UNSPECIFIED WHETHER SCIATICA PRESENT: ICD-10-CM

## 2023-10-23 ENCOUNTER — OFFICE VISIT (OUTPATIENT)
Dept: NEPHROLOGY | Facility: CLINIC | Age: 58
End: 2023-10-23
Payer: COMMERCIAL

## 2023-10-23 DIAGNOSIS — N25.81 SECONDARY HYPERPARATHYROIDISM OF RENAL ORIGIN: ICD-10-CM

## 2023-10-23 DIAGNOSIS — N18.4 CKD (CHRONIC KIDNEY DISEASE) STAGE 4, GFR 15-29 ML/MIN: Primary | ICD-10-CM

## 2023-10-23 DIAGNOSIS — E66.01 MORBID OBESITY: ICD-10-CM

## 2023-10-23 DIAGNOSIS — I10 PRIMARY HYPERTENSION: ICD-10-CM

## 2023-10-23 DIAGNOSIS — N28.1 ACQUIRED CYST OF KIDNEY: ICD-10-CM

## 2023-10-23 DIAGNOSIS — E11.21 DIABETIC NEPHROPATHY ASSOCIATED WITH TYPE 2 DIABETES MELLITUS: ICD-10-CM

## 2023-10-23 PROCEDURE — 99214 PR OFFICE/OUTPT VISIT, EST, LEVL IV, 30-39 MIN: ICD-10-PCS | Mod: 95,,, | Performed by: INTERNAL MEDICINE

## 2023-10-23 PROCEDURE — 99214 OFFICE O/P EST MOD 30 MIN: CPT | Mod: 95,,, | Performed by: INTERNAL MEDICINE

## 2023-10-23 RX ORDER — TIZANIDINE 4 MG/1
4 TABLET ORAL NIGHTLY PRN
Qty: 30 TABLET | Refills: 0 | Status: SHIPPED | OUTPATIENT
Start: 2023-10-23 | End: 2023-11-20

## 2023-10-23 NOTE — PROGRESS NOTES
Subjective:       Patient ID: Mary Ann Vidales is a 58 y.o. White female who presents for return patient evaluation for chronic renal failure.      The patient location is:  Patient Home   The chief complaint leading to consultation is: ckd  Visit type: Virtual visit with synchronous audio and video  Total time spent with patient: 18 minutes  Each patient to whom he or she provides medical services by telemedicine is:  (1) informed of the relationship between the physician and patient and the respective role of any other health care provider with respect to management of the patient; and (2) notified that he or she may decline to receive medical services by telemedicine and may withdraw from such care at any time.       She did not tolerate lyrica.  She has no uremic or urinary symptoms and is in her usual state of health.  There have been no recent illnesses, hospitalizations or procedures.  She is still losing weight and is down to 220 pounds now down from 299 last year.  Her HgA1C is 5.4.  Her blood pressure is controlled at home.      Review of Systems   Constitutional:  Negative for appetite change, chills and fever.   HENT:  Negative for congestion.    Eyes:  Negative for visual disturbance.   Respiratory:  Negative for cough and shortness of breath.    Cardiovascular:  Negative for chest pain and leg swelling.   Gastrointestinal:  Negative for diarrhea, nausea and vomiting.   Genitourinary:  Negative for difficulty urinating, dysuria and hematuria.   Musculoskeletal:  Positive for arthralgias (knees) and back pain. Negative for myalgias.   Skin:  Negative for rash.   Neurological:  Positive for numbness (toes and feet). Negative for headaches.   Hematological:  Bruises/bleeds easily.   Psychiatric/Behavioral:  Negative for sleep disturbance.        The past medical, family and social histories were reviewed for this encounter.     [facility] 12/20/2013     Objective:      Physical Exam  Vitals reviewed.    Constitutional:       General: She is not in acute distress.     Appearance: She is well-developed.   HENT:      Head: Normocephalic and atraumatic.   Eyes:      General: No scleral icterus.  Pulmonary:      Effort: Pulmonary effort is normal.   Neurological:      Mental Status: She is alert and oriented to person, place, and time.   Psychiatric:         Mood and Affect: Mood normal.         Behavior: Behavior normal.         Assessment:       1. CKD (chronic kidney disease) stage 4, GFR 15-29 ml/min    2. Primary hypertension    3. Diabetic nephropathy associated with type 2 diabetes mellitus    4. Acquired cyst of kidney    5. Secondary hyperparathyroidism of renal origin    6. Morbid obesity       Lab Results   Component Value Date    CREATININE 1.9 (H) 10/09/2023    BUN 39 (H) 10/09/2023     10/09/2023    K 4.0 10/09/2023     10/09/2023    CO2 26 10/09/2023     Lab Results   Component Value Date    .4 (H) 06/01/2023    CALCIUM 9.7 10/09/2023    PHOS 5.4 (H) 06/01/2023     Lab Results   Component Value Date    HCT 31.2 (L) 06/01/2023     Prot/Creat Ratio, Urine   Date Value Ref Range Status   06/01/2023 0.36 (H) 0.00 - 0.20 Final   08/10/2021 0.15 0.00 - 0.20 Final   08/10/2021 0.15 0.00 - 0.20 Final     Plan:   Return to clinic in 3 months.  Labs for next visit include rp, upc, pth per standing orders .  OHD next time.  Baseline creatinine is 1.0 since 2005.  She has then been 1.6-1.8 since 2015.  Since 2018 she has been 1.8-2.2.  PTH is 160 with a calcium of 9.5.  D3 2000 units daily.  UPC is 0.36 on an ARB.  I do not know why she went from a creatinine of 1.0 in 2015 to 1.5 in 2017 and 1.8 in 2018 with bland urine sediment and a negative renal US.  There appears to be no other precipitants although AIN is also a possibility.  Her urine sediment appears bland thus I think it is less likely that she has an acute GN.  She does not wish to have a biopsy at this time and I am not pushing for  one.  She has a gap from 10/15-7/17 where she seemed to bump her baseline.  Renal US showed R 11.1 cm, L 10.6 cm.  Please avoid or minimize all NSAIDS (ibuprofen, motrin, aleve, indocin, naprosyn) to minimize the risk to your kidneys.  Aspirin in a dose of 325 mg or less a day is likely the safest with regards to kidney function.  If you are able to take aspirin and do not have any bleeding problems or ulcers, this may be your best therapy.  Alternatively, acetaminophen (Tylenol) is the safer than NSAIDs with regards to kidney function.  I would ask you take this as directed on the bottle.  It is best to stay under 2 grams a day (4-500 mg tablets a day maximum).  I asked her to stay at a max of 600 mg of gabapentin a day if possible and discussed side effects.  Blood pressure is controlled on the current regimen.  The effects of diabetes as it relates to kidney function was discussed.  As for the control of your blood sugar, please follow up with your PCP or Endocrinology.

## 2023-11-08 DIAGNOSIS — E11.9 TYPE 2 DIABETES MELLITUS WITHOUT COMPLICATION, WITH LONG-TERM CURRENT USE OF INSULIN: ICD-10-CM

## 2023-11-08 DIAGNOSIS — Z79.4 TYPE 2 DIABETES MELLITUS WITHOUT COMPLICATION, WITH LONG-TERM CURRENT USE OF INSULIN: ICD-10-CM

## 2023-11-09 RX ORDER — GABAPENTIN 300 MG/1
300 CAPSULE ORAL 3 TIMES DAILY
Qty: 90 CAPSULE | Refills: 3 | Status: SHIPPED | OUTPATIENT
Start: 2023-11-09 | End: 2024-03-21

## 2023-11-17 DIAGNOSIS — K52.9 GE (GASTROENTERITIS): ICD-10-CM

## 2023-11-17 NOTE — TELEPHONE ENCOUNTER
No care due was identified.  [facility] Embedded Care Due Messages. Reference number: [id number].   11/17/2023 7:16:07 AM CST

## 2023-11-17 NOTE — TELEPHONE ENCOUNTER
Refill Routing Note   Medication(s) are not appropriate for processing by [facility] for the following reason(s):        Outside of protocol: ondansetron    [facility] action(s):  Route               Appointments  past 12m or future 3m with PCP    Date Provider   Last Visit   10/4/2021 Louis Bah MD   Next Visit   Visit date not found Louis Bah MD   ED visits in past 90 days: 0        Note composed:5:28 PM 11/17/2023

## 2023-11-19 DIAGNOSIS — M54.6 CHRONIC BILATERAL THORACIC BACK PAIN: ICD-10-CM

## 2023-11-19 DIAGNOSIS — G89.29 CHRONIC BILATERAL THORACIC BACK PAIN: ICD-10-CM

## 2023-11-19 DIAGNOSIS — M54.50 CHRONIC LEFT-SIDED LOW BACK PAIN, UNSPECIFIED WHETHER SCIATICA PRESENT: ICD-10-CM

## 2023-11-19 DIAGNOSIS — G89.29 CHRONIC LEFT-SIDED LOW BACK PAIN, UNSPECIFIED WHETHER SCIATICA PRESENT: ICD-10-CM

## 2023-11-20 RX ORDER — TIZANIDINE 4 MG/1
4 TABLET ORAL EVERY 12 HOURS PRN
Qty: 40 TABLET | Refills: 0 | Status: SHIPPED | OUTPATIENT
Start: 2023-11-20 | End: 2023-12-11

## 2023-11-21 RX ORDER — ONDANSETRON 4 MG/1
4 TABLET, FILM COATED ORAL EVERY 8 HOURS PRN
Qty: 30 TABLET | Refills: 3 | Status: SHIPPED | OUTPATIENT
Start: 2023-11-21

## 2023-11-27 RX ORDER — ALLOPURINOL 100 MG/1
200 TABLET ORAL
Qty: 180 TABLET | Refills: 1 | Status: SHIPPED | OUTPATIENT
Start: 2023-11-27

## 2023-12-06 DIAGNOSIS — E11.42 TYPE 2 DIABETES MELLITUS WITH DIABETIC POLYNEUROPATHY, WITHOUT LONG-TERM CURRENT USE OF INSULIN: ICD-10-CM

## 2023-12-06 DIAGNOSIS — S86.011S RUPTURE OF RIGHT ACHILLES TENDON, SEQUELA: ICD-10-CM

## 2023-12-07 RX ORDER — HYDROCODONE BITARTRATE AND ACETAMINOPHEN 7.5; 325 MG/1; MG/1
1 TABLET ORAL EVERY 6 HOURS PRN
Qty: 90 TABLET | Refills: 0 | Status: SHIPPED | OUTPATIENT
Start: 2023-12-07 | End: 2024-03-01 | Stop reason: SDUPTHER

## 2023-12-07 NOTE — TELEPHONE ENCOUNTER
No care due was identified.  [facility] Embedded Care Due Messages. Reference number: [id number].   12/06/2023 9:53:02 PM CST

## 2023-12-22 ENCOUNTER — PATIENT OUTREACH (OUTPATIENT)
Dept: ADMINISTRATIVE | Facility: HOSPITAL | Age: 58
End: 2023-12-22
Payer: COMMERCIAL

## 2023-12-22 ENCOUNTER — PATIENT MESSAGE (OUTPATIENT)
Dept: ADMINISTRATIVE | Facility: HOSPITAL | Age: 58
End: 2023-12-22
Payer: COMMERCIAL

## 2023-12-22 NOTE — PROGRESS NOTES
Population Health Chart Review & Patient Outreach Details      Further Action Needed If Patient Returns Outreach:        Health Maintenance Due   Topic Date Due    Hepatitis C Screening  Never done    HIV Screening  Never done    Eye Exam  2021    COVID-19 Vaccine ( season) 2023         Updates Requested / Reviewed:     [x]  Care Everywhere    []     []  External Sources ([facility], [facility], [facility], etc.)    [] [facility]   [] [facility]   [] Other:    []  Care Team Updated   []  Removed  or Duplicate Orders   [x]  Immunization Reconciliation Completed / Queried    [] [place]   [] [place]   [] [place]   [] [place]      Health Maintenance Topics Addressed and Outreach Outcomes / Actions Taken:             Breast Cancer Screening []  Mammogram Order Placed    []  Mammogram Screening Scheduled    []  External Records Requested & Care Team Updated if Applicable    []  External Records Uploaded & Care Team Updated if Applicable    []  Pt Declined Scheduling Mammogram    []  Pt Will Schedule with External Provider / Order Routed & Care Team Updated if Applicable              Cervical Cancer Screening []  Pap Smear Scheduled in Primary Care or OBGYN    []  External Records Requested & Care Team Updated if Applicable       []  External Records Uploaded, Care Team Updated, & History Updated if Applicable    []  Patient Declined Scheduling Pap Smear    []  Patient Will Schedule with External Provider & Care Team Updated if Applicable                  Colorectal Cancer Screening []  Colonoscopy Case Request / Referral / Home Test Order Placed    []  External Records Requested & Care Team Updated if Applicable    []  External Records Uploaded, Care Team Updated, & History Updated if Applicable    []  Patient Declined Completing Colon Cancer Screening    []  Patient Will Schedule with External Provider & Care Team Updated if Applicable    []  Fit Kit Mailed (add the SmartPhrase under additional  notes)    []  Reminded Patient to Complete Home Test                Diabetic Eye Exam []  Eye Exam Screening Order Placed    []  Eye Camera Scheduled or Optometry/Ophthalmology Referral Placed    []  External Records Requested & Care Team Updated if Applicable    []  External Records Uploaded, Care Team Updated, & History Updated if Applicable    []  Patient Declined Scheduling Eye Exam    [x]  Patient Will Schedule with External Provider & Care Team Updated if Applicable             Blood Pressure Control []  Primary Care Follow Up Visit Scheduled     []  Remote Blood Pressure Reading Captured    []  Patient Declined Remote Reading or Scheduling Appt - Escalated to PCP    []  Patient Will Call Back or Send Portal Message with Reading                 HbA1c & Other Labs []  Overdue Lab(s) Ordered    []  Overdue Lab(s) Scheduled    []  External Records Uploaded & Care Team Updated if Applicable    []  Primary Care Follow Up Visit Scheduled     []  Reminded Patient to Complete A1c Home Test    []  Patient Declined Scheduling Labs or Will Call Back to Schedule    []  Patient Will Schedule with External Provider / Order Routed, & Care Team Updated if Applicable           Primary Care Appointment []  Primary Care Appt Scheduled    []  Patient Declined Scheduling or Will Call Back to Schedule    []  Pt Established with External Provider, Updated Care Team, & Informed Pt to Notify Payor if Applicable           Medication Adherence /    Statin Use []  Primary Care Appointment Scheduled    []  Patient Reminded to  Prescription    []  Patient Declined, Provider Notified if Needed    []  Sent Provider Message to Review to Evaluate Pt for Statin, Add Exclusion Dx Codes, Document   Exclusion in Problem List, Change Statin Intensity Level to Moderate or High Intensity if Applicable                Osteoporosis Screening []  Dexa Order Placed    []  Dexa Appointment Scheduled    []  External Records Requested & Care Team  Updated    []  External Records Uploaded, Care Team Updated, & History Updated if Applicable    []  Patient Declined Scheduling Dexa or Will Call Back to Schedule    []  Patient Will Schedule with External Provider / Order Routed & Care Team Updated if Applicable       Additional Notes:

## 2024-01-03 ENCOUNTER — PATIENT MESSAGE (OUTPATIENT)
Dept: INTERNAL MEDICINE | Facility: CLINIC | Age: 59
End: 2024-01-03
Payer: COMMERCIAL

## 2024-01-23 ENCOUNTER — PATIENT MESSAGE (OUTPATIENT)
Dept: INTERNAL MEDICINE | Facility: CLINIC | Age: 59
End: 2024-01-23
Payer: COMMERCIAL

## 2024-01-23 NOTE — TELEPHONE ENCOUNTER
I called patient as she requested in msg.    She says she is always cold.  Hands and feet cold.  Noticed this in last 6 mos.     She is wondering if low platelets have to do with it??    Card did labs, not with [facility].  Now 129 platelets.     Aic 5. 6       Cmp, lipid, aic planned before February appt.    Any suggestions or additional labs in february?

## 2024-02-12 RX ORDER — INSULIN GLARGINE 300 U/ML
INJECTION, SOLUTION SUBCUTANEOUS
Qty: 2 PEN | Refills: 6 | Status: SHIPPED | OUTPATIENT
Start: 2024-02-12

## 2024-02-16 ENCOUNTER — TELEPHONE (OUTPATIENT)
Dept: INTERNAL MEDICINE | Facility: CLINIC | Age: 59
End: 2024-02-16
Payer: COMMERCIAL

## 2024-02-16 NOTE — TELEPHONE ENCOUNTER
She missed lab appt.  I called her to reschedule.    She Has external labs, cmp, aic  , will bring copy at upcoming visit.

## 2024-02-17 RX ORDER — FLUOXETINE HYDROCHLORIDE 40 MG/1
CAPSULE ORAL
Qty: 90 CAPSULE | Refills: 2 | Status: SHIPPED | OUTPATIENT
Start: 2024-02-17

## 2024-02-17 NOTE — TELEPHONE ENCOUNTER
Refill Decision Note   Mary Ann Vidales  is requesting a refill authorization.  Brief Assessment and Rationale for Refill:  Approve     Medication Therapy Plan:       Medication Reconciliation Completed: No   Comments:     No Care Gaps recommended.     Note composed:3:52 PM 02/17/2024

## 2024-02-17 NOTE — TELEPHONE ENCOUNTER
No care due was identified.  [facility] Embedded Care Due Messages. Reference number: [id number].   2/17/2024 9:56:50 AM CST

## 2024-02-20 ENCOUNTER — OFFICE VISIT (OUTPATIENT)
Dept: INTERNAL MEDICINE | Facility: CLINIC | Age: 59
End: 2024-02-20
Payer: COMMERCIAL

## 2024-02-20 VITALS
DIASTOLIC BLOOD PRESSURE: 80 MMHG | TEMPERATURE: 97 F | HEART RATE: 68 BPM | SYSTOLIC BLOOD PRESSURE: 118 MMHG | BODY MASS INDEX: 37.22 KG/M2 | HEIGHT: 64 IN | WEIGHT: 218 LBS | RESPIRATION RATE: 16 BRPM

## 2024-02-20 DIAGNOSIS — I10 ESSENTIAL HYPERTENSION: ICD-10-CM

## 2024-02-20 DIAGNOSIS — L84 CALLUS OF TOE: ICD-10-CM

## 2024-02-20 DIAGNOSIS — E11.42 TYPE 2 DIABETES MELLITUS WITH DIABETIC POLYNEUROPATHY, WITHOUT LONG-TERM CURRENT USE OF INSULIN: Primary | ICD-10-CM

## 2024-02-20 DIAGNOSIS — N18.4 CHRONIC KIDNEY DISEASE, STAGE 4 (SEVERE): ICD-10-CM

## 2024-02-20 DIAGNOSIS — E79.0 HYPERURICEMIA: ICD-10-CM

## 2024-02-20 DIAGNOSIS — E78.5 DYSLIPIDEMIA: ICD-10-CM

## 2024-02-20 PROCEDURE — 99999 PR PBB SHADOW E&M-EST. PATIENT-LVL V: CPT | Mod: PBBFAC,,, | Performed by: INTERNAL MEDICINE

## 2024-02-20 PROCEDURE — 99214 OFFICE O/P EST MOD 30 MIN: CPT | Mod: S$GLB,,, | Performed by: INTERNAL MEDICINE

## 2024-02-20 RX ORDER — EZETIMIBE 10 MG/1
10 TABLET ORAL DAILY
COMMUNITY
Start: 2024-02-01

## 2024-02-28 ENCOUNTER — PATIENT MESSAGE (OUTPATIENT)
Dept: ENDOCRINOLOGY | Facility: CLINIC | Age: 59
End: 2024-02-28
Payer: COMMERCIAL

## 2024-02-28 DIAGNOSIS — Z79.4 TYPE 2 DIABETES MELLITUS WITHOUT COMPLICATION, WITH LONG-TERM CURRENT USE OF INSULIN: Primary | ICD-10-CM

## 2024-02-28 DIAGNOSIS — E11.9 TYPE 2 DIABETES MELLITUS WITHOUT COMPLICATION, WITH LONG-TERM CURRENT USE OF INSULIN: Primary | ICD-10-CM

## 2024-02-28 RX ORDER — ALPRAZOLAM 0.5 MG/1
0.5 TABLET ORAL
Qty: 30 TABLET | Refills: 2 | Status: SHIPPED | OUTPATIENT
Start: 2024-02-28 | End: 2024-05-31

## 2024-02-28 RX ORDER — PEN NEEDLE, DIABETIC 30 GX3/16"
NEEDLE, DISPOSABLE MISCELLANEOUS
COMMUNITY
Start: 2024-02-28 | End: 2024-02-28 | Stop reason: SDUPTHER

## 2024-02-28 RX ORDER — PEN NEEDLE, DIABETIC 30 GX3/16"
NEEDLE, DISPOSABLE MISCELLANEOUS
Qty: 150 EACH | Refills: 6 | Status: SHIPPED | OUTPATIENT
Start: 2024-02-28

## 2024-02-28 NOTE — TELEPHONE ENCOUNTER
No care due was identified.  [facility] Embedded Care Due Messages. Reference number: [id number].   2/28/2024 2:43:37 PM CST

## 2024-03-01 DIAGNOSIS — E11.42 TYPE 2 DIABETES MELLITUS WITH DIABETIC POLYNEUROPATHY, WITHOUT LONG-TERM CURRENT USE OF INSULIN: ICD-10-CM

## 2024-03-01 DIAGNOSIS — S86.011S RUPTURE OF RIGHT ACHILLES TENDON, SEQUELA: ICD-10-CM

## 2024-03-01 RX ORDER — HYDROCODONE BITARTRATE AND ACETAMINOPHEN 7.5; 325 MG/1; MG/1
1 TABLET ORAL EVERY 6 HOURS PRN
Qty: 90 TABLET | Refills: 0 | Status: SHIPPED | OUTPATIENT
Start: 2024-03-01 | End: 2024-06-03 | Stop reason: SDUPTHER

## 2024-03-01 NOTE — TELEPHONE ENCOUNTER
No care due was identified.  [facility] Embedded Care Due Messages. Reference number: [id number].   3/01/2024 10:15:48 AM CST

## 2024-03-04 ENCOUNTER — PATIENT MESSAGE (OUTPATIENT)
Dept: NEPHROLOGY | Facility: CLINIC | Age: 59
End: 2024-03-04
Payer: COMMERCIAL

## 2024-03-04 ENCOUNTER — PATIENT MESSAGE (OUTPATIENT)
Dept: INTERNAL MEDICINE | Facility: CLINIC | Age: 59
End: 2024-03-04
Payer: COMMERCIAL

## 2024-03-04 DIAGNOSIS — M79.672 LEFT FOOT PAIN: Primary | ICD-10-CM

## 2024-03-04 NOTE — TELEPHONE ENCOUNTER
"    She is having "shocks" like electrical shocks tip of L hallux that is painful and comes at any time of the day or night.      That is keeping her awake and shoots  from the middle of the toe to the toenail randomly.     She talked with Dr Kan about it at her recent visit and he suggested it was likely neuropathy.     She feels like it is not her normal neuropathy.  She takes gabapentin (more often recently) and 2 allopurinol  each am with no results.  She takes Norco  just when she needs it.  Tried it and it only dulled the pain and she feels like pain medication is not the answer.  Sometimes shaking or flexion helps for a minute.  She is having difficulty coping with this pain and is desperate for something to help.  She even mentioned a nerve block.     Scheduled virtual visit with Dr Basurto for March 25, but will message them to see if they can help in the interim.     Virtual visit with Dr Peralta tomorrow who manages her allopurinol.  She wonders if it could be gout.     [facility] in [facility] uplaoded 01.23.24.        "

## 2024-03-04 NOTE — TELEPHONE ENCOUNTER
I called her.  She will see nephrology tomorrow to see   If he can help.    Toe is really hurt. Not swollen or red.     I told her urgent care if need

## 2024-03-05 ENCOUNTER — OFFICE VISIT (OUTPATIENT)
Dept: NEPHROLOGY | Facility: CLINIC | Age: 59
End: 2024-03-05
Payer: COMMERCIAL

## 2024-03-05 DIAGNOSIS — E66.01 MORBID OBESITY: ICD-10-CM

## 2024-03-05 DIAGNOSIS — N28.1 ACQUIRED CYST OF KIDNEY: ICD-10-CM

## 2024-03-05 DIAGNOSIS — E11.21 DIABETIC NEPHROPATHY ASSOCIATED WITH TYPE 2 DIABETES MELLITUS: ICD-10-CM

## 2024-03-05 DIAGNOSIS — N25.81 SECONDARY HYPERPARATHYROIDISM OF RENAL ORIGIN: ICD-10-CM

## 2024-03-05 DIAGNOSIS — I10 PRIMARY HYPERTENSION: ICD-10-CM

## 2024-03-05 DIAGNOSIS — N18.4 CKD (CHRONIC KIDNEY DISEASE) STAGE 4, GFR 15-29 ML/MIN: Primary | ICD-10-CM

## 2024-03-05 PROCEDURE — 99214 OFFICE O/P EST MOD 30 MIN: CPT | Mod: 95,,, | Performed by: INTERNAL MEDICINE

## 2024-03-05 NOTE — PROGRESS NOTES
Subjective:       Patient ID: Mary Ann Vidales is a 59 y.o. White female who presents for return patient evaluation for chronic renal failure.      The patient location is:  Patient Home   The chief complaint leading to consultation is: ckd  Visit type: Virtual visit with synchronous audio and video  Total time spent with patient: 18 minutes  Each patient to whom he or she provides medical services by telemedicine is:  (1) informed of the relationship between the physician and patient and the respective role of any other health care provider with respect to management of the patient; and (2) notified that he or she may decline to receive medical services by telemedicine and may withdraw from such care at any time.       She did not tolerate lyrica.  She has no uremic or urinary symptoms and is in her usual state of health.  There have been no recent illnesses, hospitalizations or procedures.  She is down to 220 pounds from 299 last year.  Her HgA1C is 5.4.  Her blood pressure is controlled at home.  She is having neuropathy pain in her left great toe in the distal phalanx for 6 months which has been causing her difficulty walking.      Review of Systems   Constitutional:  Negative for appetite change, chills and fever.   HENT:  Negative for congestion.    Eyes:  Negative for visual disturbance.   Respiratory:  Negative for cough and shortness of breath.    Cardiovascular:  Negative for chest pain and leg swelling.   Gastrointestinal:  Negative for diarrhea, nausea and vomiting.   Genitourinary:  Negative for difficulty urinating, dysuria and hematuria.   Musculoskeletal:  Positive for arthralgias (knees) and back pain. Negative for myalgias.   Skin:  Negative for rash.   Neurological:  Positive for numbness (toes and feet). Negative for headaches.   Hematological:  Bruises/bleeds easily.   Psychiatric/Behavioral:  Negative for sleep disturbance.        The past medical, family and social histories were  reviewed for this encounter.     [facility] 12/20/2013     Objective:      Physical Exam  Vitals reviewed.   Constitutional:       General: She is not in acute distress.     Appearance: She is well-developed.   HENT:      Head: Normocephalic and atraumatic.   Eyes:      General: No scleral icterus.  Pulmonary:      Effort: Pulmonary effort is normal.   Neurological:      Mental Status: She is alert and oriented to person, place, and time.   Psychiatric:         Mood and Affect: Mood normal.         Behavior: Behavior normal.         Assessment:       1. CKD (chronic kidney disease) stage 4, GFR 15-29 ml/min    2. Primary hypertension    3. Diabetic nephropathy associated with type 2 diabetes mellitus    4. Acquired cyst of kidney    5. Secondary hyperparathyroidism of renal origin    6. Morbid obesity       Lab Results   Component Value Date    CREATININE 1.9 (H) 10/09/2023    BUN 39 (H) 10/09/2023     10/09/2023    K 4.0 10/09/2023     10/09/2023    CO2 26 10/09/2023     Lab Results   Component Value Date    .4 (H) 06/01/2023    CALCIUM 9.7 10/09/2023    PHOS 5.4 (H) 06/01/2023     Lab Results   Component Value Date    HCT 31.2 (L) 06/01/2023     Prot/Creat Ratio, Urine   Date Value Ref Range Status   06/01/2023 0.36 (H) 0.00 - 0.20 Final   08/10/2021 0.15 0.00 - 0.20 Final   08/10/2021 0.15 0.00 - 0.20 Final     Plan:   Return to clinic in 3 months.  Labs for next visit include rp, upc, pth per standing orders .  OHD next time.  Baseline creatinine is 1.0 since 2005.  She has then been 1.6-1.8 since 2015.  Since 2018 she has been 1.8-2.2.  PTH is 160 with a calcium of 9.7.  D3 2000 units daily.  UPC is 0.36 on an ARB.  I do not know why she went from a creatinine of 1.0 in 2015 to 1.5 in 2017 and 1.8 in 2018 with bland urine sediment and a negative renal US.  There appears to be no other precipitants although AIN is also a possibility.  Her urine sediment appears bland thus I think it is less likely  that she has an acute GN.  She does not wish to have a biopsy at this time and I am not pushing for one.  She has a gap from 10/15-7/17 where she seemed to bump her baseline.  Renal US showed R 11.1 cm, L 10.6 cm.  Please avoid or minimize all NSAIDS (ibuprofen, motrin, aleve, indocin, naprosyn) to minimize the risk to your kidneys.  Aspirin in a dose of 325 mg or less a day is likely the safest with regards to kidney function.  If you are able to take aspirin and do not have any bleeding problems or ulcers, this may be your best therapy.  Alternatively, acetaminophen (Tylenol) is the safer than NSAIDs with regards to kidney function.  I would ask you take this as directed on the bottle.  It is best to stay under 2 grams a day (4-500 mg tablets a day maximum).  I asked her to stay at a max of 600 mg of gabapentin a day if possible and discussed side effects.  Blood pressure is controlled on the current regimen.  The effects of diabetes as it relates to kidney function was discussed.  As for the control of your blood sugar, please follow up with your PCP or Endocrinology.  She is having chronic pain in her L great toe which does not sound like neuropathy.  I have asked for Podiatry to move up her visit since she is having so much trouble.

## 2024-03-05 NOTE — Clinical Note
Hello.  Is there any way her appointment can be moved up to this week?  She has been having pain in her left great toe which has been unrelieved.  Thanks!

## 2024-03-06 ENCOUNTER — TELEPHONE (OUTPATIENT)
Dept: PODIATRY | Facility: CLINIC | Age: 59
End: 2024-03-06
Payer: COMMERCIAL

## 2024-03-06 ENCOUNTER — TELEPHONE (OUTPATIENT)
Dept: NEPHROLOGY | Facility: CLINIC | Age: 59
End: 2024-03-06
Payer: COMMERCIAL

## 2024-03-06 NOTE — TELEPHONE ENCOUNTER
The patient is complaining of worsening left great toe pain.  She has had chronic pain involving the foot previously felt to be neuropathic in nature.  She has not been obtaining relief with gabapentin or even the Norco.    An x-ray of the left foot with attention to the great toe will be ordered.  Measurement of the patient's uric acid level will be obtained in addition to a CBC and also sed rate and CRP which are inflammatory markers.    Follow-up consultation with Podiatry is also recommended.

## 2024-03-09 ENCOUNTER — HOSPITAL ENCOUNTER (OUTPATIENT)
Dept: RADIOLOGY | Facility: HOSPITAL | Age: 59
Discharge: HOME | End: 2024-03-09
Attending: INTERNAL MEDICINE
Payer: COMMERCIAL

## 2024-03-09 DIAGNOSIS — M79.672 LEFT FOOT PAIN: ICD-10-CM

## 2024-03-09 PROCEDURE — 73660 X-RAY EXAM OF TOE(S): CPT | Mod: 26,LT,, | Performed by: RADIOLOGY

## 2024-03-09 PROCEDURE — 73660 X-RAY EXAM OF TOE(S): CPT | Mod: TC,FY,PO,LT

## 2024-03-11 NOTE — PROGRESS NOTES
Subjective:       Patient ID: Mary Ann Vidales is a 59 y.o. female.    Chief Complaint: Diabetes (4-5 mos w/labs)    HPI  The patient presents for follow-up of medical conditions which include type 2 diabetes mellitus, diabetic neuropathy, obstructive sleep apnea, hypertension and chronic kidney disease.  The patient reports she is doing well overall.  Blood sugar levels have been improving.  She is currently using Toujeo insulin 32 units daily.  Last eye examination was 6 months ago a new prescription for her glasses was given at that time.    She does not monitor blood pressures but he is tolerating her medication without side effects.    She is on Adderall which helps with her daytime sleepiness.  She has been on Adderall for 9 months and has not experienced any side effects.  Narcolepsy has been suspected by Neurology specialist..  She has obstructive sleep apnea and uses her CPAP every night.    The patient has peripheral neuropathy.  He is currently on gabapentin at a dose of 600-900 mg daily.      Patient's mood has been stable no anxiety symptoms have been noted.    Review of Systems   Constitutional:  Negative for activity change, appetite change and unexpected weight change.   Eyes:  Negative for visual disturbance.   Respiratory:  Negative for shortness of breath.    Cardiovascular:  Negative for chest pain, palpitations and leg swelling.   Gastrointestinal:  Negative for abdominal pain, blood in stool and diarrhea.   Genitourinary:  Negative for dysuria, frequency, hematuria and urgency.   Neurological:  Positive for numbness. Negative for weakness and headaches.   Psychiatric/Behavioral:  Positive for sleep disturbance. Negative for dysphoric mood and hallucinations. The patient is not nervous/anxious.             Physical Exam  Vitals and nursing note reviewed.   Constitutional:       General: She is not in acute distress.     Appearance: Normal appearance. She is well-developed.      Comments: The  patient has lost 2 lb since 10/09/2023.   HENT:      Head: Normocephalic and atraumatic.   Eyes:      General: No scleral icterus.     Extraocular Movements: Extraocular movements intact.      Conjunctiva/sclera: Conjunctivae normal.   Neck:      Thyroid: No thyromegaly.      Vascular: No JVD.   Cardiovascular:      Rate and Rhythm: Normal rate and regular rhythm.      Heart sounds: Normal heart sounds. No murmur heard.     No friction rub. No gallop.   Pulmonary:      Effort: Pulmonary effort is normal. No respiratory distress.      Breath sounds: Normal breath sounds. No wheezing or rales.   Abdominal:      General: Bowel sounds are normal.      Palpations: Abdomen is soft. There is no mass.      Tenderness: There is no abdominal tenderness.   Musculoskeletal:         General: No tenderness. Normal range of motion.      Cervical back: Normal range of motion and neck supple.   Lymphadenopathy:      Cervical: No cervical adenopathy.   Skin:     General: Skin is warm and dry.      Findings: No rash.      Comments: Foot:  2nd toe with pre-ulcerative callus at the tip.  Hammertoe deformity is present.   Neurological:      Mental Status: She is alert and oriented to person, place, and time.      Cranial Nerves: No cranial nerve deficit.      Comments: Sensory exam is intact in both feet on monofilament testing.   Psychiatric:         Mood and Affect: Mood normal.         Behavior: Behavior normal.       Protective Sensation (w/ 10 gram monofilament):  Right: diminished  Left: diminished    Visual Inspection:  Normal -  Bilateral    Pedal Pulses:   Right: Present  Left: Present    Posterior Tibialis Pulses:   Right:Present  Left: Present    [facility] test results from 01/19/2024:  Glucose 99, hemoglobin A1c 5.6%, BUN 44, creatinine 1.90, eGFR 29, sodium 146, potassium 3.9, total cholesterol 182, triglyceride 157, HDL 37, , VLDL cholesterol 28.    Assessment & Plan:      Mary Ann was seen today for diabetes.  Podiatry  consultation diabetic foot care will be requested.  Current therapy will be continued for treatment of diabetic neuropathy.  Current medications will be treatment diabetes and hypertension.    Lab studies will be repeated in 4 months.    Diagnoses and all orders for this visit:    Type 2 diabetes mellitus with diabetic polyneuropathy, without long-term current use of insulin  -     Ambulatory referral/consult to Podiatry; Future  -     Comprehensive Metabolic Panel; Future  -     Lipid Panel; Future  -     Hemoglobin A1C; Future  -     CBC Auto Differential; Future    Essential hypertension  -     Comprehensive Metabolic Panel; Future  -     Lipid Panel; Future  -     Hemoglobin A1C; Future  -     CBC Auto Differential; Future    Dyslipidemia  -     Comprehensive Metabolic Panel; Future  -     Lipid Panel; Future  -     Hemoglobin A1C; Future  -     CBC Auto Differential; Future    Hyperuricemia  -     Comprehensive Metabolic Panel; Future  -     Lipid Panel; Future  -     Hemoglobin A1C; Future  -     CBC Auto Differential; Future    Chronic kidney disease, stage 4 (severe)  -     Comprehensive Metabolic Panel; Future  -     Lipid Panel; Future  -     Hemoglobin A1C; Future  -     CBC Auto Differential; Future    Callus of toe  -     Ambulatory referral/consult to Podiatry; Future         Follow up in about 4 months (around 6/20/2024).     Davon Kan MD

## 2024-03-12 ENCOUNTER — OFFICE VISIT (OUTPATIENT)
Dept: PODIATRY | Facility: CLINIC | Age: 59
End: 2024-03-12
Payer: COMMERCIAL

## 2024-03-12 VITALS — HEIGHT: 64 IN | WEIGHT: 218 LBS | BODY MASS INDEX: 37.22 KG/M2

## 2024-03-12 DIAGNOSIS — G57.32 DEEP PERONEAL NEUROPATHY OF LEFT LOWER EXTREMITY: Primary | ICD-10-CM

## 2024-03-12 PROCEDURE — 99999 PR PBB SHADOW E&M-EST. PATIENT-LVL V: CPT | Mod: PBBFAC,,, | Performed by: PODIATRIST

## 2024-03-12 PROCEDURE — 99214 OFFICE O/P EST MOD 30 MIN: CPT | Mod: 25,S$GLB,, | Performed by: PODIATRIST

## 2024-03-12 PROCEDURE — 64450 NJX AA&/STRD OTHER PN/BRANCH: CPT | Mod: LT,S$GLB,, | Performed by: PODIATRIST

## 2024-03-12 RX ORDER — DEXAMETHASONE SODIUM PHOSPHATE 4 MG/ML
2 INJECTION, SOLUTION INTRA-ARTICULAR; INTRALESIONAL; INTRAMUSCULAR; INTRAVENOUS; SOFT TISSUE ONCE
Status: COMPLETED | OUTPATIENT
Start: 2024-03-12 | End: 2024-03-12

## 2024-03-12 RX ADMIN — DEXAMETHASONE SODIUM PHOSPHATE 2 MG: 4 INJECTION, SOLUTION INTRA-ARTICULAR; INTRALESIONAL; INTRAMUSCULAR; INTRAVENOUS; SOFT TISSUE at 11:03

## 2024-03-12 NOTE — PROGRESS NOTES
Subjective:      Patient ID: Mary Ann Vidales is a 59 y.o. female.    Chief Complaint: Toe Pain (R #1)      Mary Ann is a 59 y.o. female who presents to the clinic for evaluation and treatment of high risk feet. Mary Ann has a past medical history of Anticoagulant long-term use, Chronic asthma, Coronary artery disease, Diabetes mellitus type II, Fibroids, Hypertension, Incontinence, Morbid obesity (11/3/2015), Neuropathy in diabetes, Obesity, TINA (obstructive sleep apnea), Panic attacks, and Renal disorder. The patient's chief complaint is left great toe pain dorsal aspect this pain is sharp and can be constant, the pain can come on out of nowhere and even when at rest often keeping her up at night. On gabapentin 300 mg TID for her neuropathy in both extremities but this pain is specific to the big toe on the left foot. This patient has documented high risk feet requiring routine maintenance secondary to diabetes mellitis and those secondary complications of diabetes, as mentioned.    PCP: Davon aKn MD        Current shoe gear:   Casual shoes    Hemoglobin A1C   Date Value Ref Range Status   10/09/2023 5.4 4.0 - 5.6 % Final     Comment:     ADA Screening Guidelines:  5.7-6.4%  Consistent with prediabetes  >or=6.5%  Consistent with diabetes    High levels of fetal hemoglobin interfere with the HbA1C  assay. Heterozygous hemoglobin variants (HbS, HgC, etc)do  not significantly interfere with this assay.   However, presence of multiple variants may affect accuracy.     06/01/2023 5.7 (H) 4.0 - 5.6 % Final     Comment:     ADA Screening Guidelines:  5.7-6.4%  Consistent with prediabetes  >or=6.5%  Consistent with diabetes    High levels of fetal hemoglobin interfere with the HbA1C  assay. Heterozygous hemoglobin variants (HbS, HgC, etc)do  not significantly interfere with this assay.   However, presence of multiple variants may affect accuracy.     12/02/2022 7.0 (H) 4.0 - 5.6 % Final     Comment:     ADA  Screening Guidelines:  5.7-6.4%  Consistent with prediabetes  >or=6.5%  Consistent with diabetes    High levels of fetal hemoglobin interfere with the HbA1C  assay. Heterozygous hemoglobin variants (HbS, HgC, etc)do  not significantly interfere with this assay.   However, presence of multiple variants may affect accuracy.         Review of Systems   Constitutional: Negative for chills and fever.   Cardiovascular:  Positive for leg swelling. Negative for claudication.   Respiratory:  Negative for shortness of breath.    Skin:  Positive for nail changes. Negative for itching and rash.   Musculoskeletal:  Positive for arthritis. Negative for muscle cramps, muscle weakness and myalgias.   Gastrointestinal:  Negative for nausea and vomiting.   Neurological:  Positive for paresthesias. Negative for focal weakness, loss of balance and numbness.           Objective:      Physical Exam  Constitutional:       General: She is not in acute distress.     Appearance: She is well-developed. She is not diaphoretic.   Cardiovascular:      Pulses:           Dorsalis pedis pulses are 2+ on the right side and 2+ on the left side.        Posterior tibial pulses are 2+ on the right side and 2+ on the left side.      Comments: < 3 sec capillary refill time to toes 1-5 bilateral. Feet are warm to touch proximally with distal cooling b/l. There is diminished hair growth on the feet and toes b/l. There is mild edema b/l. No spider veins or varicosities present b/l.     Musculoskeletal:      Comments: Equinus noted b/l ankles with < 10 deg DF noted. MMT 5/5 in DF/PF/Inv/Ev resistance with no reproduction of pain in any direction. Passive range of motion of ankle and pedal joints is painless b/l.    Semi reducible contractures to bilateral toes 2-5 at the MTPJ and PIPJ.    Left great toe no pain with ROM or palpation, there is paresthesias and some pain along the dorsal foot at the deep peroneal nerve more proximally and this radiates into the  big toe when palpated   Skin:     General: Skin is warm and dry.      Coloration: Skin is not pale.      Findings: No abrasion, bruising, burn, ecchymosis, erythema, laceration, lesion, petechiae or rash.      Nails: There is no clubbing.      Comments: Skin is thin and atrophic bilateral    Nails 2-5 bilateral are thick 3-4 mm, long 3-6 mm, and discolored with subungual debris    Right hallux nail is gone with underlying nail bed intact slight bruising noted. Left hallux nail lytic with surrounding bruising noted. No erythema or drainage.     Neurological:      Mental Status: She is alert and oriented to person, place, and time.      Sensory: Sensory deficit present.      Motor: No tremor, atrophy or abnormal muscle tone.      Comments: Negative tinel sign bilateral. Diminished vibratory sensation bilateral   Psychiatric:         Behavior: Behavior normal.               Assessment:       Encounter Diagnosis   Name Primary?    Deep peroneal neuropathy of left lower extremity Yes             Plan:       Mary Ann was seen today for toe pain.    Diagnoses and all orders for this visit:    Deep peroneal neuropathy of left lower extremity    Other orders  -     dexAMETHasone injection 2 mg          I counseled the patient on her conditions, their implications and medical management.    Shoe inspection. Diabetic Foot Education. Patient reminded of the importance of good nutrition and blood sugar control to help prevent podiatric complications of diabetes. Patient instructed on proper foot hygeine. We discussed wearing proper shoe gear, daily foot inspections, never walking without protective shoe gear, never putting sharp instruments to feet    A steroid injection was performed at the dorsal midfoot at the Deep peroneal nerve using 1% plain Lidocaine and 2 mg of dexamethasone. This was well tolerated.     Discussed that if this works to make the toe feel better even for a day it is confirming the likelihood that the  compression at the dorsal foot deep peroneal nerve is causing the pain, we can consider doing a nerve release procedure outpatient if this is the case.    Return for follow up 6 weeks    Lorne Burgess DPM

## 2024-03-20 DIAGNOSIS — Z79.4 TYPE 2 DIABETES MELLITUS WITHOUT COMPLICATION, WITH LONG-TERM CURRENT USE OF INSULIN: ICD-10-CM

## 2024-03-20 DIAGNOSIS — E11.9 TYPE 2 DIABETES MELLITUS WITHOUT COMPLICATION, WITH LONG-TERM CURRENT USE OF INSULIN: ICD-10-CM

## 2024-03-21 RX ORDER — GABAPENTIN 300 MG/1
300 CAPSULE ORAL 3 TIMES DAILY
Qty: 90 CAPSULE | Refills: 3 | Status: SHIPPED | OUTPATIENT
Start: 2024-03-21

## 2024-03-26 ENCOUNTER — PATIENT MESSAGE (OUTPATIENT)
Dept: ENDOCRINOLOGY | Facility: CLINIC | Age: 59
End: 2024-03-26
Payer: COMMERCIAL

## 2024-03-26 NOTE — TELEPHONE ENCOUNTER
Which labs do you want him to do.He wants to do them @ [place]. They need to be print    Thanks

## 2024-04-01 ENCOUNTER — OFFICE VISIT (OUTPATIENT)
Dept: PODIATRY | Facility: CLINIC | Age: 59
End: 2024-04-01
Payer: COMMERCIAL

## 2024-04-01 VITALS — HEIGHT: 64 IN | BODY MASS INDEX: 37.23 KG/M2 | WEIGHT: 218.06 LBS

## 2024-04-01 DIAGNOSIS — E11.42 TYPE 2 DIABETES MELLITUS WITH DIABETIC POLYNEUROPATHY, WITHOUT LONG-TERM CURRENT USE OF INSULIN: ICD-10-CM

## 2024-04-01 DIAGNOSIS — L84 CALLUS OF TOE: ICD-10-CM

## 2024-04-01 PROCEDURE — 99999 PR PBB SHADOW E&M-EST. PATIENT-LVL V: CPT | Mod: PBBFAC,,, | Performed by: PODIATRIST

## 2024-04-01 PROCEDURE — 99499 UNLISTED E&M SERVICE: CPT | Mod: S$GLB,,, | Performed by: PODIATRIST

## 2024-04-01 PROCEDURE — 28010 INCISION OF TOE TENDON: CPT | Mod: T1,S$GLB,, | Performed by: PODIATRIST

## 2024-04-01 NOTE — PROGRESS NOTES
Subjective:      Patient ID: Mary Ann Vidales is a 59 y.o. female.    Chief Complaint: tenotomy      Mary Ann is a 59 y.o. female who presents to the clinic for evaluation and treatment of high risk feet. Mary Ann has a past medical history of Anticoagulant long-term use, Chronic asthma, Coronary artery disease, Diabetes mellitus type II, Fibroids, Hypertension, Incontinence, Morbid obesity (11/3/2015), Neuropathy in diabetes, Obesity, TINA (obstructive sleep apnea), Panic attacks, and Renal disorder. The patient's chief complaint is left great toe pain dorsal aspect this pain is sharp and can be constant, the pain can come on out of nowhere and even when at rest often keeping her up at night. On gabapentin 300 mg TID for her neuropathy in both extremities but this pain is specific to the big toe on the left foot. This patient has documented high risk feet requiring routine maintenance secondary to diabetes mellitis and those secondary complications of diabetes, as mentioned.    4/1/24: Patient returns for left second toe tenotomy procedure as planned as the toe hammers to where she is ambulating on the end of the toe causing pain    PCP: Davon Kan MD        Current shoe gear:   Casual shoes    Hemoglobin A1C   Date Value Ref Range Status   10/09/2023 5.4 4.0 - 5.6 % Final     Comment:     ADA Screening Guidelines:  5.7-6.4%  Consistent with prediabetes  >or=6.5%  Consistent with diabetes    High levels of fetal hemoglobin interfere with the HbA1C  assay. Heterozygous hemoglobin variants (HbS, HgC, etc)do  not significantly interfere with this assay.   However, presence of multiple variants may affect accuracy.     06/01/2023 5.7 (H) 4.0 - 5.6 % Final     Comment:     ADA Screening Guidelines:  5.7-6.4%  Consistent with prediabetes  >or=6.5%  Consistent with diabetes    High levels of fetal hemoglobin interfere with the HbA1C  assay. Heterozygous hemoglobin variants (HbS, HgC, etc)do  not significantly  interfere with this assay.   However, presence of multiple variants may affect accuracy.     12/02/2022 7.0 (H) 4.0 - 5.6 % Final     Comment:     ADA Screening Guidelines:  5.7-6.4%  Consistent with prediabetes  >or=6.5%  Consistent with diabetes    High levels of fetal hemoglobin interfere with the HbA1C  assay. Heterozygous hemoglobin variants (HbS, HgC, etc)do  not significantly interfere with this assay.   However, presence of multiple variants may affect accuracy.         Review of Systems   Constitutional: Negative for chills and fever.   Cardiovascular:  Positive for leg swelling. Negative for claudication.   Respiratory:  Negative for shortness of breath.    Skin:  Positive for nail changes. Negative for itching and rash.   Musculoskeletal:  Positive for arthritis. Negative for muscle cramps, muscle weakness and myalgias.   Gastrointestinal:  Negative for nausea and vomiting.   Neurological:  Positive for paresthesias. Negative for focal weakness, loss of balance and numbness.           Objective:      Physical Exam  Constitutional:       General: She is not in acute distress.     Appearance: She is well-developed. She is not diaphoretic.   Cardiovascular:      Pulses:           Dorsalis pedis pulses are 2+ on the right side and 2+ on the left side.        Posterior tibial pulses are 2+ on the right side and 2+ on the left side.      Comments: < 3 sec capillary refill time to toes 1-5 bilateral. Feet are warm to touch proximally with distal cooling b/l. There is diminished hair growth on the feet and toes b/l. There is mild edema b/l. No spider veins or varicosities present b/l.     Musculoskeletal:      Comments: Equinus noted b/l ankles with < 10 deg DF noted. MMT 5/5 in DF/PF/Inv/Ev resistance with no reproduction of pain in any direction. Passive range of motion of ankle and pedal joints is painless b/l.    Semi reducible contractures to bilateral toes 2-5 at the MTPJ and PIPJ. Left second toe reducible  contracture PIPJ and DIPJ with callus and pain at the end of the toe    Left great toe no pain with ROM or palpation, there is paresthesias and some pain along the dorsal foot at the deep peroneal nerve more proximally and this radiates into the big toe when palpated   Skin:     General: Skin is warm and dry.      Coloration: Skin is not pale.      Findings: No abrasion, bruising, burn, ecchymosis, erythema, laceration, lesion, petechiae or rash.      Nails: There is no clubbing.      Comments: Skin is thin and atrophic bilateral    Nails 2-5 bilateral are thick 3-4 mm, long 3-6 mm, and discolored with subungual debris    Right hallux nail is gone with underlying nail bed intact slight bruising noted. Left hallux nail lytic with surrounding bruising noted. No erythema or drainage.     Neurological:      Mental Status: She is alert and oriented to person, place, and time.      Sensory: Sensory deficit present.      Motor: No tremor, atrophy or abnormal muscle tone.      Comments: Negative tinel sign bilateral. Diminished vibratory sensation bilateral   Psychiatric:         Behavior: Behavior normal.               Assessment:       Encounter Diagnoses   Name Primary?    Type 2 diabetes mellitus with diabetic polyneuropathy, without long-term current use of insulin     Callus of toe              Plan:       Mary Ann was seen today for tenotomy.    Diagnoses and all orders for this visit:    Type 2 diabetes mellitus with diabetic polyneuropathy, without long-term current use of insulin  -     Ambulatory referral/consult to Podiatry    Callus of toe  -     Ambulatory referral/consult to Podiatry          I counseled the patient on her conditions, their implications and medical management.    Shoe inspection. Diabetic Foot Education. Patient reminded of the importance of good nutrition and blood sugar control to help prevent podiatric complications of diabetes. Patient instructed on proper foot hygeine. We discussed wearing  proper shoe gear, daily foot inspections, never walking without protective shoe gear, never putting sharp instruments to feet    Surgeon: Lorne Burgess DPM  Assistants: none  Procedure: percutaneous flexor tenotomy left second toe  Pre-op Dx: hammertoe/contracture of left second toe  Postop Dx: same  Pathology: none  Anesthesia: none  Hemostasis: none  EBL: < 1 mL  Materials: none  Injectibles:3 mL 1% plain lidocaine for digital nerve block  Complications:none    Procedure:    Time out was called confirming the patient, toe and procedure. Skin was prepped with alcohol followed by digital nerve block with 3 mL 1% plain lidocaine. Betadine prep of the left foot was completed. Sterile drapping applied. 18 gauge needle was entered percutaneously through plantar medial aspect of DIPJ left second toe and use to release the long flexor tendon while the toe was extended. The adductovarus deformity reduced. Site was cleansed with NS. Applied triple antibiotic, sterile gauze, daksha and coban.     The dressing is to remain clean, dry and intact until follow up in 1 week    Continue offloading with postop shoe.    RTC 1 week for wound check.    Lorne Burgess DPM

## 2024-04-02 DIAGNOSIS — Z79.4 TYPE 2 DIABETES MELLITUS WITHOUT COMPLICATION, WITH LONG-TERM CURRENT USE OF INSULIN: Primary | ICD-10-CM

## 2024-04-02 DIAGNOSIS — E11.9 TYPE 2 DIABETES MELLITUS WITHOUT COMPLICATION, WITH LONG-TERM CURRENT USE OF INSULIN: Primary | ICD-10-CM

## 2024-04-03 ENCOUNTER — TELEPHONE (OUTPATIENT)
Dept: PODIATRY | Facility: CLINIC | Age: 59
End: 2024-04-03
Payer: COMMERCIAL

## 2024-04-03 NOTE — TELEPHONE ENCOUNTER
----- Message from Lesli Deleon sent at 4/3/2024  3:43 PM CDT -----  Type: Needs Medical Advice  Who Called:  pt  Symptoms (pleptase be specific):    How long has patient had these symptoms:    Pharmacy name and phone #:    Best Call Back Number: [phone]    Additional Information: Pt is returning Daisy call about whether or not to keep her appt   Please call to advise

## 2024-04-08 ENCOUNTER — PATIENT MESSAGE (OUTPATIENT)
Dept: ADMINISTRATIVE | Facility: HOSPITAL | Age: 59
End: 2024-04-08
Payer: COMMERCIAL

## 2024-04-08 ENCOUNTER — PATIENT OUTREACH (OUTPATIENT)
Dept: ADMINISTRATIVE | Facility: HOSPITAL | Age: 59
End: 2024-04-08
Payer: COMMERCIAL

## 2024-04-08 DIAGNOSIS — M79.601 RIGHT ARM PAIN: Primary | ICD-10-CM

## 2024-04-10 ENCOUNTER — OFFICE VISIT (OUTPATIENT)
Dept: ORTHOPEDICS | Facility: CLINIC | Age: 59
End: 2024-04-10
Payer: COMMERCIAL

## 2024-04-10 ENCOUNTER — HOSPITAL ENCOUNTER (OUTPATIENT)
Dept: RADIOLOGY | Facility: HOSPITAL | Age: 59
Discharge: HOME | End: 2024-04-10
Attending: ORTHOPAEDIC SURGERY
Payer: COMMERCIAL

## 2024-04-10 ENCOUNTER — OFFICE VISIT (OUTPATIENT)
Dept: PODIATRY | Facility: CLINIC | Age: 59
End: 2024-04-10
Payer: COMMERCIAL

## 2024-04-10 VITALS — BODY MASS INDEX: 35.82 KG/M2 | HEIGHT: 65 IN | WEIGHT: 215 LBS

## 2024-04-10 VITALS — BODY MASS INDEX: 37.23 KG/M2 | WEIGHT: 218.06 LBS | HEIGHT: 64 IN

## 2024-04-10 DIAGNOSIS — M19.011 ARTHRITIS OF RIGHT SHOULDER REGION: ICD-10-CM

## 2024-04-10 DIAGNOSIS — Z98.890 POST-OPERATIVE STATE: Primary | ICD-10-CM

## 2024-04-10 DIAGNOSIS — M79.601 RIGHT ARM PAIN: Primary | ICD-10-CM

## 2024-04-10 DIAGNOSIS — M79.601 RIGHT ARM PAIN: ICD-10-CM

## 2024-04-10 PROCEDURE — 73030 X-RAY EXAM OF SHOULDER: CPT | Mod: 26,RT,, | Performed by: RADIOLOGY

## 2024-04-10 PROCEDURE — 99214 OFFICE O/P EST MOD 30 MIN: CPT | Mod: 25,S$GLB,, | Performed by: ORTHOPAEDIC SURGERY

## 2024-04-10 PROCEDURE — 99999 PR PBB SHADOW E&M-EST. PATIENT-LVL IV: CPT | Mod: PBBFAC,,, | Performed by: PODIATRIST

## 2024-04-10 PROCEDURE — 99024 POSTOP FOLLOW-UP VISIT: CPT | Mod: S$GLB,,, | Performed by: PODIATRIST

## 2024-04-10 PROCEDURE — 20610 DRAIN/INJ JOINT/BURSA W/O US: CPT | Mod: RT,S$GLB,, | Performed by: ORTHOPAEDIC SURGERY

## 2024-04-10 PROCEDURE — 73030 X-RAY EXAM OF SHOULDER: CPT | Mod: TC,PO,RT

## 2024-04-10 PROCEDURE — 99999 PR PBB SHADOW E&M-EST. PATIENT-LVL IV: CPT | Mod: PBBFAC,,, | Performed by: ORTHOPAEDIC SURGERY

## 2024-04-10 RX ORDER — TRIAMCINOLONE ACETONIDE 40 MG/ML
80 INJECTION, SUSPENSION INTRA-ARTICULAR; INTRAMUSCULAR
Status: DISCONTINUED | OUTPATIENT
Start: 2024-04-10 | End: 2024-04-10 | Stop reason: HOSPADM

## 2024-04-10 RX ORDER — FLUTICASONE PROPIONATE 50 MCG
SPRAY, SUSPENSION (ML) NASAL
COMMUNITY
Start: 2024-03-05

## 2024-04-10 RX ADMIN — TRIAMCINOLONE ACETONIDE 80 MG: 40 INJECTION, SUSPENSION INTRA-ARTICULAR; INTRAMUSCULAR at 08:04

## 2024-04-10 NOTE — PROGRESS NOTES
Subjective:      Patient ID: Mary Ann Vidales is a 59 y.o. female.    Chief Complaint: Post-op Evaluation      Mary Ann is a 59 y.o. female who presents to the clinic for evaluation and treatment of high risk feet. Mary Ann has a past medical history of Anticoagulant long-term use, Chronic asthma, Coronary artery disease, Diabetes mellitus type II, Fibroids, Hypertension, Incontinence, Morbid obesity (11/3/2015), Neuropathy in diabetes, Obesity, TINA (obstructive sleep apnea), Panic attacks, and Renal disorder. The patient's chief complaint is left great toe pain dorsal aspect this pain is sharp and can be constant, the pain can come on out of nowhere and even when at rest often keeping her up at night. On gabapentin 300 mg TID for her neuropathy in both extremities but this pain is specific to the big toe on the left foot. This patient has documented high risk feet requiring routine maintenance secondary to diabetes mellitis and those secondary complications of diabetes, as mentioned.    4/1/24: Patient returns for left second toe tenotomy procedure as planned as the toe hammers to where she is ambulating on the end of the toe causing pain    4/10/24: Patient returns for left second toe post op tenotomy 1 week    PCP: Davon Kan MD        Current shoe gear:   Casual shoes    Hemoglobin A1C   Date Value Ref Range Status   10/09/2023 5.4 4.0 - 5.6 % Final     Comment:     ADA Screening Guidelines:  5.7-6.4%  Consistent with prediabetes  >or=6.5%  Consistent with diabetes    High levels of fetal hemoglobin interfere with the HbA1C  assay. Heterozygous hemoglobin variants (HbS, HgC, etc)do  not significantly interfere with this assay.   However, presence of multiple variants may affect accuracy.     06/01/2023 5.7 (H) 4.0 - 5.6 % Final     Comment:     ADA Screening Guidelines:  5.7-6.4%  Consistent with prediabetes  >or=6.5%  Consistent with diabetes    High levels of fetal hemoglobin interfere with the  HbA1C  assay. Heterozygous hemoglobin variants (HbS, HgC, etc)do  not significantly interfere with this assay.   However, presence of multiple variants may affect accuracy.     12/02/2022 7.0 (H) 4.0 - 5.6 % Final     Comment:     ADA Screening Guidelines:  5.7-6.4%  Consistent with prediabetes  >or=6.5%  Consistent with diabetes    High levels of fetal hemoglobin interfere with the HbA1C  assay. Heterozygous hemoglobin variants (HbS, HgC, etc)do  not significantly interfere with this assay.   However, presence of multiple variants may affect accuracy.         Review of Systems   Constitutional: Negative for chills and fever.   Cardiovascular:  Positive for leg swelling. Negative for claudication.   Respiratory:  Negative for shortness of breath.    Skin:  Positive for nail changes. Negative for itching and rash.   Musculoskeletal:  Positive for arthritis. Negative for muscle cramps, muscle weakness and myalgias.   Gastrointestinal:  Negative for nausea and vomiting.   Neurological:  Positive for paresthesias. Negative for focal weakness, loss of balance and numbness.           Objective:      Physical Exam  Constitutional:       General: She is not in acute distress.     Appearance: She is well-developed. She is not diaphoretic.   Cardiovascular:      Pulses:           Dorsalis pedis pulses are 2+ on the right side and 2+ on the left side.        Posterior tibial pulses are 2+ on the right side and 2+ on the left side.      Comments: < 3 sec capillary refill time to toes 1-5 bilateral. Feet are warm to touch proximally with distal cooling b/l. There is diminished hair growth on the feet and toes b/l. There is mild edema b/l. No spider veins or varicosities present b/l.     Musculoskeletal:      Comments: Equinus noted b/l ankles with < 10 deg DF noted. MMT 5/5 in DF/PF/Inv/Ev resistance with no reproduction of pain in any direction. Passive range of motion of ankle and pedal joints is painless b/l.    Semi  reducible contractures to bilateral toes 2-5 at the MTPJ and PIPJ. Left second toe now sits in a more rectus position than before    Left great toe no pain with ROM or palpation, there is paresthesias and some pain along the dorsal foot at the deep peroneal nerve more proximally and this radiates into the big toe when palpated   Skin:     General: Skin is warm and dry.      Coloration: Skin is not pale.      Findings: No abrasion, bruising, burn, ecchymosis, erythema, laceration, lesion, petechiae or rash.      Nails: There is no clubbing.      Comments: Skin is thin and atrophic bilateral    Nails 2-5 bilateral are thick 3-4 mm, long 3-6 mm, and discolored with subungual debris    Right hallux nail is gone with underlying nail bed intact slight bruising noted. Left hallux nail lytic with surrounding bruising noted. No erythema or drainage.     Neurological:      Mental Status: She is alert and oriented to person, place, and time.      Sensory: Sensory deficit present.      Motor: No tremor, atrophy or abnormal muscle tone.      Comments: Negative tinel sign bilateral. Diminished vibratory sensation bilateral   Psychiatric:         Behavior: Behavior normal.               Assessment:       Encounter Diagnosis   Name Primary?    Post-operative state Yes               Plan:       Mary Ann was seen today for post-op evaluation.    Diagnoses and all orders for this visit:    Post-operative state            I counseled the patient on her conditions, their implications and medical management.    Shoe inspection. Diabetic Foot Education. Patient reminded of the importance of good nutrition and blood sugar control to help prevent podiatric complications of diabetes. Patient instructed on proper foot hygeine. We discussed wearing proper shoe gear, daily foot inspections, never walking without protective shoe gear, never putting sharp instruments to feet    Can return to normal shoe wear    Discussed that I'm not sure what else  I can do to help with the nerve pain to the big toe on the left foot      RTC PRN    Lorne Burgess, DPM

## 2024-04-10 NOTE — PROCEDURES
Large Joint Aspiration/Injection: R subacromial bursa    Date/Time: 4/10/2024 8:00 AM    Performed by: Caleb Felton MD  Authorized by: Caleb Felton MD    Consent Done?:  Yes (Verbal)  Site marked: the procedure site was marked    Prep: patient was prepped and draped in usual sterile fashion      Details:  Needle Size:  21 G  Approach:  Posterior  Location:  Shoulder  Site:  R subacromial bursa  Medications:  80 mg triamcinolone acetonide 40 mg/mL  Patient tolerance:  Patient tolerated the procedure well with no immediate complications

## 2024-04-10 NOTE — PROGRESS NOTES
59 years old right shoulder pain for about a year aching pain worse with activity relieved with rest     Exam shows cuff strength weak and painful positive Neer Lobato impingement sign cervical motion does not reproduce his symptoms     X-rays show glenohumeral arthrosis    Assessment:  Right shoulder glenohumeral arthrosis     Plan:  Kenalog injection anterior approach, encourage strengthening and symptomatic care, follow-up as needed

## 2024-04-30 ENCOUNTER — OFFICE VISIT (OUTPATIENT)
Dept: ENDOCRINOLOGY | Facility: CLINIC | Age: 59
End: 2024-04-30
Payer: COMMERCIAL

## 2024-04-30 VITALS
HEART RATE: 88 BPM | SYSTOLIC BLOOD PRESSURE: 112 MMHG | HEIGHT: 65 IN | WEIGHT: 221.44 LBS | DIASTOLIC BLOOD PRESSURE: 80 MMHG | OXYGEN SATURATION: 97 % | BODY MASS INDEX: 36.89 KG/M2

## 2024-04-30 DIAGNOSIS — Z79.4 TYPE 2 DIABETES MELLITUS WITH STAGE 3B CHRONIC KIDNEY DISEASE, WITH LONG-TERM CURRENT USE OF INSULIN: ICD-10-CM

## 2024-04-30 DIAGNOSIS — E11.9 TYPE 2 DIABETES MELLITUS WITHOUT COMPLICATION, WITH LONG-TERM CURRENT USE OF INSULIN: Primary | ICD-10-CM

## 2024-04-30 DIAGNOSIS — E78.5 DYSLIPIDEMIA: ICD-10-CM

## 2024-04-30 DIAGNOSIS — Z79.4 TYPE 2 DIABETES MELLITUS WITHOUT COMPLICATION, WITH LONG-TERM CURRENT USE OF INSULIN: Primary | ICD-10-CM

## 2024-04-30 DIAGNOSIS — E11.22 TYPE 2 DIABETES MELLITUS WITH STAGE 3B CHRONIC KIDNEY DISEASE, WITH LONG-TERM CURRENT USE OF INSULIN: ICD-10-CM

## 2024-04-30 DIAGNOSIS — N18.32 TYPE 2 DIABETES MELLITUS WITH STAGE 3B CHRONIC KIDNEY DISEASE, WITH LONG-TERM CURRENT USE OF INSULIN: ICD-10-CM

## 2024-04-30 DIAGNOSIS — I10 ESSENTIAL HYPERTENSION: ICD-10-CM

## 2024-04-30 DIAGNOSIS — I25.10 CORONARY ARTERY DISEASE, UNSPECIFIED VESSEL OR LESION TYPE, UNSPECIFIED WHETHER ANGINA PRESENT, UNSPECIFIED WHETHER NATIVE OR TRANSPLANTED HEART: ICD-10-CM

## 2024-04-30 DIAGNOSIS — E66.01 MORBID OBESITY: ICD-10-CM

## 2024-04-30 DIAGNOSIS — G63 POLYNEUROPATHY ASSOCIATED WITH UNDERLYING DISEASE: ICD-10-CM

## 2024-04-30 PROCEDURE — 99999 PR PBB SHADOW E&M-EST. PATIENT-LVL V: CPT | Mod: PBBFAC,,, | Performed by: NURSE PRACTITIONER

## 2024-04-30 PROCEDURE — 99214 OFFICE O/P EST MOD 30 MIN: CPT | Mod: S$GLB,,, | Performed by: NURSE PRACTITIONER

## 2024-04-30 RX ORDER — SEMAGLUTIDE 2.68 MG/ML
2 INJECTION, SOLUTION SUBCUTANEOUS
Qty: 3 ML | Refills: 11 | Status: SHIPPED | OUTPATIENT
Start: 2024-04-30 | End: 2025-04-30

## 2024-04-30 NOTE — PATIENT INSTRUCTIONS
Increase ozempic to 2 mg.     Can try not taking,  toujeo---for several days,  if fasting glucoses are > 150, go ahead and restart toujeo at 15 units.         Then check glucoses for a week and let  me know how you are doing.

## 2024-04-30 NOTE — PROGRESS NOTES
Subjective:      Patient ID: Mary Ann Vidales is a 59 y.o. female.    Chief Complaint:  Diabetes f/u    History of Present Illness  Pt is a 56y/o female with TYPE 2 DM since approx 2000, and chronic conditions pending review including HTN, neuropathy, CKD state 3b , TINA on C-pap. Dyslipidemia, morbid obesity. Also TINA on cpap-    Interim Events: April 30. 2024 No acute events or focal complaints. Inquiring if she can stop insulin and increase Ozempic, ALso c/o severe L great toe burning and with electric shocks interfering with sleep--wants to increase gabapentin.    Checking glucoses qam.     FBS usually 110-120.     Current DM meds:   30 toujeo,  1 mg ozempic.        Failed DM meds:  metformin, jardicance (renal). Levemir and Lantus---marked LE erythema and burning in LE on both insulins       Back up plan for insulin pump hiatus:   Statin: atorvastatin 10 mg        Not tolerated statin : na   ACE/ARB:losartan 50 mg        Not tolerated ACE/ARB: na   Known Diabetic complications: nephropathy, neuropathy.      July 21, 2023:  Pt not seen in almost a year.  Being followed by renal for now CKD stage 4--but has been stable with GFR hovering 35 to 29 last year.  As a result I did advise her to stop the jardiance a couple of months ago.  She has lost close to 100 lbs over the last year and reports wt as 225 lbs.  She is inquiring about mounjaro for better wt loss benefits as a alternate to Ozempic       Aug 2022L Multiple issues in the interim r/t worsened renal function, marked LE edema--which was likely a result of pt being taken off gabapentin and switched to Lyrica--but since the lyrica wasn't providing the desired relief was taking more than renal dose allowed on both.  DM control has markedly worsened. She has also resumed metformin trying to improve glucoses.  Only checking 1 every day or QOD but always > 200,  And per labs 260 last week. We did add Jardiance to help wit the fluid at least visit.     Review  of Systems   Constitutional: Negative.  Negative for fatigue and unexpected weight change.   HENT:  Negative for hearing loss and trouble swallowing.    Eyes:  Negative for photophobia and visual disturbance.   Respiratory:  Negative for cough and shortness of breath.    Cardiovascular:  Negative for chest pain and leg swelling.   Gastrointestinal:  Negative for constipation and diarrhea.   Genitourinary:  Negative for difficulty urinating and frequency.   Musculoskeletal:  Negative for arthralgias and myalgias.   Skin:  Negative for rash and wound.   Neurological:  Positive for numbness. Negative for weakness.   Psychiatric/Behavioral:  Negative for sleep disturbance. The patient is not nervous/anxious.        Objective:   Physical Exam  Constitutional:       Appearance: Normal appearance. She is obese.   HENT:      Head: Normocephalic and atraumatic.      Nose: Nose normal.      Mouth/Throat:      Mouth: Mucous membranes are moist.      Pharynx: Oropharynx is clear.   Eyes:      Extraocular Movements: Extraocular movements intact.      Conjunctiva/sclera: Conjunctivae normal.      Pupils: Pupils are equal, round, and reactive to light.   Neck:      Vascular: No carotid bruit.   Cardiovascular:      Rate and Rhythm: Normal rate and regular rhythm.      Pulses: Normal pulses.      Heart sounds: Normal heart sounds.   Pulmonary:      Effort: Pulmonary effort is normal.      Breath sounds: Normal breath sounds.   Musculoskeletal:         General: Normal range of motion.      Cervical back: Normal range of motion and neck supple.      Right lower leg: No edema.      Left lower leg: No edema.   Lymphadenopathy:      Cervical: No cervical adenopathy.   Skin:     General: Skin is warm and dry.   Neurological:      General: No focal deficit present.      Mental Status: She is alert and oriented to person, place, and time.   Psychiatric:         Mood and Affect: Mood normal.         Behavior: Behavior normal.          "Thought Content: Thought content normal.         Judgment: Judgment normal.       /80 (BP Location: Right arm, Patient Position: Sitting, BP Method: Large (Manual))   Pulse 88   Ht 5' 4.5" (1.638 m)   Wt 100.4 kg (221 lb 7.2 oz)   LMP 12/20/2013   SpO2 97%   BMI 37.43 kg/m²     Hemoglobin A1C   Date Value Ref Range Status   04/10/2024 5.4 4.0 - 5.6 % Final     Comment:     ADA Screening Guidelines:  5.7-6.4%  Consistent with prediabetes  >or=6.5%  Consistent with diabetes    High levels of fetal hemoglobin interfere with the HbA1C  assay. Heterozygous hemoglobin variants (HbS, HgC, etc)do  not significantly interfere with this assay.   However, presence of multiple variants may affect accuracy.     10/09/2023 5.4 4.0 - 5.6 % Final     Comment:     ADA Screening Guidelines:  5.7-6.4%  Consistent with prediabetes  >or=6.5%  Consistent with diabetes    High levels of fetal hemoglobin interfere with the HbA1C  assay. Heterozygous hemoglobin variants (HbS, HgC, etc)do  not significantly interfere with this assay.   However, presence of multiple variants may affect accuracy.     06/01/2023 5.7 (H) 4.0 - 5.6 % Final     Comment:     ADA Screening Guidelines:  5.7-6.4%  Consistent with prediabetes  >or=6.5%  Consistent with diabetes    High levels of fetal hemoglobin interfere with the HbA1C  assay. Heterozygous hemoglobin variants (HbS, HgC, etc)do  not significantly interfere with this assay.   However, presence of multiple variants may affect accuracy.         Chemistry        Component Value Date/Time     04/10/2024 0826    K 4.1 04/10/2024 0826     04/10/2024 0826    CO2 25 04/10/2024 0826    BUN 41 (H) 04/10/2024 0826    CREATININE 1.6 (H) 04/10/2024 0826    GLU 73 04/10/2024 0826        Component Value Date/Time    CALCIUM 9.9 04/10/2024 0826    ALKPHOS 105 04/10/2024 0826    AST 19 04/10/2024 0826    ALT 18 04/10/2024 0826    BILITOT 0.2 04/10/2024 0826    ESTGFRAFRICA 23.1 (A) 05/16/2022 " 0736    ESTGFRAFRICA 23.1 (A) 05/16/2022 0736    EGFRNONAA 20.0 (A) 05/16/2022 0736    EGFRNONAA 20.0 (A) 05/16/2022 0736          Lab Results   Component Value Date    CHOL 141 04/10/2024     Lab Results   Component Value Date    HDL 33 (L) 04/10/2024     Lab Results   Component Value Date    LDLCALC 73.8 04/10/2024     Lab Results   Component Value Date    TRIG 171 (H) 04/10/2024     Lab Results   Component Value Date    CHOLHDL 23.4 04/10/2024     Lab Results   Component Value Date    MICALBCREAT 127.9 (H) 06/01/2023     Lab Results   Component Value Date    TSH 2.655 04/10/2024     Vit D, 25-Hydroxy   Date Value Ref Range Status   05/16/2022 30 30 - 96 ng/mL Final     Comment:     Vitamin D deficiency.........<10 ng/mL                              Vitamin D insufficiency......10-29 ng/mL       Vitamin D sufficiency........> or equal to 30 ng/mL  Vitamin D toxicity............>100 ng/mL               Assessment:     1. Type 2 diabetes mellitus without complication, with long-term current use of insulin  semaglutide (OZEMPIC) 2 mg/dose (8 mg/3 mL) PnIj    Hemoglobin A1C      2. Type 2 diabetes mellitus with stage 3b chronic kidney disease, with long-term current use of insulin        3. Coronary artery disease, unspecified vessel or lesion type, unspecified whether angina present, unspecified whether native or transplanted heart        4. Essential hypertension        5. Dyslipidemia        6. Polyneuropathy associated with underlying disease        7. Morbid obesity              Plan:   Pt advised:      Increase ozempic to 2 mg.     Can try not taking,  toujeo---for several days,  if fasting glucoses are > 150, go ahead and restart toujeo at 15 units.         Then check glucoses for a week and let  me know how you are doing.     Aslo can try capsacin cream or frankincense and myrh for neurpathic foot pain.   Pt renally maxed on gabapentin      ORDERS 04/30/2024     6 mo with A1c prior

## 2024-05-30 ENCOUNTER — PATIENT MESSAGE (OUTPATIENT)
Dept: INTERNAL MEDICINE | Facility: CLINIC | Age: 59
End: 2024-05-30
Payer: COMMERCIAL

## 2024-05-30 RX ORDER — PANTOPRAZOLE SODIUM 40 MG/1
40 TABLET, DELAYED RELEASE ORAL
Qty: 90 TABLET | Refills: 2 | Status: SHIPPED | OUTPATIENT
Start: 2024-05-30

## 2024-05-30 RX ORDER — ALPRAZOLAM 0.5 MG/1
0.5 TABLET ORAL
Qty: 30 TABLET | Refills: 2 | Status: CANCELLED | OUTPATIENT
Start: 2024-05-30

## 2024-05-30 NOTE — TELEPHONE ENCOUNTER
No care due was identified.  [facility] Embedded Care Due Messages. Reference number: [id number].   5/30/2024 10:42:07 AM CDT

## 2024-05-30 NOTE — TELEPHONE ENCOUNTER
No care due was identified.  [facility] Embedded Care Due Messages. Reference number: [id number].   5/30/2024 4:41:39 PM CDT

## 2024-05-31 RX ORDER — ALPRAZOLAM 0.5 MG/1
0.5 TABLET ORAL
Qty: 30 TABLET | Refills: 2 | Status: SHIPPED | OUTPATIENT
Start: 2024-05-31

## 2024-05-31 NOTE — TELEPHONE ENCOUNTER
Refill Decision Note   Mary Ann Sobeida  is requesting a refill authorization.  Brief Assessment and Rationale for Refill:  Approve     Medication Therapy Plan:        Comments:     Note composed:8:06 PM 05/30/2024

## 2024-05-31 NOTE — TELEPHONE ENCOUNTER
----- Message from Mala Menon sent at 5/31/2024 10:28 AM CDT -----  Contact: [facility]   Requesting an RX refill or new RX.  Is this a refill or new RX: new  RX name and strength (copy/paste from chart):  ALPRAZolam (XANAX) 0.5 MG tablet  Is this a 30 day or 90 day RX:   Pharmacy name and phone # (copy/paste from chart):    [facility]/pharmacy #[id number] - [place] - [facility]. AT [address]  [place] [address]  Phone: [phone] Fax: [phone]

## 2024-05-31 NOTE — TELEPHONE ENCOUNTER
No care due was identified.  [facility] Embedded Care Due Messages. Reference number: [id number].   5/30/2024 8:05:25 PM CDT

## 2024-06-03 DIAGNOSIS — S86.011S RUPTURE OF RIGHT ACHILLES TENDON, SEQUELA: ICD-10-CM

## 2024-06-03 DIAGNOSIS — E11.42 TYPE 2 DIABETES MELLITUS WITH DIABETIC POLYNEUROPATHY, WITHOUT LONG-TERM CURRENT USE OF INSULIN: ICD-10-CM

## 2024-06-03 NOTE — TELEPHONE ENCOUNTER
No care due was identified.  Health [facility] Embedded Care Due Messages. Reference number: [id number].   6/03/2024 3:00:08 PM CDT

## 2024-06-04 RX ORDER — HYDROCODONE BITARTRATE AND ACETAMINOPHEN 7.5; 325 MG/1; MG/1
1 TABLET ORAL EVERY 6 HOURS PRN
Qty: 90 TABLET | Refills: 0 | Status: SHIPPED | OUTPATIENT
Start: 2024-06-04

## 2024-06-07 DIAGNOSIS — G89.29 CHRONIC LEFT-SIDED LOW BACK PAIN, UNSPECIFIED WHETHER SCIATICA PRESENT: ICD-10-CM

## 2024-06-07 DIAGNOSIS — M54.6 CHRONIC BILATERAL THORACIC BACK PAIN: ICD-10-CM

## 2024-06-07 DIAGNOSIS — G89.29 CHRONIC BILATERAL THORACIC BACK PAIN: ICD-10-CM

## 2024-06-07 DIAGNOSIS — M54.50 CHRONIC LEFT-SIDED LOW BACK PAIN, UNSPECIFIED WHETHER SCIATICA PRESENT: ICD-10-CM

## 2024-06-10 ENCOUNTER — TELEPHONE (OUTPATIENT)
Dept: PAIN MEDICINE | Facility: CLINIC | Age: 59
End: 2024-06-10
Payer: COMMERCIAL

## 2024-06-10 RX ORDER — TIZANIDINE 4 MG/1
4 TABLET ORAL EVERY 12 HOURS PRN
Qty: 30 TABLET | Refills: 0 | OUTPATIENT
Start: 2024-06-10

## 2024-06-11 RX ORDER — TIZANIDINE 4 MG/1
4 TABLET ORAL EVERY 12 HOURS PRN
Qty: 30 TABLET | Refills: 0 | OUTPATIENT
Start: 2024-06-11

## 2024-06-11 NOTE — TELEPHONE ENCOUNTER
There was no message attached to the encounter for review. Did she have a specific question?    With her last office visit 8-31-23, she would need a new office visit for any medication refills or advice on any current ailments/ concerns.    Thanks  Joy Patino PA-C  [facility]

## 2024-06-14 ENCOUNTER — TELEPHONE (OUTPATIENT)
Dept: PAIN MEDICINE | Facility: CLINIC | Age: 59
End: 2024-06-14
Payer: COMMERCIAL

## 2024-06-22 ENCOUNTER — LAB VISIT (OUTPATIENT)
Dept: LAB | Facility: HOSPITAL | Age: 59
End: 2024-06-22
Attending: INTERNAL MEDICINE
Payer: COMMERCIAL

## 2024-06-22 DIAGNOSIS — N18.4 CKD (CHRONIC KIDNEY DISEASE) STAGE 4, GFR 15-29 ML/MIN: ICD-10-CM

## 2024-06-22 DIAGNOSIS — E79.0 HYPERURICEMIA: ICD-10-CM

## 2024-06-22 DIAGNOSIS — E78.5 DYSLIPIDEMIA: ICD-10-CM

## 2024-06-22 DIAGNOSIS — E11.42 TYPE 2 DIABETES MELLITUS WITH DIABETIC POLYNEUROPATHY, WITHOUT LONG-TERM CURRENT USE OF INSULIN: ICD-10-CM

## 2024-06-22 DIAGNOSIS — I10 ESSENTIAL HYPERTENSION: ICD-10-CM

## 2024-06-22 DIAGNOSIS — N18.4 CHRONIC KIDNEY DISEASE, STAGE 4 (SEVERE): ICD-10-CM

## 2024-06-22 LAB
ALBUMIN SERPL BCP-MCNC: 3.5 G/DL (ref 3.5–5.2)
ALBUMIN SERPL BCP-MCNC: 3.5 G/DL (ref 3.5–5.2)
ALP SERPL-CCNC: 97 U/L (ref 55–135)
ALT SERPL W/O P-5'-P-CCNC: 25 U/L (ref 10–44)
ANION GAP SERPL CALC-SCNC: 13 MMOL/L (ref 8–16)
ANION GAP SERPL CALC-SCNC: 13 MMOL/L (ref 8–16)
AST SERPL-CCNC: 30 U/L (ref 10–40)
BASOPHILS # BLD AUTO: 0.06 K/UL (ref 0–0.2)
BASOPHILS NFR BLD: 0.8 % (ref 0–1.9)
BILIRUB SERPL-MCNC: 0.3 MG/DL (ref 0.1–1)
BUN SERPL-MCNC: 45 MG/DL (ref 6–20)
BUN SERPL-MCNC: 45 MG/DL (ref 6–20)
CALCIUM SERPL-MCNC: 9.7 MG/DL (ref 8.7–10.5)
CALCIUM SERPL-MCNC: 9.7 MG/DL (ref 8.7–10.5)
CHLORIDE SERPL-SCNC: 102 MMOL/L (ref 95–110)
CHLORIDE SERPL-SCNC: 102 MMOL/L (ref 95–110)
CHOLEST SERPL-MCNC: 144 MG/DL (ref 120–199)
CHOLEST/HDLC SERPL: 3.8 {RATIO} (ref 2–5)
CO2 SERPL-SCNC: 23 MMOL/L (ref 23–29)
CO2 SERPL-SCNC: 23 MMOL/L (ref 23–29)
CREAT SERPL-MCNC: 1.9 MG/DL (ref 0.5–1.4)
CREAT SERPL-MCNC: 1.9 MG/DL (ref 0.5–1.4)
CREAT UR-MCNC: 108 MG/DL (ref 15–325)
DIFFERENTIAL METHOD BLD: ABNORMAL
EOSINOPHIL # BLD AUTO: 0.2 K/UL (ref 0–0.5)
EOSINOPHIL NFR BLD: 2.4 % (ref 0–8)
ERYTHROCYTE [DISTWIDTH] IN BLOOD BY AUTOMATED COUNT: 13.4 % (ref 11.5–14.5)
EST. GFR  (NO RACE VARIABLE): 30.4 ML/MIN/1.73 M^2
EST. GFR  (NO RACE VARIABLE): 30.4 ML/MIN/1.73 M^2
ESTIMATED AVG GLUCOSE: 111 MG/DL (ref 68–131)
GLUCOSE SERPL-MCNC: 112 MG/DL (ref 70–110)
GLUCOSE SERPL-MCNC: 112 MG/DL (ref 70–110)
HBA1C MFR BLD: 5.5 % (ref 4–5.6)
HCT VFR BLD AUTO: 33.3 % (ref 37–48.5)
HDLC SERPL-MCNC: 38 MG/DL (ref 40–75)
HDLC SERPL: 26.4 % (ref 20–50)
HGB BLD-MCNC: 11.1 G/DL (ref 12–16)
IMM GRANULOCYTES # BLD AUTO: 0.03 K/UL (ref 0–0.04)
IMM GRANULOCYTES NFR BLD AUTO: 0.4 % (ref 0–0.5)
LDLC SERPL CALC-MCNC: 69.4 MG/DL (ref 63–159)
LYMPHOCYTES # BLD AUTO: 2.6 K/UL (ref 1–4.8)
LYMPHOCYTES NFR BLD: 33.5 % (ref 18–48)
MCH RBC QN AUTO: 33 PG (ref 27–31)
MCHC RBC AUTO-ENTMCNC: 33.3 G/DL (ref 32–36)
MCV RBC AUTO: 99 FL (ref 82–98)
MONOCYTES # BLD AUTO: 0.5 K/UL (ref 0.3–1)
MONOCYTES NFR BLD: 6.4 % (ref 4–15)
NEUTROPHILS # BLD AUTO: 4.4 K/UL (ref 1.8–7.7)
NEUTROPHILS NFR BLD: 56.5 % (ref 38–73)
NONHDLC SERPL-MCNC: 106 MG/DL
NRBC BLD-RTO: 0 /100 WBC
PHOSPHATE SERPL-MCNC: 3.5 MG/DL (ref 2.7–4.5)
PLATELET # BLD AUTO: 127 K/UL (ref 150–450)
PMV BLD AUTO: 9.9 FL (ref 9.2–12.9)
POTASSIUM SERPL-SCNC: 3.5 MMOL/L (ref 3.5–5.1)
POTASSIUM SERPL-SCNC: 3.5 MMOL/L (ref 3.5–5.1)
PROT SERPL-MCNC: 6.3 G/DL (ref 6–8.4)
PROT UR-MCNC: 46 MG/DL (ref 0–15)
PROT/CREAT UR: 0.43 MG/G{CREAT} (ref 0–0.2)
PTH-INTACT SERPL-MCNC: 64.6 PG/ML (ref 9–77)
RBC # BLD AUTO: 3.36 M/UL (ref 4–5.4)
SODIUM SERPL-SCNC: 138 MMOL/L (ref 136–145)
SODIUM SERPL-SCNC: 138 MMOL/L (ref 136–145)
TRIGL SERPL-MCNC: 183 MG/DL (ref 30–150)
URATE SERPL-MCNC: 5 MG/DL (ref 2.4–5.7)
WBC # BLD AUTO: 7.82 K/UL (ref 3.9–12.7)

## 2024-06-22 PROCEDURE — 80061 LIPID PANEL: CPT | Performed by: INTERNAL MEDICINE

## 2024-06-22 PROCEDURE — 83036 HEMOGLOBIN GLYCOSYLATED A1C: CPT | Performed by: INTERNAL MEDICINE

## 2024-06-22 PROCEDURE — 36415 COLL VENOUS BLD VENIPUNCTURE: CPT | Mod: PO | Performed by: INTERNAL MEDICINE

## 2024-06-22 PROCEDURE — 83970 ASSAY OF PARATHORMONE: CPT | Performed by: INTERNAL MEDICINE

## 2024-06-22 PROCEDURE — 80069 RENAL FUNCTION PANEL: CPT | Performed by: INTERNAL MEDICINE

## 2024-06-22 PROCEDURE — 85025 COMPLETE CBC W/AUTO DIFF WBC: CPT | Performed by: INTERNAL MEDICINE

## 2024-06-22 PROCEDURE — 84550 ASSAY OF BLOOD/URIC ACID: CPT | Performed by: INTERNAL MEDICINE

## 2024-06-22 PROCEDURE — 84156 ASSAY OF PROTEIN URINE: CPT | Performed by: INTERNAL MEDICINE

## 2024-06-22 PROCEDURE — 80053 COMPREHEN METABOLIC PANEL: CPT | Performed by: INTERNAL MEDICINE

## 2024-07-15 DIAGNOSIS — K52.9 GE (GASTROENTERITIS): ICD-10-CM

## 2024-07-15 DIAGNOSIS — Z79.4 TYPE 2 DIABETES MELLITUS WITHOUT COMPLICATION, WITH LONG-TERM CURRENT USE OF INSULIN: ICD-10-CM

## 2024-07-15 DIAGNOSIS — E11.9 TYPE 2 DIABETES MELLITUS WITHOUT COMPLICATION, WITH LONG-TERM CURRENT USE OF INSULIN: ICD-10-CM

## 2024-07-16 RX ORDER — GABAPENTIN 300 MG/1
300 CAPSULE ORAL 3 TIMES DAILY
Qty: 90 CAPSULE | Refills: 3 | Status: SHIPPED | OUTPATIENT
Start: 2024-07-16

## 2024-07-16 RX ORDER — ONDANSETRON 4 MG/1
4 TABLET, FILM COATED ORAL EVERY 8 HOURS PRN
Qty: 18 TABLET | Refills: 6 | Status: SHIPPED | OUTPATIENT
Start: 2024-07-16

## 2024-07-16 NOTE — TELEPHONE ENCOUNTER
No care due was identified.  Health [facility] Embedded Care Due Messages. Reference number: [id number].   7/15/2024 8:31:10 PM CDT

## 2024-07-16 NOTE — TELEPHONE ENCOUNTER
Refill Routing Note   Medication(s) are not appropriate for processing by [facility] for the following reason(s):        Outside of protocol    [facility] action(s):  Route               Appointments  past 12m or future 3m with PCP    Date Provider   Last Visit   2/20/2024 Davon Kan MD   Next Visit   8/5/2024 Davon Kan MD   ED visits in past 90 days: 0        Note composed:8:26 AM 07/16/2024

## 2024-07-17 ENCOUNTER — TELEPHONE (OUTPATIENT)
Dept: ENDOCRINOLOGY | Facility: CLINIC | Age: 59
End: 2024-07-17
Payer: COMMERCIAL

## 2024-07-17 NOTE — TELEPHONE ENCOUNTER
----- Message from Lesli Deleon sent at 7/17/2024 12:29 PM CDT -----  Type: Needs Medical Advice  Who Called:  Lalita with [facility]  Symptoms (please be specific):    How long has patient had these symptoms:    Pharmacy name and phone #:    [id number] [place] [facility] [address] [facility]  [address] [place], [place] [address] [address]  Phone: [phone] Fax: [phone]      Best Call Back Number: [phone]    Additional Information: Pharmacy is calling because pt needs a refill on gabapentin 300  Please call to advise

## 2024-07-23 ENCOUNTER — PATIENT OUTREACH (OUTPATIENT)
Dept: ADMINISTRATIVE | Facility: HOSPITAL | Age: 59
End: 2024-07-23
Payer: COMMERCIAL

## 2024-07-23 NOTE — PROGRESS NOTES
Population Health Chart Review & Patient Outreach Details      Additional [facility] Notes:               Updates Requested / Reviewed:      Care Everywhere, , and Immunizations Reconciliation Completed or Queried: [place]         Health Maintenance Topics Overdue:      [facility] Score: 3     Urine Screening  Eye Exam  Mammogram                       Health Maintenance Topic(s) Outreach Outcomes & Actions Taken:    Primary Care Appt - Outreach Outcomes & Actions Taken  : Primary Care Appt Scheduled

## 2024-08-05 ENCOUNTER — OFFICE VISIT (OUTPATIENT)
Dept: INTERNAL MEDICINE | Facility: CLINIC | Age: 59
End: 2024-08-05
Payer: COMMERCIAL

## 2024-08-05 VITALS
RESPIRATION RATE: 18 BRPM | SYSTOLIC BLOOD PRESSURE: 134 MMHG | HEART RATE: 91 BPM | HEIGHT: 65 IN | BODY MASS INDEX: 35.88 KG/M2 | DIASTOLIC BLOOD PRESSURE: 70 MMHG | WEIGHT: 215.38 LBS | TEMPERATURE: 97 F | OXYGEN SATURATION: 97 %

## 2024-08-05 DIAGNOSIS — E79.0 HYPERURICEMIA: ICD-10-CM

## 2024-08-05 DIAGNOSIS — G89.29 CHRONIC MIDLINE THORACIC BACK PAIN: ICD-10-CM

## 2024-08-05 DIAGNOSIS — M79.675 GREAT TOE PAIN, LEFT: ICD-10-CM

## 2024-08-05 DIAGNOSIS — I10 ESSENTIAL HYPERTENSION: ICD-10-CM

## 2024-08-05 DIAGNOSIS — N18.4 CHRONIC KIDNEY DISEASE, STAGE 4 (SEVERE): ICD-10-CM

## 2024-08-05 DIAGNOSIS — E78.5 DYSLIPIDEMIA: ICD-10-CM

## 2024-08-05 DIAGNOSIS — M54.6 CHRONIC MIDLINE THORACIC BACK PAIN: ICD-10-CM

## 2024-08-05 DIAGNOSIS — E11.42 TYPE 2 DIABETES MELLITUS WITH DIABETIC POLYNEUROPATHY, WITHOUT LONG-TERM CURRENT USE OF INSULIN: Primary | ICD-10-CM

## 2024-08-05 PROCEDURE — 99999 PR PBB SHADOW E&M-EST. PATIENT-LVL V: CPT | Mod: PBBFAC,,, | Performed by: INTERNAL MEDICINE

## 2024-08-05 PROCEDURE — 99214 OFFICE O/P EST MOD 30 MIN: CPT | Mod: S$GLB,,, | Performed by: INTERNAL MEDICINE

## 2024-08-05 NOTE — PROGRESS NOTES
Subjective:       Patient ID: Mary Ann Vidales is a 59 y.o. female.    Chief Complaint: Follow-up (4 months)    HPI  The patient presents for follow-up of medical conditions which include type 2 diabetes mellitus, hypertension, hyperuricemia diabetic neuropathy, chronic kidney disease, chronic thoracic back pain obesity.    The patient has type 2 diabetes mellitus.  Blood sugar levels have been averaging 120+.  No hypoglycemia has been noted.  Current medications include Ozempic 2 mg subQ every 7 days.  Toujeo insulin at a dosage of 32 units at night has also been ordered.  The patient states that she has been off of Toujeo insulin recently due to improvement in her blood sugar levels.  She does have shooting pain in the left foot particularly involving the great toe.  She feels her balance has improved however.    The patient has hypertension.  She is using losartan for blood pressure control.  She does not routinely monitor her own blood pressure readings at home.  She intermittently uses furosemide as needed for management of edema.  We discussed furosemide also has an antihypertensive affect.  The patient has chronic kidney disease.  She has been avoiding NSAIDs.  He tries to follow a low-sodium diet.  She has hyperuricemia.  She is using allopurinol daily.  She has not experienced any recent episodes of gout.    The patient has chronic thoracic back pain.  The pain is nonradiating.  There is no lower extremity weakness.    Review of Systems   Constitutional:  Negative for activity change, appetite change and unexpected weight change.   Eyes:  Negative for visual disturbance.   Respiratory:  Negative for shortness of breath.    Cardiovascular:  Negative for chest pain, palpitations and leg swelling.   Gastrointestinal:  Negative for abdominal pain, blood in stool and diarrhea.   Genitourinary:  Negative for dysuria, frequency, hematuria and urgency.   Musculoskeletal:  Positive for arthralgias and back pain.    Neurological:  Positive for numbness. Negative for weakness and headaches.   Psychiatric/Behavioral:  Negative for sleep disturbance.             Physical Exam  Vitals and nursing note reviewed.   Constitutional:       General: She is not in acute distress.     Appearance: Normal appearance. She is well-developed.      Comments: The patient has lost 3 lb since 02/20/2024.   HENT:      Head: Normocephalic and atraumatic.   Eyes:      General: No scleral icterus.     Extraocular Movements: Extraocular movements intact.      Conjunctiva/sclera: Conjunctivae normal.   Neck:      Thyroid: No thyromegaly.      Vascular: No JVD.   Cardiovascular:      Rate and Rhythm: Normal rate and regular rhythm.      Heart sounds: Normal heart sounds. No murmur heard.     No friction rub. No gallop.   Pulmonary:      Effort: Pulmonary effort is normal. No respiratory distress.      Breath sounds: Normal breath sounds. No wheezing or rales.   Abdominal:      General: Bowel sounds are normal.      Palpations: Abdomen is soft. There is no mass.      Tenderness: There is no abdominal tenderness.   Musculoskeletal:         General: Tenderness present.      Cervical back: Normal range of motion and neck supple.      Right lower leg: No edema.      Left lower leg: No edema.      Comments: Left shoulder:  Decreased abduction and posterior rotation.    Left knee: Tenderness is present on range-of-motion.  Decreased flexion noted.   Lymphadenopathy:      Cervical: No cervical adenopathy.   Skin:     General: Skin is warm and dry.      Findings: No rash.      Comments: No foot lesions are present.   Neurological:      Mental Status: She is alert and oriented to person, place, and time.      Cranial Nerves: No cranial nerve deficit.      Comments: Sensation is absent in the left great toe on monofilament testing.   Psychiatric:         Mood and Affect: Mood normal.         Behavior: Behavior normal.       Protective Sensation (w/ 10 gram  monofilament):  Right: Intact  Left: Absent    Visual Inspection:  Normal -  Bilateral    Pedal Pulses:   Right: Present  Left: Present    Posterior Tibialis Pulses:   Right:Present  Left: Present      Lab Visit on 06/22/2024   Component Date Value Ref Range Status    Sodium 06/22/2024 138  136 - 145 mmol/L Final    Potassium 06/22/2024 3.5  3.5 - 5.1 mmol/L Final    Chloride 06/22/2024 102  95 - 110 mmol/L Final    CO2 06/22/2024 23  23 - 29 mmol/L Final    Glucose 06/22/2024 112 (H)  70 - 110 mg/dL Final    BUN 06/22/2024 45 (H)  6 - 20 mg/dL Final    Creatinine 06/22/2024 1.9 (H)  0.5 - 1.4 mg/dL Final    Calcium 06/22/2024 9.7  8.7 - 10.5 mg/dL Final    Total Protein 06/22/2024 6.3  6.0 - 8.4 g/dL Final    Albumin 06/22/2024 3.5  3.5 - 5.2 g/dL Final    Total Bilirubin 06/22/2024 0.3  0.1 - 1.0 mg/dL Final    Comment: For infants and newborns, interpretation of results should be based  on gestational age, weight and in agreement with clinical  observations.    Premature Infant recommended reference ranges:  Up to 24 hours.............<8.0 mg/dL  Up to 48 hours............<12.0 mg/dL  3-5 days..................<15.0 mg/dL  6-29 days.................<15.0 mg/dL      Alkaline Phosphatase 06/22/2024 97  55 - 135 U/L Final    AST 06/22/2024 30  10 - 40 U/L Final    ALT 06/22/2024 25  10 - 44 U/L Final    eGFR 06/22/2024 30.4 (A)  >60 mL/min/1.73 m^2 Final    Anion Gap 06/22/2024 13  8 - 16 mmol/L Final    Cholesterol 06/22/2024 144  120 - 199 mg/dL Final    Comment: The National Cholesterol Education Program (NCEP) has set the  following guidelines (reference ranges) for Cholesterol:  Optimal.....................<200 mg/dL  Borderline High.............200-239 mg/dL  High........................> or = 240 mg/dL      Triglycerides 06/22/2024 183 (H)  30 - 150 mg/dL Final    Comment: The National Cholesterol Education Program (NCEP) has set the  following guidelines (reference values) for  triglycerides:  Normal......................<150 mg/dL  Borderline High.............150-199 mg/dL  High........................200-499 mg/dL      HDL 06/22/2024 38 (L)  40 - 75 mg/dL Final    Comment: The National Cholesterol Education Program (NCEP) has set the  following guidelines (reference values) for HDL Cholesterol:  Low...............<40 mg/dL  Optimal...........>60 mg/dL      LDL Cholesterol 06/22/2024 69.4  63.0 - 159.0 mg/dL Final    Comment: The National Cholesterol Education Program (NCEP) has set the  following guidelines (reference values) for LDL Cholesterol:  Optimal.......................<130 mg/dL  Borderline High...............130-159 mg/dL  High..........................160-189 mg/dL  Very High.....................>190 mg/dL      HDL/Cholesterol Ratio 06/22/2024 26.4  20.0 - 50.0 % Final    Total Cholesterol/HDL Ratio 06/22/2024 3.8  2.0 - 5.0 Final    Non-HDL Cholesterol 06/22/2024 106  mg/dL Final    Comment: Risk category and Non-HDL cholesterol goals:  Coronary heart disease (CHD)or equivalent (10-year risk of CHD >20%):  Non-HDL cholesterol goal     <130 mg/dL  Two or more CHD risk factors and 10-year risk of CHD <= 20%:  Non-HDL cholesterol goal     <160 mg/dL  0 to 1 CHD risk factor:  Non-HDL cholesterol goal     <190 mg/dL      Hemoglobin A1C 06/22/2024 5.5  4.0 - 5.6 % Final    Comment: ADA Screening Guidelines:  5.7-6.4%  Consistent with prediabetes  >or=6.5%  Consistent with diabetes    High levels of fetal hemoglobin interfere with the HbA1C  assay. Heterozygous hemoglobin variants (HbS, HgC, etc)do  not significantly interfere with this assay.   However, presence of multiple variants may affect accuracy.      Estimated Avg Glucose 06/22/2024 111  68 - 131 mg/dL Final    WBC 06/22/2024 7.82  3.90 - 12.70 K/uL Final    RBC 06/22/2024 3.36 (L)  4.00 - 5.40 M/uL Final    Hemoglobin 06/22/2024 11.1 (L)  12.0 - 16.0 g/dL Final    Hematocrit 06/22/2024 33.3 (L)  37.0 - 48.5 % Final    MCV  06/22/2024 99 (H)  82 - 98 fL Final    MCH 06/22/2024 33.0 (H)  27.0 - 31.0 pg Final    MCHC 06/22/2024 33.3  32.0 - 36.0 g/dL Final    RDW 06/22/2024 13.4  11.5 - 14.5 % Final    Platelets 06/22/2024 127 (L)  150 - 450 K/uL Final    MPV 06/22/2024 9.9  9.2 - 12.9 fL Final    Immature Granulocytes 06/22/2024 0.4  0.0 - 0.5 % Final    Gran # (ANC) 06/22/2024 4.4  1.8 - 7.7 K/uL Final    Immature Grans (Abs) 06/22/2024 0.03  0.00 - 0.04 K/uL Final    Comment: Mild elevation in immature granulocytes is non specific and   can be seen in a variety of conditions including stress response,   acute inflammation, trauma and pregnancy. Correlation with other   laboratory and clinical findings is essential.      Lymph # 06/22/2024 2.6  1.0 - 4.8 K/uL Final    Mono # 06/22/2024 0.5  0.3 - 1.0 K/uL Final    Eos # 06/22/2024 0.2  0.0 - 0.5 K/uL Final    Baso # 06/22/2024 0.06  0.00 - 0.20 K/uL Final    nRBC 06/22/2024 0  0 /100 WBC Final    Gran % 06/22/2024 56.5  38.0 - 73.0 % Final    Lymph % 06/22/2024 33.5  18.0 - 48.0 % Final    Mono % 06/22/2024 6.4  4.0 - 15.0 % Final    Eosinophil % 06/22/2024 2.4  0.0 - 8.0 % Final    Basophil % 06/22/2024 0.8  0.0 - 1.9 % Final    Differential Method 06/22/2024 Automated   Final    Uric Acid 06/22/2024 5.0  2.4 - 5.7 mg/dL Final    Protein, Urine Random 06/22/2024 46 (H)  0 - 15 mg/dL Final    Creatinine, Urine 06/22/2024 108.0  15.0 - 325.0 mg/dL Final    Prot/Creat Ratio, Urine 06/22/2024 0.43 (H)  0.00 - 0.20 Final    PTH, Intact 06/22/2024 64.6  9.0 - 77.0 pg/mL Final    Glucose 06/22/2024 112 (H)  70 - 110 mg/dL Final    Sodium 06/22/2024 138  136 - 145 mmol/L Final    Potassium 06/22/2024 3.5  3.5 - 5.1 mmol/L Final    Chloride 06/22/2024 102  95 - 110 mmol/L Final    CO2 06/22/2024 23  23 - 29 mmol/L Final    BUN 06/22/2024 45 (H)  6 - 20 mg/dL Final    Calcium 06/22/2024 9.7  8.7 - 10.5 mg/dL Final    Creatinine 06/22/2024 1.9 (H)  0.5 - 1.4 mg/dL Final    Albumin 06/22/2024 3.5   3.5 - 5.2 g/dL Final    Phosphorus 06/22/2024 3.5  2.7 - 4.5 mg/dL Final    eGFR 06/22/2024 30.4 (A)  >60 mL/min/1.73 m^2 Final    Anion Gap 06/22/2024 13  8 - 16 mmol/L Final       Assessment & Plan:      Mary Ann was seen today for follow-up.  The patient will be referred to pain management for consultation to address neuropathic pain involving the left foot.  She describes the pain as a shooting electrical shock type pain.    Current therapy will be continued for diabetes and hypertension and hyperuricemia.  Updated lab studies will be obtained in 4 months.  Uric acid level will also be obtained with the next updated lab.    Diagnoses and all orders for this visit:    Type 2 diabetes mellitus with diabetic polyneuropathy, without long-term current use of insulin  -     Ambulatory referral/consult to Pain Clinic; Future  -     Comprehensive Metabolic Panel; Future  -     CBC Auto Differential; Future  -     Hemoglobin A1C; Future  -     Uric Acid; Future    Essential hypertension  -     Comprehensive Metabolic Panel; Future  -     CBC Auto Differential; Future  -     Hemoglobin A1C; Future  -     Uric Acid; Future    Chronic kidney disease, stage 4 (severe)  -     Comprehensive Metabolic Panel; Future  -     CBC Auto Differential; Future  -     Hemoglobin A1C; Future  -     Uric Acid; Future    Dyslipidemia  -     Comprehensive Metabolic Panel; Future  -     CBC Auto Differential; Future  -     Hemoglobin A1C; Future  -     Uric Acid; Future    Hyperuricemia  -     Comprehensive Metabolic Panel; Future  -     CBC Auto Differential; Future  -     Hemoglobin A1C; Future  -     Uric Acid; Future    Great toe pain, left  -     Ambulatory referral/consult to Pain Clinic; Future    Chronic midline thoracic back pain         Follow up in about 4 months (around 12/5/2024).     Davon Kan MD

## 2024-08-12 ENCOUNTER — OFFICE VISIT (OUTPATIENT)
Dept: PAIN MEDICINE | Facility: CLINIC | Age: 59
End: 2024-08-12
Payer: COMMERCIAL

## 2024-08-12 VITALS
BODY MASS INDEX: 35.99 KG/M2 | DIASTOLIC BLOOD PRESSURE: 81 MMHG | SYSTOLIC BLOOD PRESSURE: 139 MMHG | HEART RATE: 76 BPM | WEIGHT: 212.94 LBS

## 2024-08-12 DIAGNOSIS — M79.675 GREAT TOE PAIN, LEFT: Primary | ICD-10-CM

## 2024-08-12 DIAGNOSIS — M54.16 LUMBAR RADICULOPATHY, CHRONIC: ICD-10-CM

## 2024-08-12 DIAGNOSIS — E11.42 TYPE 2 DIABETES MELLITUS WITH DIABETIC POLYNEUROPATHY, WITHOUT LONG-TERM CURRENT USE OF INSULIN: ICD-10-CM

## 2024-08-12 DIAGNOSIS — M43.16 SPONDYLOLISTHESIS OF LUMBAR REGION: ICD-10-CM

## 2024-08-12 DIAGNOSIS — M54.6 PAIN IN THORACIC SPINE: ICD-10-CM

## 2024-08-12 PROCEDURE — 99999 PR PBB SHADOW E&M-EST. PATIENT-LVL V: CPT | Mod: PBBFAC,,, | Performed by: PHYSICIAN ASSISTANT

## 2024-08-12 PROCEDURE — 99214 OFFICE O/P EST MOD 30 MIN: CPT | Mod: S$GLB,,, | Performed by: PHYSICIAN ASSISTANT

## 2024-08-12 RX ORDER — ALLOPURINOL 100 MG/1
200 TABLET ORAL
Qty: 180 TABLET | Refills: 1 | Status: SHIPPED | OUTPATIENT
Start: 2024-08-12

## 2024-08-12 NOTE — PROGRESS NOTES
[facility] Follow Up Visit      Referred by: Dr. Davon Kan    PCP: Dr Kan    CC:   Chief Complaint   Patient presents with    Low-back Pain    Foot Pain          8/12/2024     7:39 AM   Last 3 PDI Scores   Pain Disability Index (PDI) 4       HPI:   Mary Ann Vidales is a 59 y.o. female patient who has a past medical history of Anticoagulant long-term use, Chronic asthma, Coronary artery disease, Diabetes mellitus type II, Fibroids, Hypertension, Incontinence, Morbid obesity, Neuropathy in diabetes, Obesity, TINA (obstructive sleep apnea), Panic attacks, and Renal disorder. She presents with back pain and left great toe pain.  Her back pain is located in the mid back and low back, pain can be present in either location but these do not connect.  Her worst pain is currently in her left great toe for over the last year.  She describes this as random shocks.  She denies any history of lumbar surgery or left great toe surgery.  She denies any injury to the toe.  She has seen Podiatry, had an injection into the great toe without any relief.  She denies any color changes, swelling or allodynia to the great toe.  She does have some slight allodynia to the left lateral foot.  She has diabetic neuropathy bilaterally but she feels like the toe pain is different than the numbness and tingling she gets with her neuropathy.  She has seen us in the past and underwent Qutenza treatment, did not have the left great toe pain at that time.  She had the treatment once which helped her and she was able to reduce her gabapentin from 3200 mg daily down to 600-900 mg daily.  She has been diagnosed with gout and had elevated uric acid in the past, most recently normal, controlled with allopurinol daily.  She denies having any weakness, bladder or bowel incontinence.      Pain Intervention History:      Past Spine Surgical History:      Past and current medications:  Antineuropathics:  Gabapentin 300 mg in the morning  and 300-600 mg at night.  Lyrica caused swelling in her legs.  NSAIDs:  Physical therapy:  Antidepressants:  Fluoxetine  Muscle relaxers:  Tizanidine  Opioids:  Hydrocodone  Antiplatelets/Anticoagulants:    History:    Current Outpatient Medications:     albuterol (PROVENTIL/VENTOLIN HFA) 90 mcg/actuation inhaler, INHALE 2 PUFFS INTO LUNGS EVERY 4 TO 6 HOURS AS NEEDED FOR SHORTNESS OF BREATH, Disp: 18 g, Rfl: 2    ALPRAZolam (XANAX) 0.5 MG tablet, TAKE 1 TABLET BY MOUTH EVERY DAY, Disp: 30 tablet, Rfl: 2    atorvastatin (LIPITOR) 20 MG tablet, Take 20 mg by mouth once daily., Disp: , Rfl:     blood sugar diagnostic Strp, Pt to check glucoses TID, Disp: 100 strip, Rfl: 12    blood sugar diagnostic Strp, To check BG 3 times daily, to use with insurance preferred meter, Disp: 100 each, Rfl: prn    clopidogrel (PLAVIX) 75 mg tablet, Take 75 mg by mouth once daily., Disp: , Rfl: 11    dextroamphetamine-amphetamine 30 mg Tab, Take 1 tablet by mouth every morning., Disp: , Rfl:     ezetimibe (ZETIA) 10 mg tablet, Take 10 mg by mouth once daily., Disp: , Rfl:     FLUoxetine 40 MG capsule, TAKE 1 CAPSULE BY MOUTH EVERY DAY, Disp: 90 capsule, Rfl: 2    fluticasone propionate (FLONASE) 50 mcg/actuation nasal spray, by Each Nostril route., Disp: , Rfl:     furosemide (LASIX) 40 MG tablet, Take 1 tablet (40 mg total) by mouth once daily., Disp: 90 tablet, Rfl: 3    gabapentin (NEURONTIN) 300 MG capsule, TAKE 1 CAPSULE BY MOUTH THREE TIMES A DAY, Disp: 90 capsule, Rfl: 3    glucosamine-chondroitin 250-200 mg Tab, Take 1 tablet by mouth once daily., Disp: , Rfl:     HYDROcodone-acetaminophen (NORCO) 7.5-325 mg per tablet, Take 1 tablet by mouth every 6 (six) hours as needed for Pain., Disp: 90 tablet, Rfl: 0    lancets (ONETOUCH ULTRASOFT LANCETS) Misc, Use to check glucoses up to TID., Disp: 100 each, Rfl: 0    lancets Misc, To check BG 3 times daily, to use with insurance preferred meter, Disp: 100 each, Rfl: prn    losartan  "(COZAAR) 50 MG tablet, Take 50 mg by mouth once daily., Disp: , Rfl:     nitroGLYCERIN (NITROSTAT) 0.4 MG SL tablet, PLACE 1 TABLET UNDER TONGUE EVERY 5 MINS, UP TO 3 DOSES AS NEEDED FOR CHEST PAIN, Disp: , Rfl: 11    ondansetron (ZOFRAN) 4 MG tablet, TAKE 1 TABLET BY MOUTH EVERY 8 HOURS AS NEEDED FOR NAUSEA, Disp: 18 tablet, Rfl: 6    pantoprazole (PROTONIX) 40 MG tablet, TAKE 1 TABLET BY MOUTH EVERY DAY, Disp: 90 tablet, Rfl: 2    pen needle, diabetic (BD ULTRA-FINE DEJON PEN NEEDLE) 32 gauge x 5/32" Ndle, Use with insulin once daily., Disp: 150 each, Rfl: 6    primidone (MYSOLINE) 50 MG Tab, Take 50 mg by mouth 2 (two) times daily., Disp: , Rfl:     semaglutide (OZEMPIC) 2 mg/dose (8 mg/3 mL) PnIj, Inject 2 mg into the skin every 7 days., Disp: 3 mL, Rfl: 11    tiZANidine (ZANAFLEX) 4 MG tablet, TAKE 1 TABLET (4 MG TOTAL) BY MOUTH EVERY 12 (TWELVE) HOURS AS NEEDED (MUSCLE SPASMS/ PAIN)., Disp: 30 tablet, Rfl: 0    TOUJEO SOLOSTAR U-300 INSULIN 300 unit/mL (1.5 mL) InPn pen, INJECT 30 UNITS INTO THE SKIN EVERY EVENING, Disp: 2 pen , Rfl: 6    allopurinoL (ZYLOPRIM) 100 MG tablet, TAKE 2 TABLETS BY MOUTH EVERY DAY, Disp: 180 tablet, Rfl: 1    Past Medical History:   Diagnosis Date    Anticoagulant long-term use     Chronic asthma     Coronary artery disease     Diabetes mellitus type II     Fibroids     Hypertension     Incontinence     Morbid obesity 11/3/2015    Neuropathy in diabetes     Obesity     TINA (obstructive sleep apnea)     uses CPAP    Panic attacks     Renal disorder     STAGE 3       Past Surgical History:   Procedure Laterality Date    CARDIAC CATHETERIZATION  07/05/2019    STENT IN LAD    CORONARY ANGIOGRAPHY N/A 11/6/2020    Procedure: ANGIOGRAM, CORONARY ARTERY;  Surgeon: John Francis MD;  Location: [facility] CATH;  Service: Cardiology;  Laterality: N/A;    CORONARY ANGIOPLASTY WITH STENT PLACEMENT  2019    ECTOPIC PREGNANCY SURGERY      HERNIA REPAIR      HYSTERECTOMY      OOPHORECTOMY      " TENOTOMY Right 3/4/2022    Procedure: TENOTOMY- Tx Bone Left achilles;  Surgeon: Sarwat Vaughn MD;  Location: [facility];  Service: Orthopedics;  Laterality: Right;    TONSILLECTOMY         Family History   Problem Relation Name Age of Onset    Diabetes Mother      Heart disease Mother      COPD Mother      Heart failure Father      Breast cancer Neg Hx      Ovarian cancer Neg Hx         Social History     Socioeconomic History    Marital status:    Tobacco Use    Smoking status: Never    Smokeless tobacco: Never   Substance and Sexual Activity    Alcohol use: No     Alcohol/week: 0.0 standard drinks of alcohol    Drug use: No    Sexual activity: Yes   Social History Narrative     at [employer]. Lives with her partner, who smokes.     Social Determinants of Health     Financial Resource Strain: Patient Declined (2/5/2024)    Overall Financial Resource Strain (CARDIA)     Difficulty of Paying Living Expenses: Patient declined   Food Insecurity: Patient Declined (2/5/2024)    Hunger Vital Sign     Worried About Running Out of Food in the Last Year: Patient declined     Ran Out of Food in the Last Year: Patient declined   Transportation Needs: No Transportation Needs (2/5/2024)    PRAPARE - Transportation     Lack of Transportation (Medical): No     Lack of Transportation (Non-Medical): No   Physical Activity: Insufficiently Active (2/5/2024)    Exercise Vital Sign     Days of Exercise per Week: 2 days     Minutes of Exercise per Session: 20 min   Stress: No Stress Concern Present (2/5/2024)    [employer] [place] [employer] - Occupational Stress Questionnaire     Feeling of Stress : Only a little   Housing Stability: Patient Declined (2/5/2024)    Housing Stability Vital Sign     Unable to Pay for Housing in the Last Year: Patient declined     Number of Places Lived in the Last Year: 1     Unstable Housing in the Last Year: Patient declined       Review of patient's allergies  indicates:   Allergen Reactions    Captopril      Other reaction(s): cough    Lantus [insulin glargine] Swelling and Other (See Comments)     Burning and redness in the legs    Levemir [insulin detemir] Swelling and Other (See Comments)     Burning and redness of lower extremities    Lisinopril Other (See Comments)     Other reaction(s): cough    Other      Tape causes welps & hives    Antiseptic swabs Rash     Antiseptic wipes given prior to surgery caused severe rash.        Review of Systems:  The patient reports back pain.  Balance of review of systems is negative.    Physical Exam:  Vitals:    08/12/24 0743   BP: 139/81   Pulse: 76   Weight: 96.6 kg (212 lb 15.4 oz)   PainSc: 0-No pain   PainLoc: Back     Body mass index is 35.99 kg/m².    Gen: NAD  Psych: mood appropriate for given condition  HEENT: eyes anicteric   CV: RRR  HEENT: anicteric   Respiratory: non-labored, no signs of respiratory distress  Abd: non-distended  Skin: warm, dry and intact.  Gait: No antalgic gait.     Lumbar spine:  Lumbar spine: ROM is full with flexion extension and oblique extension with no increased pain.    Richie's test causes no increased pain on either side.    Supine straight leg raise is negative bilaterally.    Internal and external rotation of the hip causes no increased pain on either side.  Myofascial exam:  Mild tenderness to palpation to the midthoracic region and bilateral lower lumbar region.      Left great toe:  No visible swelling or color changes, no allodynia.  No tenderness with palpation or range of motion active or passive.  She has slight allodynia to the left lateral foot.    Sensory:  Intact and symmetrical to light touch in L1-S1 dermatomes bilaterally.    Motor:     Right Left   L2/3 Iliacus Hip flexion  5  5   L3/4 Qudratus Femoris Knee Extension  5  5   L4/5 Tib Anterior Ankle Dorsiflexion   5  5   L5/S1 Extensor Hallicus Longus Great toe extension  5  5   S1/S2 Gastroc/Soleus Plantar Flexion  5  5       Right Left   Triceps DTR     Biceps DTR     Brachioradialis DTR     Patellar DTR 0+ 0+   Achilles DTR 0+ 0+   Parr     Clonus     Babinski         Labs:  Lab Results   Component Value Date    HGBA1C 5.5 2024       Lab Results   Component Value Date    WBC 7.82 2024    HGB 11.1 (L) 2024    HCT 33.3 (L) 2024    MCV 99 (H) 2024     (L) 2024           Imagin2023 x-ray thoracic spine  Osseous structures demonstrate diffuse demineralization. Mild rotatory dextroconvex curvature of the thoracolumbar spine. Exaggeration of the normal thoracic kyphosis. Vertebral body heights appear maintained. No definite fracture or subluxation. Multilevel disc degeneration with intervertebral disc space narrowing, degenerative endplate change and marginal osteophyte formation, most pronounced in the mid to lower thoracic spine.     2023 x-ray lumbar spine flexion/extension  Osseous structures demonstrate diffuse demineralization. Rotatory dextroconvex curvature of the lumbar spine. Grade 1 anterolisthesis of L4 on L5. No dynamic instability. Lumbar lordosis is maintained. Vertebral body heights are preserved. Severe disc space narrowing at L4-5 with degenerative endplate change and marginal osteophyte formation. Lower lumbar facet arthropathy.     2024 x-ray left great toe  Three views of the left great toe show no acute fracture or dislocation.  There are mild degenerative changes.  Small juxta-articular ossifications are seen at the 2nd metatarsophalangeal joint.        Assessment:   Problem List Items Addressed This Visit    None  Visit Diagnoses       Great toe pain, left    -  Primary    Lumbar radiculopathy, chronic        Relevant Orders    MRI Lumbar Spine Without Contrast    Spondylolisthesis of lumbar region        Pain in thoracic spine        Relevant Orders    MRI Thoracic Spine Without Contrast    Type 2 diabetes mellitus with diabetic polyneuropathy,  without long-term current use of insulin                  Mary Ann Vidales is a 59 y.o. female patient who has a past medical history of Anticoagulant long-term use, Chronic asthma, Coronary artery disease, Diabetes mellitus type II, Fibroids, Hypertension, Incontinence, Morbid obesity, Neuropathy in diabetes, Obesity, TINA (obstructive sleep apnea), Panic attacks, and Renal disorder. She presents with back pain and left great toe pain.  Her back pain is located in the mid back and low back, pain can be present in either location but these do not connect.  Her worst pain is currently in her left great toe for over the last year.  She describes this as random shocks.  She denies any history of lumbar surgery or left great toe surgery.  She denies any injury to the toe.  She has seen Podiatry, had an injection into the great toe without any relief.  She denies any color changes, swelling or allodynia to the great toe.  She does have some slight allodynia to the left lateral foot.  She has diabetic neuropathy bilaterally but she feels like the toe pain is different than the numbness and tingling she gets with her neuropathy.  She has seen us in the past and underwent Qutenza treatment, did not have the left great toe pain at that time.  She had the treatment once which helped her and she was able to reduce her gabapentin from 3200 mg daily down to 600-900 mg daily.  She has been diagnosed with gout and had elevated uric acid in the past, most recently normal, controlled with allopurinol daily.  She denies having any weakness, bladder or bowel incontinence.    1. I reviewed the patient's lumbar spine x-rays with her and we discussed that she does have spondylolisthesis at L4/5 which can potentially cause some canal or lateral recess stenosis and an L5 radiculopathy.  I am going to schedule both a lumbar and thoracic spine MRI to further evaluate her symptoms.  I do not think she has CRPS at this time, she does not  fit the [place] criteria and also denies any history of surgery or trauma to the left great toe.  I also do not think she is currently experiencing gout, recent uric acid levels were normal and she takes allopurinol daily.  2. We discussed that depending on the MRI results we can consider an epidural steroid injection.  3. She will follow-up to review the imaging and discuss recommendations.      : Reviewed and consistent with medication use as prescribed.        This note was completed with dictation software and grammatical errors may exist.

## 2024-08-19 ENCOUNTER — PATIENT OUTREACH (OUTPATIENT)
Dept: ADMINISTRATIVE | Facility: HOSPITAL | Age: 59
End: 2024-08-19
Payer: COMMERCIAL

## 2024-08-29 ENCOUNTER — TELEPHONE (OUTPATIENT)
Dept: PAIN MEDICINE | Facility: CLINIC | Age: 59
End: 2024-08-29
Payer: COMMERCIAL

## 2024-09-06 DIAGNOSIS — E11.42 TYPE 2 DIABETES MELLITUS WITH DIABETIC POLYNEUROPATHY, WITHOUT LONG-TERM CURRENT USE OF INSULIN: ICD-10-CM

## 2024-09-06 DIAGNOSIS — S86.011S RUPTURE OF RIGHT ACHILLES TENDON, SEQUELA: ICD-10-CM

## 2024-09-06 NOTE — TELEPHONE ENCOUNTER
No care due was identified.  [facility] Embedded Care Due Messages. Reference number: [id number].   9/06/2024 5:31:28 PM CDT

## 2024-09-10 RX ORDER — HYDROCODONE BITARTRATE AND ACETAMINOPHEN 7.5; 325 MG/1; MG/1
1 TABLET ORAL EVERY 6 HOURS PRN
Qty: 90 TABLET | Refills: 0 | Status: SHIPPED | OUTPATIENT
Start: 2024-09-10

## 2024-09-30 PROBLEM — M54.6 CHRONIC MIDLINE THORACIC BACK PAIN: Status: ACTIVE | Noted: 2024-09-30

## 2024-09-30 PROBLEM — E79.0 HYPERURICEMIA: Status: ACTIVE | Noted: 2024-09-30

## 2024-09-30 PROBLEM — G89.29 CHRONIC MIDLINE THORACIC BACK PAIN: Status: ACTIVE | Noted: 2024-09-30

## 2024-10-04 ENCOUNTER — OFFICE VISIT (OUTPATIENT)
Dept: ENDOCRINOLOGY | Facility: CLINIC | Age: 59
End: 2024-10-04
Payer: COMMERCIAL

## 2024-10-04 DIAGNOSIS — E78.5 DYSLIPIDEMIA: ICD-10-CM

## 2024-10-04 DIAGNOSIS — N18.32 STAGE 3B CHRONIC KIDNEY DISEASE: ICD-10-CM

## 2024-10-04 DIAGNOSIS — I25.10 CORONARY ARTERY DISEASE, UNSPECIFIED VESSEL OR LESION TYPE, UNSPECIFIED WHETHER ANGINA PRESENT, UNSPECIFIED WHETHER NATIVE OR TRANSPLANTED HEART: ICD-10-CM

## 2024-10-04 DIAGNOSIS — E11.42 TYPE 2 DIABETES MELLITUS WITH DIABETIC POLYNEUROPATHY, WITHOUT LONG-TERM CURRENT USE OF INSULIN: Primary | ICD-10-CM

## 2024-10-04 DIAGNOSIS — G63 POLYNEUROPATHY ASSOCIATED WITH UNDERLYING DISEASE: ICD-10-CM

## 2024-10-04 DIAGNOSIS — I10 ESSENTIAL HYPERTENSION: ICD-10-CM

## 2024-10-04 NOTE — PROGRESS NOTES
Subjective:      Patient ID: Mary Ann Vidales is a 59 y.o. female.    Chief Complaint:  Diabetes f/u    The patient location is: [facility] [place]  The chief complaint leading to consultation is: diabetes     Visit type: audiovisual    Face to Face time with patient: 14 m in   25  minutes of total time spent on the encounter, which includes face to face time and non-face to face time preparing to see the patient (eg, review of tests), Obtaining and/or reviewing separately obtained history, Documenting clinical information in the electronic or other health record, Independently interpreting results (not separately reported) and communicating results to the patient/family/caregiver, or Care coordination (not separately reported).     Each patient to whom he or she provides medical services by telemedicine is:  (1) informed of the relationship between the physician and patient and the respective role of any other health care provider with respect to management of the patient; and (2) notified that he or she may decline to receive medical services by telemedicine and may withdraw from such care at any time.    Notes:      History of Present Illness  Pt is a 60y/o female with TYPE 2 DM since approx 2000, and chronic conditions pending review including HTN, neuropathy, CKD state 3b , TINA on C-pap. Dyslipidemia, morbid obesity. Also TINA on cpap-    Interim Events: Oct 4, 2024: No acute events or new focal complaints. Biggest complaint continues to be LE neuropathy--and horrible intermittent shocking sensation of a great toe--whose etiology is likely contributed to back degeneration--MRI deferred at present r/t cost of co pay.  Regarding DM--currently excellent control. Pt has lost a lot of wt over the last several years.  Now off all DM agents with exception of 2 mg ozempic weekly.  States FBS usually about 120.  CKD stage 3;/4 stable.     Reports wt 212  States BP good.      April 30. 2024 No acute events or focal complaints.  Inquiring if she can stop insulin and increase Ozempic, ALso c/o severe L great toe burning and with electric shocks interfering with sleep--wants to increase gabapentin.    Checking glucoses qam.     FBS usually 110-120.     Current DM meds:    2 mg ozempic.        Failed DM meds:  metformin, jardicance (renal). Levemir and Lantus---marked LE erythema and burning in LE on both insulins. Toujeo d/c abating need.        Back up plan for insulin pump hiatus:   Statin: atorvastatin 10 mg        Not tolerated statin : na   ACE/ARB:losartan 50 mg        Not tolerated ACE/ARB: na   Known Diabetic complications: nephropathy, neuropathy.      July 21, 2023:  Pt not seen in almost a year.  Being followed by renal for now CKD stage 4--but has been stable with GFR hovering 35 to 29 last year.  As a result I did advise her to stop the jardiance a couple of months ago.  She has lost close to 100 lbs over the last year and reports wt as 225 lbs.  She is inquiring about mounjaro for better wt loss benefits as a alternate to Ozempic       Aug 2022L Multiple issues in the interim r/t worsened renal function, marked LE edema--which was likely a result of pt being taken off gabapentin and switched to Lyrica--but since the lyrica wasn't providing the desired relief was taking more than renal dose allowed on both.  DM control has markedly worsened. She has also resumed metformin trying to improve glucoses.  Only checking 1 every day or QOD but always > 200,  And per labs 260 last week. We did add Jardiance to help wit the fluid at least visit.     Review of Systems   Constitutional: Negative.  Negative for fatigue and unexpected weight change.   HENT:  Negative for hearing loss and trouble swallowing.    Eyes:  Negative for photophobia and visual disturbance.   Respiratory:  Negative for cough and shortness of breath.    Cardiovascular:  Negative for chest pain and leg swelling.   Gastrointestinal:  Negative for constipation and  diarrhea.   Genitourinary:  Negative for difficulty urinating and frequency.   Musculoskeletal:  Negative for arthralgias and myalgias.   Skin:  Negative for rash and wound.   Neurological:  Positive for numbness. Negative for weakness.   Psychiatric/Behavioral:  Negative for sleep disturbance. The patient is not nervous/anxious.        Objective:   Physical Exam  Constitutional:       Appearance: Normal appearance. She is obese.   HENT:      Head: Normocephalic and atraumatic.      Nose: Nose normal.   Neurological:      General: No focal deficit present.      Mental Status: She is alert and oriented to person, place, and time.   Psychiatric:         Mood and Affect: Mood normal.         Behavior: Behavior normal.         Thought Content: Thought content normal.         Judgment: Judgment normal.       LMP 12/20/2013     Hemoglobin A1C   Date Value Ref Range Status   06/22/2024 5.5 4.0 - 5.6 % Final     Comment:     ADA Screening Guidelines:  5.7-6.4%  Consistent with prediabetes  >or=6.5%  Consistent with diabetes    High levels of fetal hemoglobin interfere with the HbA1C  assay. Heterozygous hemoglobin variants (HbS, HgC, etc)do  not significantly interfere with this assay.   However, presence of multiple variants may affect accuracy.     04/10/2024 5.4 4.0 - 5.6 % Final     Comment:     ADA Screening Guidelines:  5.7-6.4%  Consistent with prediabetes  >or=6.5%  Consistent with diabetes    High levels of fetal hemoglobin interfere with the HbA1C  assay. Heterozygous hemoglobin variants (HbS, HgC, etc)do  not significantly interfere with this assay.   However, presence of multiple variants may affect accuracy.     10/09/2023 5.4 4.0 - 5.6 % Final     Comment:     ADA Screening Guidelines:  5.7-6.4%  Consistent with prediabetes  >or=6.5%  Consistent with diabetes    High levels of fetal hemoglobin interfere with the HbA1C  assay. Heterozygous hemoglobin variants (HbS, HgC, etc)do  not significantly interfere with  this assay.   However, presence of multiple variants may affect accuracy.         Chemistry        Component Value Date/Time     06/22/2024 0739     06/22/2024 0739    K 3.5 06/22/2024 0739    K 3.5 06/22/2024 0739     06/22/2024 0739     06/22/2024 0739    CO2 23 06/22/2024 0739    CO2 23 06/22/2024 0739    BUN 45 (H) 06/22/2024 0739    BUN 45 (H) 06/22/2024 0739    CREATININE 1.9 (H) 06/22/2024 0739    CREATININE 1.9 (H) 06/22/2024 0739     (H) 06/22/2024 0739     (H) 06/22/2024 0739        Component Value Date/Time    CALCIUM 9.7 06/22/2024 0739    CALCIUM 9.7 06/22/2024 0739    ALKPHOS 97 06/22/2024 0739    AST 30 06/22/2024 0739    ALT 25 06/22/2024 0739    BILITOT 0.3 06/22/2024 0739    ESTGFRAFRICA 23.1 (A) 05/16/2022 0736    ESTGFRAFRICA 23.1 (A) 05/16/2022 0736    EGFRNONAA 20.0 (A) 05/16/2022 0736    EGFRNONAA 20.0 (A) 05/16/2022 0736          Lab Results   Component Value Date    CHOL 144 06/22/2024     Lab Results   Component Value Date    HDL 38 (L) 06/22/2024     Lab Results   Component Value Date    LDLCALC 69.4 06/22/2024     Lab Results   Component Value Date    TRIG 183 (H) 06/22/2024     Lab Results   Component Value Date    CHOLHDL 26.4 06/22/2024     Lab Results   Component Value Date    MICALBCREAT 127.9 (H) 06/01/2023     Lab Results   Component Value Date    TSH 2.655 04/10/2024     Vit D, 25-Hydroxy   Date Value Ref Range Status   05/16/2022 30 30 - 96 ng/mL Final     Comment:     Vitamin D deficiency.........<10 ng/mL                              Vitamin D insufficiency......10-29 ng/mL       Vitamin D sufficiency........> or equal to 30 ng/mL  Vitamin D toxicity............>100 ng/mL               Assessment:     1. Type 2 diabetes mellitus with diabetic polyneuropathy, without long-term current use of insulin        2. Coronary artery disease, unspecified vessel or lesion type, unspecified whether angina present, unspecified whether native or  transplanted heart        3. Essential hypertension        4. Dyslipidemia        5. Polyneuropathy associated with underlying disease        6. Stage 3b chronic kidney disease                  Plan:     On losartan 50 mg   On atorvastatin 20 mg    Pt advised:  Cont ozempic to 2 mg.     ORDERS 10/04/2024     6 mo with A1c prior

## 2024-10-07 RX ORDER — ALPRAZOLAM 0.5 MG/1
0.5 TABLET ORAL
Qty: 30 TABLET | Refills: 2 | Status: SHIPPED | OUTPATIENT
Start: 2024-10-07

## 2024-10-07 NOTE — TELEPHONE ENCOUNTER
No care due was identified.  [facility] Embedded Care Due Messages. Reference number: [id number].   10/06/2024 7:50:15 PM CDT

## 2024-10-12 ENCOUNTER — PATIENT MESSAGE (OUTPATIENT)
Dept: INTERNAL MEDICINE | Facility: CLINIC | Age: 59
End: 2024-10-12
Payer: COMMERCIAL

## 2024-10-12 DIAGNOSIS — Z12.11 COLON CANCER SCREENING: Primary | ICD-10-CM

## 2024-10-14 NOTE — TELEPHONE ENCOUNTER
I called patient about her msg.    She says you have been asking her to do  Colonoscopy for screening.    Some days she gets constipated and she has  To stay on laxatives.  (Pink one)  says ducolax  Causes a lot of pain.    Sometimes stools are sometimes stringy.   Sometimes has cramps and uses gas x as need.     She says she does eat a lot of fruits and vegies.   Also does stool softeners daily.     Has never had a colonoscopy.    She is wondering if should do colonoscopy?  Ok we order?   I explained cologuard is not enough.      Wants [facility] location.    Please advise.  Thanks hamlet

## 2024-10-15 ENCOUNTER — TELEPHONE (OUTPATIENT)
Dept: SURGERY | Facility: CLINIC | Age: 59
End: 2024-10-15
Payer: COMMERCIAL

## 2024-10-15 ENCOUNTER — TELEPHONE (OUTPATIENT)
Dept: GASTROENTEROLOGY | Facility: CLINIC | Age: 59
End: 2024-10-15
Payer: COMMERCIAL

## 2024-10-15 NOTE — TELEPHONE ENCOUNTER
----- Message from Tammy sent at 10/15/2024  9:48 AM CDT -----  Type:  Reschedule Appointment     Caller is requesting a sooner appointment.  Caller declined first available appointment listed below.  Caller will not accept being placed on the waitlist and is requesting a message be sent to doctor.  Name of Caller:PT    When is the first available appointment? Want to reschedule 12/3/24 appt (Colonoscopy) until sometime in January   Symptoms:Procedure     Would the patient rather a call back or a response via [facility]? call  Best Call Back Number:[phone]      Additional Information:  Please call back to advise. Thank you!

## 2024-10-15 NOTE — TELEPHONE ENCOUNTER
----- Message from Katelyn sent at 10/15/2024 10:35 AM CDT -----  Contact: pt  Type:  Sooner Apoointment Request    Caller is requesting a sooner appointment.  Caller declined first available appointment listed below.  Caller will not accept being placed on the waitlist and is requesting a message be sent to doctor.    Name of Caller: pt    When is the first available appointment? Current 12/4    Symptoms: procedure    Would the patient rather a call back or a response via [facility]?  Call back    Best Call Back Number: [phone]    Additional Information:  is needing to reschedule procedure for January     Was called by howard tinajero but told they can't schedule for [place] which is where she is wanting to have procedure done    Please call to schedule    Thanks

## 2024-10-15 NOTE — TELEPHONE ENCOUNTER
Spoke with pt. Scheduled c-scope. Discussed prep, holding plavix (prescribed by Dr. Francis, pt states she takes 4 days/week), discussed last dose of ozempic prior to procedure. Prep instructions placed in mail. Pt verbalized understanding to all.       Cardiac clearance faxed

## 2024-10-15 NOTE — TELEPHONE ENCOUNTER
----- Message from Carol sent at 10/15/2024  8:42 AM CDT -----  Contact: self  Type:  Sooner Appointment Request    Caller is requesting a sooner appointment.  Caller declined first available appointment listed below.  Caller will not accept being placed on the waitlist and is requesting a message be sent to doctor.    Name of Caller:  pt  When is the first available appointment?  Na   Symptoms:  needs to have her colonoscopy on the [facility] and not [facility] virtual pre opt chaz on [facility] 01/08/2025  Would the patient rather a call back or a response via [facility]? Call back   Best Call Back Number: [phone]  Additional Information:  She wants it done in [place] so please call pt back to advise and thanks

## 2024-10-15 NOTE — TELEPHONE ENCOUNTER
Returned call to the patient, patient requested to reschedule Colonoscopy, Colonoscopy rescheduled with the patient, patient verbalized understanding of this.

## 2024-10-31 ENCOUNTER — PATIENT MESSAGE (OUTPATIENT)
Dept: INTERNAL MEDICINE | Facility: CLINIC | Age: 59
End: 2024-10-31
Payer: COMMERCIAL

## 2024-11-22 DIAGNOSIS — M25.561 ACUTE PAIN OF RIGHT KNEE: Primary | ICD-10-CM

## 2024-11-23 ENCOUNTER — LAB VISIT (OUTPATIENT)
Dept: LAB | Facility: HOSPITAL | Age: 59
End: 2024-11-23
Payer: COMMERCIAL

## 2024-11-23 DIAGNOSIS — Z13.220 SCREENING FOR LIPOID DISORDERS: ICD-10-CM

## 2024-11-23 DIAGNOSIS — M79.632 PAIN OF LEFT FOREARM: ICD-10-CM

## 2024-11-23 DIAGNOSIS — I25.10 CORONARY ATHEROSCLEROSIS OF NATIVE CORONARY ARTERY: ICD-10-CM

## 2024-11-23 DIAGNOSIS — E11.9 TYPE 2 DIABETES MELLITUS WITHOUT COMPLICATION, WITH LONG-TERM CURRENT USE OF INSULIN: ICD-10-CM

## 2024-11-23 DIAGNOSIS — N18.4 CKD (CHRONIC KIDNEY DISEASE) STAGE 4, GFR 15-29 ML/MIN: ICD-10-CM

## 2024-11-23 DIAGNOSIS — E11.9 DIABETES MELLITUS WITHOUT COMPLICATION: ICD-10-CM

## 2024-11-23 DIAGNOSIS — I10 ESSENTIAL HYPERTENSION, MALIGNANT: ICD-10-CM

## 2024-11-23 DIAGNOSIS — R60.0 LOCALIZED EDEMA: ICD-10-CM

## 2024-11-23 DIAGNOSIS — N18.4 CHRONIC KIDNEY DISEASE, STAGE IV (SEVERE): ICD-10-CM

## 2024-11-23 DIAGNOSIS — I20.9 ANGINAL SYNDROME: ICD-10-CM

## 2024-11-23 DIAGNOSIS — R94.31 NONSPECIFIC ABNORMAL ELECTROCARDIOGRAM (ECG) (EKG): ICD-10-CM

## 2024-11-23 DIAGNOSIS — R06.02 SHORTNESS OF BREATH: Primary | ICD-10-CM

## 2024-11-23 DIAGNOSIS — R07.89 OTHER CHEST PAIN: ICD-10-CM

## 2024-11-23 DIAGNOSIS — D64.9 ANEMIA, UNSPECIFIED: ICD-10-CM

## 2024-11-23 DIAGNOSIS — Z79.4 TYPE 2 DIABETES MELLITUS WITHOUT COMPLICATION, WITH LONG-TERM CURRENT USE OF INSULIN: ICD-10-CM

## 2024-11-23 LAB
ALBUMIN SERPL BCP-MCNC: 3.7 G/DL (ref 3.5–5.2)
ALBUMIN SERPL BCP-MCNC: 3.7 G/DL (ref 3.5–5.2)
ALBUMIN SERPL BCP-MCNC: 3.8 G/DL (ref 3.5–5.2)
ALP SERPL-CCNC: 104 U/L (ref 40–150)
ALT SERPL W/O P-5'-P-CCNC: 19 U/L (ref 10–44)
ANION GAP SERPL CALC-SCNC: 12 MMOL/L (ref 8–16)
ANION GAP SERPL CALC-SCNC: 13 MMOL/L (ref 8–16)
ANION GAP SERPL CALC-SCNC: 13 MMOL/L (ref 8–16)
AST SERPL-CCNC: 19 U/L (ref 10–40)
BASOPHILS # BLD AUTO: 0.07 K/UL (ref 0–0.2)
BASOPHILS NFR BLD: 0.9 % (ref 0–1.9)
BILIRUB SERPL-MCNC: 0.3 MG/DL (ref 0.1–1)
BUN SERPL-MCNC: 48 MG/DL (ref 6–20)
BUN SERPL-MCNC: 51 MG/DL (ref 6–20)
BUN SERPL-MCNC: 51 MG/DL (ref 6–20)
CALCIUM SERPL-MCNC: 9.6 MG/DL (ref 8.7–10.5)
CALCIUM SERPL-MCNC: 9.6 MG/DL (ref 8.7–10.5)
CALCIUM SERPL-MCNC: 9.7 MG/DL (ref 8.7–10.5)
CHLORIDE SERPL-SCNC: 102 MMOL/L (ref 95–110)
CHOLEST SERPL-MCNC: 183 MG/DL (ref 120–199)
CHOLEST/HDLC SERPL: 3.9 {RATIO} (ref 2–5)
CK SERPL-CCNC: 41 U/L (ref 20–180)
CO2 SERPL-SCNC: 25 MMOL/L (ref 23–29)
CO2 SERPL-SCNC: 25 MMOL/L (ref 23–29)
CO2 SERPL-SCNC: 26 MMOL/L (ref 23–29)
CREAT SERPL-MCNC: 2.1 MG/DL (ref 0.5–1.4)
CREAT SERPL-MCNC: 2.2 MG/DL (ref 0.5–1.4)
CREAT SERPL-MCNC: 2.2 MG/DL (ref 0.5–1.4)
CREAT UR-MCNC: 116 MG/DL (ref 15–325)
DIFFERENTIAL METHOD BLD: ABNORMAL
EOSINOPHIL # BLD AUTO: 0.2 K/UL (ref 0–0.5)
EOSINOPHIL NFR BLD: 2.4 % (ref 0–8)
ERYTHROCYTE [DISTWIDTH] IN BLOOD BY AUTOMATED COUNT: 13.4 % (ref 11.5–14.5)
EST. GFR  (NO RACE VARIABLE): 25.5 ML/MIN/1.73 M^2
EST. GFR  (NO RACE VARIABLE): 25.5 ML/MIN/1.73 M^2
EST. GFR  (NO RACE VARIABLE): 27 ML/MIN/1.73 M^2
ESTIMATED AVG GLUCOSE: 111 MG/DL (ref 68–131)
GLUCOSE SERPL-MCNC: 108 MG/DL (ref 70–110)
GLUCOSE SERPL-MCNC: 109 MG/DL (ref 70–110)
GLUCOSE SERPL-MCNC: 109 MG/DL (ref 70–110)
HBA1C MFR BLD: 5.5 % (ref 4–5.6)
HCT VFR BLD AUTO: 35.4 % (ref 37–48.5)
HDLC SERPL-MCNC: 47 MG/DL (ref 40–75)
HDLC SERPL: 25.7 % (ref 20–50)
HGB BLD-MCNC: 11.5 G/DL (ref 12–16)
IMM GRANULOCYTES # BLD AUTO: 0.02 K/UL (ref 0–0.04)
IMM GRANULOCYTES NFR BLD AUTO: 0.2 % (ref 0–0.5)
LDLC SERPL CALC-MCNC: 96.4 MG/DL (ref 63–159)
LYMPHOCYTES # BLD AUTO: 1.7 K/UL (ref 1–4.8)
LYMPHOCYTES NFR BLD: 20.9 % (ref 18–48)
MCH RBC QN AUTO: 32.9 PG (ref 27–31)
MCHC RBC AUTO-ENTMCNC: 32.5 G/DL (ref 32–36)
MCV RBC AUTO: 101 FL (ref 82–98)
MONOCYTES # BLD AUTO: 0.5 K/UL (ref 0.3–1)
MONOCYTES NFR BLD: 6.1 % (ref 4–15)
NEUTROPHILS # BLD AUTO: 5.7 K/UL (ref 1.8–7.7)
NEUTROPHILS NFR BLD: 69.5 % (ref 38–73)
NONHDLC SERPL-MCNC: 136 MG/DL
NRBC BLD-RTO: 0 /100 WBC
PHOSPHATE SERPL-MCNC: 4.6 MG/DL (ref 2.7–4.5)
PHOSPHATE SERPL-MCNC: 4.6 MG/DL (ref 2.7–4.5)
PLATELET # BLD AUTO: 133 K/UL (ref 150–450)
PMV BLD AUTO: 9.8 FL (ref 9.2–12.9)
POTASSIUM SERPL-SCNC: 3.9 MMOL/L (ref 3.5–5.1)
POTASSIUM SERPL-SCNC: 4 MMOL/L (ref 3.5–5.1)
POTASSIUM SERPL-SCNC: 4 MMOL/L (ref 3.5–5.1)
PROT SERPL-MCNC: 6.9 G/DL (ref 6–8.4)
PROT UR-MCNC: 62 MG/DL (ref 0–15)
PROT/CREAT UR: 0.53 MG/G{CREAT} (ref 0–0.2)
PTH-INTACT SERPL-MCNC: 130 PG/ML (ref 9–77)
PTH-INTACT SERPL-MCNC: 130 PG/ML (ref 9–77)
RBC # BLD AUTO: 3.5 M/UL (ref 4–5.4)
SODIUM SERPL-SCNC: 140 MMOL/L (ref 136–145)
TRIGL SERPL-MCNC: 198 MG/DL (ref 30–150)
TSH SERPL DL<=0.005 MIU/L-ACNC: 1.59 UIU/ML (ref 0.4–4)
WBC # BLD AUTO: 8.17 K/UL (ref 3.9–12.7)

## 2024-11-23 PROCEDURE — 82550 ASSAY OF CK (CPK): CPT | Performed by: INTERNAL MEDICINE

## 2024-11-23 PROCEDURE — 83036 HEMOGLOBIN GLYCOSYLATED A1C: CPT | Performed by: NURSE PRACTITIONER

## 2024-11-23 PROCEDURE — 84156 ASSAY OF PROTEIN URINE: CPT | Performed by: INTERNAL MEDICINE

## 2024-11-23 PROCEDURE — 80069 RENAL FUNCTION PANEL: CPT | Performed by: INTERNAL MEDICINE

## 2024-11-23 PROCEDURE — 84443 ASSAY THYROID STIM HORMONE: CPT | Performed by: INTERNAL MEDICINE

## 2024-11-23 PROCEDURE — 80061 LIPID PANEL: CPT | Performed by: INTERNAL MEDICINE

## 2024-11-23 PROCEDURE — 36415 COLL VENOUS BLD VENIPUNCTURE: CPT | Mod: PO | Performed by: INTERNAL MEDICINE

## 2024-11-23 PROCEDURE — 80053 COMPREHEN METABOLIC PANEL: CPT | Performed by: INTERNAL MEDICINE

## 2024-11-23 PROCEDURE — 85025 COMPLETE CBC W/AUTO DIFF WBC: CPT | Performed by: INTERNAL MEDICINE

## 2024-11-23 PROCEDURE — 83970 ASSAY OF PARATHORMONE: CPT | Performed by: INTERNAL MEDICINE

## 2024-11-25 ENCOUNTER — OFFICE VISIT (OUTPATIENT)
Dept: ORTHOPEDICS | Facility: CLINIC | Age: 59
End: 2024-11-25
Payer: COMMERCIAL

## 2024-11-25 ENCOUNTER — HOSPITAL ENCOUNTER (OUTPATIENT)
Dept: RADIOLOGY | Facility: HOSPITAL | Age: 59
Discharge: HOME | End: 2024-11-25
Attending: ORTHOPAEDIC SURGERY
Payer: COMMERCIAL

## 2024-11-25 ENCOUNTER — OFFICE VISIT (OUTPATIENT)
Dept: NEPHROLOGY | Facility: CLINIC | Age: 59
End: 2024-11-25
Payer: COMMERCIAL

## 2024-11-25 VITALS — OXYGEN SATURATION: 96 % | DIASTOLIC BLOOD PRESSURE: 58 MMHG | HEART RATE: 74 BPM | SYSTOLIC BLOOD PRESSURE: 104 MMHG

## 2024-11-25 DIAGNOSIS — I10 PRIMARY HYPERTENSION: ICD-10-CM

## 2024-11-25 DIAGNOSIS — M17.0 OSTEOARTHRITIS OF BOTH KNEES, UNSPECIFIED OSTEOARTHRITIS TYPE: Primary | ICD-10-CM

## 2024-11-25 DIAGNOSIS — N28.1 ACQUIRED CYST OF KIDNEY: ICD-10-CM

## 2024-11-25 DIAGNOSIS — E66.01 MORBID OBESITY: ICD-10-CM

## 2024-11-25 DIAGNOSIS — M25.561 ACUTE PAIN OF RIGHT KNEE: ICD-10-CM

## 2024-11-25 DIAGNOSIS — E11.21 DIABETIC NEPHROPATHY ASSOCIATED WITH TYPE 2 DIABETES MELLITUS: ICD-10-CM

## 2024-11-25 DIAGNOSIS — N25.81 SECONDARY HYPERPARATHYROIDISM OF RENAL ORIGIN: ICD-10-CM

## 2024-11-25 DIAGNOSIS — N18.4 CKD (CHRONIC KIDNEY DISEASE) STAGE 4, GFR 15-29 ML/MIN: Primary | ICD-10-CM

## 2024-11-25 PROCEDURE — 73562 X-RAY EXAM OF KNEE 3: CPT | Mod: 26,50,, | Performed by: RADIOLOGY

## 2024-11-25 PROCEDURE — 99214 OFFICE O/P EST MOD 30 MIN: CPT | Mod: 25,S$GLB,, | Performed by: ORTHOPAEDIC SURGERY

## 2024-11-25 PROCEDURE — 99214 OFFICE O/P EST MOD 30 MIN: CPT | Mod: S$GLB,,, | Performed by: INTERNAL MEDICINE

## 2024-11-25 PROCEDURE — 99999 PR PBB SHADOW E&M-EST. PATIENT-LVL IV: CPT | Mod: PBBFAC,,, | Performed by: INTERNAL MEDICINE

## 2024-11-25 PROCEDURE — 99999 PR PBB SHADOW E&M-EST. PATIENT-LVL IV: CPT | Mod: PBBFAC,,, | Performed by: ORTHOPAEDIC SURGERY

## 2024-11-25 PROCEDURE — 20610 DRAIN/INJ JOINT/BURSA W/O US: CPT | Mod: 50,S$GLB,, | Performed by: ORTHOPAEDIC SURGERY

## 2024-11-25 PROCEDURE — 73562 X-RAY EXAM OF KNEE 3: CPT | Mod: TC,50,PO

## 2024-11-25 RX ORDER — TRIAMCINOLONE ACETONIDE 40 MG/ML
40 INJECTION, SUSPENSION INTRA-ARTICULAR; INTRAMUSCULAR
Status: DISCONTINUED | OUTPATIENT
Start: 2024-11-25 | End: 2024-11-25 | Stop reason: HOSPADM

## 2024-11-25 RX ADMIN — TRIAMCINOLONE ACETONIDE 40 MG: 40 INJECTION, SUSPENSION INTRA-ARTICULAR; INTRAMUSCULAR at 01:11

## 2024-11-25 NOTE — PROGRESS NOTES
59 y.o. year old presents to the clinic today with recurring pain in the bilateral knee.  Chronic problem.  Patient has had Kenlaog injections in the past and responded well.  Last injection was 16 months ago.  Symptoms not improving    Exam shows tenderness at the joint line without signs of infection or instability    X-rays show arthritic changes    Assessment:  bilateral knee arthrosis    Plan:  Kenlaog into the bilateral knee.  Encourage strengthening over time.  Followup as needed.

## 2024-11-25 NOTE — PROCEDURES
Large Joint Aspiration/Injection: bilateral knee    Date/Time: 11/25/2024 1:45 PM    Performed by: Caleb Felton MD  Authorized by: Caleb Felton MD    Consent Done?:  Yes (Verbal)  Timeout: prior to procedure the correct patient, procedure, and site was verified    Prep: patient was prepped and draped in usual sterile fashion      Details:  Needle Size:  21 G  Approach:  Anterolateral  Location:  Knee  Laterality:  Bilateral  Site:  Bilateral knee  Medications (Right):  40 mg triamcinolone acetonide 40 mg/mL  Medications (Left):  40 mg triamcinolone acetonide 40 mg/mL  Patient tolerance:  Patient tolerated the procedure well with no immediate complications

## 2024-11-25 NOTE — PROGRESS NOTES
Subjective:       Patient ID: Mary Ann Vidales is a 59 y.o. White female who presents for return patient evaluation for chronic renal failure.      She did not tolerate lyrica.  She has no uremic or urinary symptoms and is in her usual state of health.  There have been no recent illnesses, hospitalizations or procedures.  She is down to 220 pounds from 299 last year.  Her HgA1C is 5.4.  Her blood pressure is controlled at home.  She is having neuropathy pain in her left great toe in the distal phalanx for 6 months which has been causing her difficulty walking.      Review of Systems   Constitutional:  Negative for appetite change, chills and fever.   HENT:  Negative for congestion.    Eyes:  Negative for visual disturbance.   Respiratory:  Negative for cough and shortness of breath.    Cardiovascular:  Negative for chest pain and leg swelling.   Gastrointestinal:  Negative for diarrhea, nausea and vomiting.   Genitourinary:  Negative for difficulty urinating, dysuria and hematuria.   Musculoskeletal:  Positive for arthralgias (knees) and back pain. Negative for myalgias.   Skin:  Negative for rash.   Neurological:  Positive for numbness (toes and feet). Negative for headaches.   Hematological:  Bruises/bleeds easily.   Psychiatric/Behavioral:  Negative for sleep disturbance.        The past medical, family and social histories were reviewed for this encounter.     BP (!) 104/58 (BP Location: Right arm, Patient Position: Sitting)   Pulse 74   LMP 12/20/2013   SpO2 96%     Objective:      Physical Exam  Vitals reviewed.   Constitutional:       General: She is not in acute distress.     Appearance: She is well-developed.   HENT:      Head: Normocephalic and atraumatic.   Eyes:      General: No scleral icterus.  Pulmonary:      Effort: Pulmonary effort is normal.   Neurological:      Mental Status: She is alert and oriented to person, place, and time.   Psychiatric:         Mood and Affect: Mood normal.          Behavior: Behavior normal.         Assessment:       1. CKD (chronic kidney disease) stage 4, GFR 15-29 ml/min    2. Primary hypertension    3. Diabetic nephropathy associated with type 2 diabetes mellitus    4. Acquired cyst of kidney    5. Secondary hyperparathyroidism of renal origin    6. Morbid obesity       Lab Results   Component Value Date    CREATININE 2.2 (H) 11/23/2024    CREATININE 2.2 (H) 11/23/2024    CREATININE 2.1 (H) 11/23/2024    BUN 51 (H) 11/23/2024    BUN 51 (H) 11/23/2024    BUN 48 (H) 11/23/2024     11/23/2024     11/23/2024     11/23/2024    K 4.0 11/23/2024    K 4.0 11/23/2024    K 3.9 11/23/2024     11/23/2024     11/23/2024     11/23/2024    CO2 25 11/23/2024    CO2 25 11/23/2024    CO2 26 11/23/2024     Lab Results   Component Value Date    .0 (H) 11/23/2024    .0 (H) 11/23/2024    CALCIUM 9.6 11/23/2024    CALCIUM 9.6 11/23/2024    CALCIUM 9.7 11/23/2024    PHOS 4.6 (H) 11/23/2024    PHOS 4.6 (H) 11/23/2024     Lab Results   Component Value Date    HCT 35.4 (L) 11/23/2024     Prot/Creat Ratio, Urine   Date Value Ref Range Status   11/23/2024 0.53 (H) 0.00 - 0.20 Final   06/22/2024 0.43 (H) 0.00 - 0.20 Final   06/01/2023 0.36 (H) 0.00 - 0.20 Final     Plan:   Return to clinic in 3 months.  Labs for next visit include rp, upc, pth per standing orders.  Baseline creatinine is 1.0 since 2005.  She has then been 1.6-1.8 since 2015.  Since 2018 she has been 1.8-2.2.  PTH is 160 with a calcium of 9.7.  D3 2000 units daily.  UPC is 0.36 on an ARB.  I do not know why she went from a creatinine of 1.0 in 2015 to 1.5 in 2017 and 1.8 in 2018 with bland urine sediment and a negative renal US.  There appears to be no other precipitants although AIN is also a possibility.  Her urine sediment appears bland thus I think it is less likely that she has an acute GN.  She does not wish to have a biopsy at this time and I am not pushing for one.  She has a gap  from 10/15-7/17 where she seemed to bump her baseline.  Renal US showed R 11.1 cm, L 10.6 cm.  Please avoid or minimize all NSAIDS (ibuprofen, motrin, aleve, indocin, naprosyn) to minimize the risk to your kidneys.  Aspirin in a dose of 325 mg or less a day is likely the safest with regards to kidney function.  If you are able to take aspirin and do not have any bleeding problems or ulcers, this may be your best therapy.  Alternatively, acetaminophen (Tylenol) is the safer than NSAIDs with regards to kidney function.  I would ask you take this as directed on the bottle.  It is best to stay under 2 grams a day (4-500 mg tablets a day maximum).  I asked her to stay at a max of 600 mg of gabapentin a day if possible and discussed side effects.  Blood pressure is controlled on the current regimen.  The effects of diabetes as it relates to kidney function was discussed.  As for the control of your blood sugar, please follow up with your PCP or Endocrinology.    KFRE 2-Year: 2.3% at 4/10/2024  8:26 AM  Calculated from:  Serum Creatinine: 1.6 mg/dL at 4/10/2024  8:26 AM  Urine Albumin Creatinine Ratio: 127.9 ug/mg at 6/1/2023  6:55 AM  Age: 59 years  Sex: Female at 4/10/2024  8:26 AM  Has CKD-3 to CKD-5: Yes    KFRE 5-Year: 7.1% at 4/10/2024  8:26 AM  Calculated from:  Serum Creatinine: 1.6 mg/dL at 4/10/2024  8:26 AM  Urine Albumin Creatinine Ratio: 127.9 ug/mg at 6/1/2023  6:55 AM  Age: 59 years  Sex: Female at 4/10/2024  8:26 AM  Has CKD-3 to CKD-5: Yes

## 2024-11-26 RX ORDER — FLUOXETINE HYDROCHLORIDE 40 MG/1
CAPSULE ORAL
Qty: 90 CAPSULE | Refills: 2 | Status: SHIPPED | OUTPATIENT
Start: 2024-11-26

## 2024-11-26 NOTE — TELEPHONE ENCOUNTER
No care due was identified.  [facility] Embedded Care Due Messages. Reference number: [id number].   11/26/2024 12:33:58 AM CST

## 2024-11-27 NOTE — TELEPHONE ENCOUNTER
Refill Decision Note   Mary Ann Vidales  is requesting a refill authorization.  Brief Assessment and Rationale for Refill:  Approve     Medication Therapy Plan:        Pharmacist review requested: Yes   Comments:     Note composed:9:17 PM 11/26/2024

## 2024-11-27 NOTE — TELEPHONE ENCOUNTER
Refill Routing Note   Medication(s) are not appropriate for processing by [facility] for the following reason(s):        Allergy or intolerance    [facility] action(s):  Defer        Medication Therapy Plan: Allergy/Contraindication: FLUoxetine    Pharmacist review requested: Yes     Appointments  past 12m or future 3m with PCP    Date Provider   Last Visit   8/5/2024 Davon Kan MD   Next Visit   4/3/2025 Davon Kan MD   ED visits in past 90 days: 0        Note composed:8:55 PM 11/26/2024

## 2024-12-17 ENCOUNTER — PATIENT MESSAGE (OUTPATIENT)
Dept: INTERNAL MEDICINE | Facility: CLINIC | Age: 59
End: 2024-12-17
Payer: COMMERCIAL

## 2024-12-25 DIAGNOSIS — S86.011S RUPTURE OF RIGHT ACHILLES TENDON, SEQUELA: ICD-10-CM

## 2024-12-25 DIAGNOSIS — E11.42 TYPE 2 DIABETES MELLITUS WITH DIABETIC POLYNEUROPATHY, WITHOUT LONG-TERM CURRENT USE OF INSULIN: ICD-10-CM

## 2024-12-25 DIAGNOSIS — E11.9 TYPE 2 DIABETES MELLITUS WITHOUT COMPLICATION, WITH LONG-TERM CURRENT USE OF INSULIN: ICD-10-CM

## 2024-12-25 DIAGNOSIS — Z79.4 TYPE 2 DIABETES MELLITUS WITHOUT COMPLICATION, WITH LONG-TERM CURRENT USE OF INSULIN: ICD-10-CM

## 2024-12-25 NOTE — TELEPHONE ENCOUNTER
No care due was identified.  [facility] Embedded Care Due Messages. Reference number: [id number].   12/25/2024 11:21:01 AM CST

## 2024-12-26 RX ORDER — HYDROCODONE BITARTRATE AND ACETAMINOPHEN 7.5; 325 MG/1; MG/1
1 TABLET ORAL EVERY 6 HOURS PRN
Qty: 90 TABLET | Refills: 0 | Status: SHIPPED | OUTPATIENT
Start: 2024-12-26

## 2024-12-27 RX ORDER — GABAPENTIN 300 MG/1
300 CAPSULE ORAL 3 TIMES DAILY
Qty: 90 CAPSULE | Refills: 3 | Status: SHIPPED | OUTPATIENT
Start: 2024-12-27

## 2024-12-30 RX ORDER — ALLOPURINOL 100 MG/1
200 TABLET ORAL
Qty: 180 TABLET | Refills: 1 | Status: SHIPPED | OUTPATIENT
Start: 2024-12-30

## 2025-01-13 ENCOUNTER — PATIENT MESSAGE (OUTPATIENT)
Dept: ENDOCRINOLOGY | Facility: CLINIC | Age: 60
End: 2025-01-13
Payer: COMMERCIAL

## 2025-01-14 ENCOUNTER — TELEPHONE (OUTPATIENT)
Dept: NEPHROLOGY | Facility: CLINIC | Age: 60
End: 2025-01-14
Payer: COMMERCIAL

## 2025-01-14 NOTE — TELEPHONE ENCOUNTER
"Called and spoke to patient. Patient states," I already spoke to Florecita and it was changed." Patient declines any further assistance.   "

## 2025-01-14 NOTE — TELEPHONE ENCOUNTER
----- Message from Anna sent at 1/14/2025 10:46 AM CST -----  Contact: pt [phone]  Type: Needs Medical Advice  Who Called:  Pt     Best Call Back Number: [phone]    Additional Information: Pt calling to advise she has updated her insurance for her PA. Pls call back and advise

## 2025-01-16 ENCOUNTER — TELEPHONE (OUTPATIENT)
Dept: SURGERY | Facility: CLINIC | Age: 60
End: 2025-01-16
Payer: COMMERCIAL

## 2025-01-16 NOTE — TELEPHONE ENCOUNTER
----- Message from Alberta sent at 1/16/2025 11:21 AM CST -----  Type: Needs Medical Advice  Who Called:  Patient   Symptoms (please be specific):    How long has patient had these symptoms:    Pharmacy name and phone #:    Best Call Back Number: [phone]  Additional Information: Patient is requesting a call back from the nurse regarding upcoming procedure on 1/31.

## 2025-01-16 NOTE — TELEPHONE ENCOUNTER
Returned call to the patient, patient states that her insurance has changed and she has called [facility] and updated her new coverage however was told that her case was placed in a que to be worked to obtain authorization, patient was given the number to the preservice department, patient verbalized understanding of this.

## 2025-01-23 ENCOUNTER — PATIENT MESSAGE (OUTPATIENT)
Dept: INTERNAL MEDICINE | Facility: CLINIC | Age: 60
End: 2025-01-23
Payer: COMMERCIAL

## 2025-01-23 ENCOUNTER — PATIENT OUTREACH (OUTPATIENT)
Dept: ADMINISTRATIVE | Facility: HOSPITAL | Age: 60
End: 2025-01-23
Payer: COMMERCIAL

## 2025-01-26 NOTE — TELEPHONE ENCOUNTER
No care due was identified.  [facility] Embedded Care Due Messages. Reference number: [id number].   1/26/2025 5:00:51 PM CST

## 2025-01-27 RX ORDER — LANSOPRAZOLE 30 MG/1
CAPSULE, DELAYED RELEASE ORAL
Refills: 0 | OUTPATIENT
Start: 2025-01-27

## 2025-01-27 NOTE — TELEPHONE ENCOUNTER
Refill Decision Note   Mary Ann Vidales  is requesting a refill authorization.  Brief Assessment and Rationale for Refill:  Quick Discontinue     Medication Therapy Plan:   Pharmacy is requesting new scripts for the following medications without required information, (sig/ frequency/qty/etc)        Comments: Pharmacies have been requesting medications for patients without required information, (sig, frequency, qty, etc.). In addition, requests are sent for medication(s) pt. are currently not taking, and medications patients have never taken.    We have spoken to the pharmacies about these request types and advised their teams previously that we are unable to assess these New Script requests and require all details for these requests. This is a known issue and has been reported.     Note composed:10:25 AM 01/27/2025

## 2025-01-29 DIAGNOSIS — Z12.31 OTHER SCREENING MAMMOGRAM: ICD-10-CM

## 2025-01-31 ENCOUNTER — HOSPITAL ENCOUNTER (OUTPATIENT)
Facility: HOSPITAL | Age: 60
Discharge: HOME | End: 2025-01-31
Attending: SURGERY | Admitting: SURGERY
Payer: COMMERCIAL

## 2025-01-31 ENCOUNTER — ANESTHESIA EVENT (OUTPATIENT)
Dept: ENDOSCOPY | Facility: HOSPITAL | Age: 60
End: 2025-01-31
Payer: COMMERCIAL

## 2025-01-31 ENCOUNTER — ANESTHESIA (OUTPATIENT)
Dept: ENDOSCOPY | Facility: HOSPITAL | Age: 60
End: 2025-01-31
Payer: COMMERCIAL

## 2025-01-31 DIAGNOSIS — Z12.11 ENCOUNTER FOR SCREENING COLONOSCOPY: Primary | ICD-10-CM

## 2025-01-31 LAB — GLUCOSE SERPL-MCNC: 115 MG/DL (ref 70–110)

## 2025-01-31 PROCEDURE — 45385 COLONOSCOPY W/LESION REMOVAL: CPT | Mod: 33,,, | Performed by: SURGERY

## 2025-01-31 PROCEDURE — D9220A PRA ANESTHESIA: Mod: CRNA,,, | Performed by: NURSE ANESTHETIST, CERTIFIED REGISTERED

## 2025-01-31 PROCEDURE — 37000009 HC ANESTHESIA EA ADD 15 MINS: Mod: PO | Performed by: SURGERY

## 2025-01-31 PROCEDURE — 63600175 PHARM REV CODE 636 W HCPCS: Mod: PO | Performed by: NURSE ANESTHETIST, CERTIFIED REGISTERED

## 2025-01-31 PROCEDURE — 37000008 HC ANESTHESIA 1ST 15 MINUTES: Mod: PO | Performed by: SURGERY

## 2025-01-31 PROCEDURE — D9220A PRA ANESTHESIA: Mod: ANES,,, | Performed by: ANESTHESIOLOGY

## 2025-01-31 PROCEDURE — 88305 TISSUE EXAM BY PATHOLOGIST: CPT | Mod: 26,,, | Performed by: STUDENT IN AN ORGANIZED HEALTH CARE EDUCATION/TRAINING PROGRAM

## 2025-01-31 PROCEDURE — 63600175 PHARM REV CODE 636 W HCPCS: Mod: PO | Performed by: SURGERY

## 2025-01-31 PROCEDURE — 88305 TISSUE EXAM BY PATHOLOGIST: CPT | Mod: PO | Performed by: STUDENT IN AN ORGANIZED HEALTH CARE EDUCATION/TRAINING PROGRAM

## 2025-01-31 PROCEDURE — 45385 COLONOSCOPY W/LESION REMOVAL: CPT | Mod: 33,PO | Performed by: SURGERY

## 2025-01-31 RX ORDER — PHENYLEPHRINE HYDROCHLORIDE 10 MG/ML
INJECTION INTRAVENOUS
Status: DISCONTINUED | OUTPATIENT
Start: 2025-01-31 | End: 2025-01-31

## 2025-01-31 RX ORDER — PROPOFOL 10 MG/ML
VIAL (ML) INTRAVENOUS CONTINUOUS PRN
Status: DISCONTINUED | OUTPATIENT
Start: 2025-01-31 | End: 2025-01-31

## 2025-01-31 RX ORDER — PROPOFOL 10 MG/ML
VIAL (ML) INTRAVENOUS
Status: DISCONTINUED | OUTPATIENT
Start: 2025-01-31 | End: 2025-01-31

## 2025-01-31 RX ORDER — SODIUM CHLORIDE, SODIUM LACTATE, POTASSIUM CHLORIDE, CALCIUM CHLORIDE 600; 310; 30; 20 MG/100ML; MG/100ML; MG/100ML; MG/100ML
INJECTION, SOLUTION INTRAVENOUS CONTINUOUS
Status: DISCONTINUED | OUTPATIENT
Start: 2025-01-31 | End: 2025-01-31 | Stop reason: HOSPADM

## 2025-01-31 RX ORDER — LIDOCAINE HYDROCHLORIDE 20 MG/ML
INJECTION INTRAVENOUS
Status: DISCONTINUED | OUTPATIENT
Start: 2025-01-31 | End: 2025-01-31

## 2025-01-31 RX ADMIN — LIDOCAINE HYDROCHLORIDE 100 MG: 20 INJECTION INTRAVENOUS at 07:01

## 2025-01-31 RX ADMIN — PROPOFOL 100 MG: 10 INJECTION, EMULSION INTRAVENOUS at 07:01

## 2025-01-31 RX ADMIN — PHENYLEPHRINE HYDROCHLORIDE 100 MCG: 10 INJECTION INTRAVENOUS at 07:01

## 2025-01-31 RX ADMIN — SODIUM CHLORIDE, POTASSIUM CHLORIDE, SODIUM LACTATE AND CALCIUM CHLORIDE: 600; 310; 30; 20 INJECTION, SOLUTION INTRAVENOUS at 07:01

## 2025-01-31 RX ADMIN — PROPOFOL 150 MCG/KG/MIN: 10 INJECTION, EMULSION INTRAVENOUS at 07:01

## 2025-01-31 NOTE — TRANSFER OF CARE
"Anesthesia Transfer of Care Note    Patient: Mary Ann Vidales    Procedure(s) Performed: Procedure(s) (LRB):  COLONOSCOPY, SCREENING, LOW RISK PATIENT (N/A)    Patient location: PACU    Transport from OR: Transported from OR on room air with adequate spontaneous ventilation    Post pain: adequate analgesia    Post assessment: tolerated procedure well    Post vital signs: stable    Level of consciousness: responds to stimulation    Nausea/Vomiting: no nausea/vomiting    Complications: none    Transfer of care protocol was followed      Last vitals: Visit Vitals  /76 (BP Location: Right arm, Patient Position: Lying)   Pulse 82   Temp 36.7 °C (98.1 °F) (Skin)   Resp 18   Ht 5' 4.5" (1.638 m)   Wt 96.2 kg (212 lb)   LMP 12/20/2013   SpO2 98%   Breastfeeding No   BMI 35.83 kg/m²     "

## 2025-01-31 NOTE — H&P
[facility] - Endoscopy  History & Physical     Subjective:     Chief Complaint/Reason for Admission: screening colonoscopy    History of Present Illness:   Patient 59 y.o. female presents for screening colonoscopy.  Pt has never had a cscope.  She deneis abd pain or changes in bowel habits.  She has PMHx significant for CAD takes plavix off for 5 days, HTN, and DM.  PSHx significant for hysterectomy  Patient Active Problem List    Diagnosis Date Noted    Chronic midline thoracic back pain 09/30/2024    Hyperuricemia 09/30/2024    Diabetic nephropathy associated with type 2 diabetes mellitus 08/24/2022    Secondary hyperparathyroidism of renal origin 08/24/2022    Acquired cyst of kidney 08/24/2022    COVID 07/06/2022    Anemia in stage 4 chronic kidney disease 04/20/2022    Ankle bone spur 03/11/2022    Right foot pain 03/11/2022    Achilles tendinitis of right lower extremity 03/11/2022    Rupture of right Achilles tendon 11/24/2021    Spondylosis of lumbar region without myelopathy or radiculopathy 11/24/2021    GE (gastroenteritis) 03/22/2021    Coronary artery disease 11/06/2020    Open toe wound, initial encounter 11/07/2019    Chronic kidney disease, stage 4 (severe) 11/26/2018    Other chest pain 11/26/2018    Chronic fatigue 07/28/2017    Peripheral neuropathy 11/03/2015    Morbid obesity 11/03/2015    Tremors of nervous system 11/03/2015    Carpal tunnel syndrome 11/03/2015    Depression 11/03/2015    TINA (obstructive sleep apnea) 12/27/2014    Anxiety 12/27/2014    Dyslipidemia 08/07/2014    Vitamin D deficiency disease- menopausal 01/07/2014    Diabetes mellitus with neurological manifestations, uncontrolled 09/24/2013    Diabetic neuropathy, type I diabetes mellitus 09/24/2013    Essential hypertension 09/24/2013    Osteoarthritis of right knee 07/19/2013    Tennis elbow 07/19/2013    Type 2 diabetes mellitus without complication, with long-term current use of insulin 09/04/2012        Medications Prior  to Admission   Medication Sig Dispense Refill Last Dose/Taking    allopurinoL (ZYLOPRIM) 100 MG tablet TAKE 2 TABLETS BY MOUTH EVERY  tablet 1 1/27/2025    atorvastatin (LIPITOR) 20 MG tablet Take 40 mg by mouth once daily.   1/27/2025    dextroamphetamine-amphetamine 30 mg Tab Take 1 tablet by mouth every morning.   1/27/2025    FLUoxetine 40 MG capsule TAKE 1 CAPSULE BY MOUTH EVERY DAY 90 capsule 2 1/27/2025    furosemide (LASIX) 40 MG tablet Take 1 tablet (40 mg total) by mouth once daily. 90 tablet 3 1/27/2025    gabapentin (NEURONTIN) 300 MG capsule TAKE 1 CAPSULE BY MOUTH THREE TIMES A DAY 90 capsule 3 1/27/2025    glucosamine-chondroitin 250-200 mg Tab Take 1 tablet by mouth once daily.   1/27/2025    HYDROcodone-acetaminophen (NORCO) 7.5-325 mg per tablet Take 1 tablet by mouth every 6 (six) hours as needed for Pain. 90 tablet 0 Past Week    losartan (COZAAR) 50 MG tablet Take 50 mg by mouth once daily.   1/27/2025    pantoprazole (PROTONIX) 40 MG tablet TAKE 1 TABLET BY MOUTH EVERY DAY 90 tablet 2 1/27/2025    primidone (MYSOLINE) 50 MG Tab Take 50 mg by mouth 2 (two) times daily.   1/27/2025    semaglutide (OZEMPIC) 2 mg/dose (8 mg/3 mL) PnIj Inject 2 mg into the skin every 7 days. 3 mL 11 Past Month    albuterol (PROVENTIL/VENTOLIN HFA) 90 mcg/actuation inhaler INHALE 2 PUFFS INTO LUNGS EVERY 4 TO 6 HOURS AS NEEDED FOR SHORTNESS OF BREATH 18 g 2 More than a month    ALPRAZolam (XANAX) 0.5 MG tablet TAKE 1 TABLET BY MOUTH EVERY DAY 30 tablet 2 More than a month    blood sugar diagnostic Strp Pt to check glucoses  strip 12     blood sugar diagnostic Strp To check BG 3 times daily, to use with insurance preferred meter 100 each prn     clopidogrel (PLAVIX) 75 mg tablet Take 75 mg by mouth once daily.  11     fluticasone propionate (FLONASE) 50 mcg/actuation nasal spray by Each Nostril route. (Patient not taking: Reported on 11/25/2024)       lancets (ONETOUCH ULTRASOFT LANCETS) Misc Use to  "check glucoses up to TID. 100 each 0     lancets Misc To check BG 3 times daily, to use with insurance preferred meter 100 each prn     nitroGLYCERIN (NITROSTAT) 0.4 MG SL tablet PLACE 1 TABLET UNDER TONGUE EVERY 5 MINS, UP TO 3 DOSES AS NEEDED FOR CHEST PAIN  11     ondansetron (ZOFRAN) 4 MG tablet TAKE 1 TABLET BY MOUTH EVERY 8 HOURS AS NEEDED FOR NAUSEA 18 tablet 6 More than a month    pen needle, diabetic (BD ULTRA-FINE DEJON PEN NEEDLE) 32 gauge x 5/32" Ndle Use with insulin once daily. (Patient not taking: Reported on 11/25/2024) 150 each 6     tiZANidine (ZANAFLEX) 4 MG tablet TAKE 1 TABLET (4 MG TOTAL) BY MOUTH EVERY 12 (TWELVE) HOURS AS NEEDED (MUSCLE SPASMS/ PAIN). (Patient not taking: Reported on 11/25/2024) 30 tablet 0      Review of patient's allergies indicates:   Allergen Reactions    Captopril      Other reaction(s): cough    Lantus [insulin glargine] Swelling and Other (See Comments)     Burning and redness in the legs    Levemir [insulin detemir] Swelling and Other (See Comments)     Burning and redness of lower extremities    Lisinopril Other (See Comments)     Other reaction(s): cough    Other      Tape causes welps & hives    Antiseptic swabs Rash     Antiseptic wipes given prior to surgery caused severe rash.         Past Medical History:   Diagnosis Date    Anticoagulant long-term use     Chronic asthma     Coronary artery disease     Diabetes mellitus type II     Fibroids     Hypertension     Incontinence     Morbid obesity 11/3/2015    Neuropathy in diabetes     Obesity     TINA (obstructive sleep apnea)     uses CPAP    Panic attacks     Renal disorder     STAGE 3      Past Surgical History:   Procedure Laterality Date    CARDIAC CATHETERIZATION  07/05/2019    STENT IN LAD    CORONARY ANGIOGRAPHY N/A 11/6/2020    Procedure: ANGIOGRAM, CORONARY ARTERY;  Surgeon: John Francis MD;  Location: [facility] CATH;  Service: Cardiology;  Laterality: N/A;    CORONARY ANGIOPLASTY WITH STENT PLACEMENT  2019 "    ECTOPIC PREGNANCY SURGERY      HERNIA REPAIR      HYSTERECTOMY      OOPHORECTOMY      TENOTOMY Right 3/4/2022    Procedure: TENOTOMY- Tx Bone Left achilles;  Surgeon: Sarwat Vaughn MD;  [facility];  Service: Orthopedics;  Laterality: Right;    TONSILLECTOMY          Social History     Tobacco Use    Smoking status: Never    Smokeless tobacco: Never   Substance Use Topics    Alcohol use: No     Alcohol/week: 0.0 standard drinks of alcohol        Review of Systems:  A comprehensive review of systems was negative.    OBJECTIVE:     Patient Vitals for the past 8 hrs:   BP Temp Temp src Pulse Resp SpO2   01/31/25 0715 130/76 98.1 °F (36.7 °C) Skin 82 18 98 %     AAOx3  Sinus  Soft/nd/nt  No resp distress    Data Review:      ASSESSMENT/PLAN:     There are no hospital problems to display for this patient.  Screening cscope    Plan:  To have cscope today

## 2025-01-31 NOTE — PROVATION PATIENT INSTRUCTIONS
Discharge Summary/Instructions after an Endoscopic Procedure  Patient Name: Mary Ann Vidales  Patient MRN: [id number]  Patient YOB: 1965 Friday, January 31, 2025  Lewis Jolly MD  Dear patient,  As a result of recent federal legislation (The Federal Cures Act), you may   receive lab or pathology results from your procedure in your [facility]   account before your physician is able to contact you. Your physician or   their representative will relay the results to you with their   recommendations at their soonest availability.  Thank you,  RESTRICTIONS:  During your procedure today, you received medications for sedation.  These   medications may affect your judgment, balance and coordination.  Therefore,   for 24 hours, you have the following restrictions:   - DO NOT drive a car, operate machinery, make legal/financial decisions,   sign important papers or drink alcohol.    ACTIVITY:  Today: no heavy lifting, straining or running due to procedural   sedation/anesthesia.  The following day: return to full activity including work.  DIET:  Eat and drink normally unless instructed otherwise.     TREATMENT FOR COMMON SIDE EFFECTS:  - Mild abdominal pain, nausea, belching, bloating or excessive gas:  rest,   eat lightly and use a heating pad.  - Sore Throat: treat with throat lozenges and/or gargle with warm salt   water.  - Because air was used during the procedure, expelling large amounts of air   from your rectum or belching is normal.  - If a bowel prep was taken, you may not have a bowel movement for 1-3 days.    This is normal.  SYMPTOMS TO WATCH FOR AND REPORT TO YOUR PHYSICIAN:  1. Abdominal pain or bloating, other than gas cramps.  2. Chest pain.  3. Back pain.  4. Signs of infection such as: chills or fever occurring within 24 hours   after the procedure.  5. Rectal bleeding, which would show as bright red, maroon, or black stools.   (A tablespoon of blood from the rectum is not serious, especially  if   hemorrhoids are present.)  6. Vomiting.  7. Weakness or dizziness.  GO DIRECTLY TO THE NEAREST EMERGENCY ROOM IF YOU HAVE ANY OF THE FOLLOWING:      Difficulty breathing              Chills and/or fever over 101 F   Persistent vomiting and/or vomiting blood   Severe abdominal pain   Severe chest pain   Black, tarry stools   Bleeding- more than one tablespoon   Any other symptom or condition that you feel may need urgent attention  Your doctor recommends these additional instructions:  If any biopsies were taken, your doctors clinic will contact you in 1 to 2   weeks with any results.  You are being discharged to home.   Resume your regular diet.   Continue your present medications.   We are waiting for your pathology results.   Your physician has recommended a repeat colonoscopy in five years for   surveillance.   Return to my office as needed.  For questions, problems or results please call your physician - Lewis Jolly MD at Work:  [phone].  EMERGENCY PHONE NUMBER: [phone], LAB RESULTS: [phone]  IF A COMPLICATION OR EMERGENCY SITUATION ARISES AND YOU ARE UNABLE TO REACH   YOUR PHYSICIAN - GO DIRECTLY TO THE EMERGENCY ROOM.  ___________________________________________  Nurse Signature  ___________________________________________  Patient/Designated Responsible Party Signature  Lewis Jolly MD  1/31/2025 8:05:56 AM  This report has been verified and signed electronically.  Dear patient,  As a result of recent federal legislation (The Federal Cures Act), you may   receive lab or pathology results from your procedure in your [facility]   account before your physician is able to contact you. Your physician or   their representative will relay the results to you with their   recommendations at their soonest availability.  Thank you.  PROVATION

## 2025-01-31 NOTE — ANESTHESIA PREPROCEDURE EVALUATION
01/31/2025  Mary Ann Vidales is a 59 y.o., female.      Pre-op Assessment          Review of Systems  Cardiovascular:     Hypertension   CAD                    Coronary Artery Disease:                            Hypertension         Pulmonary:    Asthma    Sleep Apnea   Asthma:    Obstructive Sleep Apnea (TINA).           Renal/:  Chronic Renal Disease        Kidney Function/Disease             Musculoskeletal:  Arthritis        Arthritis          Neurological:    Neuromuscular Disease,           Arthritis                         Neuromuscular Disease   Endocrine:  Diabetes    Diabetes                      Psych:  Psychiatric History                  Physical Exam  General: Well nourished        Anesthesia Plan  Type of Anesthesia, risks & benefits discussed:    Anesthesia Type: Gen Natural Airway  Intra-op Monitoring Plan: Standard ASA Monitors  Induction:  IV  Informed Consent: Informed consent signed with the Patient and all parties understand the risks and agree with anesthesia plan.  All questions answered.   ASA Score: 3  Day of Surgery Review of History & Physical: H&P Update referred to the surgeon/provider.    Ready For Surgery From Anesthesia Perspective.     .

## 2025-02-02 DIAGNOSIS — F41.9 ANXIETY: Primary | ICD-10-CM

## 2025-02-03 VITALS
TEMPERATURE: 98 F | BODY MASS INDEX: 35.32 KG/M2 | RESPIRATION RATE: 12 BRPM | SYSTOLIC BLOOD PRESSURE: 125 MMHG | HEART RATE: 69 BPM | HEIGHT: 65 IN | WEIGHT: 212 LBS | OXYGEN SATURATION: 98 % | DIASTOLIC BLOOD PRESSURE: 70 MMHG

## 2025-02-03 NOTE — ANESTHESIA POSTPROCEDURE EVALUATION
Anesthesia Post Evaluation    Patient: Mary Ann Vidales    Procedure(s) Performed: Procedure(s) (LRB):  COLONOSCOPY, SCREENING, LOW RISK PATIENT (N/A)    Final Anesthesia Type: general      Patient location during evaluation: PACU  Patient participation: Yes- Able to Participate  Level of consciousness: awake and alert  Post-procedure vital signs: reviewed and stable  Pain management: adequate  Airway patency: patent    PONV status at discharge: No PONV  Anesthetic complications: no      Cardiovascular status: blood pressure returned to baseline  Respiratory status: unassisted and room air  Hydration status: euvolemic  Follow-up not needed.              Vitals Value Taken Time   /70 01/31/25 0837   Temp 36.7 °C (98 °F) 01/31/25 0837   Pulse 69 01/31/25 0837   Resp 12 01/31/25 0837   SpO2 98 % 01/31/25 0837         Event Time   Out of Recovery 08:41:00         Pain/Bashir Score: No data recorded

## 2025-02-03 NOTE — TELEPHONE ENCOUNTER
No care due was identified.  [facility] Embedded Care Due Messages. Reference number: [id number].   2/02/2025 6:53:15 PM CST

## 2025-02-04 LAB
FINAL PATHOLOGIC DIAGNOSIS: NORMAL
GROSS: NORMAL
Lab: NORMAL
MICROSCOPIC EXAM: NORMAL

## 2025-02-04 RX ORDER — ALPRAZOLAM 0.5 MG/1
0.5 TABLET ORAL
Qty: 30 TABLET | Refills: 2 | Status: SHIPPED | OUTPATIENT
Start: 2025-02-04

## 2025-02-11 NOTE — PROGRESS NOTES
Subjective:       Patient ID: Mary Ann Vidales is a 57 y.o. female.    Chief Complaint: Establish Care (Use to see dr read.  Didn't really like dr patino, )    HPI   The patient presents to establish care with a new PCP today.  The patient was previously treated by Dr. Vishnu Read who has retired.  Her active medical conditions include type 2 diabetes mellitus, hypertension, coronary artery disease, hyperlipidemia, GERD, obstructive sleep apnea-on CPAP therapy, hyperuricemia, osteoarthritis of the knees, chronic kidney disease, severe peripheral neuropathy, chronic lower back pain, insomnia, and obesity.      The patient reports that she is currently on Vicodin 1/2 tablet twice daily for treatment of knee pain and lower back pain.  The opioid analgesic has allowed her to remain active.  She has not experienced any side effects.    The patient has noted worsening lower extremity edema for the past several months.  An ultrasound of the lower extremity had been performed by 1 of her specialist.s  She also has noted weight gain of 30-40 lb over the past 3 months.  She feels this is primarily fluid.    Reflux symptoms have been controlled with use of Protonix.    She uses her CPAP machine every night.  This has helped her have a better quality of sleep.    Her coronary artery disease has been stable recently.  She has undergone coronary artery stenting.  She has not experienced any anginal symptoms.    Blood sugar levels have been variable.  She uses Ozempic weekly and she takes Jardiance daily as prescribed.    No interval change in past medical history, family history, or social history since prior evaluations.  Review of Systems   Constitutional:  Negative for activity change, appetite change and unexpected weight change.   Eyes:  Negative for visual disturbance.   Respiratory:  Negative for shortness of breath.    Cardiovascular:  Positive for leg swelling. Negative for chest pain and palpitations.    Gastrointestinal:  Positive for reflux. Negative for abdominal pain, blood in stool and diarrhea.   Genitourinary:  Negative for dysuria, frequency, hematuria and urgency.   Musculoskeletal:  Positive for arthralgias and back pain.   Neurological:  Negative for weakness, numbness and headaches.   Psychiatric/Behavioral:  Positive for sleep disturbance.           Physical Exam  Vitals and nursing note reviewed.   Constitutional:       General: She is not in acute distress.     Appearance: She is well-developed.   HENT:      Head: Normocephalic and atraumatic.      Right Ear: External ear normal.      Left Ear: External ear normal.      Nose: Nose normal.      Mouth/Throat:      Pharynx: No oropharyngeal exudate.   Eyes:      General: No scleral icterus.     Conjunctiva/sclera: Conjunctivae normal.      Pupils: Pupils are equal, round, and reactive to light.   Neck:      Thyroid: No thyromegaly.      Vascular: No carotid bruit or JVD.   Cardiovascular:      Rate and Rhythm: Normal rate and regular rhythm.      Pulses: Normal pulses.      Heart sounds: Normal heart sounds. No murmur heard.    No friction rub. No gallop.   Pulmonary:      Effort: Pulmonary effort is normal. No respiratory distress.      Breath sounds: Normal breath sounds. No wheezing or rales.   Abdominal:      General: Bowel sounds are normal. There is no abdominal bruit.      Palpations: Abdomen is soft. There is no hepatomegaly, splenomegaly or mass.      Tenderness: There is no abdominal tenderness.      Hernia: No hernia is present.   Musculoskeletal:         General: Tenderness present. Normal range of motion.      Right shoulder: No deformity or effusion.      Cervical back: Normal range of motion and neck supple.      Right lower leg: Edema present.      Left lower leg: Edema present.      Comments: Right heel:  Tenderness is present posteriorly on palpation.  No swelling is present.  No discoloration is present.  No skin ulceration is  present.    Knees:  Crepitus is present bilaterally on range-of-motion testing.  No effusion is appreciated.    Back:  Negative straight leg raising test bilaterally.  Hip range of motion is intact.   Lymphadenopathy:      Cervical: No cervical adenopathy.      Upper Body:      Right upper body: No supraclavicular adenopathy.      Left upper body: No supraclavicular adenopathy.   Skin:     General: Skin is warm and dry.      Findings: No rash.      Comments: No foot lesions are present.   Neurological:      Mental Status: She is alert and oriented to person, place, and time.      Cranial Nerves: No cranial nerve deficit.      Comments: Sensory exam is abnormal in both feet on monofilament  testing.   Psychiatric:         Mood and Affect: Mood normal.         Speech: Speech normal.         Behavior: Behavior normal.         Thought Content: Thought content normal.       Protective Sensation (w/ 10 gram monofilament):  Right: Decreased  Left: Decreased    Visual Inspection:  Dystrophic toenails are present bilaterally.  Dried blood is present on the great toe of the right foot and on the 2nd toe of the left foot.    Pedal Pulses:   Right: Present  Left: Present    Posterior tibialis:   Right:Present  Left: Present         Lab Visit on 05/16/2022   Component Date Value Ref Range Status    Sodium 05/16/2022 140  136 - 145 mmol/L Final    Potassium 05/16/2022 4.8  3.5 - 5.1 mmol/L Final    Chloride 05/16/2022 103  95 - 110 mmol/L Final    CO2 05/16/2022 26  23 - 29 mmol/L Final    Glucose 05/16/2022 222 (H)  70 - 110 mg/dL Final    BUN 05/16/2022 57 (H)  6 - 20 mg/dL Final    Creatinine 05/16/2022 2.6 (H)  0.5 - 1.4 mg/dL Final    Calcium 05/16/2022 9.5  8.7 - 10.5 mg/dL Final    Total Protein 05/16/2022 6.5  6.0 - 8.4 g/dL Final    Albumin 05/16/2022 3.7  3.5 - 5.2 g/dL Final    Total Bilirubin 05/16/2022 0.4  0.1 - 1.0 mg/dL Final    Comment: For infants and newborns, interpretation of results should be based  on  gestational age, weight and in agreement with clinical  observations.    Premature Infant recommended reference ranges:  Up to 24 hours.............<8.0 mg/dL  Up to 48 hours............<12.0 mg/dL  3-5 days..................<15.0 mg/dL  6-29 days.................<15.0 mg/dL      Alkaline Phosphatase 05/16/2022 91  55 - 135 U/L Final    AST 05/16/2022 17  10 - 40 U/L Final    ALT 05/16/2022 16  10 - 44 U/L Final    Anion Gap 05/16/2022 11  8 - 16 mmol/L Final    eGFR if African American 05/16/2022 23.1 (A)  >60 mL/min/1.73 m^2 Final    eGFR if non African American 05/16/2022 20.0 (A)  >60 mL/min/1.73 m^2 Final    Comment: Calculation used to obtain the estimated glomerular filtration  rate (eGFR) is the CKD-EPI equation.       WBC 05/16/2022 5.91  3.90 - 12.70 K/uL Final    RBC 05/16/2022 3.21 (L)  4.00 - 5.40 M/uL Final    Hemoglobin 05/16/2022 9.5 (L)  12.0 - 16.0 g/dL Final    Hematocrit 05/16/2022 29.6 (L)  37.0 - 48.5 % Final    MCV 05/16/2022 92  82 - 98 fL Final    MCH 05/16/2022 29.6  27.0 - 31.0 pg Final    MCHC 05/16/2022 32.1  32.0 - 36.0 g/dL Final    RDW 05/16/2022 13.5  11.5 - 14.5 % Final    Platelets 05/16/2022 95 (L)  150 - 450 K/uL Final    MPV 05/16/2022 11.5  9.2 - 12.9 fL Final    Immature Granulocytes 05/16/2022 0.3  0.0 - 0.5 % Final    Gran # (ANC) 05/16/2022 3.2  1.8 - 7.7 K/uL Final    Immature Grans (Abs) 05/16/2022 0.02  0.00 - 0.04 K/uL Final    Comment: Mild elevation in immature granulocytes is non specific and   can be seen in a variety of conditions including stress response,   acute inflammation, trauma and pregnancy. Correlation with other   laboratory and clinical findings is essential.      Lymph # 05/16/2022 1.9  1.0 - 4.8 K/uL Final    Mono # 05/16/2022 0.6  0.3 - 1.0 K/uL Final    Eos # 05/16/2022 0.2  0.0 - 0.5 K/uL Final    Baso # 05/16/2022 0.05  0.00 - 0.20 K/uL Final    nRBC 05/16/2022 0  0 /100 WBC Final    Gran % 05/16/2022 54.3  38.0 - 73.0 % Final    Lymph %  05/16/2022 32.1  18.0 - 48.0 % Final    Mono % 05/16/2022 9.3  4.0 - 15.0 % Final    Eosinophil % 05/16/2022 3.2  0.0 - 8.0 % Final    Basophil % 05/16/2022 0.8  0.0 - 1.9 % Final    Differential Method 05/16/2022 Automated   Final    Free T4 05/16/2022 0.99  0.71 - 1.51 ng/dL Final    TSH 05/16/2022 2.145  0.400 - 4.000 uIU/mL Final    Hemoglobin A1C 05/16/2022 7.6 (H)  4.0 - 5.6 % Final    Comment: ADA Screening Guidelines:  5.7-6.4%  Consistent with prediabetes  >or=6.5%  Consistent with diabetes    High levels of fetal hemoglobin interfere with the HbA1C  assay. Heterozygous hemoglobin variants (HbS, HgC, etc)do  not significantly interfere with this assay.   However, presence of multiple variants may affect accuracy.      Estimated Avg Glucose 05/16/2022 171 (H)  68 - 131 mg/dL Final    Total Protein 05/16/2022 6.5  6.0 - 8.4 g/dL Final    Albumin 05/16/2022 3.7  3.5 - 5.2 g/dL Final    Total Bilirubin 05/16/2022 0.4  0.1 - 1.0 mg/dL Final    Comment: For infants and newborns, interpretation of results should be based  on gestational age, weight and in agreement with clinical  observations.    Premature Infant recommended reference ranges:  Up to 24 hours.............<8.0 mg/dL  Up to 48 hours............<12.0 mg/dL  3-5 days..................<15.0 mg/dL  6-29 days.................<15.0 mg/dL      Bilirubin, Direct 05/16/2022 0.2  0.1 - 0.3 mg/dL Final    AST 05/16/2022 17  10 - 40 U/L Final    ALT 05/16/2022 16  10 - 44 U/L Final    Alkaline Phosphatase 05/16/2022 91  55 - 135 U/L Final    WBC 05/16/2022 5.91  3.90 - 12.70 K/uL Final    RBC 05/16/2022 3.21 (L)  4.00 - 5.40 M/uL Final    Hemoglobin 05/16/2022 9.5 (L)  12.0 - 16.0 g/dL Final    Hematocrit 05/16/2022 29.6 (L)  37.0 - 48.5 % Final    MCV 05/16/2022 92  82 - 98 fL Final    MCH 05/16/2022 29.6  27.0 - 31.0 pg Final    MCHC 05/16/2022 32.1  32.0 - 36.0 g/dL Final    RDW 05/16/2022 13.5  11.5 - 14.5 % Final    Platelets 05/16/2022 95 (L)  150 - 450  K/uL Final    MPV 05/16/2022 11.5  9.2 - 12.9 fL Final    Immature Granulocytes 05/16/2022 0.3  0.0 - 0.5 % Final    Gran # (ANC) 05/16/2022 3.2  1.8 - 7.7 K/uL Final    Immature Grans (Abs) 05/16/2022 0.02  0.00 - 0.04 K/uL Final    Comment: Mild elevation in immature granulocytes is non specific and   can be seen in a variety of conditions including stress response,   acute inflammation, trauma and pregnancy. Correlation with other   laboratory and clinical findings is essential.      Lymph # 05/16/2022 1.9  1.0 - 4.8 K/uL Final    Mono # 05/16/2022 0.6  0.3 - 1.0 K/uL Final    Eos # 05/16/2022 0.2  0.0 - 0.5 K/uL Final    Baso # 05/16/2022 0.05  0.00 - 0.20 K/uL Final    nRBC 05/16/2022 0  0 /100 WBC Final    Gran % 05/16/2022 54.3  38.0 - 73.0 % Final    Lymph % 05/16/2022 32.1  18.0 - 48.0 % Final    Mono % 05/16/2022 9.3  4.0 - 15.0 % Final    Eosinophil % 05/16/2022 3.2  0.0 - 8.0 % Final    Basophil % 05/16/2022 0.8  0.0 - 1.9 % Final    Differential Method 05/16/2022 Automated   Final    Ferritin 05/16/2022 33  20.0 - 300.0 ng/mL Final    Kappa Free Light Chains 05/16/2022 2.87 (H)  0.33 - 1.94 mg/dL Final    Lambda Free Light Chains 05/16/2022 2.06  0.57 - 2.63 mg/dL Final    Kappa/Lambda FLC Ratio 05/16/2022 1.39  0.26 - 1.65 Final    Comment: Undetected antigen excess is a rare event but cannot   be excluded. If these free light chain results do not   agree with other clinical or laboratory findings or   if the sample is from a patient that has previously   demonstrated antigen excess, discuss with the testing   laboratory.   Results should always be interpreted in conjunction   with other laboratory tests and clinical evidence.      Iron 05/16/2022 60  30 - 160 ug/dL Final    Transferrin 05/16/2022 278  200 - 375 mg/dL Final    TIBC 05/16/2022 411  250 - 450 ug/dL Final    Saturated Iron 05/16/2022 15 (L)  20 - 50 % Final    Magnesium 05/16/2022 1.6  1.6 - 2.6 mg/dL Final    PTH, Intact 05/16/2022 159.8  (H)  9.0 - 77.0 pg/mL Final    Glucose 05/16/2022 222 (H)  70 - 110 mg/dL Final    Sodium 05/16/2022 140  136 - 145 mmol/L Final    Potassium 05/16/2022 4.8  3.5 - 5.1 mmol/L Final    Chloride 05/16/2022 103  95 - 110 mmol/L Final    CO2 05/16/2022 26  23 - 29 mmol/L Final    BUN 05/16/2022 57 (H)  6 - 20 mg/dL Final    Calcium 05/16/2022 9.5  8.7 - 10.5 mg/dL Final    Creatinine 05/16/2022 2.6 (H)  0.5 - 1.4 mg/dL Final    Albumin 05/16/2022 3.7  3.5 - 5.2 g/dL Final    Phosphorus 05/16/2022 3.8  2.7 - 4.5 mg/dL Final    eGFR if African American 05/16/2022 23.1 (A)  >60 mL/min/1.73 m^2 Final    eGFR if non African American 05/16/2022 20.0 (A)  >60 mL/min/1.73 m^2 Final    Comment: Calculation used to obtain the estimated glomerular filtration  rate (eGFR) is the CKD-EPI equation.       Anion Gap 05/16/2022 11  8 - 16 mmol/L Final    Protein, Serum 05/16/2022 6.2  6.0 - 8.4 g/dL Final    Comment: Serum protein electrophoresis and immunofixation results should be   interpreted in clinical context in that some therapeutic agents can   result   in false positive results (example, daratumumab). Correlation with   the   patient s therapeutic regimen is required.      Albumin 05/16/2022 3.82  3.35 - 5.55 g/dL Final    Alpha-1 05/16/2022 0.29  0.17 - 0.41 g/dL Final    Alpha-2 05/16/2022 0.76  0.43 - 0.99 g/dL Final    Beta 05/16/2022 0.75  0.50 - 1.10 g/dL Final    Gamma 05/16/2022 0.58 (L)  0.67 - 1.58 g/dL Final    Uric Acid 05/16/2022 11.7 (H)  2.4 - 5.7 mg/dL Final    Vit D, 25-Hydroxy 05/16/2022 30  30 - 96 ng/mL Final    Comment: Vitamin D deficiency.........<10 ng/mL                              Vitamin D insufficiency......10-29 ng/mL       Vitamin D sufficiency........> or equal to 30 ng/mL  Vitamin D toxicity............>100 ng/mL      Pathologist Interpretation SPE 05/16/2022 REVIEWED   Final    Comment:   Electronically reviewed and signed by:  Lenka Yousif D.O.  Signed on 05/18/22 at 02:18  Normal total  protein. Normal gamma globulins are decreased.  No definitive paraprotein identified.     Admission on 03/04/2022, Discharged on 03/04/2022   Component Date Value Ref Range Status    POC Glucose 03/04/2022 185 (A)  70 - 110 MG/DL Final       Assessment & Plan:      Mary Ann was seen today for establish care.  Current therapy will be continued.  The patient's Lyrica is being tapered by Endocrinology.  Consider adding Elavil or Pamelor at bedtime for neuropathic pain management.    The patient will follow-up with nephrology and endocrinology as scheduled.    Fasting blood tests will be obtained in 3 months.  Diagnoses and all orders for this visit:    Type 2 diabetes mellitus with diabetic polyneuropathy, without long-term current use of insulin  -     Comprehensive Metabolic Panel; Future  -     Lipid Panel; Future  -     CBC Auto Differential; Future  -     Hemoglobin A1C; Future    Essential hypertension  -     Comprehensive Metabolic Panel; Future  -     Lipid Panel; Future  -     CBC Auto Differential; Future  -     Hemoglobin A1C; Future    Chronic kidney disease, stage 4 (severe)  -     Comprehensive Metabolic Panel; Future  -     Lipid Panel; Future  -     CBC Auto Differential; Future  -     Hemoglobin A1C; Future    Dyslipidemia  -     Comprehensive Metabolic Panel; Future  -     Lipid Panel; Future  -     CBC Auto Differential; Future  -     Hemoglobin A1C; Future    Coronary artery disease, unspecified vessel or lesion type, unspecified whether angina present, unspecified whether native or transplanted heart  -     Comprehensive Metabolic Panel; Future  -     Lipid Panel; Future  -     CBC Auto Differential; Future  -     Hemoglobin A1C; Future    Anemia in stage 4 chronic kidney disease  -     Comprehensive Metabolic Panel; Future  -     Lipid Panel; Future  -     CBC Auto Differential; Future  -     Hemoglobin A1C; Future    Anxiety    TINA (obstructive sleep apnea)    Primary osteoarthritis of right  knee    Depression, unspecified depression type    Edema, unspecified type    Chronic bilateral low back pain, unspecified whether sciatica present    Primary insomnia    Hyperuricemia    Gastroesophageal reflux disease, unspecified whether esophagitis present    Other orders  -     HYDROcodone-acetaminophen (NORCO) 7.5-325 mg per tablet; Take 1 tablet by mouth every 6 (six) hours as needed for Pain.  -     ALPRAZolam (XANAX) 0.5 MG tablet; Take 1 tablet (0.5 mg total) by mouth once daily.         No follow-ups on file.     Davon Kan MD   no

## 2025-02-17 ENCOUNTER — RESULTS FOLLOW-UP (OUTPATIENT)
Dept: SURGERY | Facility: CLINIC | Age: 60
End: 2025-02-17
Payer: COMMERCIAL

## 2025-02-17 NOTE — TELEPHONE ENCOUNTER
Called and attempted to reviewed pathology with pt.      Colon, hepatic flexure, polypectomy:   - Tubular adenoma   - Resection margin cannot be assessed due to specimen fragmentation   - Negative for high-grade dysplasia or malignancy       Pt did not answer  Recommend repeat cscope in 5 years    WIll have office reach out to pt

## 2025-03-21 DIAGNOSIS — E11.42 TYPE 2 DIABETES MELLITUS WITH DIABETIC POLYNEUROPATHY, WITHOUT LONG-TERM CURRENT USE OF INSULIN: ICD-10-CM

## 2025-03-21 DIAGNOSIS — S86.011S RUPTURE OF RIGHT ACHILLES TENDON, SEQUELA: ICD-10-CM

## 2025-03-21 NOTE — TELEPHONE ENCOUNTER
No care due was identified.  [facility] Embedded Care Due Messages. Reference number: [id number].   3/21/2025 5:55:33 PM CDT

## 2025-03-25 RX ORDER — HYDROCODONE BITARTRATE AND ACETAMINOPHEN 7.5; 325 MG/1; MG/1
1 TABLET ORAL EVERY 6 HOURS PRN
Qty: 90 TABLET | Refills: 0 | Status: SHIPPED | OUTPATIENT
Start: 2025-03-25

## 2025-03-26 ENCOUNTER — PATIENT MESSAGE (OUTPATIENT)
Dept: INTERNAL MEDICINE | Facility: CLINIC | Age: 60
End: 2025-03-26
Payer: COMMERCIAL

## 2025-04-01 ENCOUNTER — TELEPHONE (OUTPATIENT)
Dept: INTERNAL MEDICINE | Facility: CLINIC | Age: 60
End: 2025-04-01
Payer: COMMERCIAL

## 2025-04-01 DIAGNOSIS — I10 ESSENTIAL HYPERTENSION: ICD-10-CM

## 2025-04-01 DIAGNOSIS — E79.0 HYPERURICEMIA: ICD-10-CM

## 2025-04-01 DIAGNOSIS — E78.5 DYSLIPIDEMIA: ICD-10-CM

## 2025-04-01 DIAGNOSIS — E11.42 TYPE 2 DIABETES MELLITUS WITH DIABETIC POLYNEUROPATHY, WITHOUT LONG-TERM CURRENT USE OF INSULIN: Primary | ICD-10-CM

## 2025-04-01 NOTE — TELEPHONE ENCOUNTER
Copied from CRM #[id number]. Topic: Appointments - Amb Referral  >> Apr 1, 2025 11:06 AM Lauren wrote:  type: Lab    Caller is requesting to schedule their Lab appointment prior to an appointment.    Order is not listed in EPIC.  Please enter order and contact patient to schedule.    Preferred Date and Time of Labs:Please send the pt her lab orders thru [facility] or mail to her address on file     Date of Appointment: 09/08/2025    Where would they like the lab performed? N/A    Would the patient rather a call back or a response via [facility]? Call back     Best Call Back Number:    Additional Information:N/A

## 2025-04-02 ENCOUNTER — OFFICE VISIT (OUTPATIENT)
Dept: NEPHROLOGY | Facility: CLINIC | Age: 60
End: 2025-04-02
Payer: COMMERCIAL

## 2025-04-02 VITALS — DIASTOLIC BLOOD PRESSURE: 84 MMHG | SYSTOLIC BLOOD PRESSURE: 117 MMHG

## 2025-04-02 DIAGNOSIS — I10 PRIMARY HYPERTENSION: ICD-10-CM

## 2025-04-02 DIAGNOSIS — N25.81 SECONDARY HYPERPARATHYROIDISM OF RENAL ORIGIN: ICD-10-CM

## 2025-04-02 DIAGNOSIS — N28.1 ACQUIRED CYST OF KIDNEY: ICD-10-CM

## 2025-04-02 DIAGNOSIS — E11.21 DIABETIC NEPHROPATHY ASSOCIATED WITH TYPE 2 DIABETES MELLITUS: ICD-10-CM

## 2025-04-02 DIAGNOSIS — E66.01 MORBID OBESITY: ICD-10-CM

## 2025-04-02 DIAGNOSIS — N18.4 CKD (CHRONIC KIDNEY DISEASE) STAGE 4, GFR 15-29 ML/MIN: Primary | ICD-10-CM

## 2025-04-02 PROCEDURE — 3066F NEPHROPATHY DOC TX: CPT | Mod: CPTII,95,, | Performed by: INTERNAL MEDICINE

## 2025-04-02 PROCEDURE — 3079F DIAST BP 80-89 MM HG: CPT | Mod: CPTII,95,, | Performed by: INTERNAL MEDICINE

## 2025-04-02 PROCEDURE — 3074F SYST BP LT 130 MM HG: CPT | Mod: CPTII,95,, | Performed by: INTERNAL MEDICINE

## 2025-04-02 PROCEDURE — 98006 SYNCH AUDIO-VIDEO EST MOD 30: CPT | Mod: 95,,, | Performed by: INTERNAL MEDICINE

## 2025-04-02 PROCEDURE — 1160F RVW MEDS BY RX/DR IN RCRD: CPT | Mod: CPTII,95,, | Performed by: INTERNAL MEDICINE

## 2025-04-02 PROCEDURE — 4010F ACE/ARB THERAPY RXD/TAKEN: CPT | Mod: CPTII,95,, | Performed by: INTERNAL MEDICINE

## 2025-04-02 PROCEDURE — 1159F MED LIST DOCD IN RCRD: CPT | Mod: CPTII,95,, | Performed by: INTERNAL MEDICINE

## 2025-04-02 NOTE — PROGRESS NOTES
Subjective:       Patient ID: Mary Ann Vidales is a 60 y.o. White female who presents for return patient evaluation for chronic renal failure.    The patient location is:  Patient Home   The chief complaint leading to consultation is: CKD  Visit type: Virtual visit with synchronous audio and video  Total time spent with patient: 12 minutes  Each patient to whom he or she provides medical services by telemedicine is:  (1) informed of the relationship between the physician and patient and the respective role of any other health care provider with respect to management of the patient; and (2) notified that he or she may decline to receive medical services by telemedicine and may withdraw from such care at any time.        She did not tolerate lyrica.  She has no uremic or urinary symptoms and is in her usual state of health.  There have been no recent illnesses, hospitalizations or procedures.  She is down to 220 pounds from 299 last year.  Her HgA1C is 5.4.  Her blood pressure is controlled at home.  She is having neuropathy pain in her left great toe in the distal phalanx for 6 months which has been causing her difficulty walking.      Review of Systems   Constitutional:  Negative for appetite change, chills and fever.   HENT:  Negative for congestion.    Eyes:  Negative for visual disturbance.   Respiratory:  Negative for cough and shortness of breath.    Cardiovascular:  Negative for chest pain and leg swelling.   Gastrointestinal:  Negative for diarrhea, nausea and vomiting.   Genitourinary:  Negative for difficulty urinating, dysuria and hematuria.   Musculoskeletal:  Positive for arthralgias (knees) and back pain. Negative for myalgias.   Skin:  Negative for rash.   Neurological:  Positive for numbness (toes and feet). Negative for headaches.   Hematological:  Bruises/bleeds easily.   Psychiatric/Behavioral:  Negative for sleep disturbance.        The past medical, family and social histories were reviewed  for this encounter.     [facility] 12/20/2013     Objective:      Physical Exam  Vitals reviewed.   Constitutional:       General: She is not in acute distress.     Appearance: She is well-developed.   HENT:      Head: Normocephalic and atraumatic.   Eyes:      General: No scleral icterus.  Pulmonary:      Effort: Pulmonary effort is normal.   Neurological:      Mental Status: She is alert and oriented to person, place, and time.   Psychiatric:         Mood and Affect: Mood normal.         Behavior: Behavior normal.         Assessment:       1. CKD (chronic kidney disease) stage 4, GFR 15-29 ml/min    2. Primary hypertension    3. Diabetic nephropathy associated with type 2 diabetes mellitus    4. Acquired cyst of kidney    5. Secondary hyperparathyroidism of renal origin    6. Morbid obesity       Lab Results   Component Value Date    CREATININE 2.2 (H) 11/23/2024    CREATININE 2.2 (H) 11/23/2024    CREATININE 2.1 (H) 11/23/2024    BUN 51 (H) 11/23/2024    BUN 51 (H) 11/23/2024    BUN 48 (H) 11/23/2024     11/23/2024     11/23/2024     11/23/2024    K 4.0 11/23/2024    K 4.0 11/23/2024    K 3.9 11/23/2024     11/23/2024     11/23/2024     11/23/2024    CO2 25 11/23/2024    CO2 25 11/23/2024    CO2 26 11/23/2024     Lab Results   Component Value Date    .0 (H) 11/23/2024    .0 (H) 11/23/2024    CALCIUM 9.6 11/23/2024    CALCIUM 9.6 11/23/2024    CALCIUM 9.7 11/23/2024    PHOS 4.6 (H) 11/23/2024    PHOS 4.6 (H) 11/23/2024     Lab Results   Component Value Date    HCT 35.4 (L) 11/23/2024     Prot/Creat Ratio, Urine   Date Value Ref Range Status   11/23/2024 0.53 (H) 0.00 - 0.20 Final   06/22/2024 0.43 (H) 0.00 - 0.20 Final   06/01/2023 0.36 (H) 0.00 - 0.20 Final     Plan:   Return to clinic in 3 months.  Labs for next visit include rp, upc, pth per standing orders.  Baseline creatinine is 1.0 since 2005.  She has then been 1.6-1.8 since 2015.  Since 2018 she has been  1.8-2.2.  PTH is 130 with a calcium of 9.6.  D3 2000 units daily.  UPC is 0.53 on an ARB.  I do not know why she went from a creatinine of 1.0 in 2015 to 1.5 in 2017 and 1.8 in 2018 with bland urine sediment and a negative renal US.  There appears to be no other precipitants although AIN is also a possibility.  Her urine sediment appears bland thus I think it is less likely that she has an acute GN.  She does not wish to have a biopsy at this time and I am not pushing for one.  She has a gap from 10/15-7/17 where she seemed to bump her baseline.  Renal US showed R 11.1 cm, L 10.6 cm.  Please avoid or minimize all NSAIDS (ibuprofen, motrin, aleve, indocin, naprosyn) to minimize the risk to your kidneys.  Aspirin in a dose of 325 mg or less a day is likely the safest with regards to kidney function.  If you are able to take aspirin and do not have any bleeding problems or ulcers, this may be your best therapy.  Alternatively, acetaminophen (Tylenol) is the safer than NSAIDs with regards to kidney function.  I would ask you take this as directed on the bottle.  It is best to stay under 2 grams a day (4-500 mg tablets a day maximum).  I asked her to stay at a max of 600 mg of gabapentin a day if possible and discussed side effects.  Blood pressure is controlled on the current regimen.  The effects of diabetes as it relates to kidney function was discussed.  As for the control of your blood sugar, please follow up with your PCP or Endocrinology.    KFRE 2-Year: 2.3% at 4/10/2024  8:26 AM  Calculated from:  Serum Creatinine: 1.6 mg/dL at 4/10/2024  8:26 AM  Urine Albumin Creatinine Ratio: 127.9 ug/mg at 6/1/2023  6:55 AM  Age: 59 years  Sex: Female at 4/10/2024  8:26 AM  Has CKD-3 to CKD-5: Yes    KFRE 5-Year: 7.1% at 4/10/2024  8:26 AM  Calculated from:  Serum Creatinine: 1.6 mg/dL at 4/10/2024  8:26 AM  Urine Albumin Creatinine Ratio: 127.9 ug/mg at 6/1/2023  6:55 AM  Age: 59 years  Sex: Female at 4/10/2024  8:26  AM  Has CKD-3 to CKD-5: Yes

## 2025-04-03 NOTE — TELEPHONE ENCOUNTER
She is booked 4/10 with stephanie since she  Had to cancel today's appt 4/3 with dr chang and  He is booked up..    She is on xanax and hydrocodone and has to  Be seen at least ev 6 mos.    She is also booked in sept w/ dr chang.

## 2025-04-10 ENCOUNTER — OFFICE VISIT (OUTPATIENT)
Dept: INTERNAL MEDICINE | Facility: CLINIC | Age: 60
End: 2025-04-10
Payer: COMMERCIAL

## 2025-04-10 VITALS
RESPIRATION RATE: 18 BRPM | TEMPERATURE: 98 F | BODY MASS INDEX: 35.12 KG/M2 | WEIGHT: 205.69 LBS | HEIGHT: 64 IN | OXYGEN SATURATION: 95 % | HEART RATE: 105 BPM | SYSTOLIC BLOOD PRESSURE: 114 MMHG | DIASTOLIC BLOOD PRESSURE: 76 MMHG

## 2025-04-10 DIAGNOSIS — E78.5 DYSLIPIDEMIA: ICD-10-CM

## 2025-04-10 DIAGNOSIS — E11.9 TYPE 2 DIABETES MELLITUS WITHOUT COMPLICATION, WITH LONG-TERM CURRENT USE OF INSULIN: Primary | ICD-10-CM

## 2025-04-10 DIAGNOSIS — G47.33 OSA (OBSTRUCTIVE SLEEP APNEA): ICD-10-CM

## 2025-04-10 DIAGNOSIS — F41.9 ANXIETY: ICD-10-CM

## 2025-04-10 DIAGNOSIS — E66.812 CLASS 2 SEVERE OBESITY WITH SERIOUS COMORBIDITY AND BODY MASS INDEX (BMI) OF 35.0 TO 35.9 IN ADULT, UNSPECIFIED OBESITY TYPE: ICD-10-CM

## 2025-04-10 DIAGNOSIS — N18.4 CKD (CHRONIC KIDNEY DISEASE) STAGE 4, GFR 15-29 ML/MIN: ICD-10-CM

## 2025-04-10 DIAGNOSIS — E66.01 CLASS 2 SEVERE OBESITY WITH SERIOUS COMORBIDITY AND BODY MASS INDEX (BMI) OF 35.0 TO 35.9 IN ADULT, UNSPECIFIED OBESITY TYPE: ICD-10-CM

## 2025-04-10 DIAGNOSIS — I25.10 CORONARY ARTERY DISEASE, UNSPECIFIED VESSEL OR LESION TYPE, UNSPECIFIED WHETHER ANGINA PRESENT, UNSPECIFIED WHETHER NATIVE OR TRANSPLANTED HEART: ICD-10-CM

## 2025-04-10 DIAGNOSIS — I10 ESSENTIAL HYPERTENSION: ICD-10-CM

## 2025-04-10 DIAGNOSIS — Z79.4 TYPE 2 DIABETES MELLITUS WITHOUT COMPLICATION, WITH LONG-TERM CURRENT USE OF INSULIN: Primary | ICD-10-CM

## 2025-04-10 DIAGNOSIS — F33.0 MILD EPISODE OF RECURRENT MAJOR DEPRESSIVE DISORDER: ICD-10-CM

## 2025-04-10 PROCEDURE — 3078F DIAST BP <80 MM HG: CPT | Mod: CPTII,S$GLB,, | Performed by: NURSE PRACTITIONER

## 2025-04-10 PROCEDURE — 3008F BODY MASS INDEX DOCD: CPT | Mod: CPTII,S$GLB,, | Performed by: NURSE PRACTITIONER

## 2025-04-10 PROCEDURE — 1159F MED LIST DOCD IN RCRD: CPT | Mod: CPTII,S$GLB,, | Performed by: NURSE PRACTITIONER

## 2025-04-10 PROCEDURE — 99999 PR PBB SHADOW E&M-EST. PATIENT-LVL V: CPT | Mod: PBBFAC,,, | Performed by: NURSE PRACTITIONER

## 2025-04-10 PROCEDURE — 99213 OFFICE O/P EST LOW 20 MIN: CPT | Mod: S$GLB,,, | Performed by: NURSE PRACTITIONER

## 2025-04-10 PROCEDURE — 3066F NEPHROPATHY DOC TX: CPT | Mod: CPTII,S$GLB,, | Performed by: NURSE PRACTITIONER

## 2025-04-10 PROCEDURE — 4010F ACE/ARB THERAPY RXD/TAKEN: CPT | Mod: CPTII,S$GLB,, | Performed by: NURSE PRACTITIONER

## 2025-04-10 PROCEDURE — 1160F RVW MEDS BY RX/DR IN RCRD: CPT | Mod: CPTII,S$GLB,, | Performed by: NURSE PRACTITIONER

## 2025-04-10 PROCEDURE — 3074F SYST BP LT 130 MM HG: CPT | Mod: CPTII,S$GLB,, | Performed by: NURSE PRACTITIONER

## 2025-04-10 RX ORDER — ATORVASTATIN CALCIUM 40 MG/1
40 TABLET, FILM COATED ORAL DAILY
COMMUNITY
Start: 2025-03-24
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
## 2025-05-02 ENCOUNTER — PATIENT OUTREACH (OUTPATIENT)
Dept: ADMINISTRATIVE | Facility: HOSPITAL | Age: 60
End: 2025-05-02
Payer: COMMERCIAL

## 2025-05-06 DIAGNOSIS — Z79.4 TYPE 2 DIABETES MELLITUS WITHOUT COMPLICATION, WITH LONG-TERM CURRENT USE OF INSULIN: ICD-10-CM

## 2025-05-06 DIAGNOSIS — F41.9 ANXIETY: ICD-10-CM

## 2025-05-06 DIAGNOSIS — E11.9 TYPE 2 DIABETES MELLITUS WITHOUT COMPLICATION, WITH LONG-TERM CURRENT USE OF INSULIN: ICD-10-CM

## 2025-05-06 DIAGNOSIS — K52.9 GE (GASTROENTERITIS): ICD-10-CM

## 2025-05-06 RX ORDER — GABAPENTIN 300 MG/1
300 CAPSULE ORAL 3 TIMES DAILY
Qty: 90 CAPSULE | Refills: 3 | Status: SHIPPED | OUTPATIENT
Start: 2025-05-06

## 2025-05-06 NOTE — TELEPHONE ENCOUNTER
No care due was identified.  [facility] Embedded Care Due Messages. Reference number: [id number].   5/06/2025 6:40:17 PM CDT

## 2025-05-07 RX ORDER — ALPRAZOLAM 0.5 MG/1
0.5 TABLET ORAL
Qty: 30 TABLET | Refills: 0 | Status: SHIPPED | OUTPATIENT
Start: 2025-05-07

## 2025-05-07 RX ORDER — ONDANSETRON 4 MG/1
4 TABLET, FILM COATED ORAL EVERY 8 HOURS PRN
Qty: 18 TABLET | Refills: 6 | Status: SHIPPED | OUTPATIENT
Start: 2025-05-07

## 2025-05-12 PROBLEM — K92.2 GI BLEED: Status: ACTIVE | Noted: 2025-05-12

## 2025-05-12 PROBLEM — D62 ACUTE BLOOD LOSS ANEMIA: Status: ACTIVE | Noted: 2025-05-12

## 2025-05-13 ENCOUNTER — PATIENT OUTREACH (OUTPATIENT)
Dept: ADMINISTRATIVE | Facility: HOSPITAL | Age: 60
End: 2025-05-13
Payer: COMMERCIAL

## 2025-05-13 DIAGNOSIS — E11.9 TYPE 2 DIABETES MELLITUS WITHOUT COMPLICATION, UNSPECIFIED WHETHER LONG TERM INSULIN USE: Primary | ICD-10-CM

## 2025-05-13 PROBLEM — K92.2 LOWER GI BLEED: Status: ACTIVE | Noted: 2025-05-13

## 2025-05-14 PROBLEM — D64.9 SYMPTOMATIC ANEMIA: Status: ACTIVE | Noted: 2022-04-20

## 2025-05-16 ENCOUNTER — PATIENT MESSAGE (OUTPATIENT)
Dept: ENDOCRINOLOGY | Facility: CLINIC | Age: 60
End: 2025-05-16
Payer: COMMERCIAL

## 2025-05-19 RX ORDER — SEMAGLUTIDE 2.68 MG/ML
2 INJECTION, SOLUTION SUBCUTANEOUS
COMMUNITY
End: 2025-05-20 | Stop reason: SDUPTHER

## 2025-05-19 RX ORDER — SEMAGLUTIDE 2.68 MG/ML
2 INJECTION, SOLUTION SUBCUTANEOUS
Qty: 3 EACH | Refills: 1 | Status: SHIPPED | OUTPATIENT
Start: 2025-05-19

## 2025-05-20 RX ORDER — SEMAGLUTIDE 2.68 MG/ML
2 INJECTION, SOLUTION SUBCUTANEOUS
Qty: 3 ML | Refills: 6 | Status: SHIPPED | OUTPATIENT
Start: 2025-05-20

## 2025-05-22 ENCOUNTER — LAB VISIT (OUTPATIENT)
Dept: LAB | Facility: HOSPITAL | Age: 60
End: 2025-05-22
Attending: INTERNAL MEDICINE
Payer: COMMERCIAL

## 2025-05-22 ENCOUNTER — OFFICE VISIT (OUTPATIENT)
Dept: GASTROENTEROLOGY | Facility: CLINIC | Age: 60
End: 2025-05-22
Payer: COMMERCIAL

## 2025-05-22 VITALS — BODY MASS INDEX: 35.98 KG/M2 | WEIGHT: 210.75 LBS | HEIGHT: 64 IN

## 2025-05-22 DIAGNOSIS — M79.672 LEFT FOOT PAIN: ICD-10-CM

## 2025-05-22 DIAGNOSIS — E11.42 TYPE 2 DIABETES MELLITUS WITH DIABETIC POLYNEUROPATHY, WITHOUT LONG-TERM CURRENT USE OF INSULIN: ICD-10-CM

## 2025-05-22 DIAGNOSIS — D64.9 ANEMIA, UNSPECIFIED TYPE: ICD-10-CM

## 2025-05-22 DIAGNOSIS — E11.9 TYPE 2 DIABETES MELLITUS WITHOUT COMPLICATION, WITH LONG-TERM CURRENT USE OF INSULIN: ICD-10-CM

## 2025-05-22 DIAGNOSIS — E79.0 HYPERURICEMIA: ICD-10-CM

## 2025-05-22 DIAGNOSIS — K59.00 CONSTIPATION, UNSPECIFIED CONSTIPATION TYPE: ICD-10-CM

## 2025-05-22 DIAGNOSIS — Z09 HOSPITAL DISCHARGE FOLLOW-UP: Primary | ICD-10-CM

## 2025-05-22 DIAGNOSIS — I10 ESSENTIAL HYPERTENSION: ICD-10-CM

## 2025-05-22 DIAGNOSIS — Z12.11 SCREENING FOR COLON CANCER: ICD-10-CM

## 2025-05-22 DIAGNOSIS — R10.9 ABDOMINAL CRAMPING: ICD-10-CM

## 2025-05-22 DIAGNOSIS — Z87.19 HISTORY OF GI DIVERTICULAR BLEED: ICD-10-CM

## 2025-05-22 DIAGNOSIS — N18.4 CKD (CHRONIC KIDNEY DISEASE) STAGE 4, GFR 15-29 ML/MIN: ICD-10-CM

## 2025-05-22 DIAGNOSIS — E78.5 DYSLIPIDEMIA: ICD-10-CM

## 2025-05-22 DIAGNOSIS — Z98.890 HISTORY OF COLONOSCOPY: ICD-10-CM

## 2025-05-22 DIAGNOSIS — Z79.4 TYPE 2 DIABETES MELLITUS WITHOUT COMPLICATION, WITH LONG-TERM CURRENT USE OF INSULIN: ICD-10-CM

## 2025-05-22 LAB
ABSOLUTE EOSINOPHIL (OHS): 0.18 K/UL
ABSOLUTE MONOCYTE (OHS): 0.49 K/UL (ref 0.3–1)
ABSOLUTE NEUTROPHIL COUNT (OHS): 4.39 K/UL (ref 1.8–7.7)
ALBUMIN SERPL BCP-MCNC: 3.3 G/DL (ref 3.5–5.2)
ANION GAP (OHS): 11 MMOL/L (ref 8–16)
BASOPHILS # BLD AUTO: 0.07 K/UL
BASOPHILS NFR BLD AUTO: 1 %
BUN SERPL-MCNC: 32 MG/DL (ref 6–20)
CALCIUM SERPL-MCNC: 9.1 MG/DL (ref 8.7–10.5)
CHLORIDE SERPL-SCNC: 103 MMOL/L (ref 95–110)
CHOLEST SERPL-MCNC: 165 MG/DL (ref 120–199)
CHOLEST/HDLC SERPL: 3.6 {RATIO} (ref 2–5)
CO2 SERPL-SCNC: 24 MMOL/L (ref 23–29)
CREAT SERPL-MCNC: 1.3 MG/DL (ref 0.5–1.4)
CREAT UR-MCNC: 26 MG/DL (ref 15–325)
CRP SERPL-MCNC: 4.1 MG/L
EAG (OHS): 111 MG/DL (ref 68–131)
ERYTHROCYTE [DISTWIDTH] IN BLOOD BY AUTOMATED COUNT: 16.4 % (ref 11.5–14.5)
ERYTHROCYTE [SEDIMENTATION RATE] IN BLOOD BY PHOTOMETRIC METHOD: 24 MM/HR
GFR SERPLBLD CREATININE-BSD FMLA CKD-EPI: 48 ML/MIN/1.73/M2
GLUCOSE SERPL-MCNC: 94 MG/DL (ref 70–110)
HBA1C MFR BLD: 5.5 % (ref 4–5.6)
HCT VFR BLD AUTO: 26.3 % (ref 37–48.5)
HDLC SERPL-MCNC: 46 MG/DL (ref 40–75)
HDLC SERPL: 27.9 % (ref 20–50)
HGB BLD-MCNC: 8.3 GM/DL (ref 12–16)
IMM GRANULOCYTES # BLD AUTO: 0.04 K/UL (ref 0–0.04)
IMM GRANULOCYTES NFR BLD AUTO: 0.6 % (ref 0–0.5)
LDLC SERPL CALC-MCNC: 85.8 MG/DL (ref 63–159)
LYMPHOCYTES # BLD AUTO: 1.83 K/UL (ref 1–4.8)
MCH RBC QN AUTO: 30.4 PG (ref 27–31)
MCHC RBC AUTO-ENTMCNC: 31.6 G/DL (ref 32–36)
MCV RBC AUTO: 96 FL (ref 82–98)
NONHDLC SERPL-MCNC: 119 MG/DL
NUCLEATED RBC (/100WBC) (OHS): 0 /100 WBC
PHOSPHATE SERPL-MCNC: 3.2 MG/DL (ref 2.7–4.5)
PLATELET # BLD AUTO: 171 K/UL (ref 150–450)
PMV BLD AUTO: 9.4 FL (ref 9.2–12.9)
POTASSIUM SERPL-SCNC: 4 MMOL/L (ref 3.5–5.1)
PROT UR-MCNC: 14 MG/DL
PROT/CREAT UR: 0.54 MG/G{CREAT}
PTH-INTACT SERPL-MCNC: 142.4 PG/ML (ref 9–77)
RBC # BLD AUTO: 2.73 M/UL (ref 4–5.4)
RELATIVE EOSINOPHIL (OHS): 2.6 %
RELATIVE LYMPHOCYTE (OHS): 26.1 % (ref 18–48)
RELATIVE MONOCYTE (OHS): 7 % (ref 4–15)
RELATIVE NEUTROPHIL (OHS): 62.7 % (ref 38–73)
SODIUM SERPL-SCNC: 138 MMOL/L (ref 136–145)
TRIGL SERPL-MCNC: 166 MG/DL (ref 30–150)
TSH SERPL-ACNC: 1.13 UIU/ML (ref 0.4–4)
URATE SERPL-MCNC: 5.2 MG/DL (ref 2.4–5.7)
WBC # BLD AUTO: 7 K/UL (ref 3.9–12.7)

## 2025-05-22 PROCEDURE — 1111F DSCHRG MED/CURRENT MED MERGE: CPT | Mod: CPTII,S$GLB,,

## 2025-05-22 PROCEDURE — 84156 ASSAY OF PROTEIN URINE: CPT

## 2025-05-22 PROCEDURE — 99214 OFFICE O/P EST MOD 30 MIN: CPT | Mod: S$GLB,,,

## 2025-05-22 PROCEDURE — 80061 LIPID PANEL: CPT

## 2025-05-22 PROCEDURE — 83036 HEMOGLOBIN GLYCOSYLATED A1C: CPT

## 2025-05-22 PROCEDURE — 3008F BODY MASS INDEX DOCD: CPT | Mod: CPTII,S$GLB,,

## 2025-05-22 PROCEDURE — 1159F MED LIST DOCD IN RCRD: CPT | Mod: CPTII,S$GLB,,

## 2025-05-22 PROCEDURE — 85652 RBC SED RATE AUTOMATED: CPT

## 2025-05-22 PROCEDURE — 3066F NEPHROPATHY DOC TX: CPT | Mod: CPTII,S$GLB,,

## 2025-05-22 PROCEDURE — 36415 COLL VENOUS BLD VENIPUNCTURE: CPT | Mod: PO

## 2025-05-22 PROCEDURE — 86140 C-REACTIVE PROTEIN: CPT

## 2025-05-22 PROCEDURE — 4010F ACE/ARB THERAPY RXD/TAKEN: CPT | Mod: CPTII,S$GLB,,

## 2025-05-22 PROCEDURE — 84443 ASSAY THYROID STIM HORMONE: CPT

## 2025-05-22 PROCEDURE — 85025 COMPLETE CBC W/AUTO DIFF WBC: CPT

## 2025-05-22 PROCEDURE — 80069 RENAL FUNCTION PANEL: CPT

## 2025-05-22 PROCEDURE — 84550 ASSAY OF BLOOD/URIC ACID: CPT

## 2025-05-22 PROCEDURE — 83970 ASSAY OF PARATHORMONE: CPT

## 2025-05-22 PROCEDURE — 1160F RVW MEDS BY RX/DR IN RCRD: CPT | Mod: CPTII,S$GLB,,

## 2025-05-22 PROCEDURE — 3044F HG A1C LEVEL LT 7.0%: CPT | Mod: CPTII,S$GLB,,

## 2025-05-22 PROCEDURE — 99999 PR PBB SHADOW E&M-EST. PATIENT-LVL V: CPT | Mod: PBBFAC,,,

## 2025-05-22 RX ORDER — EZETIMIBE 10 MG/1
10 TABLET ORAL
COMMUNITY
Start: 2024-12-30

## 2025-05-22 RX ORDER — DICYCLOMINE HYDROCHLORIDE 10 MG/1
10 CAPSULE ORAL 4 TIMES DAILY PRN
Qty: 120 CAPSULE | Refills: 0 | Status: SHIPPED | OUTPATIENT
Start: 2025-05-22 | End: 2025-06-21

## 2025-05-22 NOTE — PROGRESS NOTES
"Subjective:       Patient ID: Mary Ann Vidales is a 60 y.o. female Body mass index is 36.18 kg/m².    Chief Complaint: Hospital Follow Up    This patient is new to me.  Established patient of Dr. Awan.     Reviewed hospital discharge summary report from 05/15/25,  "Admission Date: 5/12/2025  Hospital Length of Stay: 1 days  Discharge Date and Time: No discharge date for patient encounter.  Attending Physician: Priya Morton DO   Discharging Provider: Priya Mroton DO  Primary Care Provider: Davon Kan MD     Primary Care Team: Networked reference to record PCT      HPI: Patient is a 60 year old female with PMHx of DM, HTN, CAD who presents to the ED via EMS for evaluation of bloody stools. Patient reports that she began to experience abdominal pain early this afternoon and had three episodes of diarrhea with bloody clots in the stool. Patient reports feeling fatigued and dizzy after passing the blood and thought that she might pass out. Patient denies any fever, chills, shortness of breath, chest pain. Patient reports recent colonoscopy in January. GI consulted. Patient does report history of hemorrhoids for the past 10 years.       Procedure(s) (LRB): COLONOSCOPY (N/A) EGD (ESOPHAGOGASTRODUODENOSCOPY) (N/A)       Hospital Course: Admitted with.  H&H closely monitored and GI consulted.  Patient underwent EGD mostly normal mucosal noted.  Patient colonoscopy terminal ileum showed a single diverticulum was found in the transverse colon with active bleeding coming from the diverticular opening.  To stop bleeding 1 hemostatic clip was successfully and there was no bleeding at the end of procedure.  She received 2 units of blood on 05/14.  Hemoglobin on 05/15 7.2.  Patient reports feeling well.  Discussed in depth with patient that my recommendation is to stay in the hospital and repeat her labs tomorrow morning.  However she and her wife Osiel request to be discharged home.  Patient voices " "understanding about possible consequences if she is continuing to bleed including but not limited to syncopal episodes resulting in potential head trauma and intracranial bleed, hypovolemia and potential shock that could result in cardiac arrest, death.  Patient states that she would rather go home and returned to the hospital if needed and has been counseled thoroughly with nursing at bedside as witnessed."    Anemia  Presents for initial visit. Symptoms include abdominal pain (Denies pain currently; intermittent lower abdominal ache/cramp that worsens with certain movements; she took her significant other's Bentyl with improvement), bruises/bleeds easily (Was on Plavix daily until recent hospital admission, which was held; patient reports she has not restarted Plavix yet) and malaise/fatigue (imrpoved). There has been no confusion, fever, leg swelling, light-headedness, pallor, palpitations (resolved - tight palp), pica or weight loss. (Mild constipation she associates with hydrocodone use; prior to hospital admission she would go days without BMs, but currently having 1-2 daily rated stool 4 on [place] scale; intermittent straining; currently taking stool softener daily and Correctol p.r.n.) Signs of blood loss that are not present include hematemesis, hematochezia (resolved - notices large volumes of BRBPR prior to ER visit which resolved after colonoscopy showed bleeding diverticulum in the transverse colon, which resolved after placing hemostatic clip), melena (Resolved) and vaginal bleeding. Treatments tried: Received 4 units of PRBCs during hospital admission. Past medical history includes chronic renal disease, heart failure (CAD & HTN - followed closely by Cardiology), recent illness (Hospital admission for rectal bleeding/lower GI bleed), recent surgery (recent EGD and colonoscopy) and recent trauma (reports falling about 2 weeks prior to hospital visit on right hip). There is no history of alcohol abuse, " cancer, chronic liver disease, clotting disorder, dementia, hypothyroidism, inflammatory bowel disease, malabsorption, malnutrition or rheumatic disease. Procedure history includes colonoscopy (05/13/25  - Perianal skin tags on perianal exam. The examined portion of ileum normal. Diverticulosis in transverse colon. There was active bleeding from diverticular opening. Clip placed. Diverticulosis in entire examined colon. No specimens collected) and EGD (05/13/25 - Normal mucosa found in the entire esophagus. Z-line regular, 37 cm from the incisors. Normal mucosa was found in the entire stomach. Normal duodenal bulb, first portion of the duodenum and second portion of the duodenum. No specimens collected). There is no past history of bone marrow exam or FOBT.     Review of Systems   Constitutional:  Positive for activity change and malaise/fatigue (imrpoved). Negative for appetite change, chills, diaphoresis, fatigue, fever, unexpected weight change and weight loss.   HENT:  Negative for sore throat and trouble swallowing.    Respiratory:  Negative for cough, choking and shortness of breath.    Cardiovascular:  Negative for chest pain and palpitations (resolved - tight palp).   Gastrointestinal:  Positive for abdominal pain (Denies pain currently; intermittent lower abdominal ache/cramp that worsens with certain movements; she took her significant other's Bentyl with improvement) and constipation. Negative for abdominal distention, anal bleeding, blood in stool, diarrhea, hematemesis, hematochezia (resolved - notices large volumes of BRBPR prior to ER visit which resolved after colonoscopy showed bleeding diverticulum in the transverse colon, which resolved after placing hemostatic clip), melena (Resolved), nausea, rectal pain and vomiting.   Genitourinary:  Negative for hematuria and vaginal bleeding.   Skin:  Negative for pallor and rash.   Neurological:  Negative for light-headedness.   Hematological:   Bruises/bleeds easily (Was on Plavix daily until recent hospital admission, which was held; patient reports she has not restarted Plavix yet).   Psychiatric/Behavioral:  Negative for confusion.        Patient's last menstrual period was 12/20/2013.  Past Medical History:   Diagnosis Date    Anemia     Anticoagulant long-term use     Chronic asthma     Coronary artery disease     Diabetes mellitus type II     Diverticulosis     Fibroids     Hypertension     Incontinence     Morbid obesity 11/03/2015    Neuropathy in diabetes     Obesity     TINA (obstructive sleep apnea)     uses CPAP    Panic attacks     Renal disorder     STAGE 3     Past Surgical History:   Procedure Laterality Date    BREAST SURGERY      CARDIAC CATHETERIZATION  07/05/2019    STENT IN LAD    COLONOSCOPY N/A 05/13/2025    Procedure: COLONOSCOPY;  Surgeon: Lb Awan DO;  Location: [facility] ENDO;  Service: Endoscopy;  Laterality: N/A;    COLONOSCOPY, SCREENING, LOW RISK PATIENT N/A 01/31/2025    Procedure: COLONOSCOPY, SCREENING, LOW RISK PATIENT;  Surgeon: Lewis Jolly MD;  Location: [facility] ENDO;  Service: General;  Laterality: N/A;    CORONARY ANGIOGRAPHY N/A 11/06/2020    Procedure: ANGIOGRAM, CORONARY ARTERY;  Surgeon: John Francis MD;  Location: [facility] CATH;  Service: Cardiology;  Laterality: N/A;    CORONARY ANGIOPLASTY WITH STENT PLACEMENT  2019    ECTOPIC PREGNANCY SURGERY      ESOPHAGOGASTRODUODENOSCOPY N/A 05/13/2025    Procedure: EGD (ESOPHAGOGASTRODUODENOSCOPY);  Surgeon: Lb Awan DO;  Location: [facility] ENDO;  Service: Endoscopy;  Laterality: N/A;    HERNIA REPAIR      HYSTERECTOMY      OOPHORECTOMY      TENOTOMY Right 03/04/2022    Procedure: TENOTOMY- Tx Bone Left achilles;  Surgeon: Sarwat Vaughn MD;  Location: [facility] OR;  Service: Orthopedics;  Laterality: Right;    TONSILLECTOMY      UPPER GASTROINTESTINAL ENDOSCOPY       Family History   Problem Relation Name Age of Onset    Diabetes Mother      Heart  disease Mother      COPD Mother      Heart failure Father      Breast cancer Neg Hx      Ovarian cancer Neg Hx      Colon cancer Neg Hx      Crohn's disease Neg Hx      Ulcerative colitis Neg Hx      Stomach cancer Neg Hx      Esophageal cancer Neg Hx       Social History[1]  Wt Readings from Last 10 Encounters:   05/22/25 95.6 kg (210 lb 12.2 oz)   05/12/25 95 kg (209 lb 7 oz)   04/10/25 93.3 kg (205 lb 11 oz)   01/27/25 96.2 kg (212 lb)   08/12/24 96.6 kg (212 lb 15.4 oz)   08/05/24 97.7 kg (215 lb 6.2 oz)   04/30/24 100.4 kg (221 lb 7.2 oz)   04/10/24 97.5 kg (215 lb)   04/10/24 98.9 kg (218 lb 0.6 oz)   04/01/24 98.9 kg (218 lb 0.6 oz)     Lab Results   Component Value Date    WBC 7.00 05/22/2025    HGB 8.3 (L) 05/22/2025    HCT 26.3 (L) 05/22/2025    MCV 96 05/22/2025     05/22/2025     CMP  Sodium   Date Value Ref Range Status   05/22/2025 138 136 - 145 mmol/L Final   05/15/2025 145 136 - 145 mmol/L Final     Potassium   Date Value Ref Range Status   05/22/2025 4.0 3.5 - 5.1 mmol/L Final   05/15/2025 3.8 3.5 - 5.1 mmol/L Final     Comment:     Anion Gap reference range revised on 4/28/2023     Chloride   Date Value Ref Range Status   05/22/2025 103 95 - 110 mmol/L Final   05/15/2025 114 (H) 95 - 110 mmol/L Final     CO2   Date Value Ref Range Status   05/22/2025 24 23 - 29 mmol/L Final   05/15/2025 24 23 - 29 mmol/L Final     Glucose   Date Value Ref Range Status   05/22/2025 94 70 - 110 mg/dL Final   05/15/2025 114 (H) 70 - 110 mg/dL Final     Comment:     The ADA recommends the following guidelines for fasting glucose:    Normal:       less than 100 mg/dL    Prediabetes:  100 mg/dL to 125 mg/dL    Diabetes:     126 mg/dL or higher       BUN   Date Value Ref Range Status   05/22/2025 32 (H) 6 - 20 mg/dL Final     Creatinine   Date Value Ref Range Status   05/22/2025 1.3 0.5 - 1.4 mg/dL Final     Calcium   Date Value Ref Range Status   05/22/2025 9.1 8.7 - 10.5 mg/dL Final   05/15/2025 8.3 (L) 8.7 -  10.5 mg/dL Final     Total Protein   Date Value Ref Range Status   05/15/2025 4.8 (L) 6.0 - 8.4 g/dL Final     Albumin   Date Value Ref Range Status   05/22/2025 3.3 (L) 3.5 - 5.2 g/dL Final   05/15/2025 2.6 (L) 3.5 - 5.2 g/dL Final     Total Bilirubin   Date Value Ref Range Status   05/15/2025 0.2 0.2 - 1.0 mg/dL Final     Alkaline Phosphatase   Date Value Ref Range Status   05/15/2025 67 40 - 150 U/L Final     AST   Date Value Ref Range Status   05/15/2025 19 10 - 40 U/L Final     ALT   Date Value Ref Range Status   05/15/2025 12 10 - 44 U/L Final     Anion Gap   Date Value Ref Range Status   05/22/2025 11 8 - 16 mmol/L Final     eGFR if    Date Value Ref Range Status   05/16/2022 23.1 (A) >60 mL/min/1.73 m^2 Final   05/16/2022 23.1 (A) >60 mL/min/1.73 m^2 Final     eGFR if non    Date Value Ref Range Status   05/16/2022 20.0 (A) >60 mL/min/1.73 m^2 Final     Comment:     Calculation used to obtain the estimated glomerular filtration  rate (eGFR) is the CKD-EPI equation.      05/16/2022 20.0 (A) >60 mL/min/1.73 m^2 Final     Comment:     Calculation used to obtain the estimated glomerular filtration  rate (eGFR) is the CKD-EPI equation.        Lab Results   Component Value Date    LIPASERES 92 (H) 05/12/2025     Lab Results   Component Value Date    TSH 1.130 05/22/2025     Reviewed prior medical records including radiology report of KUB 05/13/2025 & endoscopy history (see surgical history).    Objective:      Physical Exam  Vitals and nursing note reviewed.   Constitutional:       General: She is not in acute distress.     Appearance: Normal appearance. She is obese. She is not ill-appearing.   HENT:      Mouth/Throat:      Lips: Pink. No lesions.   Cardiovascular:      Pulses: Normal pulses.      Heart sounds: Normal heart sounds.   Pulmonary:      Effort: Pulmonary effort is normal. No respiratory distress.      Breath sounds: Normal breath sounds.   Abdominal:      General:  Bowel sounds are normal. There is no distension or abdominal bruit. There are no signs of injury.      Palpations: Abdomen is soft. There is no shifting dullness, fluid wave, hepatomegaly, splenomegaly or mass.      Tenderness: There is no abdominal tenderness. There is no guarding or rebound. Negative signs include Pemberton's sign, Rovsing's sign and McBurney's sign.   Skin:     General: Skin is warm and dry.      Coloration: Skin is not jaundiced or pale.   Neurological:      Mental Status: She is alert and oriented to person, place, and time.   Psychiatric:         Attention and Perception: Attention normal.         Mood and Affect: Mood normal.         Speech: Speech normal.         Behavior: Behavior normal.         Assessment:       1. Hospital discharge follow-up    2. History of GI diverticular bleed    3. Anemia, unspecified type    4. History of colonoscopy    5. Constipation, unspecified constipation type    6. Abdominal cramping    7. Screening for colon cancer        Plan:       Hospital discharge follow-up    History of GI diverticular bleed & Anemia, unspecified type  - patient cleared to restart Plavix per Dr. Awan's recommendations  - if rectal bleeding returns go to the ER  -     CBC Without Differential; Future; Expected date: 05/22/2025    History of colonoscopy    - Informed patient of colonoscopy results, patient verbalized understanding    Constipation, unspecified constipation type  - Recommend daily exercise as tolerated, adequate water intake (six 8-oz glasses of water daily), and high fiber diet. OTC fiber supplements are recommended if diet does not reach daily fiber goal (20-30 grams daily), such as Metamucil, Citrucel, or FiberCon (take as directed, separate from other oral medications by >2 hours).  -Recommend taking an OTC stool softener such as Colace as directed to avoid hard stools and straining with bowel movements PRN  -Recommend trying OTC MiraLax once daily (17g PO) as  directed  - If no improvement with above recommendations, try intermittently dosed Dulcolax OTC as directed (every 3-4  days) PRN to facilitate bowel movements  -If still no improvement with these measures, call/follow-up    Abdominal cramping  - educated patient a possible side effect of Bentyl is constipation  -START:  dicyclomine (BENTYL) 10 MG capsule; Take 1 capsule (10 mg total) by mouth 4 (four) times daily as needed.  Dispense: 120 capsule; Refill: 0    Screening for colon cancer  - follow-up for surveillance colonoscopy 2030    Follow up in about 2 months (around 7/22/2025), or if symptoms worsen or fail to improve.      If no improvement in symptoms or symptoms worsen, call/follow-up at clinic or go to ER.        Total time spent on the encounter includes face to face time and non-face to face time preparing to see the patient (eg, review of tests), Obtaining and/or reviewing separately obtained history, Documenting clinical information in the electronic or other health record, Independently interpreting results (not separately reported) and communicating results to the patient/family/caregiver, or Care coordination (not separately reported).     A dictation software program was used for this note. Please expect some simple typographical  errors in this note.         [1]   Social History  Tobacco Use    Smoking status: Never    Smokeless tobacco: Never   Substance Use Topics    Alcohol use: No     Alcohol/week: 0.0 standard drinks of alcohol    Drug use: No

## 2025-05-22 NOTE — PATIENT INSTRUCTIONS
Recommend daily exercise as tolerated, adequate water intake (six 8-oz glasses of water daily), and high fiber diet. OTC fiber supplements are recommended if diet does not reach daily fiber goal (25-30 grams daily), such as Metamucil, Citrucel, or FiberCon (take as directed, separate from other oral medications by >2 hours).  -Recommend trying OTC MiraLax once daily (17g PO) as directed  - If no improvement with above recommendations, try intermittently dosed Dulcolax OTC as directed (every 3-4 days) PRN to facilitate bowel movements  -If still no improvement with these measures, call/follow-up

## 2025-05-23 ENCOUNTER — RESULTS FOLLOW-UP (OUTPATIENT)
Dept: NEPHROLOGY | Facility: CLINIC | Age: 60
End: 2025-05-23

## 2025-06-02 DIAGNOSIS — F41.9 ANXIETY: ICD-10-CM

## 2025-06-03 ENCOUNTER — PATIENT MESSAGE (OUTPATIENT)
Dept: INTERNAL MEDICINE | Facility: CLINIC | Age: 60
End: 2025-06-03
Payer: COMMERCIAL

## 2025-06-03 RX ORDER — ALPRAZOLAM 0.5 MG/1
0.5 TABLET ORAL DAILY PRN
Qty: 30 TABLET | Refills: 0 | Status: SHIPPED | OUTPATIENT
Start: 2025-06-03

## 2025-06-16 ENCOUNTER — TELEPHONE (OUTPATIENT)
Dept: ENDOCRINOLOGY | Facility: CLINIC | Age: 60
End: 2025-06-16
Payer: COMMERCIAL

## 2025-06-16 ENCOUNTER — PATIENT MESSAGE (OUTPATIENT)
Dept: GASTROENTEROLOGY | Facility: CLINIC | Age: 60
End: 2025-06-16
Payer: COMMERCIAL

## 2025-06-16 NOTE — TELEPHONE ENCOUNTER
Pt staets she was in hospital for perforated colon.  Taking 25 grams of fiber w/Miralax, state she cannot stop going and it is not formed stool. Should she stop fiber and/or miralax at this time? Informed pt I will notify NP and respond back as soon as I get a response. [facility] a [facility] response     Please advise

## 2025-06-16 NOTE — TELEPHONE ENCOUNTER
Please let the patient know I recommend her hold off on taking MiraLax (laxative), but continue psyllium fiber supplement such as Metamucil.  If diarrhea continues schedule patient follow-up.  Sincerely,  Brittney Espinoza NP

## 2025-06-17 ENCOUNTER — OFFICE VISIT (OUTPATIENT)
Dept: ENDOCRINOLOGY | Facility: CLINIC | Age: 60
End: 2025-06-17
Payer: COMMERCIAL

## 2025-06-17 VITALS
HEART RATE: 87 BPM | SYSTOLIC BLOOD PRESSURE: 118 MMHG | BODY MASS INDEX: 34.67 KG/M2 | WEIGHT: 203.06 LBS | HEIGHT: 64 IN | DIASTOLIC BLOOD PRESSURE: 72 MMHG

## 2025-06-17 DIAGNOSIS — N18.32 STAGE 3B CHRONIC KIDNEY DISEASE: ICD-10-CM

## 2025-06-17 DIAGNOSIS — I25.10 CORONARY ARTERY DISEASE, UNSPECIFIED VESSEL OR LESION TYPE, UNSPECIFIED WHETHER ANGINA PRESENT, UNSPECIFIED WHETHER NATIVE OR TRANSPLANTED HEART: ICD-10-CM

## 2025-06-17 DIAGNOSIS — E11.9 TYPE 2 DIABETES MELLITUS WITHOUT COMPLICATION, WITH LONG-TERM CURRENT USE OF INSULIN: Primary | ICD-10-CM

## 2025-06-17 DIAGNOSIS — Z79.4 TYPE 2 DIABETES MELLITUS WITHOUT COMPLICATION, WITH LONG-TERM CURRENT USE OF INSULIN: Primary | ICD-10-CM

## 2025-06-17 DIAGNOSIS — E78.5 DYSLIPIDEMIA: ICD-10-CM

## 2025-06-17 DIAGNOSIS — G63 POLYNEUROPATHY ASSOCIATED WITH UNDERLYING DISEASE: ICD-10-CM

## 2025-06-17 DIAGNOSIS — I10 ESSENTIAL HYPERTENSION: ICD-10-CM

## 2025-06-17 PROCEDURE — 3066F NEPHROPATHY DOC TX: CPT | Mod: CPTII,S$GLB,, | Performed by: NURSE PRACTITIONER

## 2025-06-17 PROCEDURE — 3008F BODY MASS INDEX DOCD: CPT | Mod: CPTII,S$GLB,, | Performed by: NURSE PRACTITIONER

## 2025-06-17 PROCEDURE — 99999 PR PBB SHADOW E&M-EST. PATIENT-LVL III: CPT | Mod: PBBFAC,,, | Performed by: NURSE PRACTITIONER

## 2025-06-17 PROCEDURE — 1159F MED LIST DOCD IN RCRD: CPT | Mod: CPTII,S$GLB,, | Performed by: NURSE PRACTITIONER

## 2025-06-17 PROCEDURE — 4010F ACE/ARB THERAPY RXD/TAKEN: CPT | Mod: CPTII,S$GLB,, | Performed by: NURSE PRACTITIONER

## 2025-06-17 PROCEDURE — 3044F HG A1C LEVEL LT 7.0%: CPT | Mod: CPTII,S$GLB,, | Performed by: NURSE PRACTITIONER

## 2025-06-17 PROCEDURE — 99214 OFFICE O/P EST MOD 30 MIN: CPT | Mod: S$GLB,,, | Performed by: NURSE PRACTITIONER

## 2025-06-17 PROCEDURE — 3074F SYST BP LT 130 MM HG: CPT | Mod: CPTII,S$GLB,, | Performed by: NURSE PRACTITIONER

## 2025-06-17 PROCEDURE — 3078F DIAST BP <80 MM HG: CPT | Mod: CPTII,S$GLB,, | Performed by: NURSE PRACTITIONER

## 2025-06-17 RX ORDER — GABAPENTIN 300 MG/1
300 CAPSULE ORAL 3 TIMES DAILY
Qty: 90 CAPSULE | Refills: 3 | Status: SHIPPED | OUTPATIENT
Start: 2025-06-17

## 2025-06-17 RX ORDER — SEMAGLUTIDE 2.68 MG/ML
2 INJECTION, SOLUTION SUBCUTANEOUS
Qty: 3 EACH | Refills: 1 | Status: SHIPPED | OUTPATIENT
Start: 2025-06-17

## 2025-06-17 NOTE — PROGRESS NOTES
Subjective:      Patient ID: Mary Ann Vidales is a 60 y.o. female.    Chief Complaint:  Diabetes f/u      History of Present Illness  Pt is a 60y/o female with TYPE 2 DM since approx 2000, and chronic conditions pending review including HTN, neuropathy, CKD state 3b , TINA on C-pap. Dyslipidemia, morbid obesity. Also TINA on cpap-    Interim Events:  June 17, 2025:  Pt to ER mid May for c/o BRBPR, Colonscopy done and pt received 4 units PRBC.  Remains on 2 mg ozempic- A1c typically in high 5 or low 6% range.  Wt down another 10 lbs--has lost >100 lbs over last couple of years.  Renal function is improved.  No focal complaints except age related joint issues.     Current DM meds:    2 mg ozempic.        Failed DM meds:  metformin, jardicance (renal). Levemir and Lantus---marked LE erythema and burning in LE on both insulins. Toujeo d/c abating need.        Back up plan for insulin pump hiatus:   Statin: atorvastatin 10 mg        Not tolerated statin : na   ACE/ARB:losartan 50 mg        Not tolerated ACE/ARB: na   Known Diabetic complications: nephropathy, neuropathy.     Oct 4, 2024: No acute events or new focal complaints. Biggest complaint continues to be LE neuropathy--and horrible intermittent shocking sensation of a great toe--whose etiology is likely contributed to back degeneration--MRI deferred at present r/t cost of co pay.  Regarding DM--currently excellent control. Pt has lost a lot of wt over the last several years.  Now off all DM agents with exception of 2 mg ozempic weekly.  States FBS usually about 120.  CKD stage 3;/4 stable.     Reports wt 212  States BP good.      April 30. 2024 No acute events or focal complaints. Inquiring if she can stop insulin and increase Ozempic, ALso c/o severe L great toe burning and with electric shocks interfering with sleep--wants to increase gabapentin.    Checking glucoses qam.     FBS usually 110-120.        July 21, 2023:  Pt not seen in almost a year.  Being  followed by renal for now CKD stage 4--but has been stable with GFR hovering 35 to 29 last year.  As a result I did advise her to stop the jardiance a couple of months ago.  She has lost close to 100 lbs over the last year and reports wt as 225 lbs.  She is inquiring about mounjaro for better wt loss benefits as a alternate to Ozempic       Aug 2022L Multiple issues in the interim r/t worsened renal function, marked LE edema--which was likely a result of pt being taken off gabapentin and switched to Lyrica--but since the lyrica wasn't providing the desired relief was taking more than renal dose allowed on both.  DM control has markedly worsened. She has also resumed metformin trying to improve glucoses.  Only checking 1 every day or QOD but always > 200,  And per labs 260 last week. We did add Jardiance to help wit the fluid at least visit.     Review of Systems   Constitutional: Negative.  Negative for fatigue and unexpected weight change.   HENT:  Negative for hearing loss and trouble swallowing.    Eyes:  Negative for photophobia and visual disturbance.   Respiratory:  Negative for cough and shortness of breath.    Cardiovascular:  Negative for chest pain and leg swelling.   Gastrointestinal:  Negative for constipation and diarrhea.   Genitourinary:  Negative for difficulty urinating and frequency.   Musculoskeletal:  Negative for arthralgias and myalgias.   Skin:  Negative for rash and wound.   Neurological:  Positive for numbness. Negative for weakness.   Psychiatric/Behavioral:  Negative for sleep disturbance. The patient is not nervous/anxious.        Objective:   Physical Exam  Constitutional:       Appearance: Normal appearance. She is obese.   HENT:      Head: Normocephalic and atraumatic.      Nose: Nose normal.      Mouth/Throat:      Mouth: Mucous membranes are moist.      Pharynx: Oropharynx is clear.   Eyes:      Extraocular Movements: Extraocular movements intact.      Conjunctiva/sclera:  Conjunctivae normal.      Pupils: Pupils are equal, round, and reactive to light.   Cardiovascular:      Rate and Rhythm: Normal rate and regular rhythm.      Pulses: Normal pulses.      Heart sounds: Normal heart sounds.   Pulmonary:      Effort: Pulmonary effort is normal.      Breath sounds: Normal breath sounds.   Musculoskeletal:         General: Normal range of motion.      Cervical back: Normal range of motion and neck supple.      Comments: Feet: no open wounds. Multiple subungual herniation noted to toes (startes r/t wearing crocs). Some callus formation under 3 rd toe.  Pedal pulses +2 bilaterally.   Sensation via monofilament grossly absent bilaterally    Skin:     General: Skin is warm and dry.   Neurological:      General: No focal deficit present.      Mental Status: She is alert and oriented to person, place, and time.   Psychiatric:         Mood and Affect: Mood normal.         Behavior: Behavior normal.         Thought Content: Thought content normal.         Judgment: Judgment normal.       [facility] 12/20/2013     Hemoglobin A1C   Date Value Ref Range Status   05/12/2025 5.8 (H) 4.0 - 5.6 % Final     Comment:     Reference Interval:  5.0 - 5.6 Normal   5.7 - 6.4 High Risk   > 6.5 Diabetic      Hgb A1c results are standardized based on the (NGSP) National   Glycohemoglobin Standardization Program.      Hemoglobin A1C levels are related to mean serum/plasma glucose   during the preceding 2-3 months.        11/23/2024 5.5 4.0 - 5.6 % Final     Comment:     ADA Screening Guidelines:  5.7-6.4%  Consistent with prediabetes  >or=6.5%  Consistent with diabetes    High levels of fetal hemoglobin interfere with the HbA1C  assay. Heterozygous hemoglobin variants (HbS, HgC, etc)do  not significantly interfere with this assay.   However, presence of multiple variants may affect accuracy.     06/22/2024 5.5 4.0 - 5.6 % Final     Comment:     ADA Screening Guidelines:  5.7-6.4%  Consistent with prediabetes  >or=6.5%   Consistent with diabetes    High levels of fetal hemoglobin interfere with the HbA1C  assay. Heterozygous hemoglobin variants (HbS, HgC, etc)do  not significantly interfere with this assay.   However, presence of multiple variants may affect accuracy.       Hemoglobin A1c   Date Value Ref Range Status   05/22/2025 5.5 4.0 - 5.6 % Final     Comment:     ADA Screening Guidelines:  5.7-6.4%  Consistent with prediabetes  >=6.5%  Consistent with diabetes    High levels of fetal hemoglobin interfere with the HbA1C  assay. Heterozygous hemoglobin variants (HbS, HgC, etc)do  not significantly interfere with this assay.   However, presence of multiple variants may affect accuracy.       Chemistry        Component Value Date/Time     05/22/2025 1354     05/15/2025 0641    K 4.0 05/22/2025 1354    K 3.8 05/15/2025 0641     05/22/2025 1354     (H) 05/15/2025 0641    CO2 24 05/22/2025 1354    CO2 24 05/15/2025 0641    BUN 32 (H) 05/22/2025 1354    CREATININE 1.3 05/22/2025 1354    GLU 94 05/22/2025 1354     (H) 05/15/2025 0641        Component Value Date/Time    CALCIUM 9.1 05/22/2025 1354    CALCIUM 8.3 (L) 05/15/2025 0641    ALKPHOS 67 05/15/2025 0641    AST 19 05/15/2025 0641    ALT 12 05/15/2025 0641    BILITOT 0.2 05/15/2025 0641    ESTGFRAFRICA 23.1 (A) 05/16/2022 0736    ESTGFRAFRICA 23.1 (A) 05/16/2022 0736    EGFRNONAA 20.0 (A) 05/16/2022 0736    EGFRNONAA 20.0 (A) 05/16/2022 0736          Lab Results   Component Value Date    CHOL 165 05/22/2025     Lab Results   Component Value Date    HDL 46 05/22/2025     Lab Results   Component Value Date    LDLCALC 85.8 05/22/2025     Lab Results   Component Value Date    TRIG 166 (H) 05/22/2025     Lab Results   Component Value Date    CHOLHDL 27.9 05/22/2025     Lab Results   Component Value Date    MICALBCREAT 127.9 (H) 06/01/2023     Lab Results   Component Value Date    TSH 1.130 05/22/2025     Vit D, 25-Hydroxy   Date Value Ref Range Status    05/16/2022 30 30 - 96 ng/mL Final     Comment:     Vitamin D deficiency.........<10 ng/mL                              Vitamin D insufficiency......10-29 ng/mL       Vitamin D sufficiency........> or equal to 30 ng/mL  Vitamin D toxicity............>100 ng/mL               Assessment:     1. Type 2 diabetes mellitus without complication, with long-term current use of insulin  gabapentin (NEURONTIN) 300 MG capsule    semaglutide (OZEMPIC) 2 mg/dose (8 mg/3 mL) PnIj      2. Coronary artery disease, unspecified vessel or lesion type, unspecified whether angina present, unspecified whether native or transplanted heart        3. Essential hypertension        4. Dyslipidemia        5. Polyneuropathy associated with underlying disease        6. Stage 3b chronic kidney disease              Plan:     On losartan 50 mg   On atorvastatin 20 mg    Pt advised:  Cont ozempic to 2 mg.       ORDERS 06/17/2025     6 mo with   A1c prior  virtual. --

## 2025-06-19 DIAGNOSIS — R10.9 ABDOMINAL CRAMPING: ICD-10-CM

## 2025-06-19 RX ORDER — DICYCLOMINE HYDROCHLORIDE 10 MG/1
10 CAPSULE ORAL 4 TIMES DAILY PRN
Qty: 360 CAPSULE | Refills: 1 | Status: SHIPPED | OUTPATIENT
Start: 2025-06-19

## 2025-06-20 DIAGNOSIS — E11.42 TYPE 2 DIABETES MELLITUS WITH DIABETIC POLYNEUROPATHY, WITHOUT LONG-TERM CURRENT USE OF INSULIN: ICD-10-CM

## 2025-06-20 DIAGNOSIS — S86.011S RUPTURE OF RIGHT ACHILLES TENDON, SEQUELA: ICD-10-CM

## 2025-06-20 RX ORDER — HYDROCODONE BITARTRATE AND ACETAMINOPHEN 7.5; 325 MG/1; MG/1
1 TABLET ORAL EVERY 6 HOURS PRN
Qty: 90 TABLET | Refills: 0 | Status: SHIPPED | OUTPATIENT
Start: 2025-06-20

## 2025-06-20 NOTE — TELEPHONE ENCOUNTER
No care due was identified.  [facility] Embedded Care Due Messages. Reference number: [id number].   6/20/2025 7:04:32 AM CDT

## 2025-07-06 DIAGNOSIS — F41.9 ANXIETY: ICD-10-CM

## 2025-07-06 RX ORDER — ALPRAZOLAM 0.5 MG/1
0.5 TABLET ORAL DAILY PRN
Qty: 30 TABLET | Refills: 0 | Status: CANCELLED | OUTPATIENT
Start: 2025-07-06

## 2025-07-07 RX ORDER — ALLOPURINOL 100 MG/1
200 TABLET ORAL
Qty: 180 TABLET | Refills: 1 | Status: SHIPPED | OUTPATIENT
Start: 2025-07-07

## 2025-07-07 NOTE — TELEPHONE ENCOUNTER
No care due was identified.  [facility] Embedded Care Due Messages. Reference number: [id number].   7/06/2025 7:47:21 PM CDT

## 2025-07-07 NOTE — TELEPHONE ENCOUNTER
You last approved 30  6/3/25     Rx pended w/ dx code in it.  Lov 4/10/25 stephanie (because missed your appt 4/3)  Lov 8/5/24, nov 9/8/25

## 2025-07-07 NOTE — TELEPHONE ENCOUNTER
No care due was identified.  [facility] Embedded Care Due Messages. Reference number: [id number].   7/06/2025 7:47:53 PM CDT

## 2025-07-08 ENCOUNTER — OFFICE VISIT (OUTPATIENT)
Dept: GASTROENTEROLOGY | Facility: CLINIC | Age: 60
End: 2025-07-08
Payer: COMMERCIAL

## 2025-07-08 VITALS — HEIGHT: 64 IN | BODY MASS INDEX: 34.77 KG/M2 | WEIGHT: 203.69 LBS

## 2025-07-08 DIAGNOSIS — Z87.19 HISTORY OF GI DIVERTICULAR BLEED: ICD-10-CM

## 2025-07-08 DIAGNOSIS — K21.9 GASTROESOPHAGEAL REFLUX DISEASE WITHOUT ESOPHAGITIS: ICD-10-CM

## 2025-07-08 DIAGNOSIS — Z12.11 SCREENING FOR COLON CANCER: ICD-10-CM

## 2025-07-08 DIAGNOSIS — K59.09 CHRONIC CONSTIPATION: Primary | ICD-10-CM

## 2025-07-08 DIAGNOSIS — K57.90 DIVERTICULOSIS: ICD-10-CM

## 2025-07-08 PROCEDURE — 3044F HG A1C LEVEL LT 7.0%: CPT | Mod: CPTII,S$GLB,,

## 2025-07-08 PROCEDURE — 99999 PR PBB SHADOW E&M-EST. PATIENT-LVL IV: CPT | Mod: PBBFAC,,,

## 2025-07-08 PROCEDURE — 1159F MED LIST DOCD IN RCRD: CPT | Mod: CPTII,S$GLB,,

## 2025-07-08 PROCEDURE — 1160F RVW MEDS BY RX/DR IN RCRD: CPT | Mod: CPTII,S$GLB,,

## 2025-07-08 PROCEDURE — 4010F ACE/ARB THERAPY RXD/TAKEN: CPT | Mod: CPTII,S$GLB,,

## 2025-07-08 PROCEDURE — 99214 OFFICE O/P EST MOD 30 MIN: CPT | Mod: S$GLB,,,

## 2025-07-08 PROCEDURE — 3066F NEPHROPATHY DOC TX: CPT | Mod: CPTII,S$GLB,,

## 2025-07-08 PROCEDURE — 3008F BODY MASS INDEX DOCD: CPT | Mod: CPTII,S$GLB,,

## 2025-07-08 RX ORDER — ALPRAZOLAM 0.5 MG/1
0.5 TABLET ORAL
Qty: 30 TABLET | Refills: 0 | Status: SHIPPED | OUTPATIENT
Start: 2025-07-08

## 2025-07-08 NOTE — PROGRESS NOTES
"Subjective:       Patient ID: Mary Ann Vidales is a 60 y.o. female Body mass index is 34.97 kg/m².    Chief Complaint: Follow-up    Established patient of Dr. Awan and myself.     Patient's significant other present and assisting with history.    GI Problem  The primary symptoms include weight loss (currently on Ozempic for diabetes). Primary symptoms do not include fever, fatigue, abdominal pain, nausea, vomiting, diarrhea, melena, hematemesis, jaundice, hematochezia, dysuria or rash.   The illness is also significant for constipation (Currently having 3-4 BMs daily rated stool 4 on [place] scale; restarted MiraLax 17 g once daily and is taking 28 g of fiber daily; past tx: stool softener and Correctol). The illness does not include chills, dysphagia, odynophagia or bloating. Associated symptoms comments: Colonoscopy 05/13/25 - "Perianal skin tags on perianal exam. The examined portion of ileum normal. Diverticulosis in transverse colon. There was active bleeding from diverticular opening. Clip placed. Diverticulosis in entire examined colon. No specimens collected". EGD 05/13/25 - "Normal mucosa found in the entire esophagus. Z-line regular, 37 cm from the incisors. Normal mucosa was found in the entire stomach. Normal duodenal bulb, first portion of the duodenum and second portion of the duodenum. No specimens collected". Significant associated medical issues include GERD (well-controlled taking pantoprazole 40 mg once daily). Associated medical issues do not include inflammatory bowel disease, gallstones, liver disease, alcohol abuse, PUD, gastric bypass, bowel resection, irritable bowel syndrome or diverticulitis (History of diverticulosis and hospitalization 05/12/25 r/t bleeding diverticulum in the transverse colon, which resolved after placing hemostatic clip).     Review of Systems   Constitutional:  Positive for weight loss (currently on Ozempic for diabetes). Negative for activity change, " appetite change, chills, diaphoresis, fatigue, fever and unexpected weight change.   HENT:  Negative for sore throat and trouble swallowing.    Respiratory:  Negative for cough, choking and shortness of breath.    Cardiovascular:  Negative for chest pain and palpitations (resolved - tight palp).   Gastrointestinal:  Positive for constipation (Currently having 3-4 BMs daily rated stool 4 on [place] scale; restarted MiraLax 17 g once daily and is taking 28 g of fiber daily; past tx: stool softener and Correctol). Negative for abdominal distention, abdominal pain, anal bleeding, bloating, blood in stool, diarrhea, dysphagia, hematemesis, hematochezia, jaundice, melena, nausea, rectal pain and vomiting.   Genitourinary:  Negative for dysuria, hematuria and vaginal bleeding.   Skin:  Negative for pallor and rash.   Neurological:  Negative for light-headedness.   Hematological:  Bruises/bleeds easily (Was on Plavix daily until recent hospital admission, which was held; patient reports she has not restarted Plavix yet).   Psychiatric/Behavioral:  Negative for confusion.        Patient's last menstrual period was 12/20/2013.  Past Medical History:   Diagnosis Date    Anemia     Anticoagulant long-term use     Chronic asthma     Coronary artery disease     Diabetes mellitus type II     Diverticulosis     Fibroids     Hypertension     Incontinence     Morbid obesity 11/03/2015    Neuropathy in diabetes     Obesity     TINA (obstructive sleep apnea)     uses CPAP    Panic attacks     Renal disorder     STAGE 3     Past Surgical History:   Procedure Laterality Date    BREAST SURGERY      CARDIAC CATHETERIZATION  07/05/2019    STENT IN LAD    COLONOSCOPY N/A 05/13/2025    Procedure: COLONOSCOPY;  Surgeon: Lb Awan DO;  Location: [facility];  Service: Endoscopy;  Laterality: N/A;    COLONOSCOPY, SCREENING, LOW RISK PATIENT N/A 01/31/2025    Procedure: COLONOSCOPY, SCREENING, LOW RISK PATIENT;  Surgeon: Lewis Jolly  MD EDIL;  Location: [facility] ENDO;  Service: General;  Laterality: N/A;    CORONARY ANGIOGRAPHY N/A 11/06/2020    Procedure: ANGIOGRAM, CORONARY ARTERY;  Surgeon: John Francis MD;  Location: [facility] CATH;  Service: Cardiology;  Laterality: N/A;    CORONARY ANGIOPLASTY WITH STENT PLACEMENT  2019    ECTOPIC PREGNANCY SURGERY      ESOPHAGOGASTRODUODENOSCOPY N/A 05/13/2025    Procedure: EGD (ESOPHAGOGASTRODUODENOSCOPY);  Surgeon: Lb Awan DO;  Location: [facility] ENDO;  Service: Endoscopy;  Laterality: N/A;    HERNIA REPAIR      HYSTERECTOMY      OOPHORECTOMY      TENOTOMY Right 03/04/2022    Procedure: TENOTOMY- Tx Bone Left achilles;  Surgeon: Sarwat Vaughn MD;  Location: [facility] OR;  Service: Orthopedics;  Laterality: Right;    TONSILLECTOMY      UPPER GASTROINTESTINAL ENDOSCOPY       Family History   Problem Relation Name Age of Onset    Diabetes Mother      Heart disease Mother      COPD Mother      Heart failure Father      Breast cancer Neg Hx      Ovarian cancer Neg Hx      Colon cancer Neg Hx      Crohn's disease Neg Hx      Ulcerative colitis Neg Hx      Stomach cancer Neg Hx      Esophageal cancer Neg Hx       Social History[1]  Wt Readings from Last 10 Encounters:   07/08/25 92.4 kg (203 lb 11.3 oz)   06/17/25 92.1 kg (203 lb 0.7 oz)   05/22/25 95.6 kg (210 lb 12.2 oz)   05/12/25 95 kg (209 lb 7 oz)   04/10/25 93.3 kg (205 lb 11 oz)   01/27/25 96.2 kg (212 lb)   08/12/24 96.6 kg (212 lb 15.4 oz)   08/05/24 97.7 kg (215 lb 6.2 oz)   04/30/24 100.4 kg (221 lb 7.2 oz)   04/10/24 97.5 kg (215 lb)     Lab Results   Component Value Date    WBC 7.00 05/22/2025    HGB 8.3 (L) 05/22/2025    HCT 26.3 (L) 05/22/2025    MCV 96 05/22/2025     05/22/2025     CMP  Sodium   Date Value Ref Range Status   05/22/2025 138 136 - 145 mmol/L Final   05/15/2025 145 136 - 145 mmol/L Final     Potassium   Date Value Ref Range Status   05/22/2025 4.0 3.5 - 5.1 mmol/L Final   05/15/2025 3.8 3.5 - 5.1 mmol/L Final      Comment:     Anion Gap reference range revised on 4/28/2023     Chloride   Date Value Ref Range Status   05/22/2025 103 95 - 110 mmol/L Final   05/15/2025 114 (H) 95 - 110 mmol/L Final     CO2   Date Value Ref Range Status   05/22/2025 24 23 - 29 mmol/L Final   05/15/2025 24 23 - 29 mmol/L Final     Glucose   Date Value Ref Range Status   05/22/2025 94 70 - 110 mg/dL Final   05/15/2025 114 (H) 70 - 110 mg/dL Final     Comment:     The ADA recommends the following guidelines for fasting glucose:    Normal:       less than 100 mg/dL    Prediabetes:  100 mg/dL to 125 mg/dL    Diabetes:     126 mg/dL or higher       BUN   Date Value Ref Range Status   05/22/2025 32 (H) 6 - 20 mg/dL Final     Creatinine   Date Value Ref Range Status   05/22/2025 1.3 0.5 - 1.4 mg/dL Final     Calcium   Date Value Ref Range Status   05/22/2025 9.1 8.7 - 10.5 mg/dL Final   05/15/2025 8.3 (L) 8.7 - 10.5 mg/dL Final     Total Protein   Date Value Ref Range Status   05/15/2025 4.8 (L) 6.0 - 8.4 g/dL Final     Albumin   Date Value Ref Range Status   05/22/2025 3.3 (L) 3.5 - 5.2 g/dL Final   05/15/2025 2.6 (L) 3.5 - 5.2 g/dL Final     Total Bilirubin   Date Value Ref Range Status   05/15/2025 0.2 0.2 - 1.0 mg/dL Final     Alkaline Phosphatase   Date Value Ref Range Status   05/15/2025 67 40 - 150 U/L Final     AST   Date Value Ref Range Status   05/15/2025 19 10 - 40 U/L Final     ALT   Date Value Ref Range Status   05/15/2025 12 10 - 44 U/L Final     Anion Gap   Date Value Ref Range Status   05/22/2025 11 8 - 16 mmol/L Final     eGFR if    Date Value Ref Range Status   05/16/2022 23.1 (A) >60 mL/min/1.73 m^2 Final   05/16/2022 23.1 (A) >60 mL/min/1.73 m^2 Final     eGFR if non    Date Value Ref Range Status   05/16/2022 20.0 (A) >60 mL/min/1.73 m^2 Final     Comment:     Calculation used to obtain the estimated glomerular filtration  rate (eGFR) is the CKD-EPI equation.      05/16/2022 20.0 (A) >60 mL/min/1.73  m^2 Final     Comment:     Calculation used to obtain the estimated glomerular filtration  rate (eGFR) is the CKD-EPI equation.        Lab Results   Component Value Date    LIPASERES 92 (H) 05/12/2025     Lab Results   Component Value Date    TSH 1.130 05/22/2025     Reviewed prior medical records including radiology report of KUB 05/13/2025 & endoscopy history (see surgical history).    Objective:      Physical Exam  Vitals and nursing note reviewed.   Constitutional:       General: She is not in acute distress.     Appearance: Normal appearance. She is obese. She is not ill-appearing.   HENT:      Mouth/Throat:      Lips: Pink. No lesions.   Cardiovascular:      Pulses: Normal pulses.      Heart sounds: Normal heart sounds.   Pulmonary:      Effort: Pulmonary effort is normal. No respiratory distress.      Breath sounds: Normal breath sounds.   Abdominal:      General: Bowel sounds are normal. There is no distension or abdominal bruit. There are no signs of injury.      Palpations: Abdomen is soft. There is no shifting dullness, fluid wave, hepatomegaly, splenomegaly or mass.      Tenderness: There is no abdominal tenderness. There is no guarding or rebound. Negative signs include Pemberton's sign, Rovsing's sign and McBurney's sign.   Skin:     General: Skin is warm and dry.      Coloration: Skin is not jaundiced or pale.   Neurological:      Mental Status: She is alert and oriented to person, place, and time.   Psychiatric:         Attention and Perception: Attention normal.         Mood and Affect: Mood normal.         Speech: Speech normal.         Behavior: Behavior normal.         Assessment:       1. Chronic constipation    2. Diverticulosis    3. History of GI diverticular bleed    4. Gastroesophageal reflux disease without esophagitis          Plan:       Chronic constipation  Recommend daily exercise as tolerated, adequate water intake (six 8-oz glasses of water daily), and high fiber diet. Continue OTC  fiber supplements are recommended if diet does not reach daily fiber goal (20-30 grams daily), such as Metamucil, Citrucel, or FiberCon (take as directed, separate from other oral medications by >2 hours).  - CONTINUE OTC MiraLax once daily (17g PO)    Diverticulosis & History of GI diverticular bleed  - CONTINUE high fiber diet (20-30 grams of fiber daily)/OTC fiber supplements daily as directed, such as Benefiber or Metamucil.  - You may resume normal activity as long as you feel well.  - Avoid/minimize NSAIDs such as ibuprofen (Advil, Motrin) and naproxen (Aleve and Naprosyn).  - Avoid alcohol.    Gastroesophageal reflux disease without esophagitis  -Avoid large meals, avoid eating within 2-3 hours of bedtime (avoid late night eating & lying down soon after eating), elevate head of bed if nocturnal symptoms are present, smoking cessation (if current smoker), & weight loss (if overweight).   -Avoid known foods which trigger reflux symptoms & to minimize/avoid high-fat foods, chocolate, caffeine, citrus, alcohol, & tomato products.  -Avoid/limit use of NSAID's, since they can cause GI upset, bleeding, and/or ulcers. If needed, take with food.  - continue pantoprazole 40 mg once daily 30 minutes to an hour before breakfast    Screening for colon cancer  - follow-up for surveillance colonoscopy 05/2030    Follow up in about 3 months (around 10/8/2025), or if symptoms worsen or fail to improve.      If no improvement in symptoms or symptoms worsen, call/follow-up at clinic or go to ER.        Total time spent on the encounter includes face to face time and non-face to face time preparing to see the patient (eg, review of tests), Obtaining and/or reviewing separately obtained history, Documenting clinical information in the electronic or other health record, Independently interpreting results (not separately reported) and communicating results to the patient/family/caregiver, or Care coordination (not separately  reported).     A dictation software program was used for this note. Please expect some simple typographical  errors in this note.           [1]   Social History  Tobacco Use    Smoking status: Never    Smokeless tobacco: Never   Substance Use Topics    Alcohol use: No     Alcohol/week: 0.0 standard drinks of alcohol    Drug use: No

## 2025-07-22 RX ORDER — FLUOXETINE HYDROCHLORIDE 40 MG/1
40 CAPSULE ORAL
Qty: 90 CAPSULE | Refills: 2 | Status: SHIPPED | OUTPATIENT
Start: 2025-07-22

## 2025-07-22 NOTE — TELEPHONE ENCOUNTER
Refill Routing Note   Medication(s) are not appropriate for processing by [facility] for the following reason(s):        ED/Hospital Visit since last OV with provider  Allergy or intolerance    [facility] action(s):  Defer        Medication Therapy Plan: Allergy/Contraindication: FLUoxetine    Extended chart review required: Yes     Appointments  past 12m or future 3m with PCP    Date Provider   Last Visit   8/5/2024 Davon Kan MD   Next Visit   9/8/2025 Davon Kan MD   ED visits in past 90 days: 0        Note composed:9:17 AM 07/22/2025

## 2025-08-06 DIAGNOSIS — F41.9 ANXIETY: ICD-10-CM

## 2025-08-06 RX ORDER — ALPRAZOLAM 0.5 MG/1
0.5 TABLET ORAL
Qty: 30 TABLET | Refills: 0 | Status: SHIPPED | OUTPATIENT
Start: 2025-08-06

## 2025-08-06 NOTE — TELEPHONE ENCOUNTER
No care due was identified.  [facility] Embedded Care Due Messages. Reference number: [id number].   8/06/2025 9:17:08 AM CDT

## 2025-08-06 NOTE — TELEPHONE ENCOUNTER
Last filled 7/8/25 30 pills.    Lov  4/10/25 stephanie.  Last saw green 8/5/24, nov 9/8/25 dr chang.    Rx pended.

## 2025-08-15 ENCOUNTER — PATIENT OUTREACH (OUTPATIENT)
Dept: ADMINISTRATIVE | Facility: HOSPITAL | Age: 60
End: 2025-08-15
Payer: COMMERCIAL

## 2025-08-27 ENCOUNTER — LAB VISIT (OUTPATIENT)
Dept: LAB | Facility: HOSPITAL | Age: 60
End: 2025-08-27
Attending: INTERNAL MEDICINE
Payer: COMMERCIAL

## 2025-08-27 DIAGNOSIS — E11.9 TYPE 2 DIABETES MELLITUS WITHOUT COMPLICATION, UNSPECIFIED WHETHER LONG TERM INSULIN USE: ICD-10-CM

## 2025-08-27 DIAGNOSIS — I10 ESSENTIAL HYPERTENSION: ICD-10-CM

## 2025-08-27 DIAGNOSIS — D64.9 ANEMIA, UNSPECIFIED TYPE: ICD-10-CM

## 2025-08-27 DIAGNOSIS — E11.42 TYPE 2 DIABETES MELLITUS WITH DIABETIC POLYNEUROPATHY, WITHOUT LONG-TERM CURRENT USE OF INSULIN: ICD-10-CM

## 2025-08-27 DIAGNOSIS — E79.0 HYPERURICEMIA: ICD-10-CM

## 2025-08-27 DIAGNOSIS — E78.5 DYSLIPIDEMIA: ICD-10-CM

## 2025-08-27 DIAGNOSIS — N18.4 CKD (CHRONIC KIDNEY DISEASE) STAGE 4, GFR 15-29 ML/MIN: ICD-10-CM

## 2025-08-27 LAB
ABSOLUTE EOSINOPHIL (OHS): 0.19 K/UL
ABSOLUTE MONOCYTE (OHS): 0.52 K/UL (ref 0.3–1)
ABSOLUTE NEUTROPHIL COUNT (OHS): 3.86 K/UL (ref 1.8–7.7)
BASOPHILS # BLD AUTO: 0.06 K/UL
BASOPHILS NFR BLD AUTO: 0.8 %
ERYTHROCYTE [DISTWIDTH] IN BLOOD BY AUTOMATED COUNT: 13.8 % (ref 11.5–14.5)
HCT VFR BLD AUTO: 33.6 % (ref 37–48.5)
HGB BLD-MCNC: 11.1 GM/DL (ref 12–16)
IMM GRANULOCYTES # BLD AUTO: 0.02 K/UL (ref 0–0.04)
IMM GRANULOCYTES NFR BLD AUTO: 0.3 % (ref 0–0.5)
LYMPHOCYTES # BLD AUTO: 2.41 K/UL (ref 1–4.8)
MCH RBC QN AUTO: 32 PG (ref 27–31)
MCHC RBC AUTO-ENTMCNC: 33 G/DL (ref 32–36)
MCV RBC AUTO: 97 FL (ref 82–98)
NUCLEATED RBC (/100WBC) (OHS): 0 /100 WBC
PLATELET # BLD AUTO: 131 K/UL (ref 150–450)
PMV BLD AUTO: 9.6 FL (ref 9.2–12.9)
RBC # BLD AUTO: 3.47 M/UL (ref 4–5.4)
RELATIVE EOSINOPHIL (OHS): 2.7 %
RELATIVE LYMPHOCYTE (OHS): 34.1 % (ref 18–48)
RELATIVE MONOCYTE (OHS): 7.4 % (ref 4–15)
RELATIVE NEUTROPHIL (OHS): 54.7 % (ref 38–73)
WBC # BLD AUTO: 7.06 K/UL (ref 3.9–12.7)

## 2025-08-27 PROCEDURE — 84156 ASSAY OF PROTEIN URINE: CPT

## 2025-08-27 PROCEDURE — 83970 ASSAY OF PARATHORMONE: CPT

## 2025-08-27 PROCEDURE — 83036 HEMOGLOBIN GLYCOSYLATED A1C: CPT

## 2025-08-27 PROCEDURE — 84550 ASSAY OF BLOOD/URIC ACID: CPT

## 2025-08-27 PROCEDURE — 36415 COLL VENOUS BLD VENIPUNCTURE: CPT | Mod: PO

## 2025-08-27 PROCEDURE — 85025 COMPLETE CBC W/AUTO DIFF WBC: CPT

## 2025-08-27 PROCEDURE — 82570 ASSAY OF URINE CREATININE: CPT | Mod: 59

## 2025-08-27 PROCEDURE — 80053 COMPREHEN METABOLIC PANEL: CPT

## 2025-08-27 PROCEDURE — 84100 ASSAY OF PHOSPHORUS: CPT

## 2025-08-28 LAB
ALBUMIN SERPL BCP-MCNC: 4.1 G/DL (ref 3.5–5.2)
ALBUMIN/CREAT UR: 216.8 UG/MG
ALP SERPL-CCNC: 97 UNIT/L (ref 40–150)
ALT SERPL W/O P-5'-P-CCNC: 19 UNIT/L (ref 0–55)
ANION GAP (OHS): 14 MMOL/L (ref 8–16)
AST SERPL-CCNC: 26 UNIT/L (ref 0–50)
BILIRUB SERPL-MCNC: 0.3 MG/DL (ref 0.1–1)
BUN SERPL-MCNC: 48 MG/DL (ref 6–20)
CALCIUM SERPL-MCNC: 9.8 MG/DL (ref 8.7–10.5)
CHLORIDE SERPL-SCNC: 104 MMOL/L (ref 95–110)
CO2 SERPL-SCNC: 23 MMOL/L (ref 23–29)
CREAT SERPL-MCNC: 2.2 MG/DL (ref 0.5–1.4)
CREAT UR-MCNC: 143 MG/DL (ref 15–325)
CREAT UR-MCNC: 145 MG/DL (ref 15–325)
EAG (OHS): 123 MG/DL (ref 68–131)
GFR SERPLBLD CREATININE-BSD FMLA CKD-EPI: 25 ML/MIN/1.73/M2
GLUCOSE SERPL-MCNC: 103 MG/DL (ref 70–110)
HBA1C MFR BLD: 5.9 % (ref 4–5.6)
MICROALBUMIN UR-MCNC: 310 UG/ML
PHOSPHATE SERPL-MCNC: 3.7 MG/DL (ref 2.7–4.5)
POTASSIUM SERPL-SCNC: 4.1 MMOL/L (ref 3.5–5.1)
PROT SERPL-MCNC: 7 GM/DL (ref 6–8.4)
PROT UR-MCNC: 51 MG/DL
PROT/CREAT UR: 0.35 MG/G{CREAT}
PTH-INTACT SERPL-MCNC: 169 PG/ML (ref 9–77)
SODIUM SERPL-SCNC: 141 MMOL/L (ref 136–145)
URATE SERPL-MCNC: 4.5 MG/DL (ref 2.4–5.7)

## 2025-09-02 ENCOUNTER — RESULTS FOLLOW-UP (OUTPATIENT)
Dept: NEPHROLOGY | Facility: CLINIC | Age: 60
End: 2025-09-02
Payer: COMMERCIAL

## 2025-09-02 ENCOUNTER — OFFICE VISIT (OUTPATIENT)
Dept: NEPHROLOGY | Facility: CLINIC | Age: 60
End: 2025-09-02
Payer: COMMERCIAL

## 2025-09-02 DIAGNOSIS — N28.1 ACQUIRED CYST OF KIDNEY: ICD-10-CM

## 2025-09-02 DIAGNOSIS — N25.81 SECONDARY HYPERPARATHYROIDISM OF RENAL ORIGIN: ICD-10-CM

## 2025-09-02 DIAGNOSIS — N18.4 CKD (CHRONIC KIDNEY DISEASE) STAGE 4, GFR 15-29 ML/MIN: Primary | ICD-10-CM

## 2025-09-02 DIAGNOSIS — E66.01 MORBID OBESITY: ICD-10-CM

## 2025-09-02 DIAGNOSIS — E11.21 DIABETIC NEPHROPATHY ASSOCIATED WITH TYPE 2 DIABETES MELLITUS: ICD-10-CM

## 2025-09-02 DIAGNOSIS — I10 PRIMARY HYPERTENSION: ICD-10-CM

## 2025-09-02 PROCEDURE — 1159F MED LIST DOCD IN RCRD: CPT | Mod: CPTII,95,, | Performed by: INTERNAL MEDICINE

## 2025-09-02 PROCEDURE — 3044F HG A1C LEVEL LT 7.0%: CPT | Mod: CPTII,95,, | Performed by: INTERNAL MEDICINE

## 2025-09-02 PROCEDURE — 4010F ACE/ARB THERAPY RXD/TAKEN: CPT | Mod: CPTII,95,, | Performed by: INTERNAL MEDICINE

## 2025-09-02 PROCEDURE — 98006 SYNCH AUDIO-VIDEO EST MOD 30: CPT | Mod: 95,,, | Performed by: INTERNAL MEDICINE

## 2025-09-02 PROCEDURE — 3066F NEPHROPATHY DOC TX: CPT | Mod: CPTII,95,, | Performed by: INTERNAL MEDICINE

## 2025-09-02 PROCEDURE — 3060F POS MICROALBUMINURIA REV: CPT | Mod: CPTII,95,, | Performed by: INTERNAL MEDICINE

## 2025-09-02 PROCEDURE — G2211 COMPLEX E/M VISIT ADD ON: HCPCS | Mod: 95,,, | Performed by: INTERNAL MEDICINE

## 2025-09-02 PROCEDURE — 2023F DILAT RTA XM W/O RTNOPTHY: CPT | Mod: CPTII,95,, | Performed by: INTERNAL MEDICINE

## 2025-09-02 PROCEDURE — 1160F RVW MEDS BY RX/DR IN RCRD: CPT | Mod: CPTII,95,, | Performed by: INTERNAL MEDICINE

## 2025-09-03 DIAGNOSIS — F41.9 ANXIETY: ICD-10-CM

## 2025-09-04 RX ORDER — ALPRAZOLAM 0.5 MG/1
0.5 TABLET ORAL
Qty: 30 TABLET | Refills: 0 | Status: SHIPPED | OUTPATIENT
Start: 2025-09-04

## (undated) DEVICE — NDL 27G X 1 1/4

## (undated) DEVICE — GLOVE SURGICAL LATEX SZ 8

## (undated) DEVICE — SEE MEDLINE ITEM 157128

## (undated) DEVICE — MARKER SKIN STND TIP BLUE BARR

## (undated) DEVICE — CATH INTROCAN CANN 18G 1-3/4IN

## (undated) DEVICE — SEE L#120831

## (undated) DEVICE — KIT COVER GENERAL PURPOSE

## (undated) DEVICE — CUP MEDICINE STERILE 2OZ

## (undated) DEVICE — SOD CL BACT LS .09% 10 ML

## (undated) DEVICE — APPLICATOR CHLORAPREP CLR 10.5

## (undated) DEVICE — LABEL FOR UTILITY MARKER

## (undated) DEVICE — SPONGE DERMACEA 4X4IN 12PLY

## (undated) DEVICE — TOWEL OR DISP STRL BLUE 4/PK

## (undated) DEVICE — Device